# Patient Record
Sex: FEMALE | Race: WHITE | Employment: OTHER | URBAN - METROPOLITAN AREA
[De-identification: names, ages, dates, MRNs, and addresses within clinical notes are randomized per-mention and may not be internally consistent; named-entity substitution may affect disease eponyms.]

---

## 2017-08-12 ENCOUNTER — APPOINTMENT (OUTPATIENT)
Dept: GENERAL RADIOLOGY | Facility: HOSPITAL | Age: 82
DRG: 871 | End: 2017-08-12
Attending: EMERGENCY MEDICINE
Payer: MEDICARE

## 2017-08-12 ENCOUNTER — HOSPITAL ENCOUNTER (INPATIENT)
Facility: HOSPITAL | Age: 82
LOS: 2 days | Discharge: HOME HEALTH | DRG: 871 | End: 2017-08-14
Attending: EMERGENCY MEDICINE | Admitting: HOSPITALIST
Payer: MEDICARE

## 2017-08-12 ENCOUNTER — APPOINTMENT (OUTPATIENT)
Dept: CT IMAGING | Facility: HOSPITAL | Age: 82
DRG: 871 | End: 2017-08-12
Attending: EMERGENCY MEDICINE
Payer: MEDICARE

## 2017-08-12 DIAGNOSIS — J18.9 COMMUNITY ACQUIRED PNEUMONIA: ICD-10-CM

## 2017-08-12 DIAGNOSIS — G25.2 COARSE TREMORS: Primary | ICD-10-CM

## 2017-08-12 LAB
ALBUMIN SERPL-MCNC: 3.1 G/DL (ref 3.5–4.8)
ALLENS TEST: POSITIVE
ALP LIVER SERPL-CCNC: 86 U/L (ref 55–142)
ALT SERPL-CCNC: 15 U/L (ref 14–54)
APTT PPP: 32.2 SECONDS (ref 25–34)
ARTERIAL BLD GAS O2 SATURATION: 93 % (ref 92–100)
ARTERIAL BLOOD GAS BASE EXCESS: 0.8
ARTERIAL BLOOD GAS HCO3: 24.5 MEQ/L (ref 22–26)
ARTERIAL BLOOD GAS PCO2: 36 MM HG (ref 35–45)
ARTERIAL BLOOD GAS PH: 7.45 (ref 7.35–7.45)
ARTERIAL BLOOD GAS PO2: 68 MM HG (ref 80–105)
AST SERPL-CCNC: 34 U/L (ref 15–41)
BASOPHILS # BLD AUTO: 0.03 X10(3) UL (ref 0–0.1)
BASOPHILS NFR BLD AUTO: 0.2 %
BILIRUB SERPL-MCNC: 0.5 MG/DL (ref 0.1–2)
BILIRUB UR QL STRIP.AUTO: NEGATIVE
BUN BLD-MCNC: 25 MG/DL (ref 8–20)
CALCIUM BLD-MCNC: 8.6 MG/DL (ref 8.3–10.3)
CALCULATED O2 SATURATION: 94 % (ref 92–100)
CARBOXYHEMOGLOBIN: 1 % SAT (ref 0–3)
CHLORIDE: 101 MMOL/L (ref 101–111)
CLARITY UR REFRACT.AUTO: CLEAR
CO2: 26 MMOL/L (ref 22–32)
COLOR UR AUTO: YELLOW
CREAT BLD-MCNC: 1.4 MG/DL (ref 0.55–1.02)
EOSINOPHIL # BLD AUTO: 0.01 X10(3) UL (ref 0–0.3)
EOSINOPHIL NFR BLD AUTO: 0.1 %
ERYTHROCYTE [DISTWIDTH] IN BLOOD BY AUTOMATED COUNT: 13.7 % (ref 11.5–16)
GLUCOSE BLD-MCNC: 123 MG/DL (ref 70–99)
GLUCOSE UR STRIP.AUTO-MCNC: NEGATIVE MG/DL
HCT VFR BLD AUTO: 42.7 % (ref 34–50)
HGB BLD-MCNC: 14.1 G/DL (ref 12–16)
IMMATURE GRANULOCYTE COUNT: 0.09 X10(3) UL (ref 0–1)
IMMATURE GRANULOCYTE RATIO %: 0.7 %
INR BLD: 1.05 (ref 0.89–1.11)
KETONES UR STRIP.AUTO-MCNC: NEGATIVE MG/DL
L/M: 2 L/MIN
LACTIC ACID: 1.5 MMOL/L (ref 0.5–2)
LEUKOCYTE ESTERASE UR QL STRIP.AUTO: NEGATIVE
LYMPHOCYTES # BLD AUTO: 2.3 X10(3) UL (ref 0.9–4)
LYMPHOCYTES NFR BLD AUTO: 17.2 %
M PROTEIN MFR SERPL ELPH: 7.6 G/DL (ref 6.1–8.3)
MCH RBC QN AUTO: 29.9 PG (ref 27–33.2)
MCHC RBC AUTO-ENTMCNC: 33 G/DL (ref 31–37)
MCV RBC AUTO: 90.5 FL (ref 81–100)
METHEMOGLOBIN: 0.7 % SAT (ref 0.4–1.5)
MONOCYTES # BLD AUTO: 0.56 X10(3) UL (ref 0.1–0.6)
MONOCYTES NFR BLD AUTO: 4.2 %
NEUTROPHIL ABS PRELIM: 10.39 X10 (3) UL (ref 1.3–6.7)
NEUTROPHILS # BLD AUTO: 10.39 X10(3) UL (ref 1.3–6.7)
NEUTROPHILS NFR BLD AUTO: 77.6 %
NITRITE UR QL STRIP.AUTO: NEGATIVE
PATIENT TEMPERATURE: 98.2 F
PH UR STRIP.AUTO: 6 [PH] (ref 4.5–8)
PLATELET # BLD AUTO: 229 10(3)UL (ref 150–450)
POTASSIUM SERPL-SCNC: 5.1 MMOL/L (ref 3.6–5.1)
PROCALCITONIN SERPL-MCNC: <0.11 NG/ML (ref ?–0.11)
PROT UR STRIP.AUTO-MCNC: >=500 MG/DL
PSA SERPL DL<=0.01 NG/ML-MCNC: 13.7 SECONDS (ref 12–14.3)
RBC # BLD AUTO: 4.72 X10(6)UL (ref 3.8–5.1)
RBC UR QL AUTO: NEGATIVE
RED CELL DISTRIBUTION WIDTH-SD: 45.3 FL (ref 35.1–46.3)
SODIUM SERPL-SCNC: 134 MMOL/L (ref 136–144)
SP GR UR STRIP.AUTO: 1.01 (ref 1–1.03)
TOTAL HEMOGLOBIN: 14 G/DL (ref 11.7–16)
TROPONIN: <0.046 NG/ML (ref ?–0.05)
UROBILINOGEN UR STRIP.AUTO-MCNC: <2 MG/DL
WBC # BLD AUTO: 13.4 X10(3) UL (ref 4–13)

## 2017-08-12 PROCEDURE — 99223 1ST HOSP IP/OBS HIGH 75: CPT | Performed by: HOSPITALIST

## 2017-08-12 PROCEDURE — 71010 XR CHEST AP PORTABLE  (CPT=71010): CPT | Performed by: EMERGENCY MEDICINE

## 2017-08-12 PROCEDURE — 70450 CT HEAD/BRAIN W/O DYE: CPT | Performed by: EMERGENCY MEDICINE

## 2017-08-12 RX ORDER — ACETAMINOPHEN 325 MG/1
650 TABLET ORAL EVERY 6 HOURS PRN
Status: DISCONTINUED | OUTPATIENT
Start: 2017-08-12 | End: 2017-08-14

## 2017-08-12 RX ORDER — ASPIRIN 81 MG/1
81 TABLET, CHEWABLE ORAL DAILY
Status: DISCONTINUED | OUTPATIENT
Start: 2017-08-12 | End: 2017-08-14

## 2017-08-12 RX ORDER — IBUPROFEN 600 MG/1
600 TABLET ORAL EVERY 4 HOURS PRN
Status: DISCONTINUED | OUTPATIENT
Start: 2017-08-12 | End: 2017-08-14

## 2017-08-12 RX ORDER — ACETAMINOPHEN 500 MG
1000 TABLET ORAL ONCE
Status: DISCONTINUED | OUTPATIENT
Start: 2017-08-12 | End: 2017-08-14

## 2017-08-12 RX ORDER — DILTIAZEM HYDROCHLORIDE 240 MG/1
240 CAPSULE, COATED, EXTENDED RELEASE ORAL DAILY
Status: DISCONTINUED | OUTPATIENT
Start: 2017-08-12 | End: 2017-08-14

## 2017-08-12 RX ORDER — IBUPROFEN 200 MG
200 TABLET ORAL EVERY 4 HOURS PRN
Status: DISCONTINUED | OUTPATIENT
Start: 2017-08-12 | End: 2017-08-14

## 2017-08-12 RX ORDER — LOSARTAN POTASSIUM 50 MG/1
50 TABLET ORAL DAILY
Status: DISCONTINUED | OUTPATIENT
Start: 2017-08-12 | End: 2017-08-14

## 2017-08-12 RX ORDER — LORAZEPAM 2 MG/ML
0.5 INJECTION INTRAMUSCULAR ONCE
Status: COMPLETED | OUTPATIENT
Start: 2017-08-12 | End: 2017-08-12

## 2017-08-12 RX ORDER — SODIUM CHLORIDE 9 MG/ML
INJECTION, SOLUTION INTRAVENOUS CONTINUOUS
Status: DISCONTINUED | OUTPATIENT
Start: 2017-08-12 | End: 2017-08-14

## 2017-08-12 RX ORDER — IBUPROFEN 400 MG/1
400 TABLET ORAL EVERY 4 HOURS PRN
Status: DISCONTINUED | OUTPATIENT
Start: 2017-08-12 | End: 2017-08-14

## 2017-08-12 RX ORDER — DEXTROSE MONOHYDRATE 25 G/50ML
50 INJECTION, SOLUTION INTRAVENOUS
Status: DISCONTINUED | OUTPATIENT
Start: 2017-08-12 | End: 2017-08-14

## 2017-08-12 RX ORDER — ENOXAPARIN SODIUM 100 MG/ML
30 INJECTION SUBCUTANEOUS DAILY
Status: DISCONTINUED | OUTPATIENT
Start: 2017-08-12 | End: 2017-08-14

## 2017-08-12 RX ORDER — IRBESARTAN 150 MG/1
150 TABLET ORAL NIGHTLY
Status: ON HOLD | COMMUNITY
End: 2018-05-15

## 2017-08-12 RX ORDER — ONDANSETRON 2 MG/ML
4 INJECTION INTRAMUSCULAR; INTRAVENOUS EVERY 4 HOURS PRN
Status: DISCONTINUED | OUTPATIENT
Start: 2017-08-12 | End: 2017-08-14

## 2017-08-12 RX ORDER — LEVOTHYROXINE SODIUM 0.05 MG/1
50 TABLET ORAL
Status: DISCONTINUED | OUTPATIENT
Start: 2017-08-12 | End: 2017-08-14

## 2017-08-12 NOTE — ED NOTES
Pt to ED via EMS for c/o tremors that started this AM. Denies pain. Daughter at bedside, sts pt lives with a caregiver at [facility] AT [place].  Pt walks with a walker and noticed tremors this AM

## 2017-08-12 NOTE — ED PROVIDER NOTES
Patient Seen in: [facility] Emergency Department    History   Patient presents with:  Numbness Weakness (neurologic)    Stated Complaint: Tremors    HPI    26-year-old female presents for evaluation of tremors.   Patient is a fairly poor historian bu Tremors  Other systems are as noted in HPI. Constitutional and vital signs reviewed. All other systems reviewed and negative except as noted above. PSFH elements reviewed from today and agreed except as otherwise stated in HPI.     Physical Exam Absolute 10.39 (*)     All other components within normal limits   TROPONIN I - Normal   PROCALCITONIN - Normal   CBC WITH DIFFERENTIAL WITH PLATELET    Narrative: The following orders were created for panel order CBC WITH DIFFERENTIAL WITH PLATELET. IVPB-minibag/addVantage (not administered)   azithromycin (ZITHROMAX) 500 mg in sodium chloride 0.9 % 250 mL IVPB add-vantage (500 mg Intravenous Handoff 8/12/17 2110)   0.9%  NaCl infusion (not administered)   Enoxaparin Sodium (LOVENOX) 30 MG/0.3ML injec

## 2017-08-12 NOTE — ED NOTES
Daughter arrives to bedside. Refused tylenol for pt. Daughter educated on why tylenol was ordered. Daughter continues to refuse. Asked daughter why she does not want her Mom to have tylenol. Daughter sts \"I don't want her to have it. She doesn't need it. \

## 2017-08-13 LAB
BUN BLD-MCNC: 21 MG/DL (ref 8–20)
CALCIUM BLD-MCNC: 7.8 MG/DL (ref 8.3–10.3)
CHLORIDE: 105 MMOL/L (ref 101–111)
CO2: 27 MMOL/L (ref 22–32)
CREAT BLD-MCNC: 1.21 MG/DL (ref 0.55–1.02)
ERYTHROCYTE [DISTWIDTH] IN BLOOD BY AUTOMATED COUNT: 13.6 % (ref 11.5–16)
EST. AVERAGE GLUCOSE BLD GHB EST-MCNC: 134 MG/DL (ref 68–126)
GLUCOSE BLD-MCNC: 109 MG/DL (ref 65–99)
GLUCOSE BLD-MCNC: 112 MG/DL (ref 65–99)
GLUCOSE BLD-MCNC: 113 MG/DL (ref 65–99)
GLUCOSE BLD-MCNC: 118 MG/DL (ref 70–99)
GLUCOSE BLD-MCNC: 126 MG/DL (ref 65–99)
HBA1C MFR BLD HPLC: 6.3 % (ref ?–5.7)
HCT VFR BLD AUTO: 41.1 % (ref 34–50)
HGB BLD-MCNC: 13.8 G/DL (ref 12–16)
LACTIC ACID: 0.9 MMOL/L (ref 0.5–2)
LACTIC ACID: 1.7 MMOL/L (ref 0.5–2)
MCH RBC QN AUTO: 30.1 PG (ref 27–33.2)
MCHC RBC AUTO-ENTMCNC: 33.6 G/DL (ref 31–37)
MCV RBC AUTO: 89.5 FL (ref 81–100)
PLATELET # BLD AUTO: 226 10(3)UL (ref 150–450)
POTASSIUM SERPL-SCNC: 3.4 MMOL/L (ref 3.6–5.1)
RBC # BLD AUTO: 4.59 X10(6)UL (ref 3.8–5.1)
RED CELL DISTRIBUTION WIDTH-SD: 45 FL (ref 35.1–46.3)
SODIUM SERPL-SCNC: 138 MMOL/L (ref 136–144)
WBC # BLD AUTO: 12.7 X10(3) UL (ref 4–13)

## 2017-08-13 PROCEDURE — 99232 SBSQ HOSP IP/OBS MODERATE 35: CPT | Performed by: HOSPITALIST

## 2017-08-13 RX ORDER — CARVEDILOL 12.5 MG/1
12.5 TABLET ORAL 2 TIMES DAILY WITH MEALS
Status: DISCONTINUED | OUTPATIENT
Start: 2017-08-13 | End: 2017-08-14

## 2017-08-13 RX ORDER — LOSARTAN POTASSIUM 50 MG/1
50 TABLET ORAL DAILY
Status: DISCONTINUED | OUTPATIENT
Start: 2017-08-13 | End: 2017-08-13

## 2017-08-13 RX ORDER — PREGABALIN 75 MG/1
75 CAPSULE ORAL 2 TIMES DAILY
Status: DISCONTINUED | OUTPATIENT
Start: 2017-08-13 | End: 2017-08-14

## 2017-08-13 NOTE — PROGRESS NOTES
NURSING ADMISSION NOTE      Patient admitted via Cart   History and PTA meds per pt daughter. Orders received  Oriented to room. Safety precautions initiated. Bed in low position. Call light in reach. 0630--pt slept throughout night.  Pt mouth breat

## 2017-08-13 NOTE — PROGRESS NOTES
[facility] Pharmacy Note: Renal dose adjustment for Enoxaparin (Lovenox)  Salena Cantrell has been prescribed Enoxaparin (Lovenox)  40 mg subcutaneously every 24 hours. Estimated Creatinine Clearance: 27.7 mL/min (based on SCr of 1.4 mg/dL).     Her calculat

## 2017-08-13 NOTE — ED NOTES
Enter pt room. Pt continues to tremor. Pt has 5-6 blankets on her. Daughter educated again on fever management and possible source of tremors. Pt is getting hot. Daughter continues to refuse tylenol. Request for daughter to remove blankets.  MD is at bedsid

## 2017-08-13 NOTE — PAYOR COMM NOTE
--------------  ADMISSION REVIEW     Payor: MEDICARE Alysia Douglass  Subscriber #:  [id number]  Authorization Number: N/A    Admit date: 8/12/17  Admit time: 2113       Admitting Physician: Hemalatha Rojas MD  Attending Physician:  Edy Will MD  Primary Ca HCl ER Coated Beads 180 MG Oral Capsule SR 24 Hr,  Take 240 mg by mouth daily. hydrochlorothiazide 25 MG Oral Tab,  Take 25 mg by mouth daily.    Levothyroxine Sodium 50 MCG Oral Tab,  Take 50 mcg by mouth before breakfast.   aspirin 81 MG Oral Tab,  Ta Notable for the following:        Result Value    Glucose 123 (*)     BUN 25 (*)     Creatinine 1.40 (*)     GFR 36 (*)     Albumin 3.1 (*)     Sodium 134 (*)     All other components within normal limits   URINALYSIS WITH CULTURE REFLEX - Abnormal; Notabl at 15.  She appears mildly dehydrated. Chest x-ray suggests a pneumonia. Daughter and patient deny that she has a cough. I do notice that her O2 saturation is slightly low on room air at 91-92%. Blood cultures obtained and antibiotics given. [MM. 4]  D/W seemed to be more in the upper extremities. She denies any chest pain or shortness of breath. She denies abdominal pain, nausea vomiting diarrhea. She denies acute skin changes or rashes. She denied lightheadedness dizziness or syncope.   There is no ne Rfl:      Physical Exam:    BP (!) 166/100   Pulse 86   Temp 100.1 °F (37.8 °C) (Oral)   Resp 22   Ht 162.6 cm (5' 4\")   Wt 200 lb (90.7 kg)   SpO2 94%   BMI 34.33 kg/m²    General: No acute distress. [MD.1]  Ill-appearing with shaking chills[MD.2]  HEENT: meds[MD.2]    Quality:  · DVT Prophylaxis:[MD.1] lovenox[MD.2]  · CODE status:[MD.1] full[MD.2]  · Alberto:[MD.1] no[MD.2]  Plan of care discussed with[MD.1] patient, Lula Servin MD  8/12/2017[MD.1]          Electronically signed by Potassium (COZAAR) tab 50 mg     Date Action Dose Route User    8/13/2017 0822 Given 50 mg Oral Ladene Dense, RN      potassium chloride 40 mEq in sodium chloride 0.9 % 250 mL IVPB     Date Action Dose Route User    8/13/2017 0526 New Bag 40 mEq Intr

## 2017-08-13 NOTE — PHYSICAL THERAPY NOTE
PHYSICAL THERAPY EVALUATION - INPATIENT     Room Number: 407/407-A  Evaluation Date: 8/13/2017  Type of Evaluation: Initial  Physician Order: PT Eval and Treat    Presenting Problem: tremors/pneumonia/sepsis  Reason for Therapy: Mobility Dysfunction an extremity ROM is within functional limits     Lower extremity strength is within functional limits. Strength is grossly 3/5.  B dorsiflexion is 3- to 3/5    BALANCE  Static Sitting: Good  Dynamic Sitting: Fair +  Static Standing: Fair -  Dynamic Standing: P ft to bedside chair with rolling walker and min assist. One loss of balance with therapist needed correction to stay upright. Stand to sit with cga. Pt left in chair with call light. Nursing aware and daughter present.      Exercise/Education Provided:  Bed able to demonstrate transfers Sit to/from Stand at assistance level: minimum assistance     Goal #3 Patient is able to ambulate 100 feet with assist device: walker - rolling at assistance level: supervision     Goal #4    Goal #5    Goal #6    Goal Comment

## 2017-08-13 NOTE — PROGRESS NOTES
[facility]  Progress Note     Marilu Fernandez Patient Status:  Inpatient    1935 MRN [id number]   [facility] 4NW-A Attending Angelica Junior MD   Hosp Day # 1 PCP Regine Hernandez     Chief Complaint: tremors, confusion    S: P Coated Beads  240 mg Oral Daily   • Losartan Potassium  50 mg Oral Daily   • Levothyroxine Sodium  50 mcg Oral Before breakfast   • cefTRIAXone  1 g Intravenous Q24H   • azithromycin  500 mg Intravenous Q24H   • enoxaparin  30 mg Subcutaneous Daily   • Ins

## 2017-08-13 NOTE — H&P
[facility]  History and Physical     Katia Cisneros Patient Status:  Emergency    1935 MRN [id number]   Location [facility] [facility] Attending Martínez Jc, *   Hosp Day # 0 PCP Theodore Fargo     Chief Com 50 MG Oral Tab Take by mouth daily. Disp:  Rfl:    Linagliptin 5 MG Oral Tab Take by mouth. Disp:  Rfl:    pregabalin 75 MG Oral Cap Take 75 mg by mouth 2 (two) times daily.  Disp:  Rfl:    DiltiaZEM HCl ER Coated Beads 180 MG Oral Capsule SR 24 Hr Take 240 72 hours. Recent Labs   Lab  08/12/17   1732   TROP  <0.046     Imaging: Imaging data reviewed in Epic. ASSESSMENT / PLAN:   1. RLL Pneumonia with fever, RLL infiltrate, elevated WBC  1. IV Abx  2. F/U Bllod sputum cx  2.  Possible sepsis POA 2/2 above  1

## 2017-08-13 NOTE — CM/SW NOTE
08/13/17 1600   CM/SW Referral Data   Referral Source Physician   Reason for Referral Discharge planning   Informant Children      Method of Interpretation Legal Guardian/POA declined    Translated To Assessment/Screening   Pertine

## 2017-08-13 NOTE — PLAN OF CARE
Diabetes/Glucose Control    • Glucose maintained within prescribed range Progressing        Impaired Functional Mobility    • Achieve highest/safest level of mobility/gait Progressing        RESPIRATORY - ADULT    • Achieves optimal ventilation and oxygena

## 2017-08-14 VITALS
HEART RATE: 63 BPM | WEIGHT: 200 LBS | OXYGEN SATURATION: 100 % | TEMPERATURE: 98 F | BODY MASS INDEX: 34.15 KG/M2 | HEIGHT: 64 IN | DIASTOLIC BLOOD PRESSURE: 58 MMHG | RESPIRATION RATE: 18 BRPM | SYSTOLIC BLOOD PRESSURE: 121 MMHG

## 2017-08-14 LAB
ATRIAL RATE: 88 BPM
BUN BLD-MCNC: 22 MG/DL (ref 8–20)
CALCIUM BLD-MCNC: 8.3 MG/DL (ref 8.3–10.3)
CHLORIDE: 112 MMOL/L (ref 101–111)
CO2: 25 MMOL/L (ref 22–32)
CREAT BLD-MCNC: 1.11 MG/DL (ref 0.55–1.02)
ERYTHROCYTE [DISTWIDTH] IN BLOOD BY AUTOMATED COUNT: 13.6 % (ref 11.5–16)
GLUCOSE BLD-MCNC: 100 MG/DL (ref 65–99)
GLUCOSE BLD-MCNC: 91 MG/DL (ref 70–99)
GLUCOSE BLD-MCNC: 92 MG/DL (ref 65–99)
HCT VFR BLD AUTO: 38.6 % (ref 34–50)
HGB BLD-MCNC: 12.2 G/DL (ref 12–16)
MCH RBC QN AUTO: 29.5 PG (ref 27–33.2)
MCHC RBC AUTO-ENTMCNC: 31.6 G/DL (ref 31–37)
MCV RBC AUTO: 93.5 FL (ref 81–100)
PLATELET # BLD AUTO: 192 10(3)UL (ref 150–450)
POTASSIUM SERPL-SCNC: 4.1 MMOL/L (ref 3.6–5.1)
POTASSIUM SERPL-SCNC: 4.1 MMOL/L (ref 3.6–5.1)
Q-T INTERVAL: 348 MS
QRS DURATION: 82 MS
QTC CALCULATION (BEZET): 418 MS
R AXIS: -24 DEGREES
RBC # BLD AUTO: 4.13 X10(6)UL (ref 3.8–5.1)
RED CELL DISTRIBUTION WIDTH-SD: 46.7 FL (ref 35.1–46.3)
SODIUM SERPL-SCNC: 144 MMOL/L (ref 136–144)
T AXIS: 32 DEGREES
VENTRICULAR RATE: 87 BPM
WBC # BLD AUTO: 9.5 X10(3) UL (ref 4–13)

## 2017-08-14 PROCEDURE — 99239 HOSP IP/OBS DSCHRG MGMT >30: CPT | Performed by: HOSPITALIST

## 2017-08-14 RX ORDER — AZITHROMYCIN 250 MG/1
250 TABLET, FILM COATED ORAL DAILY
Qty: 3 TABLET | Refills: 0 | Status: SHIPPED | OUTPATIENT
Start: 2017-08-14 | End: 2017-08-17

## 2017-08-14 NOTE — CM/SW NOTE
SW spoke w/pt's daughter and informed her PT and OT both stated the pt is doing much better and the pt can dc home w/home health. NEFTALY asked about home health. Pt's daughter stated \"we already have a company, look up the name. \" Informed her SW was not able

## 2017-08-14 NOTE — OCCUPATIONAL THERAPY NOTE
OCCUPATIONAL THERAPY EVALUATION - INPATIENT     Room Number: 407/407-A  Evaluation Date: 8/14/2017  Type of Evaluation: Initial  Presenting Problem: PNA, sepsis, abdominal pain    Physician Order: IP Consult to Occupational Therapy  Reason for Therapy: ADL assistance    VISION  Current Vision: low vision    PERCEPTION  Overall Perception Status:   WFL - within functional limits    SENSATION  Light touch:  intact    Communication: [place] speaking, phone  used    Behavioral/Emotional/Social: moses female admitted 8/12/2017 for PNA, sepsis. The AM-TORRIE ' '6-Clicks' Inpatient Daily Activity Short Form was completed and  this patient  is demonstrating a  43% degree impairment in activities of daily living.  Research supports that patients with this level

## 2017-08-14 NOTE — PROGRESS NOTES
NURSING DISCHARGE NOTE    Discharged Home via Wheelchair. Accompanied by Family member  Belongings Taken by patient/family. AVS, prescriptions discussed with pt and pt's daughter. Demonstrated understanding. Abx prescription called in to pharmacy.

## 2017-08-14 NOTE — PROGRESS NOTES
[facility]  Progress Note     Apolonia García Patient Status:  Inpatient    1935 MRN [id number]   [facility]NW[facility]A [facility] Alverna Serum     Chief Complaint: SOB    S: Patient Lab  08/12/17   2142   PTP  13.7   INR  1.05       Recent Labs   Lab  08/12/17   1732   TROP  <0.046            Imaging: Imaging data reviewed in Epic.     Medications:   • carvedilol  12.5 mg Oral BID with meals   • pregabalin  75 mg Oral BID   • acetami

## 2017-08-14 NOTE — CM/SW NOTE
SW spoke w/pt's daughter who confirmed she would like a referral sent [facility]. Pt may need to discharge under her medicaid benefits as she does not appear to have a 3 midnight inpatient stay.

## 2017-08-14 NOTE — PHYSICAL THERAPY NOTE
PHYSICAL THERAPY TREATMENT NOTE - INPATIENT    Room Number: 407/407-A     Session: 1   Number of Visits to Meet Established Goals: 5    Presenting Problem: tremors/pneumonia/sepsis     Reason for Therapy: Mobility Dysfunction and Discharge Planning     Hi in hospital room?: None   -   Climbing 3-5 steps with a railing?: A Little       AM-PAC Score:  Raw Score: 23   PT Approx Degree of Impairment Score: 11.2%   Standardized Score (AM-PAC Scale): 56.93   CMS Modifier (G-Code): CI    FUNCTIONAL ABILITY STATUS training;Strengthening;Transfer training;Balance training  Rehab Potential : Fair  Frequency (Obs): 5x/week    CURRENT GOALS        Goal #1 Patient is able to demonstrate supine - sit EOB @ level: minimum assistance   Goal #2 Patient is able to demonstrate

## 2017-08-15 NOTE — CM/SW NOTE
08/15/17 0900   Discharge disposition   Discharged to: Home or Self   Discharge transportation Private car   NEFTALY called pt's MD office and was told the pt is current w/[facility]  [phone].  NEFTALY faxed resume [facility] orders to [phone]

## 2017-08-17 NOTE — DISCHARGE SUMMARY
[facility]PITAL[facility]  DISCHARGE SUMMARY     Madyson Hernandez Patient Status:  Inpatient    1935 MRN [id number]   [facility] 4NW-A Attending No att. providers found   Hosp Day # 2 PCP Raquel Spatz     Date of Admission: 2017  Trino injury and hyponatremia improved with IV fluids. Patient was placed on hyperglycemia protocol for her diabetes. Sepsis resolved with IV fluids and patient was restarted on her Coreg, Cardizem, losartan the day after admission.   Patient's essential tremor as: COZAAR      Take by mouth daily. Refills:  0     Lutein 40 MG Caps      Take by mouth. Refills:  0     pregabalin 75 MG Caps  Commonly known as:  LYRICA      Take 75 mg by mouth 2 (two) times daily.    Refills:  0     Vitamin D3 56697 units Caps

## 2018-05-12 ENCOUNTER — HOSPITAL ENCOUNTER (INPATIENT)
Facility: HOSPITAL | Age: 83
LOS: 6 days | Discharge: HOME | DRG: 189 | End: 2018-05-18
Attending: EMERGENCY MEDICINE | Admitting: HOSPITALIST
Payer: MEDICARE

## 2018-05-12 ENCOUNTER — APPOINTMENT (OUTPATIENT)
Dept: GENERAL RADIOLOGY | Facility: HOSPITAL | Age: 83
DRG: 189 | End: 2018-05-12
Payer: MEDICARE

## 2018-05-12 DIAGNOSIS — J98.01 ACUTE BRONCHOSPASM: ICD-10-CM

## 2018-05-12 DIAGNOSIS — B97.4 RESPIRATORY SYNCYTIAL VIRUS (RSV): ICD-10-CM

## 2018-05-12 DIAGNOSIS — J18.9 COMMUNITY ACQUIRED PNEUMONIA, UNSPECIFIED LATERALITY: Primary | ICD-10-CM

## 2018-05-12 DIAGNOSIS — N28.9 RENAL INSUFFICIENCY: ICD-10-CM

## 2018-05-12 PROBLEM — B33.8 RESPIRATORY SYNCYTIAL VIRUS (RSV): Status: ACTIVE | Noted: 2018-05-12

## 2018-05-12 LAB
ADENOVIRUS PCR:: NEGATIVE
ALBUMIN SERPL-MCNC: 2.9 G/DL (ref 3.5–4.8)
ALLENS TEST: POSITIVE
ALP LIVER SERPL-CCNC: 62 U/L (ref 55–142)
ALT SERPL-CCNC: 27 U/L (ref 14–54)
ARTERIAL BLD GAS O2 SATURATION: 90 % (ref 92–100)
ARTERIAL BLOOD GAS BASE EXCESS: -2
ARTERIAL BLOOD GAS HCO3: 23.2 MEQ/L (ref 22–26)
ARTERIAL BLOOD GAS PCO2: 40 MM HG (ref 35–45)
ARTERIAL BLOOD GAS PH: 7.38 (ref 7.35–7.45)
ARTERIAL BLOOD GAS PO2: 60 MM HG (ref 80–105)
AST SERPL-CCNC: 58 U/L (ref 15–41)
B PERT DNA SPEC QL NAA+PROBE: NEGATIVE
BASOPHILS # BLD AUTO: 0.02 X10(3) UL (ref 0–0.1)
BASOPHILS NFR BLD AUTO: 0.3 %
BILIRUB SERPL-MCNC: 0.3 MG/DL (ref 0.1–2)
BUN BLD-MCNC: 36 MG/DL (ref 8–20)
C PNEUM DNA SPEC QL NAA+PROBE: NEGATIVE
CALCIUM BLD-MCNC: 8.3 MG/DL (ref 8.3–10.3)
CALCULATED O2 SATURATION: 91 % (ref 92–100)
CARBOXYHEMOGLOBIN: 1 % SAT (ref 0–3)
CHLORIDE: 102 MMOL/L (ref 101–111)
CO2: 29 MMOL/L (ref 22–32)
CORONAVIRUS 229E PCR:: NEGATIVE
CORONAVIRUS HKU1 PCR:: NEGATIVE
CORONAVIRUS NL63 PCR:: NEGATIVE
CORONAVIRUS OC43 PCR:: NEGATIVE
CREAT BLD-MCNC: 1.57 MG/DL (ref 0.55–1.02)
EOSINOPHIL # BLD AUTO: 0.11 X10(3) UL (ref 0–0.3)
EOSINOPHIL NFR BLD AUTO: 1.5 %
ERYTHROCYTE [DISTWIDTH] IN BLOOD BY AUTOMATED COUNT: 16.4 % (ref 11.5–16)
EST. AVERAGE GLUCOSE BLD GHB EST-MCNC: 140 MG/DL (ref 68–126)
FLUAV RNA SPEC QL NAA+PROBE: NEGATIVE
FLUBV RNA SPEC QL NAA+PROBE: NEGATIVE
GLUCOSE BLD-MCNC: 118 MG/DL (ref 70–99)
GLUCOSE BLD-MCNC: 294 MG/DL (ref 65–99)
HBA1C MFR BLD HPLC: 6.5 % (ref ?–5.7)
HCT VFR BLD AUTO: 41.6 % (ref 34–50)
HGB BLD-MCNC: 13.5 G/DL (ref 12–16)
IMMATURE GRANULOCYTE COUNT: 0.04 X10(3) UL (ref 0–1)
IMMATURE GRANULOCYTE RATIO %: 0.5 %
IONIZED CALCIUM: 1.13 MMOL/L (ref 1.12–1.32)
L/M: 5 L/MIN
LACTIC ACID ARTERIAL: 3.2 MMOL/L (ref 0.5–2)
LACTIC ACID: 0.9 MMOL/L (ref 0.5–2)
LYMPHOCYTES # BLD AUTO: 3.3 X10(3) UL (ref 0.9–4)
LYMPHOCYTES NFR BLD AUTO: 44.8 %
M PROTEIN MFR SERPL ELPH: 6.8 G/DL (ref 6.1–8.3)
MCH RBC QN AUTO: 29.4 PG (ref 27–33.2)
MCHC RBC AUTO-ENTMCNC: 32.5 G/DL (ref 31–37)
MCV RBC AUTO: 90.6 FL (ref 81–100)
METAPNEUMOVIRUS PCR:: NEGATIVE
METHEMOGLOBIN: 0.7 % SAT (ref 0.4–1.5)
MONOCYTES # BLD AUTO: 0.71 X10(3) UL (ref 0.1–1)
MONOCYTES NFR BLD AUTO: 9.6 %
MYCOPLASMA PNEUMONIA PCR:: NEGATIVE
NEUTROPHIL ABS PRELIM: 3.18 X10 (3) UL (ref 1.3–6.7)
NEUTROPHILS # BLD AUTO: 3.18 X10(3) UL (ref 1.3–6.7)
NEUTROPHILS NFR BLD AUTO: 43.3 %
P/F RATIO: 301.7 MMHG
PARAINFLUENZA 1 PCR:: NEGATIVE
PARAINFLUENZA 2 PCR:: NEGATIVE
PARAINFLUENZA 3 PCR:: NEGATIVE
PARAINFLUENZA 4 PCR:: NEGATIVE
PATIENT TEMPERATURE: 97.1 F
PLATELET # BLD AUTO: 192 10(3)UL (ref 150–450)
POTASSIUM BLOOD GAS: 3.4 MMOL/L (ref 3.6–5.1)
POTASSIUM SERPL-SCNC: 3.6 MMOL/L (ref 3.6–5.1)
RBC # BLD AUTO: 4.59 X10(6)UL (ref 3.8–5.1)
RED CELL DISTRIBUTION WIDTH-SD: 54.4 FL (ref 35.1–46.3)
RHINOVIRUS/ENTERO PCR:: NEGATIVE
RSV RNA SPEC QL NAA+PROBE: POSITIVE
SODIUM BLOOD GAS: 136 MMOL/L (ref 136–144)
SODIUM SERPL-SCNC: 138 MMOL/L (ref 136–144)
TOTAL HEMOGLOBIN: 13.1 G/DL (ref 11.7–16)
TROPONIN: <0.046 NG/ML (ref ?–0.05)
WBC # BLD AUTO: 7.4 X10(3) UL (ref 4–13)

## 2018-05-12 PROCEDURE — 99223 1ST HOSP IP/OBS HIGH 75: CPT | Performed by: HOSPITALIST

## 2018-05-12 PROCEDURE — 71045 X-RAY EXAM CHEST 1 VIEW: CPT | Performed by: EMERGENCY MEDICINE

## 2018-05-12 RX ORDER — IPRATROPIUM BROMIDE AND ALBUTEROL SULFATE 2.5; .5 MG/3ML; MG/3ML
3 SOLUTION RESPIRATORY (INHALATION) ONCE
Status: COMPLETED | OUTPATIENT
Start: 2018-05-12 | End: 2018-05-12

## 2018-05-12 RX ORDER — METHYLPREDNISOLONE SODIUM SUCCINATE 125 MG/2ML
125 INJECTION, POWDER, LYOPHILIZED, FOR SOLUTION INTRAMUSCULAR; INTRAVENOUS ONCE
Status: COMPLETED | OUTPATIENT
Start: 2018-05-12 | End: 2018-05-12

## 2018-05-12 RX ORDER — IPRATROPIUM BROMIDE AND ALBUTEROL SULFATE 2.5; .5 MG/3ML; MG/3ML
3 SOLUTION RESPIRATORY (INHALATION)
Status: DISCONTINUED | OUTPATIENT
Start: 2018-05-12 | End: 2018-05-13

## 2018-05-12 RX ORDER — METOCLOPRAMIDE HYDROCHLORIDE 5 MG/ML
5 INJECTION INTRAMUSCULAR; INTRAVENOUS EVERY 8 HOURS PRN
Status: DISCONTINUED | OUTPATIENT
Start: 2018-05-12 | End: 2018-05-18

## 2018-05-12 RX ORDER — ASPIRIN 81 MG/1
81 TABLET, CHEWABLE ORAL DAILY
Status: DISCONTINUED | OUTPATIENT
Start: 2018-05-13 | End: 2018-05-18

## 2018-05-12 RX ORDER — PREGABALIN 75 MG/1
75 CAPSULE ORAL 2 TIMES DAILY
Status: DISCONTINUED | OUTPATIENT
Start: 2018-05-12 | End: 2018-05-18

## 2018-05-12 RX ORDER — DEXTROSE MONOHYDRATE 25 G/50ML
50 INJECTION, SOLUTION INTRAVENOUS
Status: DISCONTINUED | OUTPATIENT
Start: 2018-05-12 | End: 2018-05-18

## 2018-05-12 RX ORDER — HEPARIN SODIUM 5000 [USP'U]/ML
5000 INJECTION, SOLUTION INTRAVENOUS; SUBCUTANEOUS EVERY 8 HOURS SCHEDULED
Status: DISCONTINUED | OUTPATIENT
Start: 2018-05-12 | End: 2018-05-18

## 2018-05-12 RX ORDER — DILTIAZEM HYDROCHLORIDE 240 MG/1
240 CAPSULE, COATED, EXTENDED RELEASE ORAL NIGHTLY
Status: DISCONTINUED | OUTPATIENT
Start: 2018-05-12 | End: 2018-05-18

## 2018-05-12 RX ORDER — SODIUM CHLORIDE 9 MG/ML
INJECTION, SOLUTION INTRAVENOUS CONTINUOUS
Status: DISCONTINUED | OUTPATIENT
Start: 2018-05-12 | End: 2018-05-16

## 2018-05-12 RX ORDER — METHYLPREDNISOLONE SODIUM SUCCINATE 40 MG/ML
40 INJECTION, POWDER, LYOPHILIZED, FOR SOLUTION INTRAMUSCULAR; INTRAVENOUS EVERY 6 HOURS
Status: DISCONTINUED | OUTPATIENT
Start: 2018-05-12 | End: 2018-05-13

## 2018-05-12 RX ORDER — ONDANSETRON 2 MG/ML
4 INJECTION INTRAMUSCULAR; INTRAVENOUS EVERY 6 HOURS PRN
Status: DISCONTINUED | OUTPATIENT
Start: 2018-05-12 | End: 2018-05-18

## 2018-05-12 RX ORDER — ACETAMINOPHEN 325 MG/1
650 TABLET ORAL EVERY 6 HOURS PRN
Status: DISCONTINUED | OUTPATIENT
Start: 2018-05-12 | End: 2018-05-18

## 2018-05-12 RX ORDER — LEVOTHYROXINE SODIUM 0.05 MG/1
50 TABLET ORAL
Status: DISCONTINUED | OUTPATIENT
Start: 2018-05-13 | End: 2018-05-18

## 2018-05-12 NOTE — ED NOTES
[phone] [id number] [phone]

## 2018-05-12 NOTE — ED INITIAL ASSESSMENT (HPI)
Pt was seen by PCP today and sent in for fever, wheezing and rhonchi even after treatment of five days on levaquin for bronchitis.  Pt noted to have an sp02 of 88% on RA

## 2018-05-12 NOTE — ED NOTES
Inpatient Dr Royal Rueda requests that patient remain in ED for another hour long breathing treatment prior to floor transfer. Floor RN aware.

## 2018-05-12 NOTE — H&P
[facility]  History and Physical     Kezia Angulo Patient Status:  Emergency    1935 MRN [id number]   Location 656 [facility] Attending Derrick Hobbs MD   Hosp Day # 0 PCP Dariana Avina     Chief Complaint: Teresa Cortes Oral Tab Take 200 mg by mouth daily. Disp:  Rfl:    carvedilol 12.5 MG Oral Tab Take 12.5 mg by mouth 2 (two) times daily with meals. Disp:  Rfl:    Losartan Potassium 50 MG Oral Tab Take by mouth daily.  Disp:  Rfl:    Linagliptin 5 MG Oral Tab Take by edis isolation  9. Pulmonary consultation  2. Acute kidney injury on chronic kidney disease  1. IVF  2. Monitor UOP, creatinine and electrolytes  3. Essential hypertension  1. Diltiazem  2. Monitor blood pressure and heart rate  4. Diabetes mellitus  1.  Correct

## 2018-05-13 ENCOUNTER — APPOINTMENT (OUTPATIENT)
Dept: GENERAL RADIOLOGY | Facility: HOSPITAL | Age: 83
DRG: 189 | End: 2018-05-13
Attending: INTERNAL MEDICINE
Payer: MEDICARE

## 2018-05-13 LAB
ALBUMIN SERPL-MCNC: 2.5 G/DL (ref 3.5–4.8)
ALLENS TEST: POSITIVE
ALP LIVER SERPL-CCNC: 57 U/L (ref 55–142)
ALT SERPL-CCNC: 23 U/L (ref 14–54)
APTT PPP: 28.2 SECONDS (ref 26.1–34.6)
ARTERIAL BLD GAS O2 SATURATION: 96 % (ref 92–100)
ARTERIAL BLOOD GAS BASE EXCESS: -5.3
ARTERIAL BLOOD GAS HCO3: 19.4 MEQ/L (ref 22–26)
ARTERIAL BLOOD GAS PCO2: 34 MM HG (ref 35–45)
ARTERIAL BLOOD GAS PH: 7.37 (ref 7.35–7.45)
ARTERIAL BLOOD GAS PO2: 89 MM HG (ref 80–105)
AST SERPL-CCNC: 33 U/L (ref 15–41)
ATRIAL RATE: 66 BPM
BASOPHILS # BLD AUTO: 0 X10(3) UL (ref 0–0.1)
BASOPHILS NFR BLD AUTO: 0 %
BILIRUB SERPL-MCNC: 0.2 MG/DL (ref 0.1–2)
BUN BLD-MCNC: 42 MG/DL (ref 8–20)
BUN BLD-MCNC: 54 MG/DL (ref 8–20)
CALCIUM BLD-MCNC: 8.3 MG/DL (ref 8.3–10.3)
CALCIUM BLD-MCNC: 8.6 MG/DL (ref 8.3–10.3)
CALCULATED O2 SATURATION: 97 % (ref 92–100)
CARBOXYHEMOGLOBIN: 0.5 % SAT (ref 0–3)
CHLORIDE: 107 MMOL/L (ref 101–111)
CHLORIDE: 107 MMOL/L (ref 101–111)
CO2: 22 MMOL/L (ref 22–32)
CO2: 23 MMOL/L (ref 22–32)
CREAT BLD-MCNC: 1.71 MG/DL (ref 0.55–1.02)
CREAT BLD-MCNC: 2.21 MG/DL (ref 0.55–1.02)
EOSINOPHIL # BLD AUTO: 0 X10(3) UL (ref 0–0.3)
EOSINOPHIL NFR BLD AUTO: 0 %
ERYTHROCYTE [DISTWIDTH] IN BLOOD BY AUTOMATED COUNT: 16.2 % (ref 11.5–16)
FIO2: 40 %
GLUCOSE BLD-MCNC: 207 MG/DL (ref 70–99)
GLUCOSE BLD-MCNC: 213 MG/DL (ref 65–99)
GLUCOSE BLD-MCNC: 233 MG/DL (ref 70–99)
GLUCOSE BLD-MCNC: 248 MG/DL (ref 65–99)
GLUCOSE BLD-MCNC: 253 MG/DL (ref 65–99)
GLUCOSE BLD-MCNC: 262 MG/DL (ref 65–99)
GLUCOSE BLD-MCNC: 327 MG/DL (ref 65–99)
GLUCOSE BLD-MCNC: 353 MG/DL (ref 65–99)
HCT VFR BLD AUTO: 37.8 % (ref 34–50)
HGB BLD-MCNC: 12.5 G/DL (ref 12–16)
IMMATURE GRANULOCYTE COUNT: 0.03 X10(3) UL (ref 0–1)
IMMATURE GRANULOCYTE RATIO %: 0.8 %
INR BLD: 1.01 (ref 0.9–1.1)
IONIZED CALCIUM: 1.12 MMOL/L (ref 1.12–1.32)
L/M: 25 L/MIN
LACTIC ACID ARTERIAL: 6.5 MMOL/L (ref 0.5–2)
LACTIC ACID: 3.4 MMOL/L (ref 0.5–2)
LACTIC ACID: 4.9 MMOL/L (ref 0.5–2)
LACTIC ACID: 5.8 MMOL/L (ref 0.5–2)
LACTIC ACID: 7.3 MMOL/L (ref 0.5–2)
LYMPHOCYTES # BLD AUTO: 0.81 X10(3) UL (ref 0.9–4)
LYMPHOCYTES NFR BLD AUTO: 20.6 %
M PROTEIN MFR SERPL ELPH: 6.2 G/DL (ref 6.1–8.3)
MCH RBC QN AUTO: 29.9 PG (ref 27–33.2)
MCHC RBC AUTO-ENTMCNC: 33.1 G/DL (ref 31–37)
MCV RBC AUTO: 90.4 FL (ref 81–100)
METHEMOGLOBIN: 0.7 % SAT (ref 0.4–1.5)
MONOCYTES # BLD AUTO: 0.11 X10(3) UL (ref 0.1–1)
MONOCYTES NFR BLD AUTO: 2.8 %
NEUTROPHIL ABS PRELIM: 2.99 X10 (3) UL (ref 1.3–6.7)
NEUTROPHILS # BLD AUTO: 2.99 X10(3) UL (ref 1.3–6.7)
NEUTROPHILS NFR BLD AUTO: 75.8 %
P AXIS: 35 DEGREES
P-R INTERVAL: 188 MS
P/F RATIO: 233 MMHG
PATIENT TEMPERATURE: 97.2 F
PLATELET # BLD AUTO: 172 10(3)UL (ref 150–450)
POTASSIUM BLOOD GAS: 3.2 MMOL/L (ref 3.6–5.1)
POTASSIUM SERPL-SCNC: 3.3 MMOL/L (ref 3.6–5.1)
POTASSIUM SERPL-SCNC: 3.6 MMOL/L (ref 3.6–5.1)
PRO-BETA NATRIURETIC PEPTIDE: 400 PG/ML (ref ?–450)
PROCALCITONIN SERPL-MCNC: <0.11 NG/ML (ref ?–0.11)
PROCALCITONIN SERPL-MCNC: <0.11 NG/ML (ref ?–0.11)
PSA SERPL DL<=0.01 NG/ML-MCNC: 13.7 SECONDS (ref 12.4–14.7)
Q-T INTERVAL: 404 MS
QRS DURATION: 88 MS
QTC CALCULATION (BEZET): 423 MS
R AXIS: -22 DEGREES
RBC # BLD AUTO: 4.18 X10(6)UL (ref 3.8–5.1)
RED CELL DISTRIBUTION WIDTH-SD: 53.8 FL (ref 35.1–46.3)
SODIUM BLOOD GAS: 139 MMOL/L (ref 136–144)
SODIUM SERPL-SCNC: 139 MMOL/L (ref 136–144)
SODIUM SERPL-SCNC: 140 MMOL/L (ref 136–144)
T AXIS: 48 DEGREES
TOTAL HEMOGLOBIN: 12.8 G/DL (ref 11.7–16)
VENTRICULAR RATE: 66 BPM
WBC # BLD AUTO: 3.9 X10(3) UL (ref 4–13)

## 2018-05-13 PROCEDURE — 71045 X-RAY EXAM CHEST 1 VIEW: CPT | Performed by: INTERNAL MEDICINE

## 2018-05-13 PROCEDURE — 99233 SBSQ HOSP IP/OBS HIGH 50: CPT | Performed by: HOSPITALIST

## 2018-05-13 RX ORDER — POLYETHYLENE GLYCOL 3350 17 G/17G
17 POWDER, FOR SOLUTION ORAL DAILY
Status: DISCONTINUED | OUTPATIENT
Start: 2018-05-13 | End: 2018-05-17

## 2018-05-13 RX ORDER — IPRATROPIUM BROMIDE AND ALBUTEROL SULFATE 2.5; .5 MG/3ML; MG/3ML
3 SOLUTION RESPIRATORY (INHALATION)
Status: DISCONTINUED | OUTPATIENT
Start: 2018-05-14 | End: 2018-05-18

## 2018-05-13 RX ORDER — IPRATROPIUM BROMIDE AND ALBUTEROL SULFATE 2.5; .5 MG/3ML; MG/3ML
3 SOLUTION RESPIRATORY (INHALATION) EVERY 4 HOURS PRN
Status: DISCONTINUED | OUTPATIENT
Start: 2018-05-13 | End: 2018-05-18

## 2018-05-13 RX ORDER — DOCUSATE SODIUM 100 MG/1
100 CAPSULE, LIQUID FILLED ORAL 2 TIMES DAILY
Status: DISCONTINUED | OUTPATIENT
Start: 2018-05-13 | End: 2018-05-17

## 2018-05-13 RX ORDER — IPRATROPIUM BROMIDE AND ALBUTEROL SULFATE 2.5; .5 MG/3ML; MG/3ML
3 SOLUTION RESPIRATORY (INHALATION)
Status: DISCONTINUED | OUTPATIENT
Start: 2018-05-13 | End: 2018-05-13

## 2018-05-13 RX ORDER — METHYLPREDNISOLONE SODIUM SUCCINATE 125 MG/2ML
80 INJECTION, POWDER, LYOPHILIZED, FOR SOLUTION INTRAMUSCULAR; INTRAVENOUS EVERY 8 HOURS
Status: DISCONTINUED | OUTPATIENT
Start: 2018-05-13 | End: 2018-05-14

## 2018-05-13 NOTE — ED PROVIDER NOTES
Patient Seen in: [facility] 5nw-a    History   Patient presents with:  Fever (infectious)    Stated Complaint: bronchitis and fever for 5 days.      HPI    Patient is a 42-year-old female who on Monday developed cough and slight shortness of breath a equal round reactive to light and accommodation. Mouth normal, neck supple, no meningismus. LUNGS: Lungs coarse wheezes  bilaterally. CARDIOVASCULAR: + S1-S2, regular rate and rhythm, no murmurs. BACK: No CVA tenderness, no midline bony tenderness.   AB results for these tests on the individual orders.    SCAN SLIDE   HEMOGLOBIN A1C   RAINBOW DRAW BLUE   RAINBOW DRAW LAVENDER   RAINBOW DRAW LIGHT GREEN   RAINBOW DRAW GOLD   BLOOD CULTURE   BLOOD CULTURE   SPUTUM CULTURE     EKG    Rate, intervals and axes acquired pneumonia, unspecified laterality J18.9 5/12/2018     Acute bronchospasm J98.01 Unknown     Community acquired pneumonia J18.9 8/12/2017 Unknown    Renal insufficiency N28.9 5/12/2018     Respiratory syncytial virus (RSV) B97.4 5/12/2018

## 2018-05-13 NOTE — PROGRESS NOTES
[facility] Pharmacy Note:  Renal Dose Adjustment for Metoclopramide (REGLAN)    Azul Ochoa has been prescribed Metoclopramide (REGLAN) 10 mg every 8 hours as needed for nausea/vomiting. Estimated Creatinine Clearance: 24.3 mL/min (A) (based on SCr of 1.

## 2018-05-13 NOTE — CONSULTS
[facility]  Report of Consultation    Ariacody Floresron Patient Status:  Inpatient    1935 MRN [id number]   [facility] 4SW-A Attending Ronald Nelson MD   Hosp Day # 1 PCP Neda Jessica     Reason for Consultation:  Hypoxic r vomiting no diarrhea no ankle swelling. She is concerned about her hyperglycemia. She does carry diagnosis of diabetes hypertension.   She is known to have episodes of palpitations for which she takes as needed medication details unknown and reportedly MG Oral Tab Take by mouth daily. Disp:  Rfl:  Not Taking   hydrochlorothiazide 25 MG Oral Tab Take 25 mg by mouth daily. Disp:  Rfl:  Not Taking   Lutein 40 MG Oral Cap Take by mouth.  Disp:  Rfl:  Not Taking       Review of Systems:  With the help of her noman 05/12/18 1551 - - - - - 92 % - -   05/12/18 1545 153/62 (!) 97.1 °F (36.2 °C) Temporal 72 20 (!) 88 % 5' 4\" (1.626 m) 197 lb (89.4 kg)           Physical Exam:   General: alert, cooperative, oriented. No respiratory distress.   Intermittent harsh cough infiltrate  Left base actually improved from 8/17    Assessment and Plan:  Patient Active Problem List:     Elevated serum immunoglobulin free light chain level     Anemia     Coarse tremors     Pneumonia of right lower lobe due to infectious organism [facility]

## 2018-05-13 NOTE — CM/SW NOTE
05/13/18 1000   CM/SW Referral Data   Referral Source Physician;Social Work (self-referral)   Reason for Referral Discharge planning   Informant Other  (Chart Review)   Pertinent Medical Hx   Primary Care Physician Name Srinath Carr   Patient Info   Ángel

## 2018-05-13 NOTE — PLAN OF CARE
RESPIRATORY - ADULT    • Achieves optimal ventilation and oxygenation Progressing          PROBLEM: PNA, bronchitis    ASSESSMENT: Pt is A&O x4. [place]-speaking only, speaks very little [place].  Tearful and anxious this AM. Maintaining O2 sat WNL on Vapot

## 2018-05-13 NOTE — PROGRESS NOTES
NURSING ADMISSION NOTE      Patient admitted via Cart  Oriented to room 532  Safety precautions initiated. Bed in low position. Call light in reach. Pt A/O x 4, primarily [place] speaking, understands some [place]. Admission database completed.  Fa

## 2018-05-13 NOTE — PLAN OF CARE
Received pt from floor on Vapotherm at 40% and 25L. Predominately [place] speaking. Corinne, RN able to help with mild translation. A&Ox4 with no complaints of pain. All other VSS.  Daughter on way to hospital.

## 2018-05-13 NOTE — PROGRESS NOTES
[facility]  Progress Note     Zo Cooper Patient Status:  Inpatient    1935 MRN [id number]   [facility] 5NW-A Attending Lo Horn MD   Hosp Day # 1 PCP Jake Jones     Chief Complaint: Respiratory failure    S: Intravenous Q24H   • ipratropium-albuterol  3 mL Nebulization 6 times per day       ASSESSMENT / PLAN:     1. Acute hypoxic respiratory failure d/t RSV with acute bronchospasm, possible pneumonia though treated with abx recently and PCT negative  1.  IV abx

## 2018-05-13 NOTE — PLAN OF CARE
PAIN - ADULT    • Verbalizes/displays adequate comfort level or patient's stated pain goal Progressing        RESPIRATORY - ADULT    • Achieves optimal ventilation and oxygenation Progressing        SAFETY ADULT - FALL    • Free from fall injury Progressin

## 2018-05-13 NOTE — ED NOTES
EDMD decides that patient requires vapotherm. EDRN contacts house supervisor to verify that these facilities exist in that room.  House supervisor agrees that they do indeed exist.

## 2018-05-14 ENCOUNTER — APPOINTMENT (OUTPATIENT)
Dept: GENERAL RADIOLOGY | Facility: HOSPITAL | Age: 83
DRG: 189 | End: 2018-05-14
Attending: INTERNAL MEDICINE
Payer: MEDICARE

## 2018-05-14 ENCOUNTER — APPOINTMENT (OUTPATIENT)
Dept: CV DIAGNOSTICS | Facility: HOSPITAL | Age: 83
DRG: 189 | End: 2018-05-14
Attending: INTERNAL MEDICINE
Payer: MEDICARE

## 2018-05-14 ENCOUNTER — APPOINTMENT (OUTPATIENT)
Dept: ULTRASOUND IMAGING | Facility: HOSPITAL | Age: 83
DRG: 189 | End: 2018-05-14
Attending: INTERNAL MEDICINE
Payer: MEDICARE

## 2018-05-14 LAB
ALBUMIN SERPL-MCNC: 2.5 G/DL (ref 3.5–4.8)
ALP LIVER SERPL-CCNC: 55 U/L (ref 55–142)
ALT SERPL-CCNC: 22 U/L (ref 14–54)
AST SERPL-CCNC: 23 U/L (ref 15–41)
BASOPHILS # BLD AUTO: 0.02 X10(3) UL (ref 0–0.1)
BASOPHILS NFR BLD AUTO: 0.2 %
BILIRUB SERPL-MCNC: 0.2 MG/DL (ref 0.1–2)
BILIRUB UR QL STRIP.AUTO: NEGATIVE
BUN BLD-MCNC: 56 MG/DL (ref 8–20)
CALCIUM BLD-MCNC: 8.3 MG/DL (ref 8.3–10.3)
CHLORIDE: 109 MMOL/L (ref 101–111)
CO2: 21 MMOL/L (ref 22–32)
COLOR UR AUTO: YELLOW
CREAT BLD-MCNC: 2.09 MG/DL (ref 0.55–1.02)
CREAT UR-SCNC: 266 MG/DL
EOSINOPHIL # BLD AUTO: 0 X10(3) UL (ref 0–0.3)
EOSINOPHIL NFR BLD AUTO: 0 %
ERYTHROCYTE [DISTWIDTH] IN BLOOD BY AUTOMATED COUNT: 16.8 % (ref 11.5–16)
GLUCOSE BLD-MCNC: 143 MG/DL (ref 65–99)
GLUCOSE BLD-MCNC: 196 MG/DL (ref 65–99)
GLUCOSE BLD-MCNC: 203 MG/DL (ref 65–99)
GLUCOSE BLD-MCNC: 205 MG/DL (ref 65–99)
GLUCOSE BLD-MCNC: 217 MG/DL (ref 70–99)
GLUCOSE UR STRIP.AUTO-MCNC: 50 MG/DL
HCT VFR BLD AUTO: 36.5 % (ref 34–50)
HGB BLD-MCNC: 11.8 G/DL (ref 12–16)
HYALINE CASTS #/AREA URNS AUTO: PRESENT /LPF
IMMATURE GRANULOCYTE COUNT: 0.1 X10(3) UL (ref 0–1)
IMMATURE GRANULOCYTE RATIO %: 0.8 %
KETONES UR STRIP.AUTO-MCNC: NEGATIVE MG/DL
LACTIC ACID: 2.7 MMOL/L (ref 0.5–2)
LACTIC ACID: 2.8 MMOL/L (ref 0.5–2)
LEUKOCYTE ESTERASE UR QL STRIP.AUTO: NEGATIVE
LYMPHOCYTES # BLD AUTO: 0.89 X10(3) UL (ref 0.9–4)
LYMPHOCYTES NFR BLD AUTO: 7.1 %
M PROTEIN MFR SERPL ELPH: 6 G/DL (ref 6.1–8.3)
MCH RBC QN AUTO: 29.6 PG (ref 27–33.2)
MCHC RBC AUTO-ENTMCNC: 32.3 G/DL (ref 31–37)
MCV RBC AUTO: 91.7 FL (ref 81–100)
MONOCYTES # BLD AUTO: 0.46 X10(3) UL (ref 0.1–1)
MONOCYTES NFR BLD AUTO: 3.7 %
NEUTROPHIL ABS PRELIM: 11 X10 (3) UL (ref 1.3–6.7)
NEUTROPHILS # BLD AUTO: 11 X10(3) UL (ref 1.3–6.7)
NEUTROPHILS NFR BLD AUTO: 88.2 %
NITRITE UR QL STRIP.AUTO: NEGATIVE
OSMOLALITY URINE: 410 MOSM/KG (ref 300–1300)
PH UR STRIP.AUTO: 5 [PH] (ref 4.5–8)
PLATELET # BLD AUTO: 183 10(3)UL (ref 150–450)
POTASSIUM SERPL-SCNC: 3.5 MMOL/L (ref 3.6–5.1)
PROT UR STRIP.AUTO-MCNC: 100 MG/DL
RBC # BLD AUTO: 3.98 X10(6)UL (ref 3.8–5.1)
RBC UR QL AUTO: NEGATIVE
RED CELL DISTRIBUTION WIDTH-SD: 56.8 FL (ref 35.1–46.3)
SODIUM SERPL-SCNC: 11 MMOL/L
SODIUM SERPL-SCNC: 139 MMOL/L (ref 136–144)
SP GR UR STRIP.AUTO: 1.02 (ref 1–1.03)
UROBILINOGEN UR STRIP.AUTO-MCNC: <2 MG/DL
WBC # BLD AUTO: 12.5 X10(3) UL (ref 4–13)

## 2018-05-14 PROCEDURE — 99233 SBSQ HOSP IP/OBS HIGH 50: CPT | Performed by: HOSPITALIST

## 2018-05-14 PROCEDURE — 76770 US EXAM ABDO BACK WALL COMP: CPT | Performed by: INTERNAL MEDICINE

## 2018-05-14 PROCEDURE — 71045 X-RAY EXAM CHEST 1 VIEW: CPT | Performed by: INTERNAL MEDICINE

## 2018-05-14 PROCEDURE — 93306 TTE W/DOPPLER COMPLETE: CPT | Performed by: INTERNAL MEDICINE

## 2018-05-14 PROCEDURE — 99223 1ST HOSP IP/OBS HIGH 75: CPT | Performed by: INTERNAL MEDICINE

## 2018-05-14 RX ORDER — METHYLPREDNISOLONE SODIUM SUCCINATE 125 MG/2ML
60 INJECTION, POWDER, LYOPHILIZED, FOR SOLUTION INTRAMUSCULAR; INTRAVENOUS EVERY 8 HOURS
Status: DISCONTINUED | OUTPATIENT
Start: 2018-05-14 | End: 2018-05-16

## 2018-05-14 RX ORDER — GUAIFENESIN 600 MG
600 TABLET, EXTENDED RELEASE 12 HR ORAL 2 TIMES DAILY
Status: DISCONTINUED | OUTPATIENT
Start: 2018-05-14 | End: 2018-05-18

## 2018-05-14 RX ORDER — SENNOSIDES 8.6 MG
8.6 TABLET ORAL 2 TIMES DAILY
Status: DISCONTINUED | OUTPATIENT
Start: 2018-05-14 | End: 2018-05-17

## 2018-05-14 RX ORDER — BUDESONIDE 0.5 MG/2ML
0.5 INHALANT ORAL
Status: DISCONTINUED | OUTPATIENT
Start: 2018-05-14 | End: 2018-05-18

## 2018-05-14 RX ORDER — ARIPIPRAZOLE 15 MG/1
40 TABLET ORAL ONCE
Status: COMPLETED | OUTPATIENT
Start: 2018-05-14 | End: 2018-05-14

## 2018-05-14 NOTE — PLAN OF CARE
PAIN - ADULT    • Verbalizes/displays adequate comfort level or patient's stated pain goal Completed          RESPIRATORY - ADULT    • Achieves optimal ventilation and oxygenation Progressing        SAFETY ADULT - FALL    • Free from fall injury Progressin

## 2018-05-14 NOTE — CONSULTS
[facility]  Report of Consultation    Katia Cisneros Patient Status:  Inpatient    1935 MRN [id number]   [facility] 4SW-A Attending Albino Morocho MD   Hosp Day # 2 PCP Jl Salinas     Reason for Consultation:  RENEA    Hist (CARDIZEM CD) 24 hr cap 240 mg, 240 mg, Oral, Nightly  •  Levothyroxine Sodium (SYNTHROID) tab 50 mcg, 50 mcg, Oral, Before breakfast  •  aspirin chewable tab 81 mg, 81 mg, Oral, Daily  •  glucose (DEX4) oral liquid 15 g, 15 g, Oral, Q15 Min PRN **OR** Glu nondistended. Positive bowel sounds. No rebound tenderness   Neurologic: No focal neurological deficits. Musculoskeletal: Full range of motion of all extremities. No swelling noted  Integument: No lesions. No erythema.   Psychiatric: Appropriate mood an clarify  4. DM  5. Obesity    Thank you for allowing me to participate in this patient's care. Please feel free to call me with any questions or concerns.     Baltazar Seip, MD  5/14/2018  9:02 AM

## 2018-05-14 NOTE — PROGRESS NOTES
[facility]  Progress Note     Aditi Jalloh Patient Status:  Inpatient    1935 MRN [id number]   [facility] 5NW-A Attending Katerin Vogt MD   Hosp Day # 2 PCP Christine Kim     Chief Complaint: Respiratory failure    S: Subcutaneous TID CC   • potassium chloride  40 mEq Oral Once   • Senna  8.6 mg Oral BID   • guaiFENesin  100 mg Oral Q6H   • MethylPREDNISolone Sodium Succ  80 mg Intravenous Q8H   • PEG 3350  17 g Oral Daily   • docusate sodium  100 mg Oral BID   • ipratr

## 2018-05-14 NOTE — PLAN OF CARE
ANXIETY    • Will report anxiety at manageable levels Not Progressing          METABOLIC/FLUID AND ELECTROLYTES - ADULT    • Electrolytes maintained within normal limits Not Progressing          RESPIRATORY - ADULT    • Achieves optimal ventilation and oxy

## 2018-05-14 NOTE — PROGRESS NOTES
Brief Note:  Labs noted with worsening renal function and elevated blood glucose  Plan:  Nephrology consultation, await renal US  Increase Levemir and add carb coverage  Jerry VARNER

## 2018-05-14 NOTE — PLAN OF CARE
Received pt this am aox4. [place] speaking; used interpretor to [employer]. Pt has no complaints. Weaned oxygen to 2L plan to transfer out of ICU when bed available. Unable to give sq heparin prior to transfer, receiving rn aware.     CARDIOVASCULAR - AD

## 2018-05-14 NOTE — PROGRESS NOTES
[facility]  Progress Note    Sreedhar Cheng Patient Status:  Inpatient    1935 MRN [id number]   [facility] 4SW-A Attending Emily Espinosa MD   Hosp Day # 2 PCP Amy Garcia     Impression    1.  Acute hypoxic respiratory 99 %  O2 Device: High flow nasal cannula  FiO2 (%): 40 %  O2 Flow Rate (L/min): 4 L/min  Pulse Oximetry Type: Continuous  Oximetry Probe Site Changed: No  Pulse Ox Probe Location: Left hand  Blood pressure 123/48, pulse 80, temperature 98 °F (36.7 °C), tem 89   ABGHCO3  19.4*   ABGBE  -5.3   TEMP  97.2   AMAURY  Positive   SITE  Right Radial   DEV  High flow nasal cannula   THGB  12.8       Cultures:    Radiology:      Medications Reviewed:    Current Facility-Administered Medications:  insulin detemir (LEVE Q6H PRN   Insulin Aspart Pen (NOVOLOG) 100 UNIT/ML flexpen 4-20 Units 4-20 Units Subcutaneous TID CC and HS   ondansetron HCl (ZOFRAN) injection 4 mg 4 mg Intravenous Q6H PRN   Metoclopramide HCl (REGLAN) injection 5 mg 5 mg Intravenous Q8H PRN   azithromy

## 2018-05-14 NOTE — PAYOR COMM NOTE
--------------  ADMISSION REVIEW     Payor: MEDICARE Ernesto Hernandez  Subscriber #:  [id number]  Authorization Number: N/A    Admit date: 5/12/18  Admit time: 2047       Admitting Physician: Andrew Corral MD  Attending Physician:  Andrew Corral MD  Primary 16.4 (*)     RDW-SD 54.4 (*)     All other components within normal limits   LACTIC ACID, PLASMA - Normal   TROPONIN I - Normal   CBC WITH DIFFERENTIAL WITH PLATELET    Narrative:      The following orders were created for panel order CBC WITH DIFFERENTIAL Date Action Dose Route User    5/14/2018 0836 Given 81 mg Oral Oneil Hein, RN      azithromycin (ZITHROMAX) 500 mg in sodium chloride 0.9 % 250 mL IVPB     Date Action Dose Route User    5/13/2018 1834 New Bag 500 mg Intravenous Dean Orozco, RN      insulin detemir (LEVEMIR) 100 UNIT/ML flextouch 15 Units     Date Action Dose Route User    5/14/2018 0837 Given 15 Units Subcutaneous (Left Upper Arm) Naz Hein RN      ipratropium-albuterol (DUONEB) nebulizer solution 3 mL     Date Ac steroids as needed  Continue inhaled bronchodilators on a frequent basis   plan to continue antibiotics-at this time and consider de-escalating a.m. if improving, lactic acidosis has improved and pro-calcitonin remains negative.   In the interim will wean F

## 2018-05-15 ENCOUNTER — APPOINTMENT (OUTPATIENT)
Dept: ULTRASOUND IMAGING | Facility: HOSPITAL | Age: 83
DRG: 189 | End: 2018-05-15
Attending: INTERNAL MEDICINE
Payer: MEDICARE

## 2018-05-15 ENCOUNTER — APPOINTMENT (OUTPATIENT)
Dept: GENERAL RADIOLOGY | Facility: HOSPITAL | Age: 83
DRG: 189 | End: 2018-05-15
Attending: INTERNAL MEDICINE
Payer: MEDICARE

## 2018-05-15 LAB
BASOPHILS # BLD AUTO: 0.03 X10(3) UL (ref 0–0.1)
BASOPHILS NFR BLD AUTO: 0.2 %
BUN BLD-MCNC: 46 MG/DL (ref 8–20)
CALCIUM BLD-MCNC: 8.2 MG/DL (ref 8.3–10.3)
CHLORIDE: 111 MMOL/L (ref 101–111)
CO2: 21 MMOL/L (ref 22–32)
CREAT BLD-MCNC: 1.65 MG/DL (ref 0.55–1.02)
EOSINOPHIL # BLD AUTO: 0 X10(3) UL (ref 0–0.3)
EOSINOPHIL NFR BLD AUTO: 0 %
ERYTHROCYTE [DISTWIDTH] IN BLOOD BY AUTOMATED COUNT: 17.1 % (ref 11.5–16)
GLUCOSE BLD-MCNC: 143 MG/DL (ref 65–99)
GLUCOSE BLD-MCNC: 169 MG/DL (ref 65–99)
GLUCOSE BLD-MCNC: 190 MG/DL (ref 65–99)
GLUCOSE BLD-MCNC: 217 MG/DL (ref 70–99)
GLUCOSE BLD-MCNC: 240 MG/DL (ref 65–99)
HAV IGM SER QL: 2.6 MG/DL (ref 1.8–2.5)
HCT VFR BLD AUTO: 34.9 % (ref 34–50)
HGB BLD-MCNC: 11.1 G/DL (ref 12–16)
IMMATURE GRANULOCYTE COUNT: 0.28 X10(3) UL (ref 0–1)
IMMATURE GRANULOCYTE RATIO %: 2.2 %
LYMPHOCYTES # BLD AUTO: 0.89 X10(3) UL (ref 0.9–4)
LYMPHOCYTES NFR BLD AUTO: 7.1 %
MCH RBC QN AUTO: 29 PG (ref 27–33.2)
MCHC RBC AUTO-ENTMCNC: 31.8 G/DL (ref 31–37)
MCV RBC AUTO: 91.1 FL (ref 81–100)
MONOCYTES # BLD AUTO: 0.52 X10(3) UL (ref 0.1–1)
MONOCYTES NFR BLD AUTO: 4.1 %
NEUTROPHIL ABS PRELIM: 10.82 X10 (3) UL (ref 1.3–6.7)
NEUTROPHILS # BLD AUTO: 10.82 X10(3) UL (ref 1.3–6.7)
NEUTROPHILS NFR BLD AUTO: 86.4 %
PHOSPHATE SERPL-MCNC: 2.5 MG/DL (ref 2.5–4.9)
PLATELET # BLD AUTO: 191 10(3)UL (ref 150–450)
POTASSIUM SERPL-SCNC: 4.4 MMOL/L (ref 3.6–5.1)
RBC # BLD AUTO: 3.83 X10(6)UL (ref 3.8–5.1)
RED CELL DISTRIBUTION WIDTH-SD: 56.7 FL (ref 35.1–46.3)
SODIUM SERPL-SCNC: 143 MMOL/L (ref 136–144)
WBC # BLD AUTO: 12.5 X10(3) UL (ref 4–13)

## 2018-05-15 PROCEDURE — 71045 X-RAY EXAM CHEST 1 VIEW: CPT | Performed by: INTERNAL MEDICINE

## 2018-05-15 PROCEDURE — 99232 SBSQ HOSP IP/OBS MODERATE 35: CPT | Performed by: INTERNAL MEDICINE

## 2018-05-15 PROCEDURE — 05HD33Z INSERTION OF INFUSION DEVICE INTO RIGHT CEPHALIC VEIN, PERCUTANEOUS APPROACH: ICD-10-PCS | Performed by: STUDENT IN AN ORGANIZED HEALTH CARE EDUCATION/TRAINING PROGRAM

## 2018-05-15 PROCEDURE — B54MZZA ULTRASONOGRAPHY OF RIGHT UPPER EXTREMITY VEINS, GUIDANCE: ICD-10-PCS | Performed by: STUDENT IN AN ORGANIZED HEALTH CARE EDUCATION/TRAINING PROGRAM

## 2018-05-15 PROCEDURE — 93970 EXTREMITY STUDY: CPT | Performed by: INTERNAL MEDICINE

## 2018-05-15 PROCEDURE — 99233 SBSQ HOSP IP/OBS HIGH 50: CPT | Performed by: HOSPITALIST

## 2018-05-15 RX ORDER — SODIUM CHLORIDE 0.9 % (FLUSH) 0.9 %
10 SYRINGE (ML) INJECTION EVERY 12 HOURS
Status: DISCONTINUED | OUTPATIENT
Start: 2018-05-15 | End: 2018-05-18

## 2018-05-15 NOTE — PLAN OF CARE
ANXIETY    • Will report anxiety at manageable levels Progressing        CARDIOVASCULAR - ADULT    • Maintains optimal cardiac output and hemodynamic stability Progressing    • Absence of cardiac arrhythmias or at baseline Progressing        METABOLIC/FLUI

## 2018-05-15 NOTE — CM/SW NOTE
05/15/18 1000   CM/SW Referral Data   Referral Source    Reason for Referral Discharge planning   Informant Patient; Children  (daughter Rachel [place])   Pertinent Medical Hx   Primary Care Physician Name Sharyle Flatness   Patient Info   Patient's Mental

## 2018-05-15 NOTE — CONSULTS
[facility]  Progress Note    Chanel De La Cruz Patient Status:  Inpatient    1935 MRN [id number]   [facility] 4SW-A Attending Faviola Tellez MD   Hosp Day # 3 PCP Meghna Plunkett     Chart reviewed  Still feels sob  Generally irr Glucose-Vitamin C (DEX-4) 4-0.006 g chewable tab 8 tablet 8 tablet Oral Q15 Min PRN   CefTRIAXone Sodium (ROCEPHIN) 1 g in sodium chloride 0.9 % 100 mL MBP/ADD-vantage 1 g Intravenous Q24H   Heparin Sodium (Porcine) 5000 UNIT/ML injection 5,000 Units 5,0 --   --   --  2.5         Recent Labs   05/12/18  1547 05/13/18  0559 05/14/18  0414   ALT 27 23 22   AST 58* 33 23   ALB 2.9* 2.5* 2.5*       Renal US; unremarkable    Impression:  1. RENEA/CKD - baseline Cr appears to be ~ 1.2 ; suspect related to meds/H

## 2018-05-15 NOTE — PLAN OF CARE
ANXIETY    • Will report anxiety at manageable levels Progressing        CARDIOVASCULAR - ADULT    • Maintains optimal cardiac output and hemodynamic stability Progressing    • Absence of cardiac arrhythmias or at baseline Progressing        METABOLIC/FLUI requiring resolution prior to discharge: IV abx, oxygen demand    Anticipated discharge date: TBD    Current discharge plan: home?

## 2018-05-15 NOTE — PROGRESS NOTES
[facility]  Progress Note    Elena Ordonez Patient Status:  Inpatient    1935 MRN [id number]   [facility] 5NW-A Attending Alma Robbins MD   Hosp Day # 3 PCP Adalberto Salvador     Impression     1.  Acute hypoxic respiratory fa Grayson Caruso is a(n) 82year old female.    feeling worse    very weak , can not support herself   cough in bouts    no secretions    no pain    fells down today and teary   Pain in arms - IV was changes from right to left -  Objective:  Oxygen Therapy  SpO2: 21.0*  21.0*   ALKPHO  62  57   --   55   --    AST  58*  33   --   23   --    ALT  27  23   --   22   --    BILT  0.3  0.2   --   0.2   --    TP  6.8  6.2   --   6.0*   --      @MG@      Lab Results  Component Value Date   INR 1.01 05/13/2018   INR 1.05 0 Glucose-Vitamin C (DEX-4) 4-0.006 g chewable tab 4 tablet 4 tablet Oral Q15 Min PRN   Or      dextrose 50 % injection 50 mL 50 mL Intravenous Q15 Min PRN   Or      glucose (DEX4) oral liquid 30 g 30 g Oral Q15 Min PRN   Or      Glucose-Vitamin C (DEX-4)

## 2018-05-15 NOTE — PROGRESS NOTES
[facility]  Progress Note     Woody Mattiejacob Patient Status:  Inpatient    1935 MRN [id number]   [facility] 5NW-A Attending Gomez Olea MD   Hosp Day # 3 PCP Wayne Matthews     Chief Complaint: Respiratory failure    S: Lab  05/12/18   1547   TROP  <0.046            Imaging: Imaging data reviewed in Epic.     Medications:   • insulin detemir  15 Units Subcutaneous [email or web address]   • Insulin Aspart Pen  1-68 Units Subcutaneous TID CC   • Senna  8.6 mg Oral BID   • guaiFENe heart rate  4. Diabetes mellitus, A1c 6.5 - uncontrolled d/t steroids  1. Levemir to 15 BID - will need to lower as steroids are tapered  2. Correctional scale  3. Carb factor  4. Accuchecks  5. Hypothyroidism   1. Synthroid  6. Constipation  1.  Bowel care

## 2018-05-16 ENCOUNTER — APPOINTMENT (OUTPATIENT)
Dept: GENERAL RADIOLOGY | Facility: HOSPITAL | Age: 83
DRG: 189 | End: 2018-05-16
Attending: INTERNAL MEDICINE
Payer: MEDICARE

## 2018-05-16 LAB
BAND %: 3 %
BASOPHIL % MANUAL: 0 %
BASOPHIL ABSOLUTE MANUAL: 0 X10(3) UL (ref 0–0.1)
BUN BLD-MCNC: 36 MG/DL (ref 8–20)
CALCIUM BLD-MCNC: 8.7 MG/DL (ref 8.3–10.3)
CHLORIDE: 113 MMOL/L (ref 101–111)
CO2: 24 MMOL/L (ref 22–32)
CREAT BLD-MCNC: 1.24 MG/DL (ref 0.55–1.02)
EOSINOPHIL % MANUAL: 0 %
EOSINOPHIL ABSOLUTE MANUAL: 0 X10(3) UL (ref 0–0.3)
ERYTHROCYTE [DISTWIDTH] IN BLOOD BY AUTOMATED COUNT: 16.8 % (ref 11.5–16)
GLUCOSE BLD-MCNC: 133 MG/DL (ref 70–99)
GLUCOSE BLD-MCNC: 135 MG/DL (ref 65–99)
GLUCOSE BLD-MCNC: 150 MG/DL (ref 65–99)
GLUCOSE BLD-MCNC: 167 MG/DL (ref 65–99)
GLUCOSE BLD-MCNC: 183 MG/DL (ref 65–99)
HAV IGM SER QL: 2.6 MG/DL (ref 1.8–2.5)
HBV SURFACE AG SERPL QL IA: NONREACTIVE
HCT VFR BLD AUTO: 36.5 % (ref 34–50)
HEPATITIS B SURFACE ANTIGEN INDEX: 0.26
HEPATITIS C VIRUS AB INTERPRETATION: NONREACTIVE
HGB BLD-MCNC: 12.4 G/DL (ref 12–16)
HIV1+2 AB SPEC QL IA.RAPID: NONREACTIVE
HIVS EXPOSURE ONLY: NONREACTIVE
LYMPHOCYTE % MANUAL: 7 %
LYMPHOCYTE ABSOLUTE MANUAL: 1 X10(3) UL (ref 0.9–4)
MCH RBC QN AUTO: 30.1 PG (ref 27–33.2)
MCHC RBC AUTO-ENTMCNC: 34 G/DL (ref 31–37)
MCV RBC AUTO: 88.6 FL (ref 81–100)
MONOCYTE % MANUAL: 2 %
MONOCYTE ABSOLUTE MANUAL: 0.29 X10(3) UL (ref 0.1–1)
MORPHOLOGY: NORMAL
NEUTROPHIL ABS PRELIM: 11.46 X10 (3) UL (ref 1.3–6.7)
NEUTROPHIL ABSOLUTE MANUAL: 13.01 X10(3) UL (ref 1.3–6.7)
NEUTROPHILS % MANUAL: 88 %
PHOSPHATE SERPL-MCNC: 2.5 MG/DL (ref 2.5–4.9)
PLATELET # BLD AUTO: 206 10(3)UL (ref 150–450)
PLATELET MORPHOLOGY: NORMAL
POTASSIUM SERPL-SCNC: 3.8 MMOL/L (ref 3.6–5.1)
RBC # BLD AUTO: 4.12 X10(6)UL (ref 3.8–5.1)
RED CELL DISTRIBUTION WIDTH-SD: 54 FL (ref 35.1–46.3)
SODIUM SERPL-SCNC: 147 MMOL/L (ref 136–144)
TOTAL CELLS COUNTED: 100
WBC # BLD AUTO: 14.3 X10(3) UL (ref 4–13)

## 2018-05-16 PROCEDURE — 99232 SBSQ HOSP IP/OBS MODERATE 35: CPT | Performed by: INTERNAL MEDICINE

## 2018-05-16 PROCEDURE — 71045 X-RAY EXAM CHEST 1 VIEW: CPT | Performed by: INTERNAL MEDICINE

## 2018-05-16 PROCEDURE — 99233 SBSQ HOSP IP/OBS HIGH 50: CPT | Performed by: STUDENT IN AN ORGANIZED HEALTH CARE EDUCATION/TRAINING PROGRAM

## 2018-05-16 RX ORDER — METHYLPREDNISOLONE SODIUM SUCCINATE 125 MG/2ML
60 INJECTION, POWDER, LYOPHILIZED, FOR SOLUTION INTRAMUSCULAR; INTRAVENOUS EVERY 12 HOURS
Status: DISCONTINUED | OUTPATIENT
Start: 2018-05-16 | End: 2018-05-17

## 2018-05-16 RX ORDER — POTASSIUM CHLORIDE 20 MEQ/1
40 TABLET, EXTENDED RELEASE ORAL ONCE
Status: COMPLETED | OUTPATIENT
Start: 2018-05-16 | End: 2018-05-16

## 2018-05-16 RX ORDER — HYDRALAZINE HYDROCHLORIDE 20 MG/ML
10 INJECTION INTRAMUSCULAR; INTRAVENOUS EVERY 6 HOURS PRN
Status: DISCONTINUED | OUTPATIENT
Start: 2018-05-16 | End: 2018-05-18

## 2018-05-16 RX ORDER — LOSARTAN POTASSIUM 50 MG/1
50 TABLET ORAL DAILY
Status: DISCONTINUED | OUTPATIENT
Start: 2018-05-16 | End: 2018-05-18

## 2018-05-16 NOTE — PROGRESS NOTES
Multidisciplinary Discharge Rounds held 5/16/2018. Treatment team members present today include , , Charge Nurse,  Nurse, RT, PT and Pharmacy caring for [employer].      Other care providers present:    Patient Active Prob

## 2018-05-16 NOTE — PLAN OF CARE
ANXIETY    • Will report anxiety at manageable levels Progressing        CARDIOVASCULAR - ADULT    • Maintains optimal cardiac output and hemodynamic stability Progressing    • Absence of cardiac arrhythmias or at baseline Progressing        Impaired Activ

## 2018-05-16 NOTE — PAYOR COMM NOTE
--------------  CONTINUED STAY REVIEW    Payor: MEDICARE Mala Truman  Subscriber #:  [id number]  Authorization Number: N/A    Admit date: 5/12/18  Admit time: 2047    Admitting Physician: Awa Kinney MD  Attending Physician:  Hannah Buckley MD  Primary Dose Route User    5/15/2018 2237 Given 240 mg Oral Neace, MEÑO Tuttle      docusate sodium (COLACE) cap 100 mg     Date Action Dose Route User    5/16/2018 0930 Given 100 mg Oral [place] [address]    5/15/2018 2236 Given 100 mg Oral [place] MEÑO Guillaume Jarod Barbosa RN      insulin detemir (LEVEMIR) 100 UNIT/ML flextouch 15 Units     Date Action Dose Route User    5/16/2018 0930 Given 15 Units Subcutaneous (Right Lower Abdomen) Lisa Turk RN    5/15/2018 2233 Given 15 Units Subcutaneous (Right L Plan: 5/15  · Plan at this time to use modest steroids as needed - cont IV steroid same dose today   · Continue inhaled bronchodilators on a frequent basis  · Plan to continue antibiotics 3 days   · In the interim will wean FiO2 as able -on 2 l now

## 2018-05-16 NOTE — PHYSICAL THERAPY NOTE
PHYSICAL THERAPY QUICK EVALUATION - INPATIENT    Room Number: 366/707-X  Evaluation Date: 5/16/2018  Presenting Problem: SOB  Physician Order: PT Eval and Treat    Problem List  Principal Problem:    Community acquired pneumonia, unspecified laterality and from a bed to a chair (including a wheelchair)?: None   -   Need to walk in hospital room?: A Little   -   Climbing 3-5 steps with a railing?: A Little       AM-PAC Score:  Raw Score: 22   PT Approx Degree of Impairment Score: 20.91%   Standardized Sco aide will communicate with overseeing PT regarding any change in functional mobility. RN aware. GOALS  Patient was able to achieve the following goals . ..     Patient was able to transfer Safely and independently   Patient able to ambulate on level

## 2018-05-16 NOTE — OCCUPATIONAL THERAPY NOTE
OCCUPATIONAL THERAPY EVALUATION - INPATIENT     Room Number: 990/471-Q  Evaluation Date: 5/16/2018  Type of Evaluation: Initial  Presenting Problem: RSV, bronchospasm, renal insufficiency, PNA    Physician Order: IP Consult to Occupational Therapy  Reason Pt has a RW for use at home. SUBJECTIVE   \"I am very satisfied with your services. \"     Patient self-stated goal is to get home health after her children's summer \"holidays\" are over      OBJECTIVE  Precautions:  needed; Low vision  [address] [facility]  [address] throughout. Pt was educated on safety. Pt performed a sit><stand with RW and supervision assist. Pt performed functional mobility with RW and supervision assist x approximately 200ft>chair.   Pt was left with call light, telephone and personal items within options    Overall Complexity MODERATE     OT Discharge Recommendations: Home with home health PT/OT  OT Device Recommendations: TBD    PLAN  OT Treatment Plan: Balance activities; Energy conservation/work simplification techniques;ADL training;Functional t

## 2018-05-16 NOTE — PROGRESS NOTES
[facility]  Nephrology Progress Note    Kezia Angulo Patient Status:  Inpatient    1935 MRN [id number]   [facility] 5NW-A Attending Gerald Thornton MD   Hosp Day # 4 PCP Dariana Avina       SUBJECTIVE:  Cont to note SOB 46*  36*   CREATSERUM  1.71*  2.21*  2.09*  1.65*  1.24*   CA  8.6  8.3  8.3  8.2*  8.7   MG   --    --    --   2.6*  2.6*   PHOS   --    --    --   2.5  2.5   GLU  207*  233*  217*  217*  133*       Recent Labs   Lab  05/12/18   1547  05/13/18   0559  05 chewable tab 81 mg 81 mg Oral Daily   glucose (DEX4) oral liquid 15 g 15 g Oral Q15 Min PRN   Or      Glucose-Vitamin C (DEX-4) 4-0.006 g chewable tab 4 tablet 4 tablet Oral Q15 Min PRN   Or      dextrose 50 % injection 50 mL 50 mL Intravenous Q15 Min PRN

## 2018-05-16 NOTE — HOME CARE LIAISON
Referral received from [facility], met with patient at bedside via telephone  line. Per patient, planning to go out of town for several weeks/months with family but if [facility] is needed sooner or after her return she will contact [facility] at that time.

## 2018-05-16 NOTE — PROGRESS NOTES
05/16/18 0252   Provider Notification   Reason for Communication Other (comment)  (elevated BP)   Provider Name Other (comment)  (Dr. Rosaura Schlatter)   Method of Communication Page   Response Waiting for response   Notification Time [phone]     MD PEREA w/ BP of

## 2018-05-16 NOTE — PROGRESS NOTES
[facility]  Progress Note     Alverna Mchugh Patient Status:  Inpatient    1935 MRN [id number]   [facility] 5NW-A Attending New Jalloh MD   Hosp Day # 4 PCP Ronnie Yung     Chief Complaint: Respiratory failure    S: P --     < > = values in this interval not displayed. Estimated Creatinine Clearance: 30.7 mL/min (A) (based on SCr of 1.24 mg/dL (H)).     Recent Labs   Lab  05/13/18 2021   PTP  13.7   INR  1.01       Recent Labs   Lab  05/12/18   1547   TROP  <0.04 2. Correctional scale  3. Carb factor  4. Accuchecks  5. Hypothyroidism   1. Synthroid  6. Hypernatremia-   7. Constipation  1.  Bowel care      PLAN   Cr lower 1.24   IV steroids  Dec freq   Hep panel/ HCV pending  Off o2 at rest at present   Quality:  ·

## 2018-05-16 NOTE — CM/SW NOTE
PT recommending [facility] PT at [place]. Sridevi Johnson from [facility] met with pt, utilizing ipad  to speak with pt. Pt stated she will be going to [place] on Monday for an extended visit and so declined [facility] services.   / to remain

## 2018-05-16 NOTE — PROGRESS NOTES
[facility]  Progress Note    Tigist Bryson Patient Status:  Inpatient    1935 MRN [id number]   [facility] 5NW-A Attending Brant Rowell MD   Hosp Day # 4 PCP Bryanna Tirado     Impression     1.  Acute hypoxic respiratory Renal insufficiency     Respiratory syncytial virus (RSV)     Acute hypoxemic respiratory failure (HCC)     Dyspnea     Essential hypertension      Subjective:  Marilu Fernandez is a(n) 82year old female.    little better    cough + sputum +  , less    wa 3.6  3.6  3.3*  3.5*  4.4  3.8   CL  102  107  107  109  111  113*   CO2  29.0  23.0  22.0  21.0*  21.0*  24.0   ALKPHO  62  57   --   55   --    --    AST  58*  33   --   23   --    --    ALT  27  23   --   22   --    --    BILT  0.3  0.2   --   0.2   -- Q4H PRN   pregabalin (LYRICA) cap 75 mg 75 mg Oral BID   DilTIAZem HCl ER Coated Beads (CARDIZEM CD) 24 hr cap 240 mg 240 mg Oral Nightly   Levothyroxine Sodium (SYNTHROID) tab 50 mcg 50 mcg Oral Before breakfast   aspirin chewable tab 81 mg 81 mg Oral Jennifer Oneill

## 2018-05-16 NOTE — PROGRESS NOTES
05/16/18 0021   Provider Notification   Reason for Communication Other (comment)  (elevated BP)   Provider Name Other (comment)  (Dr. Donnie Lieberman)   Method of Communication Page   Response Waiting for response   Notification Time 0021     MD notified of e

## 2018-05-17 LAB
BUN BLD-MCNC: 31 MG/DL (ref 8–20)
CALCIUM BLD-MCNC: 8.3 MG/DL (ref 8.3–10.3)
CHLORIDE: 109 MMOL/L (ref 101–111)
CO2: 26 MMOL/L (ref 22–32)
CREAT BLD-MCNC: 1.07 MG/DL (ref 0.55–1.02)
GLUCOSE BLD-MCNC: 144 MG/DL (ref 65–99)
GLUCOSE BLD-MCNC: 150 MG/DL (ref 65–99)
GLUCOSE BLD-MCNC: 153 MG/DL (ref 65–99)
GLUCOSE BLD-MCNC: 158 MG/DL (ref 65–99)
GLUCOSE BLD-MCNC: 163 MG/DL (ref 70–99)
POTASSIUM SERPL-SCNC: 3.4 MMOL/L (ref 3.6–5.1)
SODIUM SERPL-SCNC: 145 MMOL/L (ref 136–144)

## 2018-05-17 PROCEDURE — 99232 SBSQ HOSP IP/OBS MODERATE 35: CPT | Performed by: STUDENT IN AN ORGANIZED HEALTH CARE EDUCATION/TRAINING PROGRAM

## 2018-05-17 PROCEDURE — 99232 SBSQ HOSP IP/OBS MODERATE 35: CPT | Performed by: INTERNAL MEDICINE

## 2018-05-17 RX ORDER — SENNOSIDES 8.6 MG
8.6 TABLET ORAL NIGHTLY
Status: DISCONTINUED | OUTPATIENT
Start: 2018-05-17 | End: 2018-05-18

## 2018-05-17 RX ORDER — POLYETHYLENE GLYCOL 3350 17 G/17G
17 POWDER, FOR SOLUTION ORAL DAILY PRN
Status: DISCONTINUED | OUTPATIENT
Start: 2018-05-17 | End: 2018-05-18

## 2018-05-17 RX ORDER — METHYLPREDNISOLONE SODIUM SUCCINATE 125 MG/2ML
60 INJECTION, POWDER, LYOPHILIZED, FOR SOLUTION INTRAMUSCULAR; INTRAVENOUS EVERY 12 HOURS
Status: COMPLETED | OUTPATIENT
Start: 2018-05-17 | End: 2018-05-18

## 2018-05-17 RX ORDER — METHYLPREDNISOLONE SODIUM SUCCINATE 125 MG/2ML
60 INJECTION, POWDER, LYOPHILIZED, FOR SOLUTION INTRAMUSCULAR; INTRAVENOUS EVERY 8 HOURS
Status: DISCONTINUED | OUTPATIENT
Start: 2018-05-17 | End: 2018-05-17

## 2018-05-17 RX ORDER — PREDNISONE 20 MG/1
40 TABLET ORAL
Status: DISCONTINUED | OUTPATIENT
Start: 2018-05-18 | End: 2018-05-18

## 2018-05-17 RX ORDER — POTASSIUM CHLORIDE 20 MEQ/1
40 TABLET, EXTENDED RELEASE ORAL EVERY 4 HOURS
Status: COMPLETED | OUTPATIENT
Start: 2018-05-17 | End: 2018-05-17

## 2018-05-17 NOTE — PROGRESS NOTES
[facility]  Nephrology Progress Note    Raineraline Retanacody Patient Status:  Inpatient    1935 MRN [id number]   [facility] 5NW-A Attending Daniel Chavez MD   Hosp Day # 5 PCP Amy Garcia       SUBJECTIVE:  Looks/feels better to CO2  22.0  21.0*  21.0*  24.0  26.0   BUN  54*  56*  46*  36*  31*   CREATSERUM  2.21*  2.09*  1.65*  1.24*  1.07*   CA  8.3  8.3  8.2*  8.7  8.3   MG   --    --   2.6*  2.6*   --    PHOS   --    --   2.5  2.5   --    GLU  233*  217*  217*  133*  163* Levothyroxine Sodium (SYNTHROID) tab 50 mcg 50 mcg Oral Before breakfast   aspirin chewable tab 81 mg 81 mg Oral Daily   glucose (DEX4) oral liquid 15 g 15 g Oral Q15 Min PRN   Or      Glucose-Vitamin C (DEX-4) 4-0.006 g chewable tab 4 tablet 4 tablet Or

## 2018-05-17 NOTE — PROGRESS NOTES
[facility]  Progress Note     Kenroy Gagnon Patient Status:  Inpatient    1935 MRN [id number]   [facility] 5NW-A Attending Lexi Encarnacion MD   Hosp Day # 5 PCP Jarad Streeter     Chief Complaint: Respiratory failure    S: P --    --    AST  58*  33   --   23   --    --    --    ALT  27  23   --   22   --    --    --    BILT  0.3  0.2   --   0.2   --    --    --    TP  6.8  6.2   --   6.0*   --    --    --     < > = values in this interval not displayed.        Estimated Creati 2. Monitor blood pressure and heart rate  4. Diabetes mellitus, A1c 6.5 - uncontrolled d/t steroids  1. Levemir to 15 BID - will need to lower as steroids are tapered  Will leave unchanged today   2. Correctional scale  3. Carb factor  4. Accuchecks  5.

## 2018-05-17 NOTE — PROGRESS NOTES
[facility]  Progress Note    Denis Samson Patient Status:  Inpatient    1935 MRN [id number]   [facility] 5NW-A Attending Kash Ordonez MD   [facility] Day # 5 PCP Maria Elena Colin     Subjective:  Denis Samson is a(n) 82 year Plan:   Patient Active Problem List:     Elevated serum immunoglobulin free light chain level     Anemia     Coarse tremors     Pneumonia of right lower lobe due to infectious organism ([facility]     Sepsis ([facility]     RENEA (acute kidney injury) ([facility] [address]     Hyponatr

## 2018-05-17 NOTE — PROGRESS NOTES
Multidisciplinary Discharge Rounds held 5/17/2018. Treatment team members present today include , , Charge Nurse,  Nurse, RT, PT and Pharmacy caring for [employer].      Other care providers present:    Patient Active Prob

## 2018-05-17 NOTE — PLAN OF CARE
Patient/Family Goals    • Patient/Family Short Term Goal Progressing        RESPIRATORY - ADULT    • Achieves optimal ventilation and oxygenation Progressing        SAFETY ADULT - FALL    • Free from fall injury Progressing        Pt A/O x 4, Cuban speak

## 2018-05-18 VITALS
HEART RATE: 93 BPM | TEMPERATURE: 98 F | RESPIRATION RATE: 18 BRPM | DIASTOLIC BLOOD PRESSURE: 74 MMHG | OXYGEN SATURATION: 93 % | SYSTOLIC BLOOD PRESSURE: 169 MMHG | BODY MASS INDEX: 34.28 KG/M2 | HEIGHT: 64 IN | WEIGHT: 200.81 LBS

## 2018-05-18 LAB
GLUCOSE BLD-MCNC: 149 MG/DL (ref 65–99)
GLUCOSE BLD-MCNC: 155 MG/DL (ref 65–99)
GLUCOSE BLD-MCNC: 196 MG/DL (ref 65–99)

## 2018-05-18 PROCEDURE — 99239 HOSP IP/OBS DSCHRG MGMT >30: CPT | Performed by: STUDENT IN AN ORGANIZED HEALTH CARE EDUCATION/TRAINING PROGRAM

## 2018-05-18 PROCEDURE — 99232 SBSQ HOSP IP/OBS MODERATE 35: CPT | Performed by: INTERNAL MEDICINE

## 2018-05-18 RX ORDER — LOSARTAN POTASSIUM 100 MG/1
100 TABLET ORAL DAILY
Status: DISCONTINUED | OUTPATIENT
Start: 2018-05-19 | End: 2018-05-18

## 2018-05-18 RX ORDER — GUAIFENESIN 600 MG
600 TABLET, EXTENDED RELEASE 12 HR ORAL 2 TIMES DAILY
Qty: 60 TABLET | Refills: 0 | Status: SHIPPED | OUTPATIENT
Start: 2018-05-18 | End: 2018-11-15 | Stop reason: CLARIF

## 2018-05-18 RX ORDER — IPRATROPIUM BROMIDE AND ALBUTEROL SULFATE 2.5; .5 MG/3ML; MG/3ML
3 SOLUTION RESPIRATORY (INHALATION)
Qty: 270 VIAL | Refills: 0 | Status: SHIPPED | OUTPATIENT
Start: 2018-05-18 | End: 2018-11-15 | Stop reason: CLARIF

## 2018-05-18 RX ORDER — LOSARTAN POTASSIUM 100 MG/1
100 TABLET ORAL DAILY
Qty: 30 TABLET | Refills: 0 | Status: SHIPPED | OUTPATIENT
Start: 2018-05-19 | End: 2018-05-18

## 2018-05-18 RX ORDER — BUDESONIDE 0.5 MG/2ML
0.5 INHALANT ORAL 2 TIMES DAILY
Qty: 60 CONTAINER | Refills: 0 | Status: SHIPPED | OUTPATIENT
Start: 2018-05-18 | End: 2018-11-15 | Stop reason: CLARIF

## 2018-05-18 RX ORDER — PREDNISONE 10 MG/1
TABLET ORAL
Qty: 30 TABLET | Refills: 0 | Status: SHIPPED | OUTPATIENT
Start: 2018-05-18 | End: 2018-11-15 | Stop reason: CLARIF

## 2018-05-18 RX ORDER — LOSARTAN POTASSIUM 100 MG/1
100 TABLET ORAL DAILY
Qty: 30 TABLET | Refills: 0 | Status: SHIPPED | OUTPATIENT
Start: 2018-05-19 | End: 2018-11-15 | Stop reason: CLARIF

## 2018-05-18 RX ORDER — IPRATROPIUM BROMIDE AND ALBUTEROL SULFATE 2.5; .5 MG/3ML; MG/3ML
3 SOLUTION RESPIRATORY (INHALATION)
Status: DISCONTINUED | OUTPATIENT
Start: 2018-05-18 | End: 2018-05-18

## 2018-05-18 NOTE — PROGRESS NOTES
[facility]  Nephrology Progress Note    Chanel De La Cruz Patient Status:  Inpatient    1935 MRN [id number]   [facility] 5NW-A Attending Bert Lopez MD   Hosp Day # 6 PCP Meghna Plunkett       SUBJECTIVE:  Stable this AM 8.2*  8.7  8.3   MG   --    --   2.6*  2.6*   --    PHOS   --    --   2.5  2.5   --    GLU  233*  217*  217*  133*  163*       Recent Labs   Lab  05/12/18   1547  05/13/18   0559  05/14/18   0414   ALT  27  23  22   AST  58*  33  23   ALB  2.9*  2.5*  2.5 tab 4 tablet 4 tablet Oral Q15 Min PRN   Or      dextrose 50 % injection 50 mL 50 mL Intravenous Q15 Min PRN   Or      glucose (DEX4) oral liquid 30 g 30 g Oral Q15 Min PRN   Or      Glucose-Vitamin C (DEX-4) 4-0.006 g chewable tab 8 tablet 8 tablet Oral Q

## 2018-05-18 NOTE — PROGRESS NOTES
[facility]  Progress Note     Madhu Mancuso Patient Status:  Inpatient    1935 MRN [id number]   [facility] 5NW-A Attending James Neri MD   Hosp Day # 6 PCP Benjie Rojas     Chief Complaint: Respiratory failure    S: P --    AST  58*  33   --   23   --    --    --    ALT  27  23   --   22   --    --    --    BILT  0.3  0.2   --   0.2   --    --    --    TP  6.8  6.2   --   6.0*   --    --    --     < > = values in this interval not displayed.        Estimated Creatinin tapered  Will leave unchanged today   2. Correctional scale  3. Carb factor  4. Accuchecks  5. Hypothyroidism   1. Synthroid  6. Hypernatremia-   7. Constipation  1.  Bowel care      PLAN   Pulm rec outpt  Pulm f/u for possible LUIS w/u   No labs today   Pt

## 2018-05-18 NOTE — PROGRESS NOTES
[facility]  Progress Note    Marilu Fernandez Patient Status:  Inpatient    1935 MRN [id number]   [facility] 5NW-A Attending Farrah Camacho MD   [facility] Day # 6 PCP Sahara Ríos     Subjective:  Marilu Fernandez is a(n) 82 year Cultures: Positive RSV;  blood cultures remain negative    Radiology:  No new imaging    Medications reviewed     Impression   1.  s/p Acute hypoxic respiratory failure-related to bronchospasm and viral infection now on room air  2.  Acute bronchospas

## 2018-05-18 NOTE — CM/SW NOTE
Spoke with Dr Reji Deshpande regarding orders for home O2 and nebulizer. Pt has diagnosis of LUIS and needs O2 for night only. She has not had a sleep study. Discussed that sleep study is required by insurance for night O2 if needed due to LUIS.   SW confirmed th

## 2018-05-18 NOTE — PLAN OF CARE
Patient/Family Goals    • Patient/Family Short Term Goal Progressing        RESPIRATORY - ADULT    • Achieves optimal ventilation and oxygenation Progressing        SAFETY ADULT - FALL    • Free from fall injury Progressing        Pt A/O x 4, Botswanan speak

## 2018-05-18 NOTE — PROGRESS NOTES
NURSING DISCHARGE NOTE    Discharged Home via Wheelchair. Accompanied by Family member and Support staff  Belongings Taken by patient/family. Midline discontinued. Went over discharge instructions with pt's daughter, Trista Owens.  Daughter very dismissi

## 2018-05-18 NOTE — PAYOR COMM NOTE
--------------  CONTINUED STAY REVIEW    Payor: MEDICARE Riki Winter  Subscriber #:  [id number]  Authorization Number: N/A    Admit date: 5/12/18  Admit time: 2047    Admitting Physician: Leda Keith MD  Attending Physician:  Isabela Tilley MD  Primary Given 5000 Units Subcutaneous (Left Lower Abdomen) Mary Fuentes RN    5/17/2018 2149 Given 5000 Units Subcutaneous (Left Lower Abdomen) Mary Fuentes RN      hydrALAzine HCl (APRESOLINE) injection 10 mg     Date Action Dose Route User    5/18/2018 0 Losartan Potassium (COZAAR) tab 50 mg     Date Action Dose Route User    5/18/2018 1135 Given 50 mg Oral Terri Jimenez RN      MethylPREDNISolone Sodium Succ (Solu-MEDROL) injection 60 mg     Date Action Dose Route User    5/18/2018 0553 Given 60 mg

## 2018-05-19 NOTE — DISCHARGE SUMMARY
[facility]  DISCHARGE SUMMARY     Karel Foy Patient Status:  Inpatient    1935 MRN [id number]   [facility] 5NW-A Attending No att. providers found   Hosp Day # 6 MANASA Morales     Date of Admission: 2018  D presented with shortness of breath tested positive for RSV virus was seen by pulmonology was treated with IV steroids neb treatments and antibiotics for possible pneumonia patient received 7 day course of antibiotic Rocephin 7 days Zithromax 3 days.   [place] 60 tablet  Refills:  0     ipratropium-albuterol 0.5-2.5 (3) MG/3ML Soln  Commonly known as:  DUONEB      Take 3 mL by nebulization 4 (four) times daily.    Quantity:  270 vial  Refills:  0     losartan 100 MG Tabs  Commonly known as:  COZAAR  Start taking need to reschedule, Bring discharge paperwork to appt    Mike Segundo MD  [address] [facility] [address]  [facility] [address] [facility]          Joyice Goldmann, MD  [address] [place] [address] [address] [facility] [phone]            Vital signs:  Temp:

## 2018-05-21 NOTE — CM/SW NOTE
Patient discharged on 05/18/2018 as previously planned.        05/21/18 0900   Discharge disposition   Expected discharge disposition Home or Self   Home services after discharge DME   E provider [address]   Discharge transportation Private car

## 2018-11-15 ENCOUNTER — HOSPITAL ENCOUNTER (INPATIENT)
Facility: HOSPITAL | Age: 83
LOS: 3 days | Discharge: HOME | DRG: 439 | End: 2018-11-18
Attending: EMERGENCY MEDICINE | Admitting: HOSPITALIST
Payer: MEDICARE

## 2018-11-15 ENCOUNTER — APPOINTMENT (OUTPATIENT)
Dept: CT IMAGING | Facility: HOSPITAL | Age: 83
DRG: 439 | End: 2018-11-15
Attending: EMERGENCY MEDICINE
Payer: MEDICARE

## 2018-11-15 DIAGNOSIS — D72.829 LEUKOCYTOSIS, UNSPECIFIED TYPE: ICD-10-CM

## 2018-11-15 DIAGNOSIS — K85.90 ACUTE PANCREATITIS, UNSPECIFIED COMPLICATION STATUS, UNSPECIFIED PANCREATITIS TYPE: Primary | ICD-10-CM

## 2018-11-15 DIAGNOSIS — D64.9 ANEMIA, UNSPECIFIED TYPE: ICD-10-CM

## 2018-11-15 DIAGNOSIS — R79.89 AZOTEMIA: ICD-10-CM

## 2018-11-15 DIAGNOSIS — R76.8 ELEVATED SERUM IMMUNOGLOBULIN FREE LIGHT CHAIN LEVEL: ICD-10-CM

## 2018-11-15 PROCEDURE — 74176 CT ABD & PELVIS W/O CONTRAST: CPT | Performed by: EMERGENCY MEDICINE

## 2018-11-15 PROCEDURE — 99223 1ST HOSP IP/OBS HIGH 75: CPT | Performed by: HOSPITALIST

## 2018-11-15 RX ORDER — ACETAMINOPHEN 325 MG/1
650 TABLET ORAL EVERY 6 HOURS PRN
Status: DISCONTINUED | OUTPATIENT
Start: 2018-11-15 | End: 2018-11-18

## 2018-11-15 RX ORDER — SODIUM CHLORIDE 9 MG/ML
INJECTION, SOLUTION INTRAVENOUS CONTINUOUS
Status: DISCONTINUED | OUTPATIENT
Start: 2018-11-15 | End: 2018-11-16

## 2018-11-15 RX ORDER — LEVOTHYROXINE SODIUM 0.05 MG/1
50 TABLET ORAL
Status: DISCONTINUED | OUTPATIENT
Start: 2018-11-16 | End: 2018-11-18

## 2018-11-15 RX ORDER — MORPHINE SULFATE 4 MG/ML
4 INJECTION, SOLUTION INTRAMUSCULAR; INTRAVENOUS EVERY 2 HOUR PRN
Status: DISCONTINUED | OUTPATIENT
Start: 2018-11-15 | End: 2018-11-18

## 2018-11-15 RX ORDER — METOCLOPRAMIDE HYDROCHLORIDE 5 MG/ML
5 INJECTION INTRAMUSCULAR; INTRAVENOUS EVERY 8 HOURS PRN
Status: DISCONTINUED | OUTPATIENT
Start: 2018-11-15 | End: 2018-11-18

## 2018-11-15 RX ORDER — ONDANSETRON 2 MG/ML
4 INJECTION INTRAMUSCULAR; INTRAVENOUS EVERY 6 HOURS PRN
Status: DISCONTINUED | OUTPATIENT
Start: 2018-11-15 | End: 2018-11-18

## 2018-11-15 RX ORDER — DILTIAZEM HYDROCHLORIDE 240 MG/1
240 CAPSULE, COATED, EXTENDED RELEASE ORAL NIGHTLY
COMMUNITY
End: 2021-11-30

## 2018-11-15 RX ORDER — DEXTROSE AND SODIUM CHLORIDE 5; .45 G/100ML; G/100ML
INJECTION, SOLUTION INTRAVENOUS CONTINUOUS
Status: ACTIVE | OUTPATIENT
Start: 2018-11-15 | End: 2018-11-15

## 2018-11-15 RX ORDER — ENOXAPARIN SODIUM 100 MG/ML
30 INJECTION SUBCUTANEOUS DAILY
Status: DISCONTINUED | OUTPATIENT
Start: 2018-11-15 | End: 2018-11-18

## 2018-11-15 RX ORDER — ENALAPRIL MALEATE 5 MG/1
5 TABLET ORAL DAILY
Status: DISCONTINUED | OUTPATIENT
Start: 2018-11-16 | End: 2018-11-16

## 2018-11-15 RX ORDER — ERGOCALCIFEROL (VITAMIN D2) 1250 MCG
50000 CAPSULE ORAL WEEKLY
COMMUNITY

## 2018-11-15 RX ORDER — MORPHINE SULFATE 4 MG/ML
2 INJECTION, SOLUTION INTRAMUSCULAR; INTRAVENOUS EVERY 2 HOUR PRN
Status: DISCONTINUED | OUTPATIENT
Start: 2018-11-15 | End: 2018-11-18

## 2018-11-15 RX ORDER — SODIUM CHLORIDE 9 MG/ML
125 INJECTION, SOLUTION INTRAVENOUS CONTINUOUS
Status: DISCONTINUED | OUTPATIENT
Start: 2018-11-15 | End: 2018-11-16

## 2018-11-15 RX ORDER — MORPHINE SULFATE 4 MG/ML
1 INJECTION, SOLUTION INTRAMUSCULAR; INTRAVENOUS EVERY 2 HOUR PRN
Status: DISCONTINUED | OUTPATIENT
Start: 2018-11-15 | End: 2018-11-18

## 2018-11-15 RX ORDER — DEXTROSE MONOHYDRATE 25 G/50ML
50 INJECTION, SOLUTION INTRAVENOUS
Status: DISCONTINUED | OUTPATIENT
Start: 2018-11-15 | End: 2018-11-18

## 2018-11-15 RX ORDER — ENALAPRIL MALEATE AND HYDROCHLOROTHIAZIDE 5; 12.5 MG/1; MG/1
1 TABLET ORAL DAILY
Status: ON HOLD | COMMUNITY
End: 2019-05-07 | Stop reason: CLARIF

## 2018-11-15 RX ORDER — DILTIAZEM HYDROCHLORIDE 240 MG/1
240 CAPSULE, COATED, EXTENDED RELEASE ORAL DAILY
Status: DISCONTINUED | OUTPATIENT
Start: 2018-11-16 | End: 2018-11-15

## 2018-11-15 RX ORDER — ALPRAZOLAM 0.25 MG/1
0.25 TABLET ORAL NIGHTLY PRN
COMMUNITY
End: 2018-11-15 | Stop reason: CLARIF

## 2018-11-15 RX ORDER — DILTIAZEM HYDROCHLORIDE 240 MG/1
240 CAPSULE, COATED, EXTENDED RELEASE ORAL EVERY EVENING
Status: DISCONTINUED | OUTPATIENT
Start: 2018-11-15 | End: 2018-11-18

## 2018-11-15 RX ORDER — ASPIRIN 81 MG/1
81 TABLET ORAL DAILY
Status: DISCONTINUED | OUTPATIENT
Start: 2018-11-16 | End: 2018-11-18

## 2018-11-15 NOTE — ED PROVIDER NOTES
Patient Seen in: [facility] Emergency Department    History   Patient presents with:  Abdomen/Flank Pain (GI/)    Stated Complaint: abd pain, pancreatic mass    HPI    This is an 28-year-old female with past medical history of obesity, hypertensio °F (36.3 °C)   Temp src Temporal   SpO2 94 %   O2 Device None (Room air)       Current:/69 (BP Location: Left arm)   Pulse 60   Temp 98.4 °F (36.9 °C) (Oral)   Resp 18   Ht 162.6 cm (5' 4\")   Wt 86.2 kg   SpO2 98%   Breastfeeding?  No   BMI 32.61 kg/ DIFFERENTIAL - Abnormal; Notable for the following components:    WBC 18.8 (*)     RDW 17.7 (*)     RDW-SD 54.6 (*)     Neutrophil Absolute Prelim 14.28 (*)     Neutrophil Absolute 14.28 (*)     Monocyte Absolute 1.07 (*)     All other components within no Patient will be admitted for further workup and evaluation. IV fluids were continued. She was relatively comfortable. Plan for admission discussed with patient and family discussed understanding.   [facility]  [facility]

## 2018-11-16 ENCOUNTER — APPOINTMENT (OUTPATIENT)
Dept: ULTRASOUND IMAGING | Facility: HOSPITAL | Age: 83
DRG: 439 | End: 2018-11-16
Attending: INTERNAL MEDICINE
Payer: MEDICARE

## 2018-11-16 PROCEDURE — 76700 US EXAM ABDOM COMPLETE: CPT | Performed by: INTERNAL MEDICINE

## 2018-11-16 PROCEDURE — 99232 SBSQ HOSP IP/OBS MODERATE 35: CPT | Performed by: INTERNAL MEDICINE

## 2018-11-16 RX ORDER — HYDRALAZINE HYDROCHLORIDE 20 MG/ML
10 INJECTION INTRAMUSCULAR; INTRAVENOUS EVERY 4 HOURS PRN
Status: DISCONTINUED | OUTPATIENT
Start: 2018-11-16 | End: 2018-11-18

## 2018-11-16 RX ORDER — DIPHENHYDRAMINE HYDROCHLORIDE 50 MG/ML
12.5 INJECTION INTRAMUSCULAR; INTRAVENOUS EVERY 6 HOURS PRN
Status: DISCONTINUED | OUTPATIENT
Start: 2018-11-16 | End: 2018-11-18

## 2018-11-16 RX ORDER — SODIUM CHLORIDE, SODIUM LACTATE, POTASSIUM CHLORIDE, CALCIUM CHLORIDE 600; 310; 30; 20 MG/100ML; MG/100ML; MG/100ML; MG/100ML
INJECTION, SOLUTION INTRAVENOUS CONTINUOUS
Status: ACTIVE | OUTPATIENT
Start: 2018-11-16 | End: 2018-11-16

## 2018-11-16 NOTE — PROGRESS NOTES
[facility] Note: Renal dose adjustment for Enoxaparin (Lovenox)  Braxton Helton has been prescribed Enoxaparin (Lovenox)  40 mg subcutaneously every 24 hours. Estimated Creatinine Clearance: 28.9 mL/min (A) (based on SCr of 1.32 mg/dL (H)).     Her

## 2018-11-16 NOTE — PAYOR COMM NOTE
--------------  ADMISSION REVIEW     Payor: MEDICARE America Payne  Subscriber #:  [id number]  Authorization Number: N/A    Admit date: 11/15/18  Admit time: 2115       Admitting Physician: Celina Belcher MD  Attending Physician:  Lita Nolasco MD  Primary Ca infection)        Past Surgical History:   Procedure Laterality Date   • APPENDECTOMY     • APPENDECTOMY     • KNEE REPLACEMENT SURGERY  2/2016           Social History    Tobacco Use      Smoking status: Never Smoker      Smokeless tobacco: Never Used REFLEX - Abnormal; Notable for the following components:    Blood Urine Small (*)     Protein Urine 30  (*)     Mucous Urine 1+ (*)     All other components within normal limits   LIPASE - Abnormal; Notable for the following components:    Lipase 668 (*) line was established of normal saline. She was given a 1 L bolus. She was kept n.p.o. Basic labs were obtained. CBC: White blood cell count 18.8. Hemoglobin 14.5. Platelet 803. CMP: BUN 45. Creatinine 1.3. Glucose 104. CMP: BUN 45. Creatinine 1. Service:  11/15/2018  7:51 PM Status:  Signed    :  Stanislaw Kaur MD (Physician)           [facility]  History and Physical     Zopaul Cooper Patient Status:  Emergency    1935 MRN [id number]   [address] daily.   Disp:  Rfl:    pregabalin 75 MG Oral Cap Take 75 mg by mouth 2 (two) times daily. Disp:  Rfl:    Levothyroxine Sodium 50 MCG Oral Tab Take 50 mcg by mouth before breakfast. Disp:  Rfl:    aspirin 81 MG Oral Tab EC Take 81 mg by mouth daily.  Disp: the last 168 hours. Imaging: Imaging data reviewed in Epic. ASSESSMENT / PLAN:     1. Acute pancreatitis  2. Jaundice  3. transaminitis  1. Suspect passed gall stone  2. Check lipids  3. Consult GI  4. Hep panel  4. Leukocytosis  1. Reactive? 2.  Charm Tank Bag 125 mL/hr Intravenous Delmi Asp, RN      0.9%  NaCl infusion     Date Action Dose Route User    11/15/2018 2245 New Bag (none) Intravenous Doyle CHAUDHARI RN      sodium chloride 0.9% IV bolus 1,000 mL     Date Action Dose Route User    11/15/2

## 2018-11-16 NOTE — PLAN OF CARE
NURSING ADMISSION NOTE      Patient admitted via Cart  Oriented to room. Safety precautions initiated. Bed in low position. Call light in reach. Pt admission data base completed with the use of the  services Bushra Ko #[id number]) via I Pad.  Pt

## 2018-11-16 NOTE — H&P
[facility]  History and Physical     Kezia Angulo Patient Status:  Emergency    1935 MRN [id number]   Location [address] Attending Melvi Lerma, [address] [facility] Day # 0 PCP Dariana Avina     Chief Complaint: Rfl:    Levothyroxine Sodium 50 MCG Oral Tab Take 50 mcg by mouth before breakfast. Disp:  Rfl:    aspirin 81 MG Oral Tab EC Take 81 mg by mouth daily. Disp:  Rfl:    Lutein 40 MG Oral Cap Take 40 mg by mouth nightly.    Disp:  Rfl:        Review of Systems pancreatitis  2. Jaundice  3. transaminitis  1. Suspect passed gall stone  2. Check lipids  3. Consult GI  4. Hep panel  4. Leukocytosis  1. Reactive? 2. No f/c or signs of infection on CT  3. Monitor off abx  5. dehdyration  6. RENEA  1.  IVF    Quality:  ·

## 2018-11-16 NOTE — CONSULTS
[facility]                       Gastroenterology Consultation-[facility]    Jhonnyanna Laila Patient Status:  Inpatient    1935 MRN [id number]   [facility] 0SW-A Attending Gali Farias MD   [facility] Da acetaminophen (TYLENOL) tab 650 mg 650 mg Oral Q6H PRN   morphINE sulfate (PF) 4 MG/ML injection 1 mg 1 mg Intravenous Q2H PRN   Or      morphINE sulfate (PF) 4 MG/ML injection 2 mg 2 mg Intravenous Q2H PRN   Or      morphINE sulfate (PF) 4 MG/ML injecti patient reports no hoarseness of voice, hearing loss, sinus congestion, tinnitus           Neurologic: The patient reports no history of seizure, stroke, or frequent headaches    PE: /58 (BP Location: Left arm)   Pulse 53   Temp 98.1 °F (36.7 °C) (Or A/P 11/15/18:  CONCLUSION:       Findings are suggestive of acute pancreatitis. L4 compression deformity is of unknown chronicity. Please correlate for pain at this site.      Mild dilatation of bilateral renal collecting system without evidence of obs

## 2018-11-16 NOTE — PROGRESS NOTES
[facility]  Progress Note     Kezia Angulo Patient Status:  Inpatient    1935 MRN [id number]   [facility] 0SW-A Attending Thomas Gongora MD   Hosp Day # 1 PCP Dariana Avina     Chief Complaint: pancreatitis  S: Patient w • enoxaparin  30 mg Subcutaneous Daily   • Insulin Aspart Pen  2-10 Units Subcutaneous TID CC and HS   • DilTIAZem HCl ER Coated Beads  240 mg Oral QPM       ASSESSMENT / PLAN:     1. Acute pancreatitis  1. IVF  2. NPO  3. US reviewed  4. Trend LFT  5.  Kylee Kraft

## 2018-11-16 NOTE — PLAN OF CARE
Maintains or returns to baseline bowel function Progressing      Minimal or absence of nausea and vomiting Progressing      Verbalizes/displays adequate comfort level or patient's stated pain goal Progressing      Glucose maintained within prescribed range

## 2018-11-16 NOTE — PROGRESS NOTES
Pharmacy Note: Renal dose adjustment for Metoclopramide (Reglan)  Lonny Hickman has been prescribed Metoclopramide (Reglan) 10 mg every 8 hours as needed. Estimated Creatinine Clearance: 28.9 mL/min (A) (based on SCr of 1.32 mg/dL (H)).     Her calcu

## 2018-11-17 PROCEDURE — 99233 SBSQ HOSP IP/OBS HIGH 50: CPT | Performed by: HOSPITALIST

## 2018-11-17 NOTE — PROGRESS NOTES
[facility]  Progress Note     Francesco Hernandez Patient Status:  Inpatient    1935 MRN [id number]   [facility] 0SW-A Attending Reyna Smallwood MD   Hosp Day # 2 PCP Donnamarie Koyanagi     Chief Complaint: pancreatitis    S:  No acu Epic.    Medications:   • aspirin  81 mg Oral Daily   • Levothyroxine Sodium  50 mcg Oral Before breakfast   • enoxaparin  30 mg Subcutaneous Daily   • Insulin Aspart Pen  2-10 Units Subcutaneous TID CC and HS   • DilTIAZem HCl ER Coated Beads  240 mg Oral

## 2018-11-17 NOTE — PLAN OF CARE
Diabetes/Glucose Control    • Glucose maintained within prescribed range Progressing        GASTROINTESTINAL - ADULT    • Minimal or absence of nausea and vomiting Progressing    • Maintains or returns to baseline bowel function Progressing        PAIN - A Request for: ALPRAZolam (XANAX) 1 MG tablet One tablet three times daily    Next visit: 8/23/18  No show(s): 0  Last visit: 3/8/18    Verified dosage against providers last note 3/8/18.    Last prescribed: 3/8/18   #90   Refills: 3  Per PDMP last filled:  6/25/18  #90  PDMP reviewed: criteria met. Routed to MD/APNP for approval/signature for pending requested medication

## 2018-11-18 VITALS
RESPIRATION RATE: 18 BRPM | WEIGHT: 195.13 LBS | TEMPERATURE: 98 F | DIASTOLIC BLOOD PRESSURE: 73 MMHG | SYSTOLIC BLOOD PRESSURE: 159 MMHG | HEART RATE: 79 BPM | HEIGHT: 64 IN | OXYGEN SATURATION: 95 % | BODY MASS INDEX: 33.31 KG/M2

## 2018-11-18 PROCEDURE — 99239 HOSP IP/OBS DSCHRG MGMT >30: CPT | Performed by: HOSPITALIST

## 2018-11-18 RX ORDER — PREGABALIN 75 MG/1
75 CAPSULE ORAL ONCE
Status: COMPLETED | OUTPATIENT
Start: 2018-11-18 | End: 2018-11-18

## 2018-11-18 RX ORDER — ENALAPRIL MALEATE 5 MG/1
5 TABLET ORAL DAILY
Status: DISCONTINUED | OUTPATIENT
Start: 2018-11-18 | End: 2018-11-18

## 2018-11-18 RX ORDER — PREGABALIN 75 MG/1
75 CAPSULE ORAL 2 TIMES DAILY
Qty: 30 CAPSULE | Refills: 0 | Status: ON HOLD | OUTPATIENT
Start: 2018-11-18 | End: 2021-11-30

## 2018-11-18 RX ORDER — DILTIAZEM HYDROCHLORIDE 240 MG/1
240 CAPSULE, COATED, EXTENDED RELEASE ORAL DAILY
Status: DISCONTINUED | OUTPATIENT
Start: 2018-11-18 | End: 2018-11-18

## 2018-11-18 NOTE — PROGRESS NOTES
[facility]  Progress Note     Marilu Fernandez Patient Status:  Inpatient    1935 MRN [id number]   [facility] 0SW-A Attending Tj Burns MD   Hosp Day # 3 PCP Sahara Ríos     Chief Complaint: pancreatitis    S:  No acu Epic.    Medications:   • aspirin  81 mg Oral Daily   • Levothyroxine Sodium  50 mcg Oral Before breakfast   • enoxaparin  30 mg Subcutaneous Daily   • Insulin Aspart Pen  2-10 Units Subcutaneous TID CC and HS   • DilTIAZem HCl ER Coated Beads  240 mg Oral

## 2018-11-18 NOTE — PROGRESS NOTES
[facility]  Report of GI Consultation    Tigist Bryson Patient Status:  Inpatient    1935 MRN [id number]   [facility] 4NW-A Attending Micheal Sorenson MD   Hosp Day # 2 PCP Bryanna Tirado     Date of Admission:  11/15/2018  PCP Units 2-10 Units Subcutaneous TID CC and HS   diltiazem (CARDIZEM CD) 24 hr cap 240 mg 240 mg Oral QPM       Allergies  No Known Allergies      Physical Exam:   Blood pressure (!) 169/80, pulse 70, temperature 98.5 °F (36.9 °C), temperature source Oral, re (normalized)  3. Cholelithiasis without evidence of choledocholithiasis    Recommendations:  1. Has started passing gas, tolerated clear liquid diet, and has good bowel sounds. Pain is present but markedly improved.   2. Advance diet as tolerated, and if O

## 2018-11-18 NOTE — PLAN OF CARE
NURSING DISCHARGE NOTE    Discharged Home via Wheelchair. Accompanied by Family member and Support staff  Belongings Taken by patient/family.

## 2018-11-18 NOTE — PLAN OF CARE
Pt is alert and oriented x 4. Speaks Limited English. Vitals stable. No C/oppain or discomfort. Tolerated Regular diet for Lunch. Pt blood pressure was elevated after 1 hour of IV Hydralazine.  Notified , orders received to resume pt home dose enal

## 2018-11-20 NOTE — DISCHARGE SUMMARY
[facility] [place] HOSPITALIST  DISCHARGE SUMMARY     Lj Sanon Patient Status:  Inpatient    1935 MRN [id number]   [facility] 4NW-A Attending No att. providers found   Hosp Day # 3 PCP Joby Cole     Date of Admission: 11/15/2018  Anival descriptions):  • As above    Lab/Test results pending at Discharge:   · none    Consultants:  • GI    Discharge Medication List:     Discharge Medications      CONTINUE taking these medications      Instructions Prescription details   aspirin 81 MG Tbec edema.  -----------------------------------------------------------------------------------------------  PATIENT DISCHARGE INSTRUCTIONS: See electronic chart    Hao Ayoub MD 11/20/2018    Time spent:  > 30 minutes

## 2019-05-07 ENCOUNTER — HOSPITAL ENCOUNTER (INPATIENT)
Facility: HOSPITAL | Age: 84
LOS: 4 days | Discharge: HOME | DRG: 202 | End: 2019-05-11
Attending: EMERGENCY MEDICINE | Admitting: HOSPITALIST
Payer: MEDICARE

## 2019-05-07 ENCOUNTER — APPOINTMENT (OUTPATIENT)
Dept: GENERAL RADIOLOGY | Facility: HOSPITAL | Age: 84
DRG: 202 | End: 2019-05-07
Attending: EMERGENCY MEDICINE
Payer: MEDICARE

## 2019-05-07 DIAGNOSIS — N17.9 ACUTE RENAL FAILURE SUPERIMPOSED ON CHRONIC KIDNEY DISEASE, UNSPECIFIED CKD STAGE, UNSPECIFIED ACUTE RENAL FAILURE TYPE (HCC): ICD-10-CM

## 2019-05-07 DIAGNOSIS — N18.9 ACUTE RENAL FAILURE SUPERIMPOSED ON CHRONIC KIDNEY DISEASE, UNSPECIFIED CKD STAGE, UNSPECIFIED ACUTE RENAL FAILURE TYPE (HCC): ICD-10-CM

## 2019-05-07 DIAGNOSIS — J20.4 PARAINFLUENZA VIRUS BRONCHITIS: Primary | ICD-10-CM

## 2019-05-07 DIAGNOSIS — R09.02 HYPOXIA: ICD-10-CM

## 2019-05-07 DIAGNOSIS — R06.02 SHORTNESS OF BREATH: ICD-10-CM

## 2019-05-07 DIAGNOSIS — R06.2 WHEEZING: ICD-10-CM

## 2019-05-07 PROCEDURE — 99223 1ST HOSP IP/OBS HIGH 75: CPT | Performed by: HOSPITALIST

## 2019-05-07 PROCEDURE — 71045 X-RAY EXAM CHEST 1 VIEW: CPT | Performed by: EMERGENCY MEDICINE

## 2019-05-07 RX ORDER — MELOXICAM 15 MG/1
15 TABLET ORAL DAILY
COMMUNITY
End: 2021-11-30

## 2019-05-07 RX ORDER — PREGABALIN 75 MG/1
75 CAPSULE ORAL DAILY
Status: DISCONTINUED | OUTPATIENT
Start: 2019-05-08 | End: 2019-05-11

## 2019-05-07 RX ORDER — ENALAPRIL MALEATE 5 MG/1
5 TABLET ORAL DAILY
Status: DISCONTINUED | OUTPATIENT
Start: 2019-05-07 | End: 2019-05-11

## 2019-05-07 RX ORDER — DILTIAZEM HYDROCHLORIDE 240 MG/1
240 CAPSULE, COATED, EXTENDED RELEASE ORAL DAILY
Status: DISCONTINUED | OUTPATIENT
Start: 2019-05-07 | End: 2019-05-07

## 2019-05-07 RX ORDER — DILTIAZEM HYDROCHLORIDE 240 MG/1
240 CAPSULE, COATED, EXTENDED RELEASE ORAL NIGHTLY
Status: DISCONTINUED | OUTPATIENT
Start: 2019-05-08 | End: 2019-05-11

## 2019-05-07 RX ORDER — METOPROLOL SUCCINATE 25 MG/1
25 TABLET, EXTENDED RELEASE ORAL DAILY
Status: DISCONTINUED | OUTPATIENT
Start: 2019-05-07 | End: 2019-05-11

## 2019-05-07 RX ORDER — SODIUM CHLORIDE 9 MG/ML
INJECTION, SOLUTION INTRAVENOUS CONTINUOUS
Status: ACTIVE | OUTPATIENT
Start: 2019-05-07 | End: 2019-05-07

## 2019-05-07 RX ORDER — METOPROLOL SUCCINATE 25 MG/1
25 TABLET, EXTENDED RELEASE ORAL DAILY
COMMUNITY
End: 2021-11-30

## 2019-05-07 RX ORDER — TRAMADOL HYDROCHLORIDE 50 MG/1
50 TABLET ORAL DAILY
COMMUNITY
End: 2021-11-30

## 2019-05-07 RX ORDER — SODIUM CHLORIDE 9 MG/ML
INJECTION, SOLUTION INTRAVENOUS CONTINUOUS
Status: DISCONTINUED | OUTPATIENT
Start: 2019-05-07 | End: 2019-05-09

## 2019-05-07 RX ORDER — TRAMADOL HYDROCHLORIDE 50 MG/1
50 TABLET ORAL EVERY 12 HOURS PRN
Status: DISCONTINUED | OUTPATIENT
Start: 2019-05-07 | End: 2019-05-11

## 2019-05-07 RX ORDER — METHYLPREDNISOLONE SODIUM SUCCINATE 125 MG/2ML
60 INJECTION, POWDER, LYOPHILIZED, FOR SOLUTION INTRAMUSCULAR; INTRAVENOUS EVERY 6 HOURS
Status: DISCONTINUED | OUTPATIENT
Start: 2019-05-07 | End: 2019-05-09

## 2019-05-07 RX ORDER — ENALAPRIL MALEATE 5 MG/1
5 TABLET ORAL DAILY
COMMUNITY
End: 2021-11-30

## 2019-05-07 RX ORDER — PREGABALIN 75 MG/1
75 CAPSULE ORAL 2 TIMES DAILY
Status: DISCONTINUED | OUTPATIENT
Start: 2019-05-07 | End: 2019-05-07

## 2019-05-07 RX ORDER — CLONIDINE HYDROCHLORIDE 0.1 MG/1
0.1 TABLET ORAL AS NEEDED
COMMUNITY
End: 2021-11-30

## 2019-05-07 RX ORDER — ENOXAPARIN SODIUM 100 MG/ML
40 INJECTION SUBCUTANEOUS DAILY
Status: DISCONTINUED | OUTPATIENT
Start: 2019-05-07 | End: 2019-05-08

## 2019-05-07 RX ORDER — CLONIDINE 0.1 MG/24H
1 PATCH, EXTENDED RELEASE TRANSDERMAL WEEKLY
Status: ON HOLD | COMMUNITY
End: 2019-05-11

## 2019-05-07 RX ORDER — LEVOTHYROXINE SODIUM 0.05 MG/1
50 TABLET ORAL
Status: DISCONTINUED | OUTPATIENT
Start: 2019-05-07 | End: 2019-05-11

## 2019-05-07 RX ORDER — TEMAZEPAM 7.5 MG/1
7.5 CAPSULE ORAL NIGHTLY PRN
Status: DISCONTINUED | OUTPATIENT
Start: 2019-05-07 | End: 2019-05-11

## 2019-05-07 RX ORDER — ONDANSETRON 2 MG/ML
4 INJECTION INTRAMUSCULAR; INTRAVENOUS EVERY 6 HOURS PRN
Status: DISCONTINUED | OUTPATIENT
Start: 2019-05-07 | End: 2019-05-11

## 2019-05-07 RX ORDER — IPRATROPIUM BROMIDE AND ALBUTEROL SULFATE 2.5; .5 MG/3ML; MG/3ML
3 SOLUTION RESPIRATORY (INHALATION) EVERY 4 HOURS PRN
Status: DISCONTINUED | OUTPATIENT
Start: 2019-05-07 | End: 2019-05-08

## 2019-05-07 RX ORDER — DILTIAZEM HYDROCHLORIDE 240 MG/1
240 CAPSULE, COATED, EXTENDED RELEASE ORAL NIGHTLY
Status: DISCONTINUED | OUTPATIENT
Start: 2019-05-07 | End: 2019-05-07

## 2019-05-07 RX ORDER — DILTIAZEM HYDROCHLORIDE 240 MG/1
240 CAPSULE, COATED, EXTENDED RELEASE ORAL ONCE
Status: COMPLETED | OUTPATIENT
Start: 2019-05-07 | End: 2019-05-07

## 2019-05-07 RX ORDER — ACETAMINOPHEN 325 MG/1
650 TABLET ORAL EVERY 6 HOURS PRN
Status: DISCONTINUED | OUTPATIENT
Start: 2019-05-07 | End: 2019-05-11

## 2019-05-07 RX ORDER — CLONIDINE 0.1 MG/24H
1 PATCH, EXTENDED RELEASE TRANSDERMAL WEEKLY
Status: DISCONTINUED | OUTPATIENT
Start: 2019-05-07 | End: 2019-05-11

## 2019-05-07 RX ORDER — DEXTROSE MONOHYDRATE 25 G/50ML
50 INJECTION, SOLUTION INTRAVENOUS
Status: DISCONTINUED | OUTPATIENT
Start: 2019-05-07 | End: 2019-05-11

## 2019-05-07 RX ORDER — METOCLOPRAMIDE HYDROCHLORIDE 5 MG/ML
5 INJECTION INTRAMUSCULAR; INTRAVENOUS EVERY 8 HOURS PRN
Status: DISCONTINUED | OUTPATIENT
Start: 2019-05-07 | End: 2019-05-11

## 2019-05-07 RX ORDER — METHYLPREDNISOLONE SODIUM SUCCINATE 125 MG/2ML
125 INJECTION, POWDER, LYOPHILIZED, FOR SOLUTION INTRAMUSCULAR; INTRAVENOUS ONCE
Status: COMPLETED | OUTPATIENT
Start: 2019-05-07 | End: 2019-05-07

## 2019-05-07 RX ORDER — LEVOFLOXACIN 500 MG/1
500 TABLET, FILM COATED ORAL DAILY
Status: ON HOLD | COMMUNITY
End: 2019-05-11

## 2019-05-07 RX ORDER — ASPIRIN 81 MG/1
81 TABLET ORAL DAILY
Status: DISCONTINUED | OUTPATIENT
Start: 2019-05-07 | End: 2019-05-11

## 2019-05-07 NOTE — ED PROVIDER NOTES
Patient Seen in: [facility] Emergency Department    History   Patient presents with:  Dyspnea SHAILESH SOB (respiratory)  Cough/URI    Stated Complaint: cough, sob    HPI    82-year-old female presents to the emergency department with complaints of incre Exam     ED Triage Vitals [05/07/19 1138]   BP 95/72   Pulse 83   Resp 20   Temp 97 °F (36.1 °C)   Temp src Temporal   SpO2 95 %   O2 Device None (Room air)       Current:/65   Pulse 77   Temp 98.5 °F (36.9 °C) (Oral)   Resp 24   Ht 162.6 cm (5' 4\") All other components within normal limits   POCT GLUCOSE - Abnormal; Notable for the following components:    POC Glucose 177 (*)     All other components within normal limits   RESPIRATORY PANEL FLU EXPANDED - Abnormal; Notable for the following component congestion and volume overload. Heart size is within normal limits. Pleural spaces appear clear. No focal consolidation. CONCLUSION:  Possible mild vascular congestion and volume overload. No focal consolidation is seen.   If clinical symptoms pers ICD-10-CM Noted POA    * (Principal) Parainfluenza virus bronchitis J20.4 5/7/2019 Unknown    Acute renal failure superimposed on chronic kidney disease, unspecified CKD stage, unspecified acute renal failure type ([facility] N17.9, N18.9 5/7/2019     D

## 2019-05-07 NOTE — ED INITIAL ASSESSMENT (HPI)
Pt here for SOB, and cough since April 30, pt seen Primary on Friday and given Antibx.  Pt denies n,v,d.

## 2019-05-07 NOTE — CONSULTS
[facility] Pharmacy Note:  Renal Dose Adjustment for Metoclopramide (REGLAN)    Chanel De La Cruz has been prescribed Metoclopramide (REGLAN) 10 mg every 8 hours as needed for nausea/vomiting. Estimated Creatinine Clearance: 19.2 mL/min (A) (based on SCr of 1.

## 2019-05-07 NOTE — PROGRESS NOTES
NURSING ADMISSION NOTE      Patient admitted via Cart  Oriented to room. Safety precautions initiated. Bed in low position. Call light in reach. ADMITTED TO  FROM ER FOR BRONCHITIS.

## 2019-05-07 NOTE — PLAN OF CARE
Problem: Patient/Family Goals  Goal: Patient/Family Long Term Goal  Description  Patient's Long Term Goal: BE DISCHARGED     Interventions:  - O2 THERAPY  NEB TREATMENTS  - See additional Care Plan goals for specific interventions   Outcome: Progressing and encourage patient/family in tolerated functional activity level and precautions during self-care  {Additional ADL Interventions:  Outcome: Progressing

## 2019-05-07 NOTE — H&P
[facility]  History and Physical     Zo Cooper Patient Status:  Emergency    1935 MRN [id number]   Location [facility] [facility] Attending Isela Trotter MD   Hosp Day # 0 PCP Jake Jones     Chief Complaint: Philip Ansari Take 15 mg by mouth daily. Disp:  Rfl:    cloNIDine 0.1 MG/24HR Transdermal Patch Weekly Place 1 patch onto the skin once a week. Disp:  Rfl:    cloNIDine HCl 0.1 MG Oral Tab Take 0.1 mg by mouth as needed.  Disp:  Rfl:    levofloxacin 500 MG Oral Tab Take cyanosis. Integument: No rashes or lesions. Psychiatric: Appropriate mood and affect.   Diagnostic Data:    Labs:  Recent Labs   Lab 05/07/19  1155   WBC 10.1   HGB 14.2   MCV 91.2   .0     Recent Labs   Lab 05/07/19  1155   GLU 92   BUN 47*   CRE

## 2019-05-08 PROCEDURE — 99232 SBSQ HOSP IP/OBS MODERATE 35: CPT | Performed by: HOSPITALIST

## 2019-05-08 RX ORDER — FUROSEMIDE 10 MG/ML
20 INJECTION INTRAMUSCULAR; INTRAVENOUS ONCE
Status: DISCONTINUED | OUTPATIENT
Start: 2019-05-08 | End: 2019-05-08

## 2019-05-08 RX ORDER — IPRATROPIUM BROMIDE AND ALBUTEROL SULFATE 2.5; .5 MG/3ML; MG/3ML
3 SOLUTION RESPIRATORY (INHALATION)
Status: DISCONTINUED | OUTPATIENT
Start: 2019-05-08 | End: 2019-05-08

## 2019-05-08 RX ORDER — IPRATROPIUM BROMIDE AND ALBUTEROL SULFATE 2.5; .5 MG/3ML; MG/3ML
3 SOLUTION RESPIRATORY (INHALATION)
Status: DISCONTINUED | OUTPATIENT
Start: 2019-05-08 | End: 2019-05-11

## 2019-05-08 RX ORDER — ENOXAPARIN SODIUM 100 MG/ML
30 INJECTION SUBCUTANEOUS DAILY
Status: DISCONTINUED | OUTPATIENT
Start: 2019-05-08 | End: 2019-05-11

## 2019-05-08 NOTE — PROGRESS NOTES
Pt is a 83 y/o Burundian speaking female admitted with sob due to acute bronchospasm and +parainfluenza. alert oriented. still has wheezing with nonproductive cough. on iv steroids,nebs. up as tolerated. on 2L02 via NC.02 sats 92-96% at rest.encouaged ambulation

## 2019-05-08 NOTE — RESPIRATORY THERAPY NOTE
Patient received on 2 liter nasal cannula sating 96%. Breath sounds diminshed/expiratory wheezes. Plan to continue nebs as ordered.

## 2019-05-08 NOTE — PHYSICAL THERAPY NOTE
PHYSICAL THERAPY QUICK EVALUATION - INPATIENT    Room Number: 393/598-C  Evaluation Date: 5/8/2019  Presenting Problem: SOB  Physician Order: PT Eval and Treat    Problem List  Principal Problem:    Parainfluenza virus bronchitis  Active Problems:    Whe bedclothes, sheets and blankets)?: None   -   Sitting down on and standing up from a chair with arms (e.g., wheelchair, bedside commode, etc.): None   -   Moving from lying on back to sitting on the side of the bed?: None   How much help from another perso encouraged ambulation to maintain current level of mobility. The rehab aide will perform treatment activities prescribed by this physical therapist. The rehab aide will communicate with overseeing PT regarding any change in functional mobility. RN aware.

## 2019-05-08 NOTE — PROGRESS NOTES
Assumed care of pt at 2300. Here for bronchitis, expiratory wheezes. VSS, 3L NC.  used for explanation of medications and to inform pt of plan of care for the night. IV steroids, IV fluids, tolerating both well.  Questions and concerns about me

## 2019-05-08 NOTE — PROGRESS NOTES
[facility] Pharmacy Note: Renal dose adjustment for Enoxaparin (Lovenox)  Fermin Soto has been prescribed Enoxaparin (Lovenox)  40 mg subcutaneously every 24 hours. Estimated Creatinine Clearance: 23.7 mL/min (A) (based on SCr of 1.58 mg/dL (H)).     Her

## 2019-05-08 NOTE — PLAN OF CARE
Problem: Patient/Family Goals  Goal: Patient/Family Long Term Goal  Description  Patient's Long Term Goal: BE DISCHARGED     Interventions:  - O2 THERAPY  NEB TREATMENTS  - See additional Care Plan goals for specific interventions   Outcome: Progressing and bathing  - Educate and encourage patient/family in tolerated functional activity level and precautions during self-care    Outcome: Progressing

## 2019-05-08 NOTE — PROGRESS NOTES
[facility]  Progress Note     Karel Heads Patient Status:  Inpatient    1935 MRN [id number]   [facility] 5NW-A Attending Chapin Ingram [address] Day # 1 PCP Alexi Morales     Chief Complaint: SOB    S: Patient Daily   • Levothyroxine Sodium  50 mcg Oral Before breakfast   • Metoprolol Succinate ER  25 mg Oral Daily   • enoxaparin  40 mg Subcutaneous Daily   • Insulin Aspart Pen  1-10 Units Subcutaneous TID AC and HS   • Insulin Aspart Pen  1-68 Units Subcutaneou

## 2019-05-08 NOTE — PLAN OF CARE
Assumed care of patient at 1. Monitor on  on nasal cannula 3-4L. HOB elevated. Droplet isolation maintained. Went over plan of care/medications/assessment with . IVF, IV solumedrol, nebs prn.  Labs in am. Endorse care to oncoming RN

## 2019-05-09 PROCEDURE — 99232 SBSQ HOSP IP/OBS MODERATE 35: CPT | Performed by: HOSPITALIST

## 2019-05-09 RX ORDER — TETRAHYDROZOLINE HCL 0.05 %
1 DROPS OPHTHALMIC (EYE) 4 TIMES DAILY PRN
Status: DISCONTINUED | OUTPATIENT
Start: 2019-05-09 | End: 2019-05-11

## 2019-05-09 RX ORDER — LORAZEPAM 2 MG/ML
0.5 INJECTION INTRAMUSCULAR ONCE
Status: DISCONTINUED | OUTPATIENT
Start: 2019-05-09 | End: 2019-05-09

## 2019-05-09 RX ORDER — METHYLPREDNISOLONE SODIUM SUCCINATE 125 MG/2ML
80 INJECTION, POWDER, LYOPHILIZED, FOR SOLUTION INTRAMUSCULAR; INTRAVENOUS EVERY 6 HOURS
Status: DISCONTINUED | OUTPATIENT
Start: 2019-05-09 | End: 2019-05-10

## 2019-05-09 RX ORDER — CODEINE PHOSPHATE AND GUAIFENESIN 10; 100 MG/5ML; MG/5ML
5 SOLUTION ORAL EVERY 4 HOURS PRN
Status: DISCONTINUED | OUTPATIENT
Start: 2019-05-09 | End: 2019-05-11

## 2019-05-09 NOTE — PLAN OF CARE
Patient alert and orientated, [place] speaking, [address] [facility]  used. Oxygen WNL on 3L nasal cannula, . No complaints of pain. Medications per MAR. IVF infusing. Pt tolerating diet. Voiding. Up with assistance.  Pt instructed to call for help, call omar level and precautions  - Ambulate 3+ times per day in hallway with RW   Outcome: Progressing     Problem: Impaired Activities of Daily Living  Goal: Achieve highest/safest level of independence in self care  Description  Interventions:  - Assess ability an

## 2019-05-09 NOTE — PROGRESS NOTES
Pt is a 83 y/o Ecuadorean speaking female admitted with sob due to acute bronchospasm and +parainfluenza. alert oriented. still has wheezing with nonproductive cough. MD notified and robitussin with codeine ordered. on iv steroids,nebs. up as tolerated. on 2L02 via

## 2019-05-09 NOTE — PLAN OF CARE
Problem: Patient/Family Goals  Goal: Patient/Family Long Term Goal  Description  Patient's Long Term Goal: BE DISCHARGED     Interventions:  - O2 THERAPY  NEB TREATMENTS  - See additional Care Plan goals for specific interventions   Outcome: Progressing care  Description  Interventions:  - Assess ability and encourage patient to participate in ADLs to maximize function  - Promote sitting position while performing ADLs such as feeding, grooming, and bathing  - Educate and encourage patient/family in [place]

## 2019-05-09 NOTE — CM/SW NOTE
Spoke with pt's dtr, Sowmya regarding DC planning. Pt lives alone and has a caregiver for 19 hours/week. Pt's dtr would like to increase this, but has been told this is the maximum number of hours pt can receive.   Pt's dtr stated that Jacob Chirinos has Apryl Escobar

## 2019-05-09 NOTE — PROGRESS NOTES
[facility]  Progress Note     Mario Casper Patient Status:  Inpatient    1935 MRN [id number]   [facility] 5NW-A Attending Jaclyn Guallpa [address] Day # 2 PCP Lida Duval     Chief Complaint: SOB    S: Patient Epic.    Medications:   • enoxaparin  30 mg Subcutaneous Daily   • ipratropium-albuterol  3 mL Nebulization QID   • aspirin  81 mg Oral Daily   • cloNIDine  1 patch Transdermal Weekly   • Enalapril Maleate  5 mg Oral Daily   • Levothyroxine Sodium  50 mcg

## 2019-05-10 PROCEDURE — 99232 SBSQ HOSP IP/OBS MODERATE 35: CPT | Performed by: HOSPITALIST

## 2019-05-10 RX ORDER — DOCUSATE SODIUM 100 MG/1
100 CAPSULE, LIQUID FILLED ORAL 2 TIMES DAILY
Status: DISCONTINUED | OUTPATIENT
Start: 2019-05-10 | End: 2019-05-11

## 2019-05-10 RX ORDER — METHYLPREDNISOLONE SODIUM SUCCINATE 40 MG/ML
40 INJECTION, POWDER, LYOPHILIZED, FOR SOLUTION INTRAMUSCULAR; INTRAVENOUS EVERY 12 HOURS
Status: DISCONTINUED | OUTPATIENT
Start: 2019-05-10 | End: 2019-05-11

## 2019-05-10 NOTE — PROGRESS NOTES
Pt is a 83 y/o Cape Verdean speaking female admitted with sob due to acute bronchospasm and +parainfluenza. alert oriented. wheezing is better with nonproductive cough. robitussin with codeine given with minimal help. on iv steroids,nebs. up as tolerated. on 2L02 vi

## 2019-05-10 NOTE — PLAN OF CARE
Problem: Patient/Family Goals  Goal: Patient/Family Long Term Goal  Description  Patient's Long Term Goal: BE DISCHARGED     Interventions:  - O2 THERAPY  NEB TREATMENTS  - See additional Care Plan goals for specific interventions   Outcome: Progressing care  Description  Interventions:  - Assess ability and encourage patient to participate in ADLs to maximize function  - Promote sitting position while performing ADLs such as feeding, grooming, and bathing  - Educate and encourage patient/family in [place] Cessation handout, if applicable  - Encourage broncho-pulmonary hygiene including cough, deep breathe, Incentive Spirometry  - Assess the need for suctioning and perform as needed  - Assess and instruct to report SOB or any respiratory difficulty  - Respir stated that she wanted to get as much uninterrupted sleep as possible tonight, cluster care with medications and vitals. The plan is to see how the pt tolerated increased steroid dose and to determine plan of care from morning labs and imaging.  Pt will ne

## 2019-05-10 NOTE — PROGRESS NOTES
[facility]  Progress Note     Woody Mendez Patient Status:  Inpatient    1935 MRN [id number]   [facility] 5NW-A Attending Jordyn Chaudhari [address] Day # 3 PCP Wayne Matthews     Chief Complaint: SOB    S: Patient INR in the last 168 hours. Recent Labs   Lab 05/07/19  1155   TROP <0.045            Imaging: Imaging data reviewed in Epic.     Medications:   • MethylPREDNISolone Sodium Succ  40 mg Intravenous Q12H   • docusate sodium  100 mg Oral BID   • enoxaparin reasonably be expected to span two midnight's based on the clinical documentation in H+P. Based on patients current state of illness, I anticipate that, after discharge, patient will require TBD.

## 2019-05-10 NOTE — PLAN OF CARE
Problem: Patient/Family Goals  Goal: Patient/Family Long Term Goal  Description  Patient's Long Term Goal: BE DISCHARGED     Interventions:  - O2 THERAPY  NEB TREATMENTS  - See additional Care Plan goals for specific interventions   Outcome: Progressing Living  Goal: Achieve highest/safest level of independence in self care  Description  Interventions:  - Assess ability and encourage patient to participate in ADLs to maximize function  - Promote sitting position while performing ADLs such as feeding, groo

## 2019-05-11 VITALS
SYSTOLIC BLOOD PRESSURE: 161 MMHG | TEMPERATURE: 98 F | HEIGHT: 64 IN | RESPIRATION RATE: 16 BRPM | DIASTOLIC BLOOD PRESSURE: 62 MMHG | OXYGEN SATURATION: 95 % | BODY MASS INDEX: 34.33 KG/M2 | WEIGHT: 201.06 LBS | HEART RATE: 68 BPM

## 2019-05-11 PROCEDURE — 99239 HOSP IP/OBS DSCHRG MGMT >30: CPT | Performed by: HOSPITALIST

## 2019-05-11 RX ORDER — PREDNISONE 20 MG/1
40 TABLET ORAL 2 TIMES DAILY WITH MEALS
Status: DISCONTINUED | OUTPATIENT
Start: 2019-05-11 | End: 2019-05-11

## 2019-05-11 RX ORDER — PREDNISONE 10 MG/1
TABLET ORAL
Qty: 37 TABLET | Refills: 0 | Status: SHIPPED | OUTPATIENT
Start: 2019-05-11 | End: 2021-11-30

## 2019-05-11 RX ORDER — CODEINE PHOSPHATE AND GUAIFENESIN 10; 100 MG/5ML; MG/5ML
10 SOLUTION ORAL EVERY 6 HOURS PRN
Qty: 240 ML | Refills: 0 | Status: SHIPPED | OUTPATIENT
Start: 2019-05-11

## 2019-05-11 NOTE — PLAN OF CARE
Received patient a/ox4. O2 sats on RA 89% while sleeping. No new complaints. Will continue to monitor.       Problem: Patient/Family Goals  Goal: Patient/Family Long Term Goal  Description  Patient's Long Term Goal: BE DISCHARGED     Interventions:  - O2 TH Ambulate 3+ times per day in hallway with RW   Outcome: Progressing     Problem: Impaired Activities of Daily Living  Goal: Achieve highest/safest level of independence in self care  Description  Interventions:  - Assess ability and encourage patient to pa

## 2019-05-11 NOTE — PROGRESS NOTES
[facility]  Progress Note     Alverna Mchugh Patient Status:  Inpatient    1935 MRN [id number]   [facility] 5NW-A [facility] PCP Ronnie Yung     Chief Complaint: SOB    S: Patient INR in the last 168 hours. Recent Labs   Lab 05/07/19  1155   TROP <0.045            Imaging: Imaging data reviewed in Epic.     Medications:   • predniSONE  40 mg Oral BID with meals   • docusate sodium  100 mg Oral BID   • enoxaparin  30 mg Subcutaneou

## 2019-05-11 NOTE — PROGRESS NOTES
NURSING DISCHARGE NOTE    Discharged Home via Wheelchair. Accompanied by Family member  Belongings Taken by patient/family. Discharge education completed with patient and patients daughter Salbador Lesches.  New prescription and disease information handouts

## 2019-05-12 PROBLEM — J96.01 ACUTE RESPIRATORY FAILURE WITH HYPOXIA (HCC): Status: ACTIVE | Noted: 2019-05-12

## 2019-05-12 NOTE — CM/SW NOTE
05/12/19 1600   Discharge disposition   Expected discharge disposition Home or Self   Additional Home Care/Hospice Provider   (Pt current w/19 hours caregiver from [address] [place] [address])   Home services after discharge Patient refused services   Discharge transportation

## 2019-05-13 ENCOUNTER — PATIENT OUTREACH (OUTPATIENT)
Dept: CASE MANAGEMENT | Age: 84
End: 2019-05-13

## 2019-05-13 NOTE — DISCHARGE SUMMARY
[facility] HOSPITALIST  DISCHARGE SUMMARY     Christina Kearney Patient Status:  Inpatient    1935 MRN [id number]   [facility] 5NW-A Attending No att. providers found   Hosp Day # 4 PCP Gaurav Lopez     Date of Admission: 2019  Date home.    Lace+ Score: 64  59-90 High Risk  29-58 Medium Risk  0-28   Low Risk. TCM Follow-Up Recommendation:  LACE 29-58:  Moderate Risk of readmission after discharge from the hospital.    Procedures during hospitalization:   • None    Incidental or sig TRADJENTA      Take 5 mg by mouth daily. Refills:  0     Lutein 40 MG Caps      Take 40 mg by mouth nightly. Refills:  0     Meloxicam 15 MG Tabs      Take 15 mg by mouth daily.    Refills:  0     Metoprolol Succinate ER 25 MG Tb24  Commonly known as: No edema.   -----------------------------------------------------------------------------------------------  PATIENT DISCHARGE INSTRUCTIONS: See electronic chart    Shayna Flores DO    Time spent:  > 30 minutes

## 2019-05-13 NOTE — PROGRESS NOTES
Attempted to reach pt for TCM. Dtr Javier Ackerman answered stating pt does not speak [place]. Introduced myself and advised why I was calling for hospital f/u. Dtr states pt is doing OK since d/c and cough has improved.   She states pt already saw PCP Dr. Quiles Danger

## 2020-03-26 NOTE — LETTER
OSR/LUIS Notification    To: Dr Carol Vizcaino            Date:  1/14/2022  Fax #: [phone]    Patient Name: Corey Luna / Sex: 4/22/1935-A: 82 y  female  Phone:  [phone]  CSN: [id number] Improved

## 2021-11-18 ENCOUNTER — APPOINTMENT (OUTPATIENT)
Dept: CT IMAGING | Facility: HOSPITAL | Age: 86
DRG: 853 | End: 2021-11-18
Attending: EMERGENCY MEDICINE
Payer: MEDICARE

## 2021-11-18 ENCOUNTER — ANESTHESIA (OUTPATIENT)
Dept: SURGERY | Facility: HOSPITAL | Age: 86
DRG: 853 | End: 2021-11-18
Payer: MEDICARE

## 2021-11-18 ENCOUNTER — HOSPITAL ENCOUNTER (INPATIENT)
Facility: HOSPITAL | Age: 86
LOS: 12 days | Discharge: NURSING, REHAB OR LONG-TERM CARE | DRG: 853 | End: 2021-11-30
Attending: EMERGENCY MEDICINE | Admitting: INTERNAL MEDICINE
Payer: MEDICARE

## 2021-11-18 ENCOUNTER — APPOINTMENT (OUTPATIENT)
Dept: GENERAL RADIOLOGY | Facility: HOSPITAL | Age: 86
DRG: 853 | End: 2021-11-18
Attending: EMERGENCY MEDICINE
Payer: MEDICARE

## 2021-11-18 ENCOUNTER — ANESTHESIA EVENT (OUTPATIENT)
Dept: SURGERY | Facility: HOSPITAL | Age: 86
DRG: 853 | End: 2021-11-18
Payer: MEDICARE

## 2021-11-18 DIAGNOSIS — R76.8 ELEVATED SERUM IMMUNOGLOBULIN FREE LIGHT CHAIN LEVEL: ICD-10-CM

## 2021-11-18 DIAGNOSIS — D64.9 ANEMIA, UNSPECIFIED TYPE: ICD-10-CM

## 2021-11-18 DIAGNOSIS — R19.8 PERFORATED VISCUS: Primary | ICD-10-CM

## 2021-11-18 PROCEDURE — 49020 DRAINAGE ABDOM ABSCESS OPEN: CPT | Performed by: STUDENT IN AN ORGANIZED HEALTH CARE EDUCATION/TRAINING PROGRAM

## 2021-11-18 PROCEDURE — 99223 1ST HOSP IP/OBS HIGH 75: CPT | Performed by: STUDENT IN AN ORGANIZED HEALTH CARE EDUCATION/TRAINING PROGRAM

## 2021-11-18 PROCEDURE — 0DQH0ZZ REPAIR CECUM, OPEN APPROACH: ICD-10-PCS | Performed by: STUDENT IN AN ORGANIZED HEALTH CARE EDUCATION/TRAINING PROGRAM

## 2021-11-18 PROCEDURE — 47100 WEDGE BIOPSY OF LIVER: CPT | Performed by: STUDENT IN AN ORGANIZED HEALTH CARE EDUCATION/TRAINING PROGRAM

## 2021-11-18 PROCEDURE — 0DNW0ZZ RELEASE PERITONEUM, OPEN APPROACH: ICD-10-PCS | Performed by: STUDENT IN AN ORGANIZED HEALTH CARE EDUCATION/TRAINING PROGRAM

## 2021-11-18 PROCEDURE — 0FB00ZZ EXCISION OF LIVER, OPEN APPROACH: ICD-10-PCS | Performed by: STUDENT IN AN ORGANIZED HEALTH CARE EDUCATION/TRAINING PROGRAM

## 2021-11-18 PROCEDURE — 99223 1ST HOSP IP/OBS HIGH 75: CPT | Performed by: INTERNAL MEDICINE

## 2021-11-18 PROCEDURE — 49020 DRAINAGE ABDOM ABSCESS OPEN: CPT | Performed by: SURGERY

## 2021-11-18 PROCEDURE — 74177 CT ABD & PELVIS W/CONTRAST: CPT | Performed by: EMERGENCY MEDICINE

## 2021-11-18 PROCEDURE — 0F9000Z DRAINAGE OF LIVER WITH DRAINAGE DEVICE, OPEN APPROACH: ICD-10-PCS | Performed by: STUDENT IN AN ORGANIZED HEALTH CARE EDUCATION/TRAINING PROGRAM

## 2021-11-18 PROCEDURE — 3E0T3BZ INTRODUCTION OF ANESTHETIC AGENT INTO PERIPHERAL NERVES AND PLEXI, PERCUTANEOUS APPROACH: ICD-10-PCS | Performed by: ANESTHESIOLOGY

## 2021-11-18 PROCEDURE — 47100 WEDGE BIOPSY OF LIVER: CPT | Performed by: SURGERY

## 2021-11-18 PROCEDURE — 76942 ECHO GUIDE FOR BIOPSY: CPT | Performed by: ANESTHESIOLOGY

## 2021-11-18 PROCEDURE — 71045 X-RAY EXAM CHEST 1 VIEW: CPT | Performed by: EMERGENCY MEDICINE

## 2021-11-18 PROCEDURE — 0W9J0ZZ DRAINAGE OF PELVIC CAVITY, OPEN APPROACH: ICD-10-PCS | Performed by: STUDENT IN AN ORGANIZED HEALTH CARE EDUCATION/TRAINING PROGRAM

## 2021-11-18 RX ORDER — MEPERIDINE HYDROCHLORIDE 25 MG/ML
12.5 INJECTION INTRAMUSCULAR; INTRAVENOUS; SUBCUTANEOUS AS NEEDED
Status: DISCONTINUED | OUTPATIENT
Start: 2021-11-18 | End: 2021-11-19 | Stop reason: HOSPADM

## 2021-11-18 RX ORDER — SODIUM CHLORIDE 9 MG/ML
1000 INJECTION, SOLUTION INTRAVENOUS CONTINUOUS
Status: DISCONTINUED | OUTPATIENT
Start: 2021-11-18 | End: 2021-11-18

## 2021-11-18 RX ORDER — ONDANSETRON 2 MG/ML
INJECTION INTRAMUSCULAR; INTRAVENOUS AS NEEDED
Status: DISCONTINUED | OUTPATIENT
Start: 2021-11-18 | End: 2021-11-18 | Stop reason: SURG

## 2021-11-18 RX ORDER — SODIUM CHLORIDE 9 MG/ML
INJECTION, SOLUTION INTRAVENOUS CONTINUOUS
Status: DISCONTINUED | OUTPATIENT
Start: 2021-11-18 | End: 2021-11-19

## 2021-11-18 RX ORDER — HYDROMORPHONE HYDROCHLORIDE 1 MG/ML
0.2 INJECTION, SOLUTION INTRAMUSCULAR; INTRAVENOUS; SUBCUTANEOUS EVERY 2 HOUR PRN
Status: CANCELLED | OUTPATIENT
Start: 2021-11-18

## 2021-11-18 RX ORDER — OXYCODONE HYDROCHLORIDE 5 MG/1
2.5 TABLET ORAL EVERY 4 HOURS PRN
Status: CANCELLED | OUTPATIENT
Start: 2021-11-18

## 2021-11-18 RX ORDER — HYDROMORPHONE HYDROCHLORIDE 1 MG/ML
0.2 INJECTION, SOLUTION INTRAMUSCULAR; INTRAVENOUS; SUBCUTANEOUS EVERY 2 HOUR PRN
Status: DISCONTINUED | OUTPATIENT
Start: 2021-11-18 | End: 2021-11-18

## 2021-11-18 RX ORDER — MIDAZOLAM HYDROCHLORIDE 1 MG/ML
1 INJECTION INTRAMUSCULAR; INTRAVENOUS EVERY 5 MIN PRN
Status: DISCONTINUED | OUTPATIENT
Start: 2021-11-18 | End: 2021-11-19

## 2021-11-18 RX ORDER — ONDANSETRON 2 MG/ML
4 INJECTION INTRAMUSCULAR; INTRAVENOUS EVERY 6 HOURS PRN
Status: DISCONTINUED | OUTPATIENT
Start: 2021-11-18 | End: 2021-11-30

## 2021-11-18 RX ORDER — MORPHINE SULFATE 4 MG/ML
4 INJECTION, SOLUTION INTRAMUSCULAR; INTRAVENOUS ONCE
Status: COMPLETED | OUTPATIENT
Start: 2021-11-18 | End: 2021-11-18

## 2021-11-18 RX ORDER — ONDANSETRON 2 MG/ML
4 INJECTION INTRAMUSCULAR; INTRAVENOUS EVERY 6 HOURS PRN
Status: DISCONTINUED | OUTPATIENT
Start: 2021-11-18 | End: 2021-11-19

## 2021-11-18 RX ORDER — LABETALOL HYDROCHLORIDE 5 MG/ML
5 INJECTION, SOLUTION INTRAVENOUS EVERY 5 MIN PRN
Status: DISCONTINUED | OUTPATIENT
Start: 2021-11-18 | End: 2021-11-19

## 2021-11-18 RX ORDER — CEFOXITIN 1 G/1
INJECTION, POWDER, FOR SOLUTION INTRAVENOUS AS NEEDED
Status: DISCONTINUED | OUTPATIENT
Start: 2021-11-18 | End: 2021-11-18 | Stop reason: SURG

## 2021-11-18 RX ORDER — LIDOCAINE HYDROCHLORIDE 20 MG/ML
10 SOLUTION OROPHARYNGEAL ONCE
Status: COMPLETED | OUTPATIENT
Start: 2021-11-18 | End: 2021-11-18

## 2021-11-18 RX ORDER — DEXTROSE MONOHYDRATE 25 G/50ML
50 INJECTION, SOLUTION INTRAVENOUS
Status: DISCONTINUED | OUTPATIENT
Start: 2021-11-18 | End: 2021-11-30

## 2021-11-18 RX ORDER — HYDROMORPHONE HYDROCHLORIDE 1 MG/ML
0.4 INJECTION, SOLUTION INTRAMUSCULAR; INTRAVENOUS; SUBCUTANEOUS EVERY 2 HOUR PRN
Status: DISCONTINUED | OUTPATIENT
Start: 2021-11-18 | End: 2021-11-18

## 2021-11-18 RX ORDER — METOCLOPRAMIDE HYDROCHLORIDE 5 MG/ML
10 INJECTION INTRAMUSCULAR; INTRAVENOUS EVERY 8 HOURS PRN
Status: DISCONTINUED | OUTPATIENT
Start: 2021-11-18 | End: 2021-11-19

## 2021-11-18 RX ORDER — HYDROMORPHONE HYDROCHLORIDE 1 MG/ML
0.4 INJECTION, SOLUTION INTRAMUSCULAR; INTRAVENOUS; SUBCUTANEOUS EVERY 5 MIN PRN
Status: DISCONTINUED | OUTPATIENT
Start: 2021-11-18 | End: 2021-11-19

## 2021-11-18 RX ORDER — NALOXONE HYDROCHLORIDE 0.4 MG/ML
0.08 INJECTION, SOLUTION INTRAMUSCULAR; INTRAVENOUS; SUBCUTANEOUS
Status: DISCONTINUED | OUTPATIENT
Start: 2021-11-18 | End: 2021-11-30

## 2021-11-18 RX ORDER — ACETAMINOPHEN 10 MG/ML
1000 INJECTION, SOLUTION INTRAVENOUS EVERY 6 HOURS
Status: DISCONTINUED | OUTPATIENT
Start: 2021-11-18 | End: 2021-11-22

## 2021-11-18 RX ORDER — SODIUM CHLORIDE 9 MG/ML
INJECTION, SOLUTION INTRAVENOUS CONTINUOUS
Status: DISCONTINUED | OUTPATIENT
Start: 2021-11-18 | End: 2021-11-18

## 2021-11-18 RX ORDER — HEPARIN SODIUM 5000 [USP'U]/ML
5000 INJECTION, SOLUTION INTRAVENOUS; SUBCUTANEOUS EVERY 8 HOURS SCHEDULED
Status: DISCONTINUED | OUTPATIENT
Start: 2021-11-19 | End: 2021-11-30

## 2021-11-18 RX ORDER — SODIUM CHLORIDE, SODIUM LACTATE, POTASSIUM CHLORIDE, CALCIUM CHLORIDE 600; 310; 30; 20 MG/100ML; MG/100ML; MG/100ML; MG/100ML
INJECTION, SOLUTION INTRAVENOUS CONTINUOUS
Status: DISCONTINUED | OUTPATIENT
Start: 2021-11-18 | End: 2021-11-18

## 2021-11-18 RX ORDER — ONDANSETRON 2 MG/ML
4 INJECTION INTRAMUSCULAR; INTRAVENOUS AS NEEDED
Status: DISCONTINUED | OUTPATIENT
Start: 2021-11-18 | End: 2021-11-19

## 2021-11-18 RX ORDER — HYDROCODONE BITARTRATE AND ACETAMINOPHEN 5; 325 MG/1; MG/1
1 TABLET ORAL AS NEEDED
Status: DISCONTINUED | OUTPATIENT
Start: 2021-11-18 | End: 2021-11-19 | Stop reason: HOSPADM

## 2021-11-18 RX ORDER — DIPHENHYDRAMINE HYDROCHLORIDE 50 MG/ML
12.5 INJECTION INTRAMUSCULAR; INTRAVENOUS EVERY 4 HOURS PRN
Status: DISCONTINUED | OUTPATIENT
Start: 2021-11-18 | End: 2021-11-30

## 2021-11-18 RX ORDER — OXYCODONE HYDROCHLORIDE 5 MG/1
5 TABLET ORAL EVERY 4 HOURS PRN
Status: CANCELLED | OUTPATIENT
Start: 2021-11-18

## 2021-11-18 RX ORDER — ONDANSETRON 2 MG/ML
4 INJECTION INTRAMUSCULAR; INTRAVENOUS ONCE
Status: COMPLETED | OUTPATIENT
Start: 2021-11-18 | End: 2021-11-18

## 2021-11-18 RX ORDER — ONDANSETRON 2 MG/ML
4 INJECTION INTRAMUSCULAR; INTRAVENOUS EVERY 6 HOURS PRN
Status: DISCONTINUED | OUTPATIENT
Start: 2021-11-18 | End: 2021-11-18

## 2021-11-18 RX ORDER — INSULIN ASPART 100 [IU]/ML
INJECTION, SOLUTION INTRAVENOUS; SUBCUTANEOUS ONCE
Status: DISCONTINUED | OUTPATIENT
Start: 2021-11-18 | End: 2021-11-18

## 2021-11-18 RX ORDER — HYDROMORPHONE HYDROCHLORIDE 1 MG/ML
0.4 INJECTION, SOLUTION INTRAMUSCULAR; INTRAVENOUS; SUBCUTANEOUS EVERY 2 HOUR PRN
Status: CANCELLED | OUTPATIENT
Start: 2021-11-18

## 2021-11-18 RX ORDER — HYDROMORPHONE HYDROCHLORIDE 1 MG/ML
0.8 INJECTION, SOLUTION INTRAMUSCULAR; INTRAVENOUS; SUBCUTANEOUS EVERY 2 HOUR PRN
Status: DISCONTINUED | OUTPATIENT
Start: 2021-11-18 | End: 2021-11-18

## 2021-11-18 RX ORDER — HYDROCODONE BITARTRATE AND ACETAMINOPHEN 5; 325 MG/1; MG/1
2 TABLET ORAL AS NEEDED
Status: DISCONTINUED | OUTPATIENT
Start: 2021-11-18 | End: 2021-11-19 | Stop reason: HOSPADM

## 2021-11-18 RX ORDER — NALOXONE HYDROCHLORIDE 0.4 MG/ML
80 INJECTION, SOLUTION INTRAMUSCULAR; INTRAVENOUS; SUBCUTANEOUS AS NEEDED
Status: DISCONTINUED | OUTPATIENT
Start: 2021-11-18 | End: 2021-11-19

## 2021-11-18 RX ORDER — DEXAMETHASONE SODIUM PHOSPHATE 4 MG/ML
VIAL (ML) INJECTION AS NEEDED
Status: DISCONTINUED | OUTPATIENT
Start: 2021-11-18 | End: 2021-11-18 | Stop reason: SURG

## 2021-11-18 RX ORDER — DEXTROSE MONOHYDRATE 25 G/50ML
50 INJECTION, SOLUTION INTRAVENOUS ONCE
Status: COMPLETED | OUTPATIENT
Start: 2021-11-18 | End: 2021-11-18

## 2021-11-18 RX ORDER — ROCURONIUM BROMIDE 10 MG/ML
INJECTION, SOLUTION INTRAVENOUS AS NEEDED
Status: DISCONTINUED | OUTPATIENT
Start: 2021-11-18 | End: 2021-11-18 | Stop reason: SURG

## 2021-11-18 RX ORDER — LIDOCAINE HYDROCHLORIDE 20 MG/ML
SOLUTION OROPHARYNGEAL
Status: COMPLETED
Start: 2021-11-18 | End: 2021-11-18

## 2021-11-18 RX ADMIN — DEXAMETHASONE SODIUM PHOSPHATE 8 MG: 4 MG/ML VIAL (ML) INJECTION at 17:55:00

## 2021-11-18 RX ADMIN — ROCURONIUM BROMIDE 70 MG: 10 INJECTION, SOLUTION INTRAVENOUS at 17:41:00

## 2021-11-18 RX ADMIN — SODIUM CHLORIDE, SODIUM LACTATE, POTASSIUM CHLORIDE, CALCIUM CHLORIDE: 600; 310; 30; 20 INJECTION, SOLUTION INTRAVENOUS at 19:39:00

## 2021-11-18 RX ADMIN — CEFOXITIN 2 G: 1 INJECTION, POWDER, FOR SOLUTION INTRAVENOUS at 19:47:00

## 2021-11-18 RX ADMIN — SODIUM CHLORIDE, SODIUM LACTATE, POTASSIUM CHLORIDE, CALCIUM CHLORIDE: 600; 310; 30; 20 INJECTION, SOLUTION INTRAVENOUS at 20:55:00

## 2021-11-18 RX ADMIN — ONDANSETRON 4 MG: 2 INJECTION INTRAMUSCULAR; INTRAVENOUS at 20:02:00

## 2021-11-18 RX ADMIN — SODIUM CHLORIDE, SODIUM LACTATE, POTASSIUM CHLORIDE, CALCIUM CHLORIDE: 600; 310; 30; 20 INJECTION, SOLUTION INTRAVENOUS at 18:28:00

## 2021-11-18 RX ADMIN — SODIUM CHLORIDE, SODIUM LACTATE, POTASSIUM CHLORIDE, CALCIUM CHLORIDE: 600; 310; 30; 20 INJECTION, SOLUTION INTRAVENOUS at 17:37:00

## 2021-11-18 RX ADMIN — CEFOXITIN 2 G: 1 INJECTION, POWDER, FOR SOLUTION INTRAVENOUS at 17:57:00

## 2021-11-18 NOTE — CONSULTS
[facility]  Report of Consultation    Yonatan Martinez Patient Status:  Inpatient    1935 MRN [id number]   [facility] 4SW-A Attending Casimiro Gillette, DO   Hosp Day # 0 PCP Luz Austin MD     Reason for Consultation:  ICU ma Allergies    Medications:  Ipratropium-Albuterol (COMBIVENT RESPIMAT)  MCG/ACT Inhalation Aero Soln, Inhale 1 puff into the lungs 4 (four) times daily as needed (SOB). , Disp: 1 Inhaler, Rfl: 1  predniSONE 10 MG Oral Tab, Take 4 tablets BID x 2 days, pain  Gastrointestinal: Abdominal symptoms as above  Musculoskeletal: Denies lower extremity edema has peripheral neuropathy  Neurological: Denies issues     All other review of systems are negative. --Denies any history of LUIS    Vital signs in last 24 ayaz 11/18/2021    BILT 2.2 11/18/2021    TP 6.9 11/18/2021     11/18/2021     11/18/2021    PTT 32.9 11/18/2021    INR 1.24 11/18/2021    PTP 15.9 11/18/2021    LIP 2,203 11/18/2021    PGLU 132 11/18/2021       Cultures:   Blood cultures are pend (acute kidney injury) ([facility]     Hyponatremia     Community acquired pneumonia     Acute bronchospasm     CKD (chronic kidney disease) stage 3, GFR 30-59 ml/min ([facility])     Community acquired pneumonia, unspecified laterality     Renal insufficiency     Respi

## 2021-11-18 NOTE — CONSULTS
[facility]  Report of  Surgical Consultation with History and Physical Exam    Kimberlee Anderson Patient Status:  Emergency    1935 MRN [id number]   Location [facility] [facility] Attending Chintan Erickson MD   [facility] Day # 0 [facility] medical history is significant for type 2 diabetes, hypertension, and chronic kidney disease. The patient's past surgical history is significant for open appendectomy. The patient reports that she takes 81 mg of aspirin daily.     The patient denies a symptoms  Respiratory:  Negative for cough, dyspnea and wheezing    Physical Exam:  Blood pressure 114/52, pulse 69, temperature 97.8 °F (36.6 °C), temperature source Tympanic, resp.  rate 21, weight 198 lb 6.6 oz (90 kg), SpO2 96 %, not currently breastfee bronchospasm     CKD (chronic kidney disease) stage 3, GFR 30-59 ml/min (HCC)     Community acquired pneumonia, unspecified laterality     Renal insufficiency     Respiratory syncytial virus (RSV)     Acute hypoxemic respiratory failure (HCC)     Dyspnea patient:  [address] [place] [address]  11/18/2021  2:00 PM  ADDENDUM:     Patient was seen and examined.   72-year-old Austrian-speaking female with past medical history of gout, arthritis, hypertension who presented to the emergency room with 1 d risks, benefits, alternatives of exploratory laparotomy with possible bowel resection possible ostomy in detail with the patient including but not limited to bleeding, infection, damage to surrounding organs or tissues, possible ostomy creation, possible b

## 2021-11-18 NOTE — H&P
[facility]  History and Physical     Yonatan Martinez Patient Status:  Emergency    1935 MRN [id number]   Location [address] [facility] [address] Attending Pam Haynes MD   Hosp Day # 0 PCP Luz Austin MD     Chief Compl 3 days, then  Take 1 tablet BID x 3 days, then  Take 1 tablet daily x 3 days then stop, Disp: 37 tablet, Rfl: 0  guaiFENesin-codeine 100-10 MG/5ML Oral Solution, Take 10 mL by mouth every 6 (six) hours as needed for cough or congestion. , Disp: 240 mL, Rfl: Chest and Back: No tenderness or deformity. Abdomen: distended, tender  Neurologic: No focal neurological deficits. CNII-XII grossly intact. Musculoskeletal: Moves all extremities. Extremities: No edema or cyanosis. Integument: No rashes or lesions. discussed with patient     Luis Gibson DO  11/18/2021

## 2021-11-18 NOTE — PROGRESS NOTES
Received pt from ER. [facility] speaking. Able to understand some english. Granddaughter then at bedside translating. A0x4. Pleasant. Maintaining 02 sats on 2L via NC. Increased WOB d/t abd size. Severe ABD pain reported with gentle movement. Prn meds given.

## 2021-11-18 NOTE — PROGRESS NOTES
[facility] Pharmacy Note:  Renal Adjustment for piperacillin/tazobactam (Glenora Alfonso)    Misty Cage is a 82year old patient who has been prescribed piperacillin/tazobactam (ZOSYN) 3.375 g every 8 hrs.   The estimated creatinine clearance is 13.4 mL/min (A) (base

## 2021-11-18 NOTE — ANESTHESIA PREPROCEDURE EVALUATION
PRE-OP EVALUATION    Patient Name: Isadora Bonner    Admit Diagnosis: Perforated viscus [R19.8]    Pre-op Diagnosis: Perforated viscus [R19.8]    EXPLORATORY LAPAROTOMY    Anesthesia Procedure: EXPLORATORY LAPAROTOMY (N/A )    Surgeon(s) and Role:     * Units, 2-10 Units, Subcutaneous, Q6H  Piperacillin Sod-Tazobactam So (ZOSYN) 3.375 g in dextrose 5% 100 mL IVPB-MBP, 3.375 g, Intravenous, Q8H        Outpatient Medications:   Ipratropium-Albuterol (COMBIVENT RESPIMAT)  MCG/ACT Inhalation Aero Soln, for perforated viscus           (+) chronic renal disease and CRI                   Cardiovascular  Comment: Normal sinus rhythm   Low voltage QRS, consider pulmonary disease, pericardial effusion, or normal   variant   Borderline ECG   When compared with APPENDECTOMY     • KNEE REPLACEMENT SURGERY  2/2016     Social History    Tobacco Use      Smoking status: Never Smoker      Smokeless tobacco: Never Used    Alcohol use: No      Drug use: No     Available pre-op labs reviewed.   Lab Results   Component Katie

## 2021-11-18 NOTE — ED PROVIDER NOTES
Patient Seen in: [facility] Emergency Department      History   Patient presents with:  Abdomen/Flank Pain    Stated Complaint: abdominal pain     Subjective:   HPI    She is an 82-year-old female comes in emergency room for evaluation of abdominal bilaterally, no wheezing, no rales, no rhonchi. CARDIOVASCULAR: Regular rate and rhythm. Normal S1S2. No S3S4 or murmur. ABDOMEN: Bowel sounds are present. Patient has a distended abdomen.   She has diffuse abdominal tenderness with rigid abdomen and p ---------                               -----------         ------                     CBC W/ DIFFERENTIAL[[id number]]          Abnormal            Final result                 Please view results for these tests on the individual orders.    Memory [place] BILIARY:  Mild intrahepatic biliary ductal dilatation. The gallbladder is mildly distended with suggestion of gallbladder thickening. Common bile duct dilated to 1.9 cm tapering to 1.3 cm in the region of the head of the pancreas. [place]  PANCREAS:  Pancreas is to be less likely. Perforated peptic ulcer the differential consideration. There is wall thickening and slight Daya cholecystic fluid involving the gallbladder with intrahepatic and common bile duct distension.   Cortical scarring both kidneys with perine procedures critical care time includes monitoring of patient's cardiopulmonary and hemodynamic status, interpretation of laboratory values, and discussion of case with physician and consultants.   Admission disposition: 11/18/2021 12:06 PM

## 2021-11-19 ENCOUNTER — ANESTHESIA (OUTPATIENT)
Dept: ENDOSCOPY | Facility: HOSPITAL | Age: 86
DRG: 853 | End: 2021-11-19
Payer: MEDICARE

## 2021-11-19 ENCOUNTER — APPOINTMENT (OUTPATIENT)
Dept: GENERAL RADIOLOGY | Facility: HOSPITAL | Age: 86
DRG: 853 | End: 2021-11-19
Attending: PHYSICIAN ASSISTANT
Payer: MEDICARE

## 2021-11-19 ENCOUNTER — ANESTHESIA EVENT (OUTPATIENT)
Dept: ENDOSCOPY | Facility: HOSPITAL | Age: 86
DRG: 853 | End: 2021-11-19
Payer: MEDICARE

## 2021-11-19 ENCOUNTER — APPOINTMENT (OUTPATIENT)
Dept: GENERAL RADIOLOGY | Facility: HOSPITAL | Age: 86
DRG: 853 | End: 2021-11-19
Attending: INTERNAL MEDICINE
Payer: MEDICARE

## 2021-11-19 PROCEDURE — 71045 X-RAY EXAM CHEST 1 VIEW: CPT | Performed by: PHYSICIAN ASSISTANT

## 2021-11-19 PROCEDURE — 99223 1ST HOSP IP/OBS HIGH 75: CPT | Performed by: INTERNAL MEDICINE

## 2021-11-19 PROCEDURE — 74328 X-RAY BILE DUCT ENDOSCOPY: CPT | Performed by: INTERNAL MEDICINE

## 2021-11-19 PROCEDURE — 99233 SBSQ HOSP IP/OBS HIGH 50: CPT | Performed by: INTERNAL MEDICINE

## 2021-11-19 PROCEDURE — BF101ZZ FLUOROSCOPY OF BILE DUCTS USING LOW OSMOLAR CONTRAST: ICD-10-PCS | Performed by: INTERNAL MEDICINE

## 2021-11-19 PROCEDURE — 0F798DZ DILATION OF COMMON BILE DUCT WITH INTRALUMINAL DEVICE, VIA NATURAL OR ARTIFICIAL OPENING ENDOSCOPIC: ICD-10-PCS | Performed by: INTERNAL MEDICINE

## 2021-11-19 PROCEDURE — 0FC98ZZ EXTIRPATION OF MATTER FROM COMMON BILE DUCT, VIA NATURAL OR ARTIFICIAL OPENING ENDOSCOPIC: ICD-10-PCS | Performed by: INTERNAL MEDICINE

## 2021-11-19 DEVICE — COTTON-LEUNG BILIARY STENT
Type: IMPLANTABLE DEVICE | Site: BILIARY | Status: FUNCTIONAL
Brand: COTTON-LEUNG

## 2021-11-19 RX ORDER — ONDANSETRON 2 MG/ML
4 INJECTION INTRAMUSCULAR; INTRAVENOUS AS NEEDED
Status: DISCONTINUED | OUTPATIENT
Start: 2021-11-19 | End: 2021-11-19

## 2021-11-19 RX ORDER — LIDOCAINE HYDROCHLORIDE 10 MG/ML
INJECTION, SOLUTION EPIDURAL; INFILTRATION; INTRACAUDAL; PERINEURAL AS NEEDED
Status: DISCONTINUED | OUTPATIENT
Start: 2021-11-19 | End: 2021-11-19 | Stop reason: SURG

## 2021-11-19 RX ORDER — SODIUM CHLORIDE, SODIUM LACTATE, POTASSIUM CHLORIDE, CALCIUM CHLORIDE 600; 310; 30; 20 MG/100ML; MG/100ML; MG/100ML; MG/100ML
INJECTION, SOLUTION INTRAVENOUS CONTINUOUS
Status: DISCONTINUED | OUTPATIENT
Start: 2021-11-19 | End: 2021-11-20

## 2021-11-19 RX ORDER — SODIUM CHLORIDE, SODIUM LACTATE, POTASSIUM CHLORIDE, CALCIUM CHLORIDE 600; 310; 30; 20 MG/100ML; MG/100ML; MG/100ML; MG/100ML
INJECTION, SOLUTION INTRAVENOUS CONTINUOUS
Status: DISCONTINUED | OUTPATIENT
Start: 2021-11-19 | End: 2021-11-19

## 2021-11-19 RX ORDER — HYDROCODONE BITARTRATE AND ACETAMINOPHEN 10; 325 MG/1; MG/1
1 TABLET ORAL AS NEEDED
Status: COMPLETED | OUTPATIENT
Start: 2021-11-19 | End: 2021-11-23

## 2021-11-19 RX ORDER — METOCLOPRAMIDE HYDROCHLORIDE 5 MG/ML
5 INJECTION INTRAMUSCULAR; INTRAVENOUS EVERY 8 HOURS PRN
Status: DISCONTINUED | OUTPATIENT
Start: 2021-11-19 | End: 2021-11-30

## 2021-11-19 RX ORDER — HYDROMORPHONE HYDROCHLORIDE 1 MG/ML
0.4 INJECTION, SOLUTION INTRAMUSCULAR; INTRAVENOUS; SUBCUTANEOUS EVERY 2 HOUR PRN
Status: DISCONTINUED | OUTPATIENT
Start: 2021-11-19 | End: 2021-11-30

## 2021-11-19 RX ORDER — NALOXONE HYDROCHLORIDE 0.4 MG/ML
80 INJECTION, SOLUTION INTRAMUSCULAR; INTRAVENOUS; SUBCUTANEOUS AS NEEDED
Status: DISCONTINUED | OUTPATIENT
Start: 2021-11-19 | End: 2021-11-19

## 2021-11-19 RX ORDER — HYDROCODONE BITARTRATE AND ACETAMINOPHEN 10; 325 MG/1; MG/1
2 TABLET ORAL AS NEEDED
Status: COMPLETED | OUTPATIENT
Start: 2021-11-19 | End: 2021-11-23

## 2021-11-19 RX ORDER — HYDROMORPHONE HYDROCHLORIDE 1 MG/ML
0.2 INJECTION, SOLUTION INTRAMUSCULAR; INTRAVENOUS; SUBCUTANEOUS EVERY 2 HOUR PRN
Status: DISCONTINUED | OUTPATIENT
Start: 2021-11-19 | End: 2021-11-30

## 2021-11-19 RX ORDER — HYDROMORPHONE HYDROCHLORIDE 1 MG/ML
0.4 INJECTION, SOLUTION INTRAMUSCULAR; INTRAVENOUS; SUBCUTANEOUS EVERY 5 MIN PRN
Status: DISCONTINUED | OUTPATIENT
Start: 2021-11-19 | End: 2021-11-19

## 2021-11-19 RX ORDER — ALBUMIN, HUMAN INJ 5% 5 %
250 SOLUTION INTRAVENOUS ONCE
Status: COMPLETED | OUTPATIENT
Start: 2021-11-19 | End: 2021-11-19

## 2021-11-19 RX ORDER — HYDROMORPHONE HYDROCHLORIDE 1 MG/ML
0.1 INJECTION, SOLUTION INTRAMUSCULAR; INTRAVENOUS; SUBCUTANEOUS EVERY 2 HOUR PRN
Status: DISCONTINUED | OUTPATIENT
Start: 2021-11-19 | End: 2021-11-30

## 2021-11-19 RX ORDER — METOCLOPRAMIDE HYDROCHLORIDE 5 MG/ML
5 INJECTION INTRAMUSCULAR; INTRAVENOUS AS NEEDED
Status: DISCONTINUED | OUTPATIENT
Start: 2021-11-19 | End: 2021-11-19

## 2021-11-19 RX ADMIN — LIDOCAINE HYDROCHLORIDE 25 MG: 10 INJECTION, SOLUTION EPIDURAL; INFILTRATION; INTRACAUDAL; PERINEURAL at 11:39:00

## 2021-11-19 RX ADMIN — LIDOCAINE HYDROCHLORIDE 25 MG: 10 INJECTION, SOLUTION EPIDURAL; INFILTRATION; INTRACAUDAL; PERINEURAL at 11:48:00

## 2021-11-19 NOTE — H&P (VIEW-ONLY)
[facility]                       [facility]    Marilu Fernandez Patient Status:  Inpatient    1935 MRN [id number]   [facility] 4SW-A Attending Nerissa Moreland, DO   Hosp Day # 1 PCP Lonnie العراقي chloride 0.9% IV bolus 1,000 mL, 1,000 mL, Intravenous, Once  norepinephrine (LEVOPHED) 4 mg/250 ml premix infusion, 0.5-30 mcg/min, Intravenous, Continuous  [COMPLETED] Albumin Human (ALBUMINAR) 5 % solution 250 mL, 250 mL, Intravenous, Once  metocloprami oral liquid spray 1 mL, 1 spray, Mouth/Throat, PRN  Heparin Sodium (Porcine) 5000 UNIT/ML injection 5,000 Units, 5,000 Units, Subcutaneous, Q8H TRE  acetaminophen (OFIRMEV) infusion 1,000 mg, 1,000 mg, Intravenous, Q6H  HYDROmorphone (DILAUDID) PCA bolus f Genitourinary:  +chronic kidney disease           Psychiatric: The patient reports no history of depression, anxiety, suicidal ideation, or homicidal ideation           Oncologic: The patient reports no history of prior solid tumor or hematologic malignanc 11/19/2021    PHOS 6.6 11/19/2021     Recent Labs   Lab 11/18/21  1059 11/18/21  2115 11/19/21  0322   * 123* 114*   BUN 46* 52* 55*   CREATSERUM 2.70* 3.34* 3.27*   GFRAA 18* 14* 14*   GFRNAA 16* 12* 12*   CA 8.6 7.7* 6.9*    140 143   K 4.3 lateral limb of the left adrenal gland. .   AORTA/VASCULAR:  Tortuous abdominal aorta 0 with calcified plaque involving the abdominal aorta, mesenteric vessels and common iliac arteries.     RETROPERITONEUM:  No enlarged adenopathy. Bethena Selawik is free air in th Taz Lewis MD on 11/18/2021 at 11:58 AM      Impression: Chanel De La Cruz is an 46-year-old with PMHx of hypertension, diabetes mellitus, chronic kidney disease, hypothyroidism, who presented with a hepatic abscess that is s/p resection with surgical Gastroenterology

## 2021-11-19 NOTE — ANESTHESIA POSTPROCEDURE EVALUATION
[address] [facility] Patient Status:  Inpatient   Age/Gender 82year old female MRN [id number]   [facility] 4SW-A Attending Sheldon Roy, [address] [facility] Day # 0 PCP Eleni Desai MD       Anesthesia Post-op Note    EXPLORATORY L

## 2021-11-19 NOTE — CONSULTS
[facility]                       Gastroenterology Consultation-[facility]    Denis Samson Patient Status:  Inpatient    1935 MRN [id number]   [facility] 4SW-A Attending Yanet Harrison, DO   Hosp Day # 1 PCP Pili Lobato chloride 0.9% IV bolus 1,000 mL, 1,000 mL, Intravenous, Once  norepinephrine (LEVOPHED) 4 mg/250 ml premix infusion, 0.5-30 mcg/min, Intravenous, Continuous  [COMPLETED] Albumin Human (ALBUMINAR) 5 % solution 250 mL, 250 mL, Intravenous, Once  metocloprami oral liquid spray 1 mL, 1 spray, Mouth/Throat, PRN  Heparin Sodium (Porcine) 5000 UNIT/ML injection 5,000 Units, 5,000 Units, Subcutaneous, Q8H TRE  acetaminophen (OFIRMEV) infusion 1,000 mg, 1,000 mg, Intravenous, Q6H  HYDROmorphone (DILAUDID) PCA bolus f Genitourinary:  +chronic kidney disease           Psychiatric: The patient reports no history of depression, anxiety, suicidal ideation, or homicidal ideation           Oncologic: The patient reports no history of prior solid tumor or hematologic malignanc 11/19/2021    PHOS 6.6 11/19/2021     Recent Labs   Lab 11/18/21  1059 11/18/21  2115 11/19/21  0322   * 123* 114*   BUN 46* 52* 55*   CREATSERUM 2.70* 3.34* 3.27*   GFRAA 18* 14* 14*   GFRNAA 16* 12* 12*   CA 8.6 7.7* 6.9*    140 143   K 4.3 lateral limb of the left adrenal gland. .   AORTA/VASCULAR:  Tortuous abdominal aorta 0 with calcified plaque involving the abdominal aorta, mesenteric vessels and common iliac arteries.     RETROPERITONEUM:  No enlarged adenopathy. Mono Dominion is free air in th Ayan Landin MD on 11/18/2021 at 11:58 AM      Impression: Mario Casper is an 82-year-old with PMHx of hypertension, diabetes mellitus, chronic kidney disease, hypothyroidism, who presented with a hepatic abscess that is s/p resection with surgical Gastroenterology

## 2021-11-19 NOTE — OCCUPATIONAL THERAPY NOTE
OCCUPATIONAL THERAPY EVALUATION - INPATIENT     Room Number:452  Evaluation Date: 11/19/2021  Type of Evaluation: Initial  Presenting Problem: perforated viscus, 11/18 exp lap, liver wedge resection, drainage of hepatic abscess    Physician Order: IP Consu engagement in ADL/IADL, client assessment/performance deficits, and clinical decision making): Low Complexity    OT Discharge Recommendations: Sub-acute rehabilitation  OT Device Recommendations: TBD    WEIGHT BEARING RESTRICTION  Weight Bearing Restrictio Strength: is within functional limits     AM-PAC ‘6-Clicks’ Inpatient Daily Activity Short Form  -   Putting on and taking off regular lower body clothing?: A Lot  -   Bathing (including washing, rinsing, drying)?: A Lot  -   Toileting, which includes us complains of pain/discomfort

## 2021-11-19 NOTE — ANESTHESIA PROCEDURE NOTES
Airway  Date/Time: 11/18/2021 5:45 PM  Urgency: elective      General Information and Staff    Patient location during procedure: OR  Anesthesiologist: Shayna Aguirre MD  Performed: anesthesiologist     Indications and Patient Condition  Indications for air

## 2021-11-19 NOTE — ANESTHESIA PROCEDURE NOTES
Airway  Date/Time: 11/19/2021 11:39 AM  Urgency: elective    Airway not difficult    General Information and Staff    Patient location during procedure: OR  Anesthesiologist: Reynaldo Saeed MD  Performed: anesthesiologist     Indications and Patient Co

## 2021-11-19 NOTE — PLAN OF CARE
Received pt from OR around 2100 on 6L NC. Upon initial assessment pt very drowsy; ABG obtained. Within an hour more alert and oriented x4. Primarily Com[place]an speaking,  used. Denies pain.  Pt hyperkalemic; bicarb, insulin and D50 given and EKG obt

## 2021-11-19 NOTE — ANESTHESIA POSTPROCEDURE EVALUATION
[facility] Patient Status:  Inpatient   Age/Gender 82year old female MRN [id number]   Location [address] [address] Attending Francisco Curry, [address] [facility] Day # 1 PCP Aldair Reyes MD       Anesthesia Post-op Note

## 2021-11-19 NOTE — PROGRESS NOTES
[facility]  Progress Note    Lonny Hickman Patient Status:  Inpatient    1935 MRN [id number]   [facility] 4SW-A Attending Han Guillory, DO   Hosp Day # 1 PCP Domingo Dunham MD     Subjective:  Lonny Hickman is a(n) 82 y 143   K 4.3 5.5* 4.9    111 112   CO2 24.0 18.0* 24.0   ALKPHO 153* 109 79   * 188* 151*   * 162* 132*   BILT 2.2* 2.6* 1.7   TP 6.9 5.7* 4.8*       Recent Labs   Lab 11/18/21 2115 11/19/21  0322   MG 2.0 1.8       Lab Results   Compon abdominal pressures.   Check bladder pressure  · Pain control  · Pulm toilet as able, IS, OOB to chair  · GI consultation pending  · Proph- heparin subcut, protonix  · Disp- ICU    Critical care time 40 minutes  Discussed w RN, APN, Dr. Esther Nieves

## 2021-11-19 NOTE — CONSULTS
INFECTIOUS DISEASE CONSULT NOTE    Rafael Fuad Patient Status:  Inpatient    1935 MRN [id number]   [facility] 4SW-A Attending Nury Garcia, DO   Hosp Day # 1 PCP Rosalinda Rojas ringers infusion, , Intravenous, Continuous  •  lactated ringers IV bolus 500 mL, 500 mL, Intravenous, Once PRN  •  HYDROcodone-acetaminophen (NORCO)  MG per tab 1 tablet, 1 tablet, Oral, PRN **OR** HYDROcodone-acetaminophen (NORCO)  MG per tab Heparin Sodium (Porcine) 5000 UNIT/ML injection 5,000 Units, 5,000 Units, Subcutaneous, Q8H [facility]  •  acetaminophen (OFIRMEV) infusion 1,000 mg, 1,000 mg, Intravenous, Q6H  •  Naloxone HCl (NARCAN) 0.4 MG/ML injection 0.08 mg, 0.08 mg, Intravenous, Q5 Min P arthritis or deformity  Integument: No rash. No open wounds.     Laboratory Data:    Recent Labs   Lab 11/18/21  1059 11/18/21  2115 11/19/21  0322   RBC 4.04 3.82 3.38*   HGB 13.0 11.5* 9.9*   HCT 37.0 37.1 33.2*   MCV 91.6 97.1 98.2   MCH 32.2 30.1 29.3 COMPARISON:  [facility] , CT, CT ABDOMEN PELVIS IV CONTRAST, NO ORAL (ER), 11/18/2021, 11:17 AM.  INDICATIONS:  ERCP  FLUOROSCOPY IMAGES OBTAINED:  7 FLUOROSCOPY TIME:  3 minutes 2 seconds TECHNOLOGIST TIME:  40 minutes RADIATION DOSE (AIR KERMA PRODUCT):  62m 12:24 PM.  [facility] , XR, XR CHEST AP PORTABLE  (CPT=71045), 5/16/2018, 5:50 AM.  INDICATIONS:  Verify correct tube placement  PATIENT STATED HISTORY: (As transcribed by Technologist)  Patient offered no additional history at this time.     FINDINGS:  The exa 2. 4 cm. ADRENALS:  Mild thickening of the lateral limb of the left adrenal gland. . AORTA/VASCULAR:  Tortuous abdominal aorta 0 with calcified plaque involving the abdominal aorta, mesenteric vessels and common iliac arteries.   RETROPERITONEUM:  No enlarged on 11/18/2021 at 11:58 AM            ASSESSMENT:  1.  Liver abscess- s/p OR drainage and wedge resection- assume biliary origin as pt with dilated CBD and stones removed at the time of ERCP  2. choledocholithiasis with suspected cholangitis, sp ERCP  3. Xavi

## 2021-11-19 NOTE — CM/SW NOTE
11/19/21 1300   CM/SW Referral Data   Referral Source Social Work (self-referral)   Reason for Referral Discharge planning   Informant Patient;Daughter; Other  (Granddaughter)      Method of Interpretation Family Member over the age of 25  (Gr

## 2021-11-19 NOTE — CONSULTS
[facility]  Report of Consultation    Margie Jaimes Patient Status:  Inpatient    1935 MRN [id number]   [facility] 4SW-A Attending La Kidd, DO   Hosp Day # 1 PCP David Huitron MD       Assessment / Plan:    1) RENEA- du Procedure Laterality Date   • APPENDECTOMY     • APPENDECTOMY     • KNEE REPLACEMENT SURGERY  2/2016     No family history on file. Denies family history of kidney disease. reports that she has never smoked.  She has never used smokeless tobacco. She mg, Intravenous, Q5 Min PRN  •  glucose (DEX4) oral liquid 15 g, 15 g, Oral, Q15 Min PRN **OR** glucose-vitamin C (DEX-4) chewable tab 4 tablet, 4 tablet, Oral, Q15 Min PRN **OR** dextrose 50 % injection 50 mL, 50 mL, Intravenous, Q15 Min PRN **OR** glucos Output 1284 ml   Net 3369 ml     Wt Readings from Last 3 Encounters:  11/18/21 : 202 lb 6.1 oz (91.8 kg)  05/11/19 : 201 lb 1 oz (91.2 kg)  11/16/18 : 195 lb 1.6 oz (88.5 kg)    General: awake alert  HEENT: No scleral icterus, MMM  Neck: Supple, no AMANDEEP o

## 2021-11-19 NOTE — ANESTHESIA PREPROCEDURE EVALUATION
PRE-OP EVALUATION    Patient Name: Marilu Fernandez    Admit Diagnosis: Perforated viscus [R19.8]    Pre-op Diagnosis: bile leak    ENDOSCOPIC RETROGRADE CHOLANGIOPANCREATOGRAPHY (ERCP)    Anesthesia Procedure: ENDOSCOPIC RETROGRADE [address] Or  glucose-vitamin C (DEX-4) chewable tab 8 tablet, 8 tablet, Oral, Q15 Min PRN  Piperacillin Sod-Tazobactam So (ZOSYN) 3.375 g in dextrose 5% 100 mL IVPB-MBP, 3.375 g, Intravenous, q12h  influenza vaccine (PF) (FLUZONE HD) high dose for 65 yrs & older in days, then  Take 2 tablets BID x 3 days, then  Take 1 tablet BID x 3 days, then  Take 1 tablet daily x 3 days then stop, Disp: 37 tablet, Rfl: 0  guaiFENesin-codeine 100-10 MG/5ML Oral Solution, Take 10 mL by mouth every 6 (six) hours as needed for cough o status: Never Smoker      Smokeless tobacco: Never Used    Alcohol use: No      Drug use: No     Available pre-op labs reviewed.   Lab Results   Component Value Date    WBC 8.9 11/19/2021    RBC 3.38 (L) 11/19/2021    HGB 9.9 (L) 11/19/2021    HCT 33.2 (L)

## 2021-11-19 NOTE — INTERVAL H&P NOTE
Pre-op Diagnosis: bile leak    The above referenced H&P was reviewed by Silvia Alonzo DO on 11/19/2021, the patient was examined and no significant changes have occurred in the patient's condition since the H&P was performed.   I discussed with the patie

## 2021-11-19 NOTE — PHYSICAL THERAPY NOTE
PHYSICAL THERAPY EVALUATION - INPATIENT     Room Number: 452/452-A  Evaluation Date: 11/19/2021  Type of Evaluation: Initial  Physician Order: PT Eval and Treat    Presenting Problem: perforated viscus s/p repair 11/18/21  Co-Morbidities : HTN, DM  R and oriented to person  · Following Commands:  follows one-step commands inconsistently  · Initiation: hand over hand to initiate tasks  · Motor Planning: impaired  · Safety Judgement:  decreased awareness of need for assistance and decreased awareness of Moderate assistance  Distance (ft): 6  Assistive Device: Rolling walker  Pattern: Shuffle (impulsive)    Skilled Therapy Provided: Pt received supine in bed and is agreeable to therapy.  line used for session.  Pt reports 10/10 abdominal pain and placement 11/19/21. Pertinent comorbidities and personal factors impacting therapy include HTN and DM.  In this PT evaluation, the patient presents with the following impairments abdominal pain, strength deficits, balance impairments, impulsivity, and decre

## 2021-11-19 NOTE — OPERATIVE REPORT
General Surgery Operative Note  Woody Mendez Location: OR   CSN [id number] MRN [id number]    1935 Age 82year old   Admission Date 2021 Operation Date 2021   Attending Physician Cl Castillo DO Operating Physician Sri Seabstian gastric ulcer or possible biliary source. The patient was seen and evaluated by myself and found to have an acute abdomen with focal peritonitis in the epigastrium. The patient is admitted to the ICU and surgical consult was performed.  The risks, benefits, was thoroughly inspected and found to be without any signs of ulcer. The lesser sac was opened and also found to be normal in the posterior aspect of the stomach. The ileocecal valve was identified and the cecum was mobilized up to the hepatic flexure. off the sterile field pending final pathology. Electrocautery was used to achieve hemostasis in the liver bed.   There was concern for possible bile leak in this area so elected to leave a 19 [place] Claudette Marcelo drain draped from the right lateral abdomen up to t

## 2021-11-19 NOTE — ANESTHESIA PROCEDURE NOTES
Regional Block  Performed by: Jorge Cantrell MD  Authorized by: Jorge Cantrell MD       General Information and Staff    Start Time:   Anesthesiologist: Jorge Cantrell MD  Performed by:   Anesthesiologist  Patient Location:  OR    Block Placement: Keven Castellanos

## 2021-11-19 NOTE — PROGRESS NOTES
[facility]     Hospitalist Progress Note     Chanel De La Cruz Patient Status:  Inpatient    1935 MRN [id number]   [facility] 4SW-A Attending Magdaleno Fatimah,    Hosp Day # 1 PCP Anushka Mcdonald MD     Chief Complaint: Abdominal hours. Cardiac  No results for input(s): TROP, PBNP in the last 168 hours. Creatinine Kinase  No results for input(s): CK in the last 168 hours. Inflammatory Markers  No results for input(s): CRP, KP, LDH, DDIMER in the last 168 hours.     Imaging reasonably be expected to span two midnight's based on the clinical documentation in H+P. Based on patients current state of illness, I anticipate that, after discharge, patient will require TBD.

## 2021-11-19 NOTE — PROGRESS NOTES
[facility]  General surgery progress Note    Rafael Joanna Patient Status:  Inpatient    1935 MRN [id number]   Location [address] [place] [address] 28 Attending Nury Garcia, [address] [facility] [address] Day # 1 PCP Damari Olivas MD     Subjective: surgery      Laboratory:  Recent Labs     11/18/21  1059 11/18/21  1147 11/18/21  2115 11/19/21  8872   WBC 13.6*  --  9.8 8.9   HGB 13.0  --  11.5* 9.9*   HCT 37.0  --  37.1 33.2*   .0  --  172.0 152.0   INR  --  1.24*  --  1.56*   PTT  --  32.9  - INDICATIONS:  Verify correct tube placement         PATIENT STATED HISTORY: (As transcribed by Technologist)  Patient offered     no additional history at this time. FINDINGS:  The exam is somewhat limited due to motion.   There is an scarring with perinephric stranding     about both kidneys. Parapelvic cysts largest on the right measuring 2.4     cm. ADRENALS:  Mild thickening of the lateral limb of the left adrenal gland. .    AORTA/VASCULAR:  Tortuous abdominal aorta 0 with calc and common bile duct distension. Cortical scarring both kidneys with perinephric stranding and cysts. Small amount of free fluid in the pelvis.          Dictated by (CST): Kirt Uribe MD on 11/18/2021 at 11:43 AM         Finalized by (CST status, unspecified pancreatitis type     Leukocytosis, unspecified type     Type 2 diabetes mellitus without complication, without long-term current use of insulin (HCC)     Elevated liver enzymes     Parainfluenza virus bronchitis     Wheezing     Shortn

## 2021-11-19 NOTE — OPERATIVE REPORT
Sreedhar Cheng Patient Status:  Inpatient    1935 MRN [id number]   Location [address] [place] [address] Attending Panda Sanchez, [address] [facility] Day # 1 PCP Deyvi Anderson MD     PREOPERATIVE DIAGNOSIS/INDICATION: 81 y/o F s/p explor the procedure, the patient was transported to the recovery area in stable condition. FINDINGS:  A  film was taken. The duodenoscope was advanced to the ampulla which small in appearance.  Selective cannulation of the common bile duct was achieved deep resume clear liquid diet then advance as tolerated. 3. Monitor BRIANNA output.      Jaden Gilmore  Gastroenterology/Advanced Endoscopy  [facility]

## 2021-11-20 PROCEDURE — 05H933Z INSERTION OF INFUSION DEVICE INTO RIGHT BRACHIAL VEIN, PERCUTANEOUS APPROACH: ICD-10-PCS | Performed by: INTERNAL MEDICINE

## 2021-11-20 PROCEDURE — 99291 CRITICAL CARE FIRST HOUR: CPT | Performed by: INTERNAL MEDICINE

## 2021-11-20 PROCEDURE — 99232 SBSQ HOSP IP/OBS MODERATE 35: CPT | Performed by: INTERNAL MEDICINE

## 2021-11-20 PROCEDURE — B54MZZA ULTRASONOGRAPHY OF RIGHT UPPER EXTREMITY VEINS, GUIDANCE: ICD-10-PCS | Performed by: INTERNAL MEDICINE

## 2021-11-20 RX ORDER — FUROSEMIDE 10 MG/ML
40 INJECTION INTRAMUSCULAR; INTRAVENOUS ONCE
Status: COMPLETED | OUTPATIENT
Start: 2021-11-20 | End: 2021-11-20

## 2021-11-20 NOTE — PROGRESS NOTES
[facility]     Hospitalist Progress Note     Baileygilson Hannah Patient Status:  Inpatient    1935 MRN [id number]   [facility] 4SW-A Attending Eliza Mariano, DO   Hosp Day # 2 PCP Geraldine Krueger MD     Chief Complaint: Abdominal COVID19 Not Detected 11/18/2021       Pro-Calcitonin  No results for input(s): PCT in the last 168 hours. Cardiac  No results for input(s): TROP, PBNP in the last 168 hours. Creatinine Kinase  No results for input(s): CK in the last 168 hours.     Inf tbd            **Certification      PHYSICIAN Certification of Need for Inpatient Hospitalization - Initial Certification    Patient will require inpatient services that will reasonably be expected to span two midnight's based on the clinical documentation

## 2021-11-20 NOTE — PROGRESS NOTES
[facility]                INFECTIOUS DISEASE PROGRESS NOTE    Francesco Hernandez Patient Status:  Inpatient    1935 MRN [id number]   [facility] 4SW-A Attending Nadege Thakkar, [address] [facility] Day # 2 PCP Yareli Schreiber MD     Antib 97*   BILT 2.6* 1.7 0.8   TP 5.7* 4.8* 5.1*       No results found for: [facility] Encounter on 11/18/21   1.  Tissue Aerobic Culture     Status: Abnormal (Preliminary result)    Collection Time: 11/18/21  8:24 PM    Specimen: Peritoneu long-term current use of insulin (HCC)     Elevated liver enzymes     Parainfluenza virus bronchitis     Wheezing     Shortness of breath     Acute renal failure superimposed on chronic kidney disease, unspecified CKD stage, unspecified acute renal failure

## 2021-11-20 NOTE — PLAN OF CARE
Received patient this morning after report. Patient alert and oriented X4. Primary [place] Speaking, small English.  used and granddaughter at bedside. Patient titrated off of levo @1442 with stable BP.  Weaned down oxygen to 3L NC with sats of 9

## 2021-11-20 NOTE — PROGRESS NOTES
[facility]  Progress Note    Denis Samson Patient Status:  Inpatient    1935 MRN [id number]   [facility] 4SW-A Attending Yanet Harrison, DO   Hosp Day # 2 PCP Roger Cates MD     Subjective:  Denis Samson is a(n) 82 y culture thus far with gram-negative angelina  Tissue culture with E. Coli   blood cultures pending    Radiology:  No results found.   Chest x-ray is reviewed with poor excursion      Medications reviewed     Assessment and Plan:   Patient Active Problem List: chronic bronchitis with intermittent flares  · Diabetes/gout  · Thrombocytopenia following suspected consumptive    Plan:  · Following serial blood work  · Add bronchopulmonary toilet as swelling  · Out of bed when able  · Okay to transfer from pulmonary s

## 2021-11-20 NOTE — PLAN OF CARE
Pt alert and oriented x4, VSS, NSR with 1st deg HB, afebrile. 8L HFNC overnight but down to 6L in am. Midline placed. Diminished urinary output and irregular am labs, renal aware. No BM, bowel sounds hypoactive, abdomen firm, rounded, tender.  Brown/bloody

## 2021-11-20 NOTE — PROGRESS NOTES
[facility]  Progress Note    Margie Fail Patient Status:  Inpatient    1935 MRN [id number]   [facility] 4SW-A Attending La Kidd, DO   Hosp Day # 2 PCP David Huitron MD     Subjective: The patient is resting in bed. AST 94 11/20/2021    BILT 0.8 11/20/2021    ALB 1.8 11/20/2021    TP 5.1 11/20/2021     Lab Results   Component Value Date    COLORUR Yellow 11/19/2021    CLARITY Hazy 11/19/2021    SPECGRAVITY 1.015 11/19/2021    GLUUR Negative 11/19/2021    BILUR Negativ mobilization, and lysis of intra-abdominal adhesions  PPD 1 ERCP with sphincterotomy, stone removal, and stent placement    Plan:  1. Okay to transfer to floor from a surgical standpoint. 2. Continue clear liquids for now.  Will await further return of bow

## 2021-11-20 NOTE — PROGRESS NOTES
[facility]  Progress Note    Elena Ordonez Patient Status:  Inpatient    1935 MRN [id number]   [facility] 4SW-A Attending Harshil Walls, DO   Hosp Day # 2 PCP Joshua Morales MD       Chart reviewed  Family at bedside  Ángel injection 5,000 Units, 5,000 Units, Subcutaneous, Q8H [facility]  acetaminophen (OFIRMEV) infusion 1,000 mg, 1,000 mg, Intravenous, Q6H  Naloxone HCl (NARCAN) 0.4 MG/ML injection 0.08 mg, 0.08 mg, Intravenous, Q5 Min PRN  diphenhydrAMINE (BENADRYL) IV PUSH inject Recent Labs     11/18/21  1059 11/18/21  2115 11/19/21  0322 11/20/21  0522   * 162* 132* 97*   * 188* 151* 94*   ALB 2.8* 2.3* 1.8* 1.8*         Assessment / Plan:    1. RENEA/CKD 3 - due to DM/HTN related nephropathy; + microalbuminuria

## 2021-11-20 NOTE — PROGRESS NOTES
[address] [facility] Gastroenterology     Isadora Bonner Patient Status:  Inpatient    1935 MRN [id number]   [facility] 4SW-A Attending Roberto Bauer, [address] [facility] Day # 2 PCP Aleksandr Haynes MD 3.2 oz (95.8 kg)   SpO2 96%   BMI 36.25 kg/m²   Body mass index is 36.25 kg/m².     Gen: NAD, AOx3  HEENT: no scleral icterus, MMM; neck supple  CV: RRR, extremities in lower extremity are warm b/l  Resp: CTAB, no respiratory distress  Abd: Soft, non-tender post-operative clinical concern for bile leak, exam revealing large bilious output from BRIANNA drain highly suspicious of bile leak, dilated CBD, elevated liver chemistries and patient with abdominal pain also concerning for choledocholithiasis.    S/p ERCP 11/

## 2021-11-21 ENCOUNTER — APPOINTMENT (OUTPATIENT)
Dept: GENERAL RADIOLOGY | Facility: HOSPITAL | Age: 86
DRG: 853 | End: 2021-11-21
Attending: INTERNAL MEDICINE
Payer: MEDICARE

## 2021-11-21 PROCEDURE — 99232 SBSQ HOSP IP/OBS MODERATE 35: CPT | Performed by: INTERNAL MEDICINE

## 2021-11-21 PROCEDURE — 99233 SBSQ HOSP IP/OBS HIGH 50: CPT | Performed by: INTERNAL MEDICINE

## 2021-11-21 PROCEDURE — 71045 X-RAY EXAM CHEST 1 VIEW: CPT | Performed by: INTERNAL MEDICINE

## 2021-11-21 RX ORDER — METRONIDAZOLE 500 MG/1
500 TABLET ORAL EVERY 8 HOURS SCHEDULED
Status: DISCONTINUED | OUTPATIENT
Start: 2021-11-21 | End: 2021-11-27

## 2021-11-21 RX ORDER — CEFAZOLIN SODIUM/WATER 2 G/20 ML
2 SYRINGE (ML) INTRAVENOUS EVERY 24 HOURS
Status: DISCONTINUED | OUTPATIENT
Start: 2021-11-21 | End: 2021-11-27

## 2021-11-21 RX ORDER — ALBUMIN (HUMAN) 12.5 G/50ML
25 SOLUTION INTRAVENOUS ONCE
Status: COMPLETED | OUTPATIENT
Start: 2021-11-21 | End: 2021-11-21

## 2021-11-21 RX ORDER — PREGABALIN 25 MG/1
75 CAPSULE ORAL DAILY
Status: DISCONTINUED | OUTPATIENT
Start: 2021-11-21 | End: 2021-11-30

## 2021-11-21 RX ORDER — FUROSEMIDE 10 MG/ML
40 INJECTION INTRAMUSCULAR; INTRAVENOUS ONCE
Status: COMPLETED | OUTPATIENT
Start: 2021-11-21 | End: 2021-11-21

## 2021-11-21 RX ORDER — METOPROLOL SUCCINATE 25 MG/1
25 TABLET, EXTENDED RELEASE ORAL
Status: DISCONTINUED | OUTPATIENT
Start: 2021-11-21 | End: 2021-11-24

## 2021-11-21 RX ORDER — DILTIAZEM HYDROCHLORIDE 240 MG/1
240 CAPSULE, COATED, EXTENDED RELEASE ORAL NIGHTLY
Status: DISCONTINUED | OUTPATIENT
Start: 2021-11-21 | End: 2021-11-26

## 2021-11-21 RX ORDER — LEVOTHYROXINE SODIUM 0.05 MG/1
50 TABLET ORAL
Status: DISCONTINUED | OUTPATIENT
Start: 2021-11-21 | End: 2021-11-30

## 2021-11-21 RX ORDER — ALBUTEROL SULFATE 2.5 MG/3ML
2.5 SOLUTION RESPIRATORY (INHALATION)
Status: DISCONTINUED | OUTPATIENT
Start: 2021-11-21 | End: 2021-11-27

## 2021-11-21 RX ORDER — BUDESONIDE 0.5 MG/2ML
0.5 INHALANT ORAL
Status: DISCONTINUED | OUTPATIENT
Start: 2021-11-21 | End: 2021-11-26

## 2021-11-21 NOTE — PROGRESS NOTES
[facility]                INFECTIOUS DISEASE PROGRESS NOTE    Chloe Ulloa Patient Status:  Inpatient    1935 MRN [id number]   [facility] 4SW-A Attending Arleen Hernandez, [address] [facility] Day # 3 PCP Liz Colon MD     Antib 100* 138* 136*   BUN 55*   < > 70* 75* 77*   CREATSERUM 3.27*   < > 3.89* 3.89* 3.96*   GFRAA 14*   < > 12* 12* 11*   GFRNAA 12*   < > 10* 10* 10*   CA 6.9*   < > 6.9* 6.7* 6.8*   ALB 1.8*  --  1.8*  --  1.5*      < > 138 140 140   K 4.9   < > 5.8* 4 Ampicillin 4 Sensitive      Cefazolin <=4 Sensitive      Ciprofloxacin <=0.25 Sensitive      Gentamicin <=1 Sensitive      Meropenem <=0.25 Sensitive      Levofloxacin <=0.12 Sensitive      Piperacillin + Tazobactam <=4 Sensitive      Trimethoprim/Sulfa <= [facility]  [phone]

## 2021-11-21 NOTE — PLAN OF CARE
Received patient this morning A&Ox4 on 6L NC. Patient weaned down to 2L by end of shift. 1 amp Na bicarb and 1x dose lasix given. Patient started on Bicarb gtt per renal. Urine output for shift: 300 ml. Dressing removed per surgery from midline incision.  B

## 2021-11-21 NOTE — PLAN OF CARE
Pt is A&Ox2. RA, VSS, , TELE ( sinus bradycardia). Alberto with adequate output, up with assist, full diet tolerated well. IVF running per order. Abdomen Soft and Rounded, denies nausea, report gas. All appropriate safety measures in place.  All question

## 2021-11-21 NOTE — PROGRESS NOTES
[facility]  Progress Note    Lj Sanon Patient Status:  Inpatient    1935 MRN [id number]   [facility] 4SW-A Attending Yasir Price, DO   Hosp Day # 3 PCP Anai Noriega MD     Subjective:  Lj Sanon is an 82 yea PTT 32.9  --        Cultures: Fluid culture thus far with gram-negative angelina  Tissue culture with E. Coli   blood cultures pending    Radiology:  None new      Medications reviewed     Assessment and Plan:   Patient Active Problem List:     Elevated serum above-history of chronic bronchitis with intermittent flares  · Diabetes/gout  · Thrombocytopenia following suspected consumptive    Plan:  · Following serial blood work  · Cont bronchopulmonary toilet  · Scheduled budesonide nebs and albuterol nebs  · Out

## 2021-11-21 NOTE — PROGRESS NOTES
[facility]     Hospitalist Progress Note     Braxton Helton Patient Status:  Inpatient    1935 MRN [id number]   [facility] 4SW-A Attending Francisco Curry, DO   Hosp Day # 3 PCP Aldair Reyes MD     Chief Complaint: Abdominal Estimated Creatinine Clearance: 9.1 mL/min (A) (based on SCr of 3.96 mg/dL (H)).     Recent Labs   Lab 11/18/21  1147 11/19/21  0322   PTP 15.9* 19.0*   INR 1.24* 1.56*            COVID-19 Lab Results    COVID-19  Lab Results   Component Value Date 11/19  4. RENEA  a. IVF, did receive contrast on admission, monitor UOP, avoid nephrotoxic agents, renally dose meds  b. Alberto in place  c. Nephrology following    5. Acute Anemia/thrombocytopenia  a.  Suspect 2/2 surgical blood loss and critical illness, mon

## 2021-11-21 NOTE — CM/SW NOTE
CM reviewed MARK referrals in aidin and only one had accepted before it timed out. CM re-opened window to give more time for facilities to respond and offer patient more choices. Updates sent.  JAYESH/NEFTALY to F/U on 11/22 with choice list.     & Case

## 2021-11-21 NOTE — PROGRESS NOTES
[facility]  Progress Note    Apolonia García Patient Status:  Inpatient    1935 MRN [id number]   [facility] 4SW-A Attending Gurjit Arndt DO   Hosp Day # 3 PCP Cheryl De La Fuente MD       Chart reviewed  Daughter  at bedside  Fee injection 50 mL, 50 mL, Intravenous, Q15 Min PRN   Or  glucose (DEX4) oral liquid 30 g, 30 g, Oral, Q15 Min PRN   Or  glucose-vitamin C (DEX-4) chewable tab 8 tablet, 8 tablet, Oral, Q15 Min PRN  influenza vaccine (PF) (FLUZONE HD) high dose for 65 yrs & o cj    Recent Labs     11/18/21 2115 11/19/21  0322 11/20/21  0613 11/21/21  0424   WBC 9.8 8.9 10.4 10.5   HGB 11.5* 9.9* 9.9* 8.5*   MCV 97.1 98.2 98.5 100.7*   .0 152.0 123.0* 121.0*   BAND 30 42 35  --    INR  --  1.56*  --   --        Recen

## 2021-11-21 NOTE — PLAN OF CARE
Received patient after report. Patient resting in bed on nasal canula. Alberto catheter intact, urine output remains low. BRIANNA x2 to bulb suction. Midline abdominal incision intact. Patient dyspneic with exertion. PRN dilaudid for pain.  Melatonin to help with

## 2021-11-21 NOTE — PROGRESS NOTES
[facility]  Progress Note    Kezia Angulo Patient Status:  Inpatient    1935 MRN [id number]   [facility] 3NW-A Attending Erwin Elliott, DO   Hosp Day # 3 PCP Nissa Rose MD     Subjective: The patient is resting in bed. 11/21/2021    ALT 66 11/21/2021    AST 51 11/21/2021    BILT 0.6 11/21/2021    ALB 1.5 11/21/2021    TP 4.7 11/21/2021          Assessment:  Patient Active Problem List:     Elevated serum immunoglobulin free light chain level     Anemia     Coarse tremors catheter tomorrow morning. 6. Continue to encourage use of incentive spirometer. 7. Continue IV antibiotics per infectious disease service. 8. Melatonin nightly. 9. DVT prophylaxis with heparin. 10. GI prophylaxis with Protonix.     I spent 25 minutes

## 2021-11-22 PROCEDURE — 99233 SBSQ HOSP IP/OBS HIGH 50: CPT | Performed by: INTERNAL MEDICINE

## 2021-11-22 PROCEDURE — 99232 SBSQ HOSP IP/OBS MODERATE 35: CPT | Performed by: INTERNAL MEDICINE

## 2021-11-22 RX ORDER — FUROSEMIDE 10 MG/ML
40 INJECTION INTRAMUSCULAR; INTRAVENOUS ONCE
Status: DISCONTINUED | OUTPATIENT
Start: 2021-11-22 | End: 2021-11-22

## 2021-11-22 RX ORDER — ALBUMIN (HUMAN) 12.5 G/50ML
25 SOLUTION INTRAVENOUS ONCE
Status: COMPLETED | OUTPATIENT
Start: 2021-11-22 | End: 2021-11-22

## 2021-11-22 RX ORDER — MAGNESIUM SULFATE HEPTAHYDRATE 40 MG/ML
2 INJECTION, SOLUTION INTRAVENOUS ONCE
Status: COMPLETED | OUTPATIENT
Start: 2021-11-22 | End: 2021-11-22

## 2021-11-22 RX ORDER — FUROSEMIDE 10 MG/ML
20 INJECTION INTRAMUSCULAR; INTRAVENOUS ONCE
Status: COMPLETED | OUTPATIENT
Start: 2021-11-22 | End: 2021-11-22

## 2021-11-22 RX ORDER — PANTOPRAZOLE SODIUM 40 MG/1
40 TABLET, DELAYED RELEASE ORAL
Status: DISCONTINUED | OUTPATIENT
Start: 2021-11-22 | End: 2021-11-30

## 2021-11-22 RX ORDER — ACETAMINOPHEN 500 MG
1000 TABLET ORAL EVERY 6 HOURS
Status: DISCONTINUED | OUTPATIENT
Start: 2021-11-22 | End: 2021-11-30

## 2021-11-22 NOTE — CM/SW NOTE
Provided MARK list to dtr and pt. Pt lives by Venetie IRA, family chose as MARK placement. Ariela made aware. DON complete. Insurance auth not needed.      Darren 106, LSW

## 2021-11-22 NOTE — PROGRESS NOTES
[facility]  Progress Note    Kezia Angulo Patient Status:  Inpatient    1935 MRN [id number]   [facility] 3NW-A Attending Erwin Elliott, DO   Hosp Day # 4 PCP Nissa Rose MD     Subjective:  Patient seen and examined.   Re Sepsis ([facility] [address] [facility]     RENEA (acute kidney injury) ([facility] [address] [facility]     Hyponatremia     Community acquired pneumonia     Acute bronchospasm     CKD (chronic kidney disease) stage 3, GFR 30-59 ml/min ([facility] [address] [facility]     Community acquired pneumonia, unspecified laterality     Renal in coli  8. Melatonin nightly. 9. DVT prophylaxis with heparin. 10. GI prophylaxis with Protonix.     Thank you for allowing me to care for this patient    Dr. Daya Stewart (T.CINDY) Valentine  [facility]  11/22/2021  8:50 AM

## 2021-11-22 NOTE — PLAN OF CARE
Pt alert and orientedx4. Mostly [place]ese speaking, can understand some [place]. 2 liter of oxygen, nasal cannula. Pulse Ox. Encouraged to use IS. SOB w/ exertion or activity. Mild wheezing noted. Scheduled Neb treatments. Tele- NSR/ SB. On heparin. SCDs.  Fu optimize hemodynamic stability  - Monitor arterial and/or venous puncture sites for bleeding and/or hematoma  - Assess quality of pulses, skin color and temperature  - Assess for signs of decreased coronary artery perfusion - ex.  Angina  - Evaluate fluid b

## 2021-11-22 NOTE — PROGRESS NOTES
INFECTIOUS DISEASE PROGRESS NOTE    Bailey Hannah Patient Status:  Inpatient    1935 MRN [id number]   [facility] 4SW-A Attending Eliza [place], DO   Hosp Day # 4 PCP Yakelin Rios tablet, Oral, PRN  •  Insulin Aspart Pen (NOVOLOG) 100 UNIT/ML flexpen 2-10 Units, 2-10 Units, Subcutaneous, TID CC and HS  •  glucose (DEX4) oral liquid 15 g, 15 g, Oral, Q15 Min PRN **OR** glucose-vitamin C (DEX-4) chewable tab 4 tablet, 4 tablet, Oral, serosang output on L. Musculoskeletal: Full range of motion of all extremities. No arthritis or deformity  Integument: No rash. No open wounds.     Laboratory Data:    Recent Labs   Lab 11/18/21 2115 11/18/21 2115 11/19/21  0322 11/20/21  8196 11/21/21 BACUR None Seen   EPIUR Few*        Microbiology    Reviewed in EMR,   Hospital Encounter on 11/18/21   1.  Blood Culture     Status: None (Preliminary result)    Collection Time: 11/19/21  4:26 PM    Specimen: Blood,peripheral   Result Value Ref Range PATIENT STATED HISTORY: (As transcribed by Technologist) Patient offered no additional history at this time. FINDINGS: There is stable cardiomegaly. There is mild interstitial edema. There is trace left pleural effusion.      Impression: CONCLUSIO

## 2021-11-22 NOTE — PHYSICAL THERAPY NOTE
PHYSICAL THERAPY TREATMENT NOTE - INPATIENT    Room Number: 326/326-A     Session: 1   Number of Visits to Meet Established Goals: 5     History related to current admission: Pt is a 82 y.o. female admitted 11/18/21 with abdominal pain.  Pt presenting with have...   Patient Difficulty: Turning over in bed (including adjusting bedclothes, sheets and blankets)?: A Lot   Patient Difficulty: Sitting down on and standing up from a chair with arms (e.g., wheelchair, bedside commode, etc.): A Little   Patient Diffic will benefit from continued skilled IP PT services and MARK upon discharge. DISCHARGE RECOMMENDATIONS  PT Discharge Recommendations: Sub-acute rehabilitation     PLAN  PT Treatment Plan: Bed mobility; Endurance; Energy conservation;Patient education;Gait

## 2021-11-22 NOTE — PROGRESS NOTES
[facility] Note: Route Optimization for Pantoprazole (PROTONIX)    Patient is currently on Pantoprazole (PROTONIX) 40 mg IV every 12 hours.    The patient meets the criteria to convert to the oral equivalent as established by the IV to Oral conversion pro

## 2021-11-22 NOTE — PROGRESS NOTES
[facility]  Progress Note    Misty Cage Patient Status:  Inpatient    1935 MRN [id number]   [facility] 4SW-A Attending Rebecca Owen, DO   Hosp Day # 4 PCP Juan Zheng MD       Chart reviewed  No acute events   pain co Intravenous, Q15 Min PRN   Or  glucose (DEX4) oral liquid 30 g, 30 g, Oral, Q15 Min PRN   Or  glucose-vitamin C (DEX-4) chewable tab 8 tablet, 8 tablet, Oral, Q15 Min PRN  influenza vaccine (PF) (FLUZONE HD) high dose for 65 yrs & older inj 0.7ml, 0.7 mL, 11/19/21 0322 11/20/21  0613 11/21/21  0424   WBC 9.8 8.9 10.4 10.5   HGB 11.5* 9.9* 9.9* 8.5*   MCV 97.1 98.2 98.5 100.7*   .0 152.0 123.0* 121.0*   BAND 30 42 35  --    INR  --  1.56*  --   --        Recent Labs     11/18/21 2115 11/19/21 0322

## 2021-11-22 NOTE — PROGRESS NOTES
[facility]     Hospitalist Progress Note     Denis Samson Patient Status:  Inpatient    1935 MRN [id number]   [facility] 4SW-A Attending Yanet Harrison, DO   Hosp Day # 4 PCP Roger Cates MD     Chief Complaint: Abdominal > 18.0*   < > 22.0 23.0 27.0   ALKPHO 79  --  50*  --   --  51*  --    *  --  94*  --   --  51*  --    *  --  97*  --   --  66*  --    BILT 1.7  --  0.8  --   --  0.6  --    TP 4.8*  --  5.1*  --   --  4.7*  --     < > = values in this interv reintroduce antihypertensives   3. Acute Pancreatitis 2/2 choledocholithiasis complicated by bile leak   a. S/p ERCP w/ biliary sphincterotomy, removal of stone and placement of biliary stent 11/19  4.  RENEA  a. IVF, did receive contrast on admission, dedricko

## 2021-11-22 NOTE — PLAN OF CARE
Patient A&Ox4, 3L NC, up with 2 assist and walker, and primarily [place] speaking. SOB with exertion and expiratory wheezes noted. Receiving neb treatments BID. Midline incision with staples intact and beata. Tele-SB. Reports passing gas. BM today.  Autumn Drake Assess for signs of decreased coronary artery perfusion - ex.  Angina  - Evaluate fluid balance, assess for edema, trend weights  Outcome: Progressing     Problem: RESPIRATORY - ADULT  Goal: Achieves optimal ventilation and oxygenation  Description: INTERVE

## 2021-11-22 NOTE — OCCUPATIONAL THERAPY NOTE
OCCUPATIONAL THERAPY TREATMENT NOTE - INPATIENT     Room Number: 326/326-A  Session: 1   Number of Visits to Meet Established Goals: 7    Presenting Problem: perforated viscus, 11/18 exp lap, liver wedge resection, drainage of hepatic abscess    History re ACTIVITY TOLERANCE                         O2 SATURATIONS  Oxygen Therapy  SPO2% Ambulation on Oxygen: 89  Ambulation oxygen flow (liters per minute): 2    ACTIVITIES OF DAILY LIVING ASSESSMENT  AM-PAC ‘6-Clicks’ Inpatient Daily Activity Short Form  Ho aware of session/findings; All patient questions and concerns addressed;SCDs in place; Alarm set    ASSESSMENT    In this session patient making progress towards OT goals with improvements in balance, sitting endurance and independence in ADL tasks, however

## 2021-11-22 NOTE — PROGRESS NOTES
[facility]  Progress Note    Francesco Hernandez Patient Status:  Inpatient    1935 MRN [id number]   [facility] 4SW-A Attending Nadege Thakkar, DO   Hosp Day # 4 PCP Yareli Schreiber MD     Subjective:  Francesco Hernandez is a(n) 82 y < > 3.89* 3.96* 3.65*   GFRAA 14*   < > 12*   < > 12* 11* 12*   GFRNAA 12*   < > 10*   < > 10* 10* 11*   CA 6.9*   < > 6.9*   < > 6.7* 6.8* 7.4*   ALB 1.8*  --  1.8*  --   --  1.5*  --       < > 138   < > 140 140 142   K 4.9   < > 5.8*   < > 4.8 4.6 injury on chronic kidney disease  · Acute blood loss anemia- post op  · History of hypertension with negative stress 2014  · History of exertional dyspnea chronic with further work-up pending the above-history of chronic bronchitis with intermittent flares

## 2021-11-22 NOTE — CM/SW NOTE
SW left voicemail to daughter to follow up on PT recommendations and MARK choice. Will follow up.      Darren 106, LSW

## 2021-11-23 PROCEDURE — 99233 SBSQ HOSP IP/OBS HIGH 50: CPT | Performed by: INTERNAL MEDICINE

## 2021-11-23 PROCEDURE — 99232 SBSQ HOSP IP/OBS MODERATE 35: CPT | Performed by: INTERNAL MEDICINE

## 2021-11-23 RX ORDER — HYDRALAZINE HYDROCHLORIDE 20 MG/ML
10 INJECTION INTRAMUSCULAR; INTRAVENOUS ONCE
Status: COMPLETED | OUTPATIENT
Start: 2021-11-23 | End: 2021-11-23

## 2021-11-23 RX ORDER — HYDRALAZINE HYDROCHLORIDE 20 MG/ML
10 INJECTION INTRAMUSCULAR; INTRAVENOUS EVERY 4 HOURS PRN
Status: DISCONTINUED | OUTPATIENT
Start: 2021-11-23 | End: 2021-11-30

## 2021-11-23 RX ORDER — HYDRALAZINE HYDROCHLORIDE 25 MG/1
25 TABLET, FILM COATED ORAL EVERY 8 HOURS SCHEDULED
Status: DISCONTINUED | OUTPATIENT
Start: 2021-11-23 | End: 2021-11-24

## 2021-11-23 NOTE — PLAN OF CARE
Pt is A&Ox3, 3L of oxygen, Tele Sinus rhythm,  pt complain of chest tightness, MD notified. Pt BP is high: IV hydralazine PRN administered. EKG ordered and performed. Denies nausea. Report belching and passing flautus. Reg diet tolerated.  Gi Bull in place for edema, trend weights  Outcome: Progressing     Problem: RESPIRATORY - ADULT  Goal: Achieves optimal ventilation and oxygenation  Description: INTERVENTIONS:  - Assess for changes in respiratory status  - Assess for changes in mentation and behavior  -

## 2021-11-23 NOTE — PROGRESS NOTES
INFECTIOUS DISEASE PROGRESS NOTE    Sreedhar Mazincody Patient Status:  Inpatient    1935 MRN [id number]   [facility] 4SW-A Attending Panda Sanchez, DO   Hosp Day # 5 PCP Kristal Mcintyre HYDROmorphone HCl (DILAUDID) 1 MG/ML injection 0.2 mg, 0.2 mg, Intravenous, Q2H PRN **OR** HYDROmorphone HCl (DILAUDID) 1 MG/ML injection 0.4 mg, 0.4 mg, Intravenous, Q2H PRN  •  Insulin Aspart Pen (NOVOLOG) 100 UNIT/ML flexpen 2-10 Units, 2-10 Units, Subc place.  Musculoskeletal: Full range of motion of all extremities. No arthritis or deformity  Integument: No rash. No open wounds.     Laboratory Data:    Recent Labs   Lab 11/18/21 2115 11/18/21 2115 11/19/21  0322 11/20/21  0613 11/21/21  0424   RBC 3.8 Seen   EPIUR Few*        Microbiology    Reviewed in EMR,   Hospital Encounter on 11/18/21   1.  Blood Culture     Status: None (Preliminary result)    Collection Time: 11/19/21  4:26 PM    Specimen: Blood,peripheral   Result Value Ref Range    Blood Cultur STATED HISTORY: (As transcribed by Technologist) Patient offered no additional history at this time. FINDINGS: There is stable cardiomegaly. There is mild interstitial edema. There is trace left pleural effusion.      Impression: CONCLUSION: Cardiomeg

## 2021-11-23 NOTE — PROGRESS NOTES
[facility]  Progress Note    Mary Ann Orozco Patient Status:  Inpatient    1935 MRN [id number]   [facility] 4SW-A Attending Kaila Ludwig DO   Hosp Day # 5 PCP Landon Navarro MD       Chart reviewed  Feels better  RUQ pain ov (DEX-4) chewable tab 8 tablet, 8 tablet, Oral, Q15 Min PRN  influenza vaccine (PF) (FLUZONE HD) high dose for 65 yrs & older inj 0.7ml, 0.7 mL, Intramuscular, Prior to discharge  phenol (CHLORASEPTIC) 1.4 % oral liquid spray 1 mL, 1 spray, Mouth/Throat, AR 6. 7* 6.8* 7.4* 8.3*   MG  --  2.0 1.9 2.2   PHOS  --  6.0*  --   --        Recent Labs     11/21/21  0424   ALT 66*   AST 51*   ALB 1.5*     Assessment / Plan:    1. RENEA/CKD 3 - due to DM/HTN related nephropathy; + microalbuminuria.  RENEA  due to dehydration

## 2021-11-23 NOTE — PROGRESS NOTES
[facility]     Hospitalist Progress Note     Apolonia García Patient Status:  Inpatient    1935 MRN [id number]   [facility] 4SW-A Attending Gurjit Arndt DO   Hosp Day # 5 PCP Cheryl De La Fuente MD     Chief Complaint: Abdominal > 18.0*   < > 23.0 27.0 25.0   ALKPHO 79  --  50*  --  51*  --   --    *  --  94*  --  51*  --   --    *  --  97*  --  66*  --   --    BILT 1.7  --  0.8  --  0.6  --   --    TP 4.8*  --  5.1*  --  4.7*  --   --     < > = values in this interv reintroduce antihypertensives   3. Acute Pancreatitis 2/2 choledocholithiasis complicated by bile leak   a. S/p ERCP w/ biliary sphincterotomy, removal of stone and placement of biliary stent 11/19  4.  RENEA  a. IVF, did receive contrast on admission, dedricko

## 2021-11-23 NOTE — PHYSICAL THERAPY NOTE
PHYSICAL THERAPY TREATMENT NOTE - INPATIENT    Room Number: 326/326-A     Session: 1   Number of Visits to Meet Established Goals: 5     History related to current admission: Pt is a 82 y.o. female admitted 11/18/21 with abdominal pain.  Pt presenting with up from a chair with arms (e.g., wheelchair, bedside commode, etc.): A Little   Patient Difficulty: Moving from lying on back to sitting on the side of the bed?: A Little   How much help from another person does the patient currently need. ..    Help from An staff;Needs met;Call light within reach;RN aware of session/findings; All patient questions and concerns addressed; Alarm set    ASSESSMENT   Needed encouragement to do task.  Poor task tolerance and Pt will benefit from ongoing PT to continue to improve stre

## 2021-11-23 NOTE — PROGRESS NOTES
[facility]  Progress Note    Dania Due Patient Status:  Inpatient    1935 MRN [id number]   [facility] 3NW-A Attending Sabrina Vincent MD   [facility] Day # 5 PCP Maria Elena Levy MD     Subjective:   The patient is currently un sphincterotomy, stone removal, and stent placement for choledocholithiasis and bile leak    Plan:  1. Continue pulmonary toilet and use of incentive spirometer  2. Encourage out of bed to chair as able. Physical therapy on service.   3. Continue IV antibio

## 2021-11-24 ENCOUNTER — APPOINTMENT (OUTPATIENT)
Dept: ULTRASOUND IMAGING | Facility: HOSPITAL | Age: 86
DRG: 853 | End: 2021-11-24
Attending: STUDENT IN AN ORGANIZED HEALTH CARE EDUCATION/TRAINING PROGRAM
Payer: MEDICARE

## 2021-11-24 ENCOUNTER — APPOINTMENT (OUTPATIENT)
Dept: NUCLEAR MEDICINE | Facility: HOSPITAL | Age: 86
DRG: 853 | End: 2021-11-24
Attending: STUDENT IN AN ORGANIZED HEALTH CARE EDUCATION/TRAINING PROGRAM
Payer: MEDICARE

## 2021-11-24 PROCEDURE — 78226 HEPATOBILIARY SYSTEM IMAGING: CPT | Performed by: STUDENT IN AN ORGANIZED HEALTH CARE EDUCATION/TRAINING PROGRAM

## 2021-11-24 PROCEDURE — 76705 ECHO EXAM OF ABDOMEN: CPT | Performed by: STUDENT IN AN ORGANIZED HEALTH CARE EDUCATION/TRAINING PROGRAM

## 2021-11-24 PROCEDURE — 99232 SBSQ HOSP IP/OBS MODERATE 35: CPT | Performed by: INTERNAL MEDICINE

## 2021-11-24 PROCEDURE — 78227 HEPATOBIL SYST IMAGE W/DRUG: CPT | Performed by: STUDENT IN AN ORGANIZED HEALTH CARE EDUCATION/TRAINING PROGRAM

## 2021-11-24 PROCEDURE — 99233 SBSQ HOSP IP/OBS HIGH 50: CPT | Performed by: INTERNAL MEDICINE

## 2021-11-24 RX ORDER — IPRATROPIUM BROMIDE AND ALBUTEROL SULFATE 2.5; .5 MG/3ML; MG/3ML
SOLUTION RESPIRATORY (INHALATION)
Status: DISPENSED
Start: 2021-11-24 | End: 2021-11-25

## 2021-11-24 RX ORDER — POTASSIUM CHLORIDE 20 MEQ/1
40 TABLET, EXTENDED RELEASE ORAL ONCE
Status: COMPLETED | OUTPATIENT
Start: 2021-11-24 | End: 2021-11-24

## 2021-11-24 RX ORDER — FUROSEMIDE 10 MG/ML
20 INJECTION INTRAMUSCULAR; INTRAVENOUS ONCE
Status: COMPLETED | OUTPATIENT
Start: 2021-11-24 | End: 2021-11-24

## 2021-11-24 RX ORDER — HYDRALAZINE HYDROCHLORIDE 50 MG/1
50 TABLET, FILM COATED ORAL EVERY 8 HOURS SCHEDULED
Status: DISCONTINUED | OUTPATIENT
Start: 2021-11-24 | End: 2021-11-30

## 2021-11-24 RX ORDER — CARVEDILOL 3.12 MG/1
6.25 TABLET ORAL 2 TIMES DAILY WITH MEALS
Status: DISCONTINUED | OUTPATIENT
Start: 2021-11-24 | End: 2021-11-25

## 2021-11-24 NOTE — PROGRESS NOTES
[facility]  Progress Note    Kenroy Gagnon Patient Status:  Inpatient    1935 MRN [id number]   [facility] 3NW-A Attending Liz Mccullough, DO   Hosp Day # 6 PCP Merline Yee MD     Subjective:  Translation tablet used during previously 6 mm.  Gallstones and some gallbladder sludge noted.  The gallbladder is distended slightly.  Findings would be   consistent with acute cholecystitis.  A nuclear medicine biliary scan may provide more information if clinically indicated. Patricia Ac falciform ligament, wedge resection of the liver, kocherization of the duodenum, splenic flexure mobilization, and lysis of intra-abdominal adhesions    PPD 4 ERCP     Plan:  1. Ultrasound with evidence of acute cholecystitis.    2. Recommend cholecystostom Noted ERCP with filling of cystic duct stump only  4. Continue IV antibiotics per ID  5. Continue surgical drains, monitor for bile leak  6. Pulmonary following, appreciate input for patient's complaint of dyspnea and shortness of breath at night  7.  PT O

## 2021-11-24 NOTE — DIETARY NOTE
[facility]     Admitting diagnosis:  Perforated viscus [R19.8]    Ht: 162.6 cm (5' 4\")  Wt: 96.6 kg (213 lb). Body mass index is 36.56 kg/m².     Wt Readings from Last 6 Encounters:  11/24/21 : 96.6 kg (213

## 2021-11-24 NOTE — PROGRESS NOTES
[facility]  Progress Note    Zo Cooper Patient Status:  Inpatient    1935 MRN [id number]   [facility] 4SW-A Attending Adele Muñoz, DO   Hosp Day # 6 PCP Saturnino Lindsay MD     Subjective:  Zo Cooper is a(n) 82 y 11/20/21  1401 11/21/21  0424 11/21/21  0424 11/22/21  0908 11/23/21  0700 11/24/21  0443   *   < > 100*   < > 136*   < > 102* 116* 141*   BUN 55*   < > 70*   < > 77*   < > 69* 60* 49*   CREATSERUM 3.27*   < > 3.89*   < > 3.96*   < > 3.65* 3.09* 2.3 hours.    Cultures:  Peritoneal fluid 11/18- E. Coli  Blood NGTD    Radiology:  CXR 11/21- mild interstitial edema and trace left effusion  11/24 US and  HIDA reviewed.    US GALLBLADDER (QTD=[id number])    Result Date: 11/24/2021  CONCLUSION:  There is marked ga evaluation of this exam and consultation with the performing technologist, the exam was performed for a total of 90 minutes as opposed to 75 minutes.   Additionally, there is a focus of increased radiotracer activity along the region of the proximal duodenu ancef/flagyl  · Proph- heparin subcut, protonix  · Disp- floor    Merlin Dike, [address] [facility]

## 2021-11-24 NOTE — PROGRESS NOTES
[facility]  Progress Note    Aditi Jalloh Patient Status:  Inpatient    1935 MRN [id number]   [facility] 4SW-A Attending Quinten Bailon, DO   Hosp Day # 6 PCP Priti Freire MD       Chart reviewed  Feels better  Abd pain is Intravenous, Q15 Min PRN   Or  glucose (DEX4) oral liquid 30 g, 30 g, Oral, Q15 Min PRN   Or  glucose-vitamin C (DEX-4) chewable tab 8 tablet, 8 tablet, Oral, Q15 Min PRN  influenza vaccine (PF) (FLUZONE HD) high dose for 65 yrs & older inj 0.7ml, 0.7 mL, 3.6 3.4*    105 105   CO2 27.0 25.0 25.0   BUN 69* 60* 49*   CREATSERUM 3.65* 3.09* 2.35*   CA 7.4* 8.3* 9.1   MG 1.9 2.2 2.0       No results for input(s): ALT, AST, ALB, AMYLASE, LIPASE, LDH in the last 72 hours.     Invalid input(s): ALPHOS, TBIL,

## 2021-11-24 NOTE — PROGRESS NOTES
INFECTIOUS DISEASE PROGRESS NOTE    Marilu Fernandez Patient Status:  Inpatient    1935 MRN [id number]   [facility] 4SW-A Attending Nerissa Moreland, DO   Hosp Day # 6 PCP Hermila Rodriguez injection 0.2 mg, 0.2 mg, Intravenous, Q2H PRN **OR** HYDROmorphone HCl (DILAUDID) 1 MG/ML injection 0.4 mg, 0.4 mg, Intravenous, Q2H PRN  •  Insulin Aspart Pen (NOVOLOG) 100 UNIT/ML flexpen 2-10 Units, 2-10 Units, Subcutaneous, TID CC and HS  •  glucose ( arthritis or deformity  Integument: No rash. No open wounds.     Laboratory Data:    Recent Labs   Lab 11/18/21 2115 11/18/21 2115 11/19/21  0322 11/20/21  0613 11/21/21  0424   RBC 3.82   < > 3.38* 3.31* 2.80*   HGB 11.5*   < > 9.9* 9.9* 8.5*   HCT 37.1 PHURINE 5.0   PROUR Negative   UROBILINOGEN <2.0   NITRITE Negative   LEUUR Trace*   WBCUR 11-20*   RBCUR 3-5*   BACUR None Seen   EPIUR Few*        Microbiology    Reviewed in EMR,   Hospital Encounter on 11/18/21   1.  Blood Culture     Status: None (Pr (WKM=[id number])     COMPARISON: [facility] , XR, XR ENDO BILE DUCT (VZT=[id number]), 11/19/2021, 12:03 PM. EDEVI , CT, CT ABDOMEN PELVIS IV CONTRAST, NO ORAL (ER), 11/18/2021, 11:17 AM. EDEVI , US, US ABDOMEN COMPLETE (CPT=76700), 11/16/2018, 9:43 AM.     INDICATIONS reviewed. Agree with above. Note has been reviewed by me and modified as needed. Exam and Impression/ Recs as noted above.   D/w staff and with pt this am  Will follow     Yoni Auguste MD

## 2021-11-24 NOTE — PHYSICAL THERAPY NOTE
Duplicate PT order placed by PA this AM, chart reviewed. Pt is being followed by PT with sessions on 11/19, 11/22, and 11/23 and recs for MARK. Will f/u for further PT sessions at a later date.

## 2021-11-24 NOTE — PLAN OF CARE
Pt a&ox4, afebrile. O2 sat stable on 1L NC. Needs reminders about IS use. NSR on telemetry. Voiding well. Purewick in place. Tolerating diet without nausea. Passing flatus. C.diff pending - although pt did have formed stool this afternoon.  Midline incision signs, rhythm, and trends  - Monitor for bleeding, hypotension and signs of decreased cardiac output  - Evaluate effectiveness of vasoactive medications to optimize hemodynamic stability  - Monitor arterial and/or venous puncture sites for bleeding and/or

## 2021-11-24 NOTE — PROGRESS NOTES
[facility]     Hospitalist Progress Note     Apolonia García Patient Status:  Inpatient    1935 MRN [id number]   [facility] 4SW-A Attending Gurjit Arndt DO   Hosp Day # 6 PCP Cheryl De La Fuente MD     Chief Complaint: Abdominal > 4.6   < > 3.9 3.6 3.4*      < > 112   < > 109   < > 108 105 105   CO2 24.0   < > 18.0*   < > 23.0   < > 27.0 25.0 25.0   ALKPHO 79  --  50*  --  51*  --   --   --   --    *  --  94*  --  51*  --   --   --   --    *  --  97*  --  66* General Surgery on consult  b. Zosyn-> Flagyl/Cefazolin, - follow surgical/blood cultures, + E. Coli  c. ID following   d. RUQ ultrasound showing GB wall thickening - plan for IR cholecystostomy   2. Septic Shock 2/2 above  a.  Shock resolved, source control

## 2021-11-24 NOTE — PROGRESS NOTES
[facility]  Progress Note    Fermin Soto Patient Status:  Inpatient    1935 MRN [id number]   [facility] 4SW-A Attending Zeinab Perez, DO   Hosp Day # 5 PCP Ivis Max MD     Subjective:  Fermin Soto is a(n) 82 y 116*   BUN 55*   < > 70*   < > 77* 69* 60*   CREATSERUM 3.27*   < > 3.89*   < > 3.96* 3.65* 3.09*   GFRAA 14*   < > 12*   < > 11* 12* 15*   GFRNAA 12*   < > 10*   < > 10* 11* 13*   CA 6.9*   < > 6.9*   < > 6.8* 7.4* 8.3*   ALB 1.8*  --  1.8*  --  1.5*  -- LFTs- with history of presumed gallstone pancreatitis 2018  · Acute kidney injury on chronic kidney disease  · Acute blood loss anemia- post op  · History of hypertension with negative stress 2014  · History of exertional dyspnea chronic with further work-

## 2021-11-25 ENCOUNTER — APPOINTMENT (OUTPATIENT)
Dept: GENERAL RADIOLOGY | Facility: HOSPITAL | Age: 86
DRG: 853 | End: 2021-11-25
Attending: INTERNAL MEDICINE
Payer: MEDICARE

## 2021-11-25 PROCEDURE — 71045 X-RAY EXAM CHEST 1 VIEW: CPT | Performed by: INTERNAL MEDICINE

## 2021-11-25 PROCEDURE — 99232 SBSQ HOSP IP/OBS MODERATE 35: CPT | Performed by: INTERNAL MEDICINE

## 2021-11-25 PROCEDURE — 99233 SBSQ HOSP IP/OBS HIGH 50: CPT | Performed by: INTERNAL MEDICINE

## 2021-11-25 RX ORDER — CARVEDILOL 12.5 MG/1
12.5 TABLET ORAL 2 TIMES DAILY WITH MEALS
Status: DISCONTINUED | OUTPATIENT
Start: 2021-11-25 | End: 2021-11-26

## 2021-11-25 RX ORDER — SODIUM CHLORIDE, SODIUM LACTATE, POTASSIUM CHLORIDE, CALCIUM CHLORIDE 600; 310; 30; 20 MG/100ML; MG/100ML; MG/100ML; MG/100ML
INJECTION, SOLUTION INTRAVENOUS CONTINUOUS
Status: DISCONTINUED | OUTPATIENT
Start: 2021-11-25 | End: 2021-11-26

## 2021-11-25 NOTE — PROGRESS NOTES
[facility]     Hospitalist Progress Note     Christina Kearney Patient Status:  Inpatient    1935 MRN [id number]   [facility] 4SW-A Attending Pina Carey, DO   Hosp Day # 7 PCP Mo Barber MD     Chief Complaint: Abdominal > 7.4* 8.3* 9.1   ALB 1.8*  --  1.8*  --  1.5*  --   --   --   --       < > 138   < > 140   < > 142 142 140   K 4.9   < > 5.8*   < > 4.6   < > 3.9 3.6 3.4*      < > 112   < > 109   < > 108 105 105   CO2 24.0   < > 18.0*   < > 23.0   < > 27.0 25 admission 11/18 with drainage of intra-abdominal/hepatic abscess, liver wedge resection, NAREN  a. Drains/diet per General Surgery on consult  b. Zosyn-> Flagyl/Cefazolin, - follow surgical/blood cultures, + E. Coli  c. ID following   d. RUQ ultrasound showin

## 2021-11-25 NOTE — PLAN OF CARE
*Assessment findings and all other communication done using hospital video interpretor at bedside (used @5 times throughout shift)    Upon assessment this am, B/P elevated. MD aware and meds changed. B/P improved to 148/70. HR 70, NSR on tele monitor.  Pt d self management of diabetes  Outcome: Progressing     Problem: Patient/Family Goals  Goal: Patient/Family Long Term Goal  Description: Patient's Long Term Goal: go home    Interventions:  -   - See additional Care Plan goals for specific interventions  Out GASTROINTESTINAL - ADULT  Goal: Minimal or absence of nausea and vomiting  Description: INTERVENTIONS:  - Maintain adequate hydration with IV or PO as ordered and tolerated  - Nasogastric tube to low intermittent suction as ordered  - Evaluate effectivenes

## 2021-11-25 NOTE — PROGRESS NOTES
[facility]  Progress Note    Claymarnie Soto Patient Status:  Inpatient    1935 MRN [id number]   [facility] 3NW-A Attending Zeinab Perez, DO   Hosp Day # 7 PCP Ivis Max MD     Subjective:   The patient is undergoing resp splenic flexure mobilization and lysis of intra-abdominal adhesions    PPD #5 ERCP    Leukocytosis–18.6    Plan:  1. Recheck labs tomorrow morning. Will monitor leukocytosis.   2. SPECT imaging of the upper abdomen/gallbladder fossa region showed a decompr services described in this documentation  by Ms Andi Odom, and they are both accurate and complete. Jake Wagner.  MD Maral FACS  [facility]

## 2021-11-25 NOTE — PROGRESS NOTES
[facility]  Progress Note    Sreedhar Retanacody Patient Status:  Inpatient    1935 MRN [id number]   [facility] 3NW-A Attending Panda Sanchez, DO   Hosp Day # 7 PCP Deyvi Anderson MD     Subjective:  Sreedhar Cheng is a(n) 82 y negative    Radiology:  NM GB HEPATOBILIARY SCAN  (GBA=38499)    Addendum Date: 11/24/2021    ADDENDUM:  Patient was brought back for additional SPECT imaging of the upper abdomen/gallbladder fossa region.   CT images reveal the presence of a biliary stent transit. The findings are concerning for acute cholecystitis.    Dictated by (CST): Aura Griffith DO on 11/24/2021 at 5:53 PM     Finalized by (CST): Aura Griffith DO on 11/24/2021 at 5:56 PM       XR CHEST AP PORTABLE  (CPT=71045)    Result Date: 11/25/20 Hepatic abscess     Proteinuria     Bile leak     Hyperkalemia     Acidosis     Cholangitis      Assessment:  · Perforated viscus- s/p exploratory laparotomy with drainage of hepatic  abscess, liver wedge resection and lysis of adhesions 11/18   · Pancreat

## 2021-11-25 NOTE — PROGRESS NOTES
[facility]     Nephrology Progress Note    Mario Modest Patient Status:  Inpatient    1935 MRN [id number]   [facility] 3NW-A Attending Lucy Walker, DO   Hosp Day # 7 PCP Katalina Matamoros MD       SUBJECTIVE:  Stable this AM 10. 5  --  18.6*   HGB 13.0   < >  --  11.5* 9.9* 9.9* 8.5*  --  10.6*   MCV 91.6   < >  --  97.1 98.2 98.5 100.7*  --  92.0   .0   < >  --  172.0 152.0 123.0* 121.0*  --  207.0   BAND 45  --   --  30 42 35  --   --  16   INR  --   --  1.24*  --  1.5 (VENTOLIN) (2.5 MG/3ML) 0.083% nebulizer solution 2.5 mg, 2.5 mg, Nebulization, Q6H WA  pregabalin (LYRICA) cap 75 mg, 75 mg, Oral, Daily  levothyroxine (SYNTHROID) tab 50 mcg, 50 mcg, Oral, Before breakfast  dilTIAZem (cardIZEM CD) 24 hr cap 240 mg, 240 m DM/HTN related nephropathy; + microalbuminuria with b/l creat typically close to 1.9 mg/dl or so. RENEA  due to dehydration + sepsis. no other clear insults noted. Imaging without obstruction. Renal fxn slowly better     2.  Sepsis- due to intraabdominal /

## 2021-11-25 NOTE — PLAN OF CARE
Pt alert and oriented x 4. [place] speaking.  used for assessment. Lung sounds clear to diminished on 1L NC/. Pt denies SOB. Voiding via purwick. Abdomen soft and rounded, bowel sounds present. Pt reports passing gas. Denies nausea.  Midline in sites for bleeding and/or hematoma  - Assess quality of pulses, skin color and temperature  - Assess for signs of decreased coronary artery perfusion - ex.  Angina  - Evaluate fluid balance, assess for edema, trend weights  Outcome: Progressing     Problem:

## 2021-11-26 PROCEDURE — 99233 SBSQ HOSP IP/OBS HIGH 50: CPT | Performed by: INTERNAL MEDICINE

## 2021-11-26 PROCEDURE — 99232 SBSQ HOSP IP/OBS MODERATE 35: CPT | Performed by: INTERNAL MEDICINE

## 2021-11-26 RX ORDER — CARVEDILOL 12.5 MG/1
25 TABLET ORAL 2 TIMES DAILY WITH MEALS
Status: DISCONTINUED | OUTPATIENT
Start: 2021-11-26 | End: 2021-11-30

## 2021-11-26 RX ORDER — LISINOPRIL 20 MG/1
20 TABLET ORAL DAILY
Status: DISCONTINUED | OUTPATIENT
Start: 2021-11-26 | End: 2021-11-27

## 2021-11-26 RX ORDER — DILTIAZEM HYDROCHLORIDE 120 MG/1
120 CAPSULE, EXTENDED RELEASE ORAL NIGHTLY
Status: DISCONTINUED | OUTPATIENT
Start: 2021-11-26 | End: 2021-11-26

## 2021-11-26 RX ORDER — ALPRAZOLAM 0.25 MG/1
0.25 TABLET ORAL ONCE
Status: COMPLETED | OUTPATIENT
Start: 2021-11-26 | End: 2021-11-26

## 2021-11-26 RX ORDER — POTASSIUM CHLORIDE 20 MEQ/1
40 TABLET, EXTENDED RELEASE ORAL ONCE
Status: COMPLETED | OUTPATIENT
Start: 2021-11-26 | End: 2021-11-26

## 2021-11-26 NOTE — PROGRESS NOTES
[facility]  Progress Note    Margie Fail Patient Status:  Inpatient    1935 MRN [id number]   [facility] 3NW-A Attending La Kidd, DO   Hosp Day # 8 PCP David Huitron MD     Subjective:   The patient is resting comfort Anemia     Coarse tremors     Pneumonia of right lower lobe due to infectious organism     Sepsis ([facility] [address]     RENEA (acute kidney injury) ([facility] [address]     Hyponatremia     Community acquired pneumonia     Acute bronchospasm     CKD (chronic kidney disease) stage 3, G assistant. I agree with her physical exam and the data listed in the report, and I have made any relevant changes in editing her note. I have personally examined the patient and reviewed all relevant labs and reports.  I agree with the above assessment and

## 2021-11-26 NOTE — PROGRESS NOTES
[facility]     Hospitalist Progress Note     Chanel De La Cruz Patient Status:  Inpatient    1935 MRN [id number]   [facility] 4SW-A Attending Magdaleno Fatimah, DO   Hosp Day # 8 PCP Anushka Mcdonald MD     Chief Complaint: Abdominal on SCr of 2 mg/dL (H)).     Recent Labs   Lab 11/24/21  1114   PTP 14.0   INR 1.06            COVID-19 Lab Results    COVID-19  Lab Results   Component Value Date    COVID19 Not Detected 11/24/2021    COVID19 Not Detected 11/18/2021       Pro-Calcitonin  No receive contrast on admission, monitor UOP, avoid nephrotoxic agents, renally dose meds  b. Alberto removed  c. Nephrology following    d. Improved, off IVF  5. Acute Anemia/thrombocytopenia (resolved)  a.  Suspect 2/2 surgical blood loss and critical illness

## 2021-11-26 NOTE — PLAN OF CARE
Pt axox4, resting in bed, used  on phone, pt stated that she only had back pain and did not want any pain meds, pt passing gas and belching, blood sugar QID, abdomen soft rounded with midline incision staples KERI, tele NSR with first degree bloc weights  Outcome: Progressing     Problem: RESPIRATORY - ADULT  Goal: Achieves optimal ventilation and oxygenation  Description: INTERVENTIONS:  - Assess for changes in respiratory status  - Assess for changes in mentation and behavior  - Position to facil document skin integrity  - Assess and document dressing/incision, wound bed, drain sites and surrounding tissue  - Implement wound care per orders  - Initiate isolation precautions as appropriate  - Initiate Pressure Ulcer prevention bundle as indicated  O

## 2021-11-26 NOTE — PROGRESS NOTES
[facility]  Progress Note    Madyson Hernandez Patient Status:  Inpatient    1935 MRN [id number]   [facility] 3NW-A Attending Augusto Sanches, DO   Hosp Day # 8 PCP Judi Freeman MD     Subjective:  Madyson Hernandez is a(n) 82 y reviewed     Assessment and Plan:   Patient Active Problem List:     Elevated serum immunoglobulin free light chain level     Anemia     Coarse tremors     Pneumonia of right lower lobe due to infectious organism     Sepsis ([facility] [address]     RENEA (acute kidney inju pending the above-history of chronic bronchitis with intermittent flares  · Diabetes/gout  ·     Plan:  · Weaned to room air  · Increase activity  · Plan for outpatient pulmonary assessment for chronic dyspnea once recovered--no desaturation with ambulatio

## 2021-11-26 NOTE — PROGRESS NOTES
[facility]     Nephrology Progress Note    Denis Samson Patient Status:  Inpatient    1935 MRN [id number]   [facility] 3NW-A Attending Yanet Harrison, DO   Hosp Day # 8 PCP Roger Cates MD       SUBJECTIVE:  States she is 121.0*  --  207.0 191.0   BAND 35  --   --  16 3   INR  --   --  1.06  --   --        Recent Labs   Lab 11/20/21  0522 11/20/21  1401 11/21/21  0424 11/21/21  0424 11/22/21  0908 11/23/21  0700 11/24/21  0443 11/25/21  0707 11/26/21  0534      < > 14 syringe 2 g, 2 g, Intravenous, Q24H  metRONIDAZOLE (FLAGYL) tab 500 mg, 500 mg, Oral, Q8H TRE  melatonin cap/tab 5 mg, 5 mg, Oral, Nightly  metoclopramide (REGLAN) injection 5 mg, 5 mg, Intravenous, Q8H PRN  HYDROmorphone HCl (DILAUDID) 1 MG/ML injection 0 ACE as outpt and will resume today    4.   Hypokalemia- replace and follow        Bjorn Harrison MD  11/26/2021  8:35 AM

## 2021-11-26 NOTE — PROGRESS NOTES
INFECTIOUS DISEASE PROGRESS NOTE    Chanel De La Cruz Patient Status:  Inpatient    1935 MRN [id number]   [facility] 4SW-A Attending Magdaleno Fatimah, DO   Hosp Day # 8 PCP Agus Cheek mg, Intravenous, Q2H PRN **OR** HYDROmorphone HCl (DILAUDID) 1 MG/ML injection 0.4 mg, 0.4 mg, Intravenous, Q2H PRN  •  Insulin Aspart Pen (NOVOLOG) 100 UNIT/ML flexpen 2-10 Units, 2-10 Units, Subcutaneous, TID CC and HS  •  glucose (DEX4) oral liquid 15 g extremities. No arthritis or deformity  Integument: No rash. No open wounds.     Laboratory Data:    Recent Labs   Lab 11/20/21  0613 11/20/21  0613 11/21/21  0424 11/25/21  0643 11/26/21  0534   RBC 3.31*   < > 2.80* 3.50* 3.01*   HGB 9.9*   < > 8.5* 10.6 Blood Culture Result No Growth 5 Days N/A   2. Tissue Aerobic Culture     Status: Abnormal    Collection Time: 11/18/21  8:24 PM    Specimen: Peritoneum;  Tissue   Result Value Ref Range    Tissue Culture Result 4+ growth Escherichia coli (A) N/A    Tiss effusions. There is a tortuous, ectatic thoracic aorta. Degenerative changes are seen in the spine. Impression: CONCLUSION: Cardiomegaly with improving pulmonary edema. Small bilateral pleural effusions are suspected.      Dictated by (CST): Anali Figueroa 6:03 PM Finalized by (CST): Oral Hastings DO on 11/24/2021 at 6:21 PM Signed: 11/24/21 1821 by Oral Hastings DO Narrative: PROCEDURE: NM GB HEPATOBILIARY SCAN (CPT=78226)     COMPARISON: US BANDAR, US GALLBLADDER (CPT=76705), 11/24/2021, 7:24 AM.     IND progress.  -follow abd exam and O2 requirements  -duration of treatment with IV abx TBD, depending on progress    D/w pt (via ), Dr. Kassy Gonzalez and JOSE Webster PA-C    ID ATTENDING ADDENDUM     Pt seen an examined independently.  [facility]

## 2021-11-26 NOTE — PHYSICAL THERAPY NOTE
PHYSICAL THERAPY TREATMENT NOTE - INPATIENT    Room Number: 326/326-A     Session: 2  Number of Visits to Meet Established Goals: 5     History related to current admission: Pt is a 82 y.o. female admitted 11/18/21 with abdominal pain.  Pt presenting with down on and standing up from a chair with arms (e.g., wheelchair, bedside commode, etc.): A Little   Patient Difficulty: Moving from lying on back to sitting on the side of the bed?: A Little   How much help from another person does the patient currently n addressed; Alarm set    ASSESSMENT   Pt continues to make slow progress toward functional goals with max encouragement to progress mobility . Pt continues to present with impaired strength and balance below PLOC.  The AM-PAC '6-Clicks' Inpatient Basic Mobili

## 2021-11-27 ENCOUNTER — APPOINTMENT (OUTPATIENT)
Dept: CT IMAGING | Facility: HOSPITAL | Age: 86
DRG: 853 | End: 2021-11-27
Attending: PHYSICIAN ASSISTANT
Payer: MEDICARE

## 2021-11-27 PROCEDURE — 99233 SBSQ HOSP IP/OBS HIGH 50: CPT | Performed by: INTERNAL MEDICINE

## 2021-11-27 PROCEDURE — 74176 CT ABD & PELVIS W/O CONTRAST: CPT | Performed by: PHYSICIAN ASSISTANT

## 2021-11-27 PROCEDURE — 99232 SBSQ HOSP IP/OBS MODERATE 35: CPT | Performed by: INTERNAL MEDICINE

## 2021-11-27 RX ORDER — ALBUTEROL SULFATE 2.5 MG/3ML
2.5 SOLUTION RESPIRATORY (INHALATION) EVERY 4 HOURS PRN
Status: DISCONTINUED | OUTPATIENT
Start: 2021-11-27 | End: 2021-11-30

## 2021-11-27 NOTE — PROGRESS NOTES
[facility]  Progress Note    Katia Cisneros Patient Status:  Inpatient    1935 MRN [id number]   [facility] 4SW-A Attending Quiana Hampton, [address] [facility] Day # 9 PCP Jerardo Rubalcava MD     Subjective:  Katia Cisneros is a(n) 82 y Recent Labs   Lab 11/21/21  0424 11/22/21  0908 11/25/21  0707 11/25/21  0707 11/26/21  0534 11/26/21  0534 11/26/21  1612 11/26/21  2344 11/27/21  0556   *   < > 132*  --  120*  --   --   --  105*   BUN 77*   < > 49*  --  43*  --   --   --  4 NGTD    Radiology:  CXR 11/21- mild interstitial edema and trace left effusion  11/24 US and  HIDA reviewed. CT as below   CT ABDOMEN+PELVIS(CPT=74176)    Result Date: 11/27/2021  CONCLUSION:  1.  Interval surgery for wedge resection of abnormality in live

## 2021-11-27 NOTE — PROGRESS NOTES
[facility]     Nephrology Progress Note    Christina eKarney Patient Status:  Inpatient    1935 MRN [id number]   [facility] 3NW-A Attending Pina Carey, DO   Hosp Day # 9 PCP Mo Barber MD       SUBJECTIVE:  Stable this af 9.2*   .7*  --  92.0 94.4 95.3   .0*  --  207.0 191.0 197.0   BAND  --   --  16 3  --    INR  --  1.06  --   --   --        Recent Labs   Lab 11/21/21  0424 11/21/21  0424 11/22/21  0908 11/22/21  0908 11/23/21  0700 11/23/21  0700 11/24/21 AC  albuterol (VENTOLIN) (2.5 MG/3ML) 0.083% nebulizer solution 2.5 mg, 2.5 mg, Nebulization, Q6H WA  pregabalin (LYRICA) cap 75 mg, 75 mg, Oral, Daily  levothyroxine (SYNTHROID) tab 50 mcg, 50 mcg, Oral, Before breakfast  SARS-CoV-2 adenovirus vaccine Helio Finch fxn had been better overall although creat up slightly today. Encourage po intake. Will follow, hold ACE with RENEA     2. Sepsis- due to intraabdominal / hepatic abscess. S/p surgery/drain placmeent. As per surgery service. On ancef/flagly    3.   HTN-

## 2021-11-27 NOTE — PLAN OF CARE
Patient alert and oriented x4. Patient [place] speaking.  tablet in room and used for communication. Patient's blood pressure elevated at start of shift, new medication ordered.   Patient taken off of nasal cannula and oxygen saturating within bleeding and/or hematoma  - Assess quality of pulses, skin color and temperature  - Assess for signs of decreased coronary artery perfusion - ex.  Angina  - Evaluate fluid balance, assess for edema, trend weights  Outcome: Progressing     Problem: RESPIRATO INTEGRITY - ADULT  Goal: Incision(s), wounds(s) or drain site(s) healing without S/S of infection  Description: INTERVENTIONS:  - Assess and document risk factors for pressure ulcer development  - Assess and document skin integrity  - Assess and document d

## 2021-11-27 NOTE — PLAN OF CARE
PT axox4, laying in bed, abdomen distended, firm, tender to touch, using bedside commode, voiding and having bm, two liters nc at night restating to 88, midline incision staples KERI cleaned with chg cloths, BRIANNA drain with grey mucus and number 2 with SS flu ADULT  Goal: Achieves optimal ventilation and oxygenation  Description: INTERVENTIONS:  - Assess for changes in respiratory status  - Assess for changes in mentation and behavior  - Position to facilitate oxygenation and minimize respiratory effort  - Oxyg dressing/incision, wound bed, drain sites and surrounding tissue  - Implement wound care per orders  - Initiate isolation precautions as appropriate  - Initiate Pressure Ulcer prevention bundle as indicated  Outcome: Progressing

## 2021-11-27 NOTE — PROGRESS NOTES
INFECTIOUS DISEASE PROGRESS NOTE    Braxton Helton Patient Status:  Inpatient    1935 MRN [id number]   [facility] 4SW-A Attending Francisco Curry, DO   Hosp Day # 9 MANASA Dorado Intravenous, Q2H PRN **OR** HYDROmorphone HCl (DILAUDID) 1 MG/ML injection 0.4 mg, 0.4 mg, Intravenous, Q2H PRN  •  Insulin Aspart Pen (NOVOLOG) 100 UNIT/ML flexpen 2-10 Units, 2-10 Units, Subcutaneous, TID CC and HS  •  glucose (DEX4) oral liquid 15 g, 15 on L.  Musculoskeletal: Full range of motion of all extremities. No arthritis or deformity  Integument: No rash. No open wounds.     Laboratory Data:    Recent Labs   Lab 11/25/21  0643 11/26/21  0534 11/27/21  0556   RBC 3.50* 3.01* 2.99*   HGB 10.6* 8.9* 4:26 PM    Specimen: Blood,peripheral   Result Value Ref Range    Blood Culture Result No Growth 5 Days N/A   2. Tissue Aerobic Culture     Status: Abnormal    Collection Time: 11/18/21  8:24 PM    Specimen: Peritoneum;  Tissue   Result Value Ref Range    T interstitial prominence noted. There are small bilateral pleural effusions. There is a tortuous, ectatic thoracic aorta. Degenerative changes are seen in the spine. Impression: CONCLUSION: Cardiomegaly with improving pulmonary edema.  Small bilateral above findings.  Dictated by (CST): William Steinberg DO on 11/24/2021 at 6:03 PM Finalized by (CST): William Steinberg DO on 11/24/2021 at 6:21 PM Signed: 11/24/21 1821 by William Steinberg DO Narrative: PROCEDURE: NM GB HEPATOBILIARY SCAN (TJX=[id number])     COMPARISON: bcxs. Dc IV Ancef/po flagyl and start IV Zosyn.  Surgery eval appreciated, agree with plans for CT a/p today.  -follow creatinine and adjust abx as needed  -follow temps and WBC  -follow abd exam and O2 requirements  -duration of treatment with IV abx TBD,

## 2021-11-27 NOTE — PROGRESS NOTES
[facility]  Progress Note    Misty Cage Patient Status:  Inpatient    1935 MRN [id number]   [facility] 3NW-A Attending Rebecca Owen, DO   Hosp Day # 9 PCP Juan Zheng MD     Subjective:   The patient is sitting up in c Community acquired pneumonia     Acute bronchospasm     CKD (chronic kidney disease) stage 3, GFR 30-59 ml/min (HCC)     Community acquired pneumonia, unspecified laterality     Renal insufficiency     Respiratory syncytial virus (RSV)     Acute hypoxemic the above note and evaluation by the physician assistant. I agree with her physical exam and the data listed in the report, and I have made any relevant changes in editing her note.  I have personally examined the patient and reviewed all relevant labs and

## 2021-11-27 NOTE — PROGRESS NOTES
[facility]     Hospitalist Progress Note     Elena Ordonez Patient Status:  Inpatient    1935 MRN [id number]   [facility] 4SW-A Attending Harshil Walls, DO   Hosp Day # 9 PCP Joshua Morales MD     Chief Complaint: Abdominal --  26.0  --   --   --  27.0   ALKPHO 51*  --  88  --  78  --   --   --   --    AST 51*  --  19  --  16  --   --   --   --    ALT 66*  --  <6*  --  <6*  --   --   --   --    BILT 0.6  --  0.5  --  0.4  --   --   --   --    TP 4.7*  --  6.2*  --  5.7*  -- Cholecystitis, however gallbladder decompressed no plans for surgery presently   d. Repeat CTAP today  2. Septic Shock 2/2 above  a. Shock resolved, source controlled, BP normalized   3.  Acute Pancreatitis 2/2 choledocholithiasis complicated by bile leak

## 2021-11-28 PROCEDURE — 99233 SBSQ HOSP IP/OBS HIGH 50: CPT | Performed by: INTERNAL MEDICINE

## 2021-11-28 PROCEDURE — 99232 SBSQ HOSP IP/OBS MODERATE 35: CPT | Performed by: INTERNAL MEDICINE

## 2021-11-28 NOTE — PLAN OF CARE
Alert and oriented x3  Up with SBA  Voiding  +BMs  R drain w/ cloudy output  L drain serosang.    Incision to midline is CDI  Problem: Diabetes/Glucose Control  Goal: Glucose maintained within prescribed range  Description: INTERVENTIONS:  - Monitor Blood G and minimize respiratory effort  - Oxygen supplementation based on oxygen saturation or ABGs  - Provide Smoking Cessation handout, if applicable  - Encourage broncho-pulmonary hygiene including cough, deep breathe, Incentive Spirometry  - Assess the need f

## 2021-11-28 NOTE — PROGRESS NOTES
[facility]     Nephrology Progress Note    Yonatan Martinez Patient Status:  Inpatient    1935 MRN [id number]   [facility] 3NW-A Attending Casimiro Gillette,    Hosp Day # 10 PCP Luz Austin MD       SUBJECTIVE:  Remains stabl 19.9* 17.6*   HGB  --  10.6* 8.9* 9.2* 8.7*   MCV  --  92.0 94.4 95.3 94.4   PLT  --  207.0 191.0 197.0 209.0   BAND  --  16 3  --  2   INR 1.06  --   --   --   --        Recent Labs   Lab 11/23/21  0700 11/23/21  0700 11/24/21  0443 11/24/21  0443 11/25/2 tab 50 mcg, 50 mcg, Oral, Before breakfast  SARS-CoV-2 adenovirus vaccine Ary Ear) injection 0.5 mL, 0.5 mL, Intramuscular, Prior to discharge  melatonin cap/tab 5 mg, 5 mg, Oral, Nightly  metoclopramide (REGLAN) injection 5 mg, 5 mg, Intravenous, Q8H PRN surgery/drain placmeent. As per surgery service. On ancef/flagly    3. HTN- BP remains somewhat poorly controlled, ACE still on hold for now. Will follow    4.   Hypokalemia- replace and follow        Meri Cowan MD  11/28/2021  9:24 AM

## 2021-11-28 NOTE — PROGRESS NOTES
[facility]     Hospitalist Progress Note     Mary Ann Orozco Patient Status:  Inpatient    1935 MRN [id number]   [facility] 4SW-A Attending Kaila Ludwig DO   Hosp Day # 10 PCP Landon Navarro MD     Chief Complaint: Abdominal --  0.4  --   --   --   --    TP 6.2*  --  5.7*  --   --   --   --     < > = values in this interval not displayed. Estimated Creatinine Clearance: 15.7 mL/min (A) (based on SCr of 2.3 mg/dL (H)).     Recent Labs   Lab 11/24/21  1114   PTP 14.0   INR and placement of biliary stent 11/19  4. RENEA  a. Nephrology following    b. Improved, off IVF  5. Acute Anemia/thrombocytopenia (resolved)  a. Suspect 2/2 surgical blood loss and critical illness, monitor Hb  6. Essential HTN  a.  Diltiazem stopped  b. Carv

## 2021-11-28 NOTE — PROGRESS NOTES
[facility]  Progress Note    Chanel De La Cruz Patient Status:  Inpatient    1935 MRN [id number]   [facility] 4SW-A Attending Magdaleno Fatimah, DO   Hosp Day # 10 PCP Anushka Mcdonald MD     Subjective:  Chanel De La Cruz is a(n) 82 Lab 11/25/21  0707 11/25/21  0707 11/26/21  0534 11/26/21  1612 11/26/21  2344 11/27/21  0556 11/28/21  0641   *   < > 120*  --   --  105* 97   BUN 49*   < > 43*  --   --  46* 49*   CREATSERUM 2.26*   < > 2.00*  --   --  2.38* 2.30*   GFRAA 22* wedge resection of abnormality in liver. Multiple surgical drains are noted. There is ill-defined fluid and air along expected course of falciform ligament which is likely related to recent surgery.   There is no well-formed abscess cavity detected within

## 2021-11-28 NOTE — PROGRESS NOTES
[facility]  Progress Note    Lj Sanon Patient Status:  Inpatient    1935 MRN [id number]   [facility] 3NW-A Attending Yasir Price, DO   Hosp Day # 10 PCP Anai Noriega MD     Subjective:   The patient states that she i acquired pneumonia     Acute bronchospasm     CKD (chronic kidney disease) stage 3, GFR 30-59 ml/min (HCC)     Community acquired pneumonia, unspecified laterality     Renal insufficiency     Respiratory syncytial virus (RSV)     Acute hypoxemic respirator assistant. I have personally examined the patient and reviewed all relevant labs and reports. I agree with her physical exam and the data listed in the report, and I have made any relevant changes in editing her note.     I agree with the above listed as

## 2021-11-28 NOTE — PLAN OF CARE
Upon assessment, alert and oriented. Pain managed w/dennis tylenol. Denies n/v, tolerating diet. Abd soft and rounded. BRIANNA x2 L w/serosang and R cloudy - mds aware. abd incisions cdi. Tele NSR. Prn o2 at night. Accu checks completed. Iv abx per mar.  Afebrile t Problem: RESPIRATORY - ADULT  Goal: Achieves optimal ventilation and oxygenation  Description: INTERVENTIONS:  - Assess for changes in respiratory status  - Assess for changes in mentation and behavior  - Position to facilitate oxygenation and minimize r and document dressing/incision, wound bed, drain sites and surrounding tissue  - Implement wound care per orders  - Initiate isolation precautions as appropriate  - Initiate Pressure Ulcer prevention bundle as indicated  Outcome: Progressing

## 2021-11-29 PROCEDURE — 99232 SBSQ HOSP IP/OBS MODERATE 35: CPT | Performed by: HOSPITALIST

## 2021-11-29 PROCEDURE — 99233 SBSQ HOSP IP/OBS HIGH 50: CPT | Performed by: INTERNAL MEDICINE

## 2021-11-29 NOTE — PLAN OF CARE
Pt tolerating diet. Abd soft and tender. Active bs,reports loose stool. Midline incision with staples cdi. Rt mrayam with light green cloudy drainage, lt maryam with ss drainage Pt ia ambulating in room. Encouraged to use Is.  Poc updated using Step Labs interpr respiratory difficulty  - Respiratory Therapy support as indicated  - Manage/alleviate anxiety  - Monitor for signs/symptoms of CO2 retention  Outcome: Progressing     Problem: GASTROINTESTINAL - ADULT  Goal: Minimal or absence of nausea and vomiting  Desc

## 2021-11-29 NOTE — PROGRESS NOTES
INFECTIOUS DISEASE PROGRESS NOTE    Francesco Hernandez Patient Status:  Inpatient    1935 MRN [id number]   [facility] 4SW-A Attending Nadege Thakkar, DO   Hosp Day # 11 PCP Dalton Zuluaga injection 0.4 mg, 0.4 mg, Intravenous, Q2H PRN  •  Insulin Aspart Pen (NOVOLOG) 100 UNIT/ML flexpen 2-10 Units, 2-10 Units, Subcutaneous, TID CC and HS  •  glucose (DEX4) oral liquid 15 g, 15 g, Oral, Q15 Min PRN **OR** glucose-vitamin C (DEX-4) chewable t deformity  Integument: No rash. No open wounds.     Laboratory Data:    Recent Labs   Lab 11/26/21  0534 11/26/21  0534 11/27/21  0556 11/28/21  0641 11/29/21  0700   RBC 3.01*   < > 2.99* 2.85* 2.84*   HGB 8.9*   < > 9.2* 8.7* 8.5*   HCT 28.4*   < > 28.5* Specimen: Blood,peripheral   Result Value Ref Range    Blood Culture Result No Growth 1 Day N/A   2. Tissue Aerobic Culture     Status: Abnormal    Collection Time: 11/18/21  8:24 PM    Specimen: Peritoneum;  Tissue   Result Value Ref Range    Tissue Cultur techniques were used. Dose information is transmitted to the [facility] [employer] of Radiology) NRDR ([address]) which includes the Dose Index Registry.      PATIENT STATED HISTORY: (As transcribed by Technologist) The patient had liver. Multiple surgical drains are noted. There is ill-defined fluid and air along expected course of falciform ligament which is likely related to recent surgery.  There is no well-formed   abscess cavity detected within the limits of this noncontrast shankar MD

## 2021-11-29 NOTE — PLAN OF CARE
Pt alert and oriented x4. Currently on 2L via nc when sleeping but maintains sats well on RA when awake. VSS. Afebrile. NSR on tele. Tolerating diet. Denies n/v. Mild pain managed with scheduled PO tylenol. BRIANNA x2.  Left having SS drainage and right cloudy d Incentive Spirometry  - Assess the need for suctioning and perform as needed  - Assess and instruct to report SOB or any respiratory difficulty  - Respiratory Therapy support as indicated  - Manage/alleviate anxiety  - Monitor for signs/symptoms of CO2 ret

## 2021-11-29 NOTE — PROGRESS NOTES
[facility]  Nephrology Progress Note    Zo Cooper Attending:  Patrecia Cooks, MD       Assessment and Plan:    1) RENEA- nonoliguric ATN due to dehydration + sepsis superimposed on CKD with stable labs / reasonable UO,.  Expect ongoing, gradual r moving all extremities  Skin: Warm and dry, no rashes       Labs:   Lab Results   Component Value Date    WBC 15.2 11/29/2021    HGB 8.5 11/29/2021    HCT 28.1 11/29/2021    .0 11/29/2021    CREATSERUM 2.33 11/29/2021    BUN 43 11/29/2021     % injection 50 mL, 50 mL, Intravenous, Q15 Min PRN   Or  glucose (DEX4) oral liquid 30 g, 30 g, Oral, Q15 Min PRN   Or  glucose-vitamin C (DEX-4) chewable tab 8 tablet, 8 tablet, Oral, Q15 Min PRN  influenza vaccine (PF) (FLUZONE HD) high dose for 65 yrs &

## 2021-11-29 NOTE — PHYSICAL THERAPY NOTE
IP PT attempted, pt in bed, politely refusing. Pt states she's been OOB x 2 today and has been moving more in room. Pt reports fatigue. Will re-attempt as schedule permits.

## 2021-11-29 NOTE — PROGRESS NOTES
[facility]  Progress Note    Moravianrobbin Mancuso Patient Status:  Inpatient    1935 MRN [id number]   [facility] 3NW-A Attending Elvin Akbar MD   [facility] Day # 11 PCP Brittany Renner MD     Subjective:  Moravian Mancuso is a(n) 82 Recent Labs   Lab 11/24/21  1114   PTP 14.0   INR 1.06       Cultures: Blood cultures remain negative    Radiology:  No results found.   CT abdomen reviewed no obvious abscess cavity cholelithiasis noted  Small right effusion      Medications reviewed kidney injury on chronic kidney disease-renal note appreciated with gradual recovery  · Acute blood loss anemia- post op-stabilizing  · History of hypertension with negative stress 2014  · History of exertional dyspnea chronic with further work-up pending

## 2021-11-29 NOTE — CM/SW NOTE
Care Progression Note:  Active Acute Medical Issue:   Perforated viscus   Ex lap drainag intra-abdominal abscess/JANINA/splenic flexure mobilization/kocherization duodenum/liver wedge resection/surgical hemostatic agent to liver 11/18  ERCP w/ sphincterotomy/

## 2021-11-29 NOTE — PROGRESS NOTES
[facility]  Progress Note    Onsilver Mews Patient Status:  Inpatient    1935 MRN [id number]   [facility] 3NW-A Attending Caitlyn Love MD   [facility] Day # 11 PCP Lamont Mcfarlane MD     Subjective:   The patient denies new compl bronchospasm     CKD (chronic kidney disease) stage 3, GFR 30-59 ml/min (HCC)     Community acquired pneumonia, unspecified laterality     Renal insufficiency     Respiratory syncytial virus (RSV)     Acute hypoxemic respiratory failure (HCC)     Dyspnea opinion, and physical exam findings. Exam and plan as above. No new complaints. No abdominal pain reported. No nausea or emesis. Ambulating without difficulty. Abdomen soft nontender nondistended without rebound guarding or rigidity.   Incisions

## 2021-11-30 VITALS
HEART RATE: 65 BPM | OXYGEN SATURATION: 97 % | SYSTOLIC BLOOD PRESSURE: 146 MMHG | HEIGHT: 64 IN | BODY MASS INDEX: 35.08 KG/M2 | WEIGHT: 205.5 LBS | RESPIRATION RATE: 19 BRPM | TEMPERATURE: 98 F | DIASTOLIC BLOOD PRESSURE: 50 MMHG

## 2021-11-30 PROCEDURE — 99239 HOSP IP/OBS DSCHRG MGMT >30: CPT | Performed by: HOSPITALIST

## 2021-11-30 PROCEDURE — B548ZZA ULTRASONOGRAPHY OF SUPERIOR VENA CAVA, GUIDANCE: ICD-10-PCS | Performed by: HOSPITALIST

## 2021-11-30 PROCEDURE — 02HV33Z INSERTION OF INFUSION DEVICE INTO SUPERIOR VENA CAVA, PERCUTANEOUS APPROACH: ICD-10-PCS | Performed by: HOSPITALIST

## 2021-11-30 PROCEDURE — 99233 SBSQ HOSP IP/OBS HIGH 50: CPT | Performed by: INTERNAL MEDICINE

## 2021-11-30 RX ORDER — PREGABALIN 75 MG/1
75 CAPSULE ORAL DAILY
Qty: 30 CAPSULE | Refills: 0 | Status: SHIPPED | COMMUNITY
Start: 2021-11-30 | End: 2022-01-07

## 2021-11-30 RX ORDER — CARVEDILOL 25 MG/1
25 TABLET ORAL 2 TIMES DAILY WITH MEALS
Refills: 0 | Status: SHIPPED | COMMUNITY
Start: 2021-11-30

## 2021-11-30 RX ORDER — LIDOCAINE HYDROCHLORIDE 10 MG/ML
5 INJECTION, SOLUTION EPIDURAL; INFILTRATION; INTRACAUDAL; PERINEURAL ONCE
Status: COMPLETED | OUTPATIENT
Start: 2021-11-30 | End: 2021-11-30

## 2021-11-30 RX ORDER — HYDROCODONE BITARTRATE AND ACETAMINOPHEN 5; 325 MG/1; MG/1
1 TABLET ORAL EVERY 4 HOURS PRN
Qty: 15 TABLET | Refills: 0 | Status: SHIPPED | OUTPATIENT
Start: 2021-11-30 | End: 2021-11-30

## 2021-11-30 RX ORDER — PANTOPRAZOLE SODIUM 40 MG/1
40 TABLET, DELAYED RELEASE ORAL DAILY
Qty: 30 TABLET | Refills: 0 | Status: SHIPPED | COMMUNITY
Start: 2021-11-30

## 2021-11-30 RX ORDER — HYDRALAZINE HYDROCHLORIDE 50 MG/1
50 TABLET, FILM COATED ORAL EVERY 8 HOURS SCHEDULED
Qty: 90 TABLET | Refills: 0 | Status: SHIPPED | COMMUNITY
Start: 2021-11-30

## 2021-11-30 NOTE — DIETARY NOTE
[facility]   CLINICAL NUTRITION    Aditirolando Jalloh     Admitting diagnosis:  Perforated viscus [R19.8]    Ht: 162.6 cm (5' 4\")  Wt: 93.2 kg (205 lb 8 oz). Body mass index is 35.27 kg/m².     Wt Readings from Last 6 Encounters:  11/28/21 : 93.2 kg

## 2021-11-30 NOTE — PLAN OF CARE
Pt alert and oriented x4. 2L via nc when sleeping. . VSS. Afebrile. NSR on tele. Tolerating diet, denies n/v. QID ac, coverage per mar. Reports minimal pain managed with scheduled PO tylenol. Up with minimal assist and walker. Voiding freely.  Pt had one ADULT  Goal: Achieves optimal ventilation and oxygenation  Description: INTERVENTIONS:  - Assess for changes in respiratory status  - Assess for changes in mentation and behavior  - Position to facilitate oxygenation and minimize respiratory effort  - Oxyg dressing/incision, wound bed, drain sites and surrounding tissue  - Implement wound care per orders  - Initiate isolation precautions as appropriate  - Initiate Pressure Ulcer prevention bundle as indicated  Outcome: Progressing

## 2021-11-30 NOTE — PHYSICAL THERAPY NOTE
PHYSICAL THERAPY TREATMENT NOTE - INPATIENT    Room Number: 326/326-A     Session: 3  Number of Visits to Meet Established Goals: 5     History related to current admission: Pt is a 82 y.o. female admitted 11/18/21 with abdominal pain.  Pt presenting with patient currently have. ..   Patient Difficulty: Turning over in bed (including adjusting bedclothes, sheets and blankets)?: A Little   Patient Difficulty: Sitting down on and standing up from a chair with arms (e.g., wheelchair, bedside commode, etc.): WATSON Mattson skilled therapy this session and is able to progress mobility . Pt continues to present with impaired strength, balance and decreased endurance  below PLOF.  The AM-PAC '6-Clicks' Inpatient Basic Mobility Short Form was completed and this patient is demonst

## 2021-11-30 NOTE — PROGRESS NOTES
[facility]  Progress Note    Lonny Hickman Patient Status:  Inpatient    1935 MRN [id number]   [facility] 3NW-A Attending Pernell Castleman, MD   [facility] Day # 12 PCP Domingo Dunham MD     Subjective:  Lonny Hickman is a(n) 82 --   --  5.0*    < > = values in this interval not displayed.        Recent Labs   Lab 11/27/21  0556 11/28/21  0641 11/30/21  0455   MG 2.0 2.0 2.1       Lab Results   Component Value Date    PHOS 3.2 11/27/2021        Recent Labs   Lab 11/24/21  1114   IN Prior to discharge  melatonin cap/tab 5 mg, 5 mg, Oral, Nightly  metoclopramide (REGLAN) injection 5 mg, 5 mg, Intravenous, Q8H PRN  HYDROmorphone HCl (DILAUDID) 1 MG/ML injection 0.1 mg, 0.1 mg, Intravenous, Q2H PRN   Or  HYDROmorphone HCl (DILAUDID) 1 MG bronchitis with intermittent flares  · Diabetes/gout  · Right pleural effusion    Plan:  · Fluid on right too small for thoracentesis  · Continue pulm toilet, IS, ambulation as able  · IV abx per ID  · Proph- protonix, heparin subcut  · Disp- SCU.   Saima Graves

## 2021-11-30 NOTE — PROGRESS NOTES
[facility]  Nephrology Progress Note    Lonny Hickman Attending:  Pernell Castleman, MD       Assessment and Plan:    1) RENEA- nonoliguric ATN due to dehydration + sepsis superimposed on CKD with stable labs / reasonable UO,. Expect gradual recovery. extremities  Skin: Warm and dry, no rashes       Labs:   Lab Results   Component Value Date    WBC 12.3 11/30/2021    HGB 8.0 11/30/2021    HCT 25.5 11/30/2021    .0 11/30/2021    CREATSERUM 2.37 11/30/2021    BUN 45 11/30/2021     11/30/2021 oral liquid 15 g, 15 g, Oral, Q15 Min PRN   Or  glucose-vitamin C (DEX-4) chewable tab 4 tablet, 4 tablet, Oral, Q15 Min PRN   Or  dextrose 50 % injection 50 mL, 50 mL, Intravenous, Q15 Min PRN   Or  glucose (DEX4) oral liquid 30 g, 30 g, Oral, Q15 Min PRN

## 2021-11-30 NOTE — PROGRESS NOTES
[facility]     Hospitalist Progress Note     Woody Mendez Patient Status:  Inpatient    1935 MRN [id number]   [facility] 4SW-A Attending Cl Castillo, DO   Hosp Day # 15 PCP Eliazar Lamas MD     Chief Complaint: Abdominal ALKPHO 88  --  78  --   --   --  67   AST 19  --  16  --   --   --  18   ALT <6*  --  <6*  --   --   --  <6*   BILT 0.5  --  0.4  --   --   --  0.3   TP 6.2*  --  5.7*  --   --   --  5.0*    < > = values in this interval not displayed.        Estimated Cr biliary sphincterotomy, removal of stone and placement of biliary stent 11/19, repeat ERCP with stent exchange/removal in 8 weeks  5. ERNEA  a. improving  6. Acute Anemia/thrombocytopenia (resolved)  a.  Suspect 2/2 surgical blood loss and critical illness, m

## 2021-11-30 NOTE — PROGRESS NOTES
Tried calling to give report to [address] [place] [address] I [address]. Line is busy. Will try again before shift change. [facility] phone number [phone] and [phone] multiple to give report. Lines are busy. Midline take out. Picc line on left arm kept in place for IV Abx.

## 2021-11-30 NOTE — CM/SW NOTE
11/30/21 1418   Discharge disposition   Expected discharge disposition [id number] Provider   Farhan Crenshaw)   Discharge transportation [facility]

## 2021-11-30 NOTE — PROGRESS NOTES
[facility]  Progress Note    Iván Pump Patient Status:  Inpatient    1935 MRN [id number]   [facility] 3NW-A Attending Yaw Varghese MD   [facility] Day # 12 PCP Vane Mcnulty MD     Subjective:   The patient just returned fr week for drain removal   3.  DVT prophylaxis -Heparin      Arash Arreaga PA-C  11/30/2021  11:49 AM

## 2021-11-30 NOTE — PROGRESS NOTES
[facility]     Hospitalist Progress Note     Kimberlee Anderson Patient Status:  Inpatient    1935 MRN [id number]   [facility] 4SW-A Attending Mena Marrufo, DO   Hosp Day # 6 PCP Christina Rashid MD     Chief Complaint: Abdominal --  <6*  --   --   --   --    BILT 0.5  --  0.4  --   --   --   --    TP 6.2*  --  5.7*  --   --   --   --     < > = values in this interval not displayed. Estimated Creatinine Clearance: 15.5 mL/min (A) (based on SCr of 2.33 mg/dL (H)).     Recent [place] ERCP with stent exchange/removal in 8 weeks  5. RENEA  a. improving  6. Acute Anemia/thrombocytopenia (resolved)  a. Suspect 2/2 surgical blood loss and critical illness, monitor Hb  7. Essential HTN  a. Cont. Coreg and hydralazine  8.  Type II DM with periph

## 2021-11-30 NOTE — PROGRESS NOTES
INFECTIOUS DISEASE PROGRESS NOTE    Chloe Ulloa Patient Status:  Inpatient    1935 MRN [id number]   [facility] 4SW-A Attending Arleen Hernandez, DO   Hosp Day # 12 PCP Juan Sun Insulin Aspart Pen (NOVOLOG) 100 UNIT/ML flexpen 2-10 Units, 2-10 Units, Subcutaneous, TID CC and HS  •  glucose (DEX4) oral liquid 15 g, 15 g, Oral, Q15 Min PRN **OR** glucose-vitamin C (DEX-4) chewable tab 4 tablet, 4 tablet, Oral, Q15 Min PRN **OR** dex Data:    Recent Labs   Lab 11/26/21  0534 11/26/21  0534 11/27/21  0556 11/27/21  0556 11/28/21  0641 11/29/21  0700 11/30/21  0455   RBC 3.01*   < > 2.99*   < > 2.85* 2.84* 2.68*   HGB 8.9*   < > 9.2*   < > 8.7* 8.5* 8.0*   HCT 28.4*   < > 28.5*   < > 26. EMR,   Hospital Encounter on 11/18/21   1. Blood Culture     Status: None (Preliminary result)    Collection Time: 11/27/21  1:54 PM    Specimen: Blood,peripheral   Result Value Ref Range    Blood Culture Result No Growth 2 Days N/A   2.  Tissue Aerobic Cul TECHNIQUE: Unenhanced multislice CT scanning was performed from the dome of the diaphragm to the pubic symphysis. Dose reduction techniques were used.  Dose information is transmitted to the [facility] [employer] [facility]) [place] [address] [place] [address] Radiology right lower lobe are noted. OTHER: Negative. Impression: CONCLUSION:   1. Interval surgery for wedge resection of abnormality in liver. Multiple surgical drains are noted.  There is ill-defined fluid and air along expected course of falciform ligament ATTENDING ADDENDUM     Pt seen an examined independently. Chart reviewed. Agree with above. Note has been reviewed by me and modified as needed. Exam and Impression/ Recs as noted above.   D/w staff and with pt    Khadra Pearson MD

## 2021-11-30 NOTE — PLAN OF CARE
Patient is alert and oriented. Primarily [place] speaking.  at bedside used. On room air. Tele with NSR. Heart rate in the 60's. Patient had bowel movement earlier this morning. Voiding freely. Patient ambulated with PT this morning.  Midline wit puncture sites for bleeding and/or hematoma  - Assess quality of pulses, skin color and temperature  - Assess for signs of decreased coronary artery perfusion - ex.  Angina  - Evaluate fluid balance, assess for edema, trend weights  Outcome: Progressing SKIN/TISSUE INTEGRITY - ADULT  Goal: Incision(s), wounds(s) or drain site(s) healing without S/S of infection  Description: INTERVENTIONS:  - Assess and document risk factors for pressure ulcer development  - Assess and document skin integrity  - Assess an

## 2021-11-30 NOTE — PLAN OF CARE
Ipad [place]  used for transition. Pt is c/o of slight abd tenderness, bs active, having Bm. Staples removed form midline incision at ss applied. Pt is voiding. Poc updated, pt verbalized understanding.   Problem: Diabetes/Glucose Control  Goal: Manage/alleviate anxiety  - Monitor for signs/symptoms of CO2 retention  Outcome: Progressing     Problem: GASTROINTESTINAL - ADULT  Goal: Minimal or absence of nausea and vomiting  Description: INTERVENTIONS:  - Maintain adequate hydration with IV or PO a

## 2021-12-02 ENCOUNTER — APPOINTMENT (OUTPATIENT)
Dept: ULTRASOUND IMAGING | Facility: HOSPITAL | Age: 86
End: 2021-12-02
Attending: EMERGENCY MEDICINE
Payer: MEDICARE

## 2021-12-02 ENCOUNTER — HOSPITAL ENCOUNTER (EMERGENCY)
Facility: HOSPITAL | Age: 86
Discharge: HOME | End: 2021-12-02
Attending: EMERGENCY MEDICINE
Payer: MEDICARE

## 2021-12-02 ENCOUNTER — TELEPHONE (OUTPATIENT)
Dept: SURGERY | Facility: CLINIC | Age: 86
End: 2021-12-02

## 2021-12-02 VITALS
OXYGEN SATURATION: 97 % | WEIGHT: 205 LBS | HEART RATE: 70 BPM | DIASTOLIC BLOOD PRESSURE: 50 MMHG | RESPIRATION RATE: 14 BRPM | HEIGHT: 63 IN | SYSTOLIC BLOOD PRESSURE: 140 MMHG | BODY MASS INDEX: 36.32 KG/M2 | TEMPERATURE: 98 F

## 2021-12-02 DIAGNOSIS — T81.30XA WOUND DEHISCENCE: Primary | ICD-10-CM

## 2021-12-02 PROCEDURE — 80053 COMPREHEN METABOLIC PANEL: CPT | Performed by: EMERGENCY MEDICINE

## 2021-12-02 PROCEDURE — 85025 COMPLETE CBC W/AUTO DIFF WBC: CPT | Performed by: EMERGENCY MEDICINE

## 2021-12-02 PROCEDURE — 99285 EMERGENCY DEPT VISIT HI MDM: CPT

## 2021-12-02 PROCEDURE — 12020 TX SUPFC WND DEHSN SMPL CLSR: CPT

## 2021-12-02 PROCEDURE — 76705 ECHO EXAM OF ABDOMEN: CPT | Performed by: EMERGENCY MEDICINE

## 2021-12-02 PROCEDURE — 83690 ASSAY OF LIPASE: CPT | Performed by: EMERGENCY MEDICINE

## 2021-12-02 PROCEDURE — 36415 COLL VENOUS BLD VENIPUNCTURE: CPT

## 2021-12-02 PROCEDURE — 87070 CULTURE OTHR SPECIMN AEROBIC: CPT | Performed by: EMERGENCY MEDICINE

## 2021-12-02 PROCEDURE — 87205 SMEAR GRAM STAIN: CPT | Performed by: EMERGENCY MEDICINE

## 2021-12-02 PROCEDURE — 83605 ASSAY OF LACTIC ACID: CPT | Performed by: EMERGENCY MEDICINE

## 2021-12-02 PROCEDURE — 87040 BLOOD CULTURE FOR BACTERIA: CPT | Performed by: EMERGENCY MEDICINE

## 2021-12-02 NOTE — ED PROVIDER NOTES
Patient Seen in: [facility] Emergency Department      History   Patient presents with:  Postop/Procedure Problem    Stated Complaint: post op    Subjective:   HPI    Patient is a 72-year-old female comes emergency room for evaluation of abdominal wo Temp src 12/02/21 1055 Temporal   SpO2 12/02/21 1051 98 %   O2 Device 12/02/21 1051 None (Room air)       Current:/55   Pulse 68   Temp 98.1 °F (36.7 °C) (Temporal)   Resp 14   Ht 160 cm (5' 3\")   Wt 93 kg   SpO2 98%   BMI 36.31 kg/m²         Phys RAPID SARS-COV-2 BY PCR - Normal   CBC WITH DIFFERENTIAL WITH PLATELET    Narrative: The following orders were created for panel order CBC With Differential With Platelet.   Procedure                               Abnormality         Status INDICATIONS:  ERCP  FLUOROSCOPY IMAGES OBTAINED:  7 FLUOROSCOPY TIME:  3 minutes 2 seconds TECHNOLOGIST TIME:  40 minutes RADIATION DOSE (AIR KERMA PRODUCT):  62mGy   FINDINGS:  BILIARY:  There is retrograde injection of contrast into bile duct on subseque can follow-up in the office tomorrow. I did go back to the room after this and expressed approximately 20 cc of drainage from the wound. Gauze was placed into the wound and ABD pad was placed over. Patient was screened and evaluated during this visit.

## 2021-12-02 NOTE — DISCHARGE SUMMARY
[facility] HOSPITAL[facility]  DISCHARGE SUMMARY     Christina Kearney Patient Status:  Inpatient    1935 MRN [id number]   [facility] 3NW-A Attending No att. providers found   Hosp Day # 12 PCP Mo Barber MD     Date of Admission:  biliary stent on 11/19. The patient eventually stabilized. She was significantly deconditioned but eventually for discharge with a PICC line in place for IV antibiotics. She has a right BRIANNA drain remain in place for now.   Her left BRIANNA drain was removed pr as needed for cough or congestion. Quantity: 240 mL  Refills: 0     ipratropium-albuterol  MCG/ACT Aers  Commonly known as: Combivent Respimat      Inhale 1 puff into the lungs 4 (four) times daily as needed (SOB).    Quantity: 1 Inhaler  Refills: 1 bilaterally. No wheezes. No rhonchi. Cardiovascular: S1, S2. No rubs, no JVD  Abdomen: Soft, nontender, nondistended. Positive bowel sounds.     -----------------------------------------------------------------------------------------------  PATIENT [address] [place]

## 2021-12-02 NOTE — ED QUICK NOTES
Attempt made to call report to nursing facility, phone rings without answer. Family aware patient will be returning to nursing facility and to F/U with surgeon.

## 2021-12-02 NOTE — ED INITIAL ASSESSMENT (HPI)
Pt to ED with c/o surgical wound drainage and dehiscence per EMS. Pt is on IV Unasyn at nursing facility.

## 2021-12-02 NOTE — ED QUICK NOTES
Patient's abd surgical wound packed with gauze by XIMENA VARNER. Wound dressed with ABD pad and paper tape.

## 2021-12-02 NOTE — PROGRESS NOTES
I was contacted by the ER physician regarding this patient. She has a separation of her surgical wound in the upper aspect and the ultrasound I requested shows a small fluid collection in the subcutaneous tissue.   I recommend wet-to-dry dressing and follo

## 2021-12-02 NOTE — TELEPHONE ENCOUNTER
Family member called back and scheduled appt.      Future Appointments   Date Time Provider Flakito Duarte   12/3/2021  2:15 PM Rufus Vines [facility] General   12/7/2021 10:00 AM DO DONNA Triana [facility] General   1/21/20

## 2021-12-02 NOTE — TELEPHONE ENCOUNTER
PBP is on call. This pt was seen in ED by our staff. Per PBP, pt needs to be seen in office tomorrow for wound check. Writer called number in pt chart to schedule. Family member refuses to make appt.  States pt is going back to nursing facility and

## 2021-12-03 ENCOUNTER — HOSPITAL ENCOUNTER (EMERGENCY)
Facility: HOSPITAL | Age: 86
Discharge: HOME | End: 2021-12-03
Attending: EMERGENCY MEDICINE
Payer: MEDICARE

## 2021-12-03 ENCOUNTER — OFFICE VISIT (OUTPATIENT)
Dept: SURGERY | Facility: CLINIC | Age: 86
End: 2021-12-03

## 2021-12-03 VITALS
HEART RATE: 72 BPM | OXYGEN SATURATION: 98 % | TEMPERATURE: 98 F | RESPIRATION RATE: 16 BRPM | DIASTOLIC BLOOD PRESSURE: 63 MMHG | SYSTOLIC BLOOD PRESSURE: 170 MMHG

## 2021-12-03 VITALS — TEMPERATURE: 98 F

## 2021-12-03 DIAGNOSIS — K75.0 HEPATIC ABSCESS: Primary | ICD-10-CM

## 2021-12-03 DIAGNOSIS — T81.31XA POSTOPERATIVE WOUND DEHISCENCE, INITIAL ENCOUNTER: Primary | ICD-10-CM

## 2021-12-03 DIAGNOSIS — G47.00 INSOMNIA, UNSPECIFIED TYPE: ICD-10-CM

## 2021-12-03 DIAGNOSIS — T81.30XA SUPERFICIAL DEHISCENCE OF WOUND: ICD-10-CM

## 2021-12-03 DIAGNOSIS — Z98.890 S/P EXPLORATORY LAPAROTOMY: ICD-10-CM

## 2021-12-03 PROCEDURE — 3008F BODY MASS INDEX DOCD: CPT | Performed by: PHYSICIAN ASSISTANT

## 2021-12-03 PROCEDURE — 99283 EMERGENCY DEPT VISIT LOW MDM: CPT

## 2021-12-03 PROCEDURE — 85025 COMPLETE CBC W/AUTO DIFF WBC: CPT | Performed by: EMERGENCY MEDICINE

## 2021-12-03 PROCEDURE — 36415 COLL VENOUS BLD VENIPUNCTURE: CPT

## 2021-12-03 PROCEDURE — 99024 POSTOP FOLLOW-UP VISIT: CPT | Performed by: PHYSICIAN ASSISTANT

## 2021-12-03 PROCEDURE — 85025 COMPLETE CBC W/AUTO DIFF WBC: CPT

## 2021-12-03 PROCEDURE — 80053 COMPREHEN METABOLIC PANEL: CPT

## 2021-12-03 PROCEDURE — 80053 COMPREHEN METABOLIC PANEL: CPT | Performed by: EMERGENCY MEDICINE

## 2021-12-03 RX ORDER — ZOLPIDEM TARTRATE 5 MG/1
5 TABLET ORAL NIGHTLY PRN
Qty: 10 TABLET | Refills: 0 | Status: ON HOLD | OUTPATIENT
Start: 2021-12-03 | End: 2021-12-29

## 2021-12-03 NOTE — ED INITIAL ASSESSMENT (HPI)
Pt had surgery to liver x10 days ago. Pt has a wound near he umbilicus that has been stitched that has been leaking x2 days. No fevers noted at home. No pain noted per family member. Per family leakage appeared pus like without foul odor.  Culture done to t

## 2021-12-03 NOTE — PROGRESS NOTES
Post Operative Visit Note       Active Problems  1. Hepatic abscess    2. S/P exploratory laparotomy    3.  Superficial dehiscence of wound         Chief Complaint   Patient presents with:  Post-Op: PO 11/18 ex lap wound check         History of Present Ill APPENDECTOMY     • KNEE REPLACEMENT SURGERY  2/2016       The family history and social history have been reviewed by me today. History reviewed. No pertinent family history. Social History    Socioeconomic History      Marital status:      [place] and unexpected weight change. HENT: Negative for hearing loss, nosebleeds, sore throat and trouble swallowing. Respiratory: Negative for apnea, cough, shortness of breath and wheezing.     Cardiovascular: Negative for chest pain, palpitations and leg s discomfort with probing of the wound. The wound tracks both superiorly and inferiorly.  The fascia is palpable at the wound base and seems to be intact; however, the base of the wound is unable to be visualized and I am only able to probe with my pinky fing

## 2021-12-04 ENCOUNTER — HOSPITAL ENCOUNTER (EMERGENCY)
Facility: HOSPITAL | Age: 86
Discharge: HOME | End: 2021-12-04
Attending: EMERGENCY MEDICINE
Payer: MEDICARE

## 2021-12-04 VITALS
HEART RATE: 75 BPM | OXYGEN SATURATION: 93 % | BODY MASS INDEX: 36 KG/M2 | DIASTOLIC BLOOD PRESSURE: 62 MMHG | RESPIRATION RATE: 18 BRPM | WEIGHT: 205 LBS | TEMPERATURE: 98 F | SYSTOLIC BLOOD PRESSURE: 186 MMHG

## 2021-12-04 DIAGNOSIS — Z51.89 VISIT FOR WOUND CHECK: Primary | ICD-10-CM

## 2021-12-04 PROCEDURE — 99213 OFFICE O/P EST LOW 20 MIN: CPT | Performed by: SURGERY

## 2021-12-04 NOTE — ED QUICK NOTES
Dr. Alvarado Bowden came to patients room- at bedside did wound dressing change and placed a new bulb to BRIANNA drain. Patient cleared for discharge and to follow up in office in a week.

## 2021-12-04 NOTE — ED PROVIDER NOTES
Patient Seen in: [facility] Emergency Department      History   Patient presents with:  Cath Tube Problem    Stated Complaint: pulled out g-tube    Subjective:   HPI    This is a 30-year-old Moroccan-speaking female that presents with BRIANNA drain issue. kg/m²         Physical Exam    GENERAL: Awake, alert oriented x3, nontoxic appearing. SKIN: Normal, warm, and dry. HEENT:  Pupils equally round and reactive to light. Conjuctiva clear.   Oropharynx is clear and moist.   Lungs: Clear to auscultation bilat    Giuseppe SIMMS       Contacted general surgery. Discussed case with Dr. Ziggy Demarco. She will come down from OR to evaluate patient. Dr. Ziggy Demarco evaluated patient. She placed a new BRIANNA bulb on the drain.   She did a dressing change on the epigastric

## 2021-12-04 NOTE — CONSULTS
[facility]  Report of Consultation    Rafael Merida Patient Status:  Emergency    1935 MRN [id number]   Location [address] [facility] Attending Evelyne Tucker, [facility] Day # 0 PCP Damari Olivas MD     Requesting Physic vitals reviewed. Constitutional: She is oriented to person, place, and time. She appears well-developed and well-nourished. HENT:   Head: Normocephalic and atraumatic. Cardiovascular: Normal rate and regular rhythm.     Pulmonary/Chest: Effort normal in the last 168 hours. No results found.       Impression:  Patient Active Problem List:     Elevated serum immunoglobulin free light chain level     Anemia     Coarse tremors     Pneumonia of right lower lobe due to infectious organism     Sepsis ([address] [facility] from rehab facility for evaluation of [place] drain. Interview was conducted with the assistance of video  ID number [id number]  Patient denies any abdominal pain. She denies any nausea or vomiting.   She has been tolerating her general

## 2021-12-04 NOTE — ED PROVIDER NOTES
Patient Seen in: [facility] Emergency Department      History   Patient presents with:  Postop/Procedure Problem    Stated Complaint: post op complication, leaking from surgical site    Subjective:   HPI    62-year-old woman who presents from rehab that extends up to upper abdomen has a 1 cm dehiscence with serous fluid. There is no purulent drainage. No surrounding erythema.   Extremities: no edema, normal peripheral pulses   Neuro: Alert oriented and nonfocal           ED Course     Labs Reviewed Discharge Medication List    START taking these medications    zolpidem 5 MG Oral Tab  Take 1 tablet (5 mg total) by mouth nightly as needed for Sleep.   Qty: 10 tablet Refills: 0

## 2021-12-05 NOTE — CONSULTS
[facility]  Report of Consultation    Lonny Hickman Patient Status:  Emergency    1935 MRN [id number]   Location [address] [facility] Attending Gurjit Larios, [facility] Day # 0 PCP Domingo Dunham MD     Requesting Physic vitals reviewed. Constitutional: She is oriented to person, place, and time. She appears well-developed and well-nourished. HENT:   Head: Normocephalic and atraumatic. Cardiovascular: Normal rate and regular rhythm.     Pulmonary/Chest: Effort normal in the last 168 hours. No results found.       Impression:  Patient Active Problem List:     Elevated serum immunoglobulin free light chain level     Anemia     Coarse tremors     Pneumonia of right lower lobe due to infectious organism     Sepsis ([address] evaluation of wound and drain  Superior aspect of the wound is seperated for approximately 1.5 cm in length and 3 cm in depth. There is turbid discharge. The would was irrigaed and repacked. The bulb of the drain was removed from the drain tubing.   Ne

## 2021-12-06 ENCOUNTER — HOSPITAL ENCOUNTER (EMERGENCY)
Facility: HOSPITAL | Age: 86
Discharge: HOME | End: 2021-12-06
Attending: EMERGENCY MEDICINE
Payer: MEDICARE

## 2021-12-06 ENCOUNTER — TELEPHONE (OUTPATIENT)
Dept: SURGERY | Facility: CLINIC | Age: 86
End: 2021-12-06

## 2021-12-06 VITALS
HEIGHT: 64 IN | TEMPERATURE: 97 F | DIASTOLIC BLOOD PRESSURE: 64 MMHG | HEART RATE: 63 BPM | OXYGEN SATURATION: 100 % | WEIGHT: 195 LBS | BODY MASS INDEX: 33.29 KG/M2 | RESPIRATION RATE: 22 BRPM | SYSTOLIC BLOOD PRESSURE: 163 MMHG

## 2021-12-06 DIAGNOSIS — T81.30XA WOUND DEHISCENCE: Primary | ICD-10-CM

## 2021-12-06 PROCEDURE — 99283 EMERGENCY DEPT VISIT LOW MDM: CPT

## 2021-12-06 NOTE — ED QUICK NOTES
RN APPLIED WET TO DRY DRESSING PER VERBAL ORDER FROM MD, RN PLACED 1 4X4 WITH SALINE, 1 ABD AND PAPER TAP APPLIED, PT Yvonne Kirby MD AWARE.

## 2021-12-06 NOTE — TELEPHONE ENCOUNTER
Per Lala AU. Pt was seen in the ER over the weekend and is to be seen by Dr Jennifer Lyn this week. I called her daughter Selene Watters to schedule.  She is calling the Nursing home to see if they can get transportation to bring her on Tuesday or Friday and she

## 2021-12-06 NOTE — CONSULTS
[facility]  Consultation Note    Karel Foy Patient Status:  Emergency    1935 MRN [id number]   Location [address] Attending Michael Camacho, *   Hosp Day # 0 PCP Teja Singh MD     Reason for Consult was afebrile with normal vital signs no other lab work or imaging was done    General surgery consulted for wound check    History:  Past Medical History:   Diagnosis Date   • Arthritis    • Back disorder    • Back pain    • Diabetes ([facility]    • Essential h and oriented, cooperative, no apparent distress  Eyes: EOMI  ENT: moist mucus membranes, without scleral icterus  Cardiovascular: regular rate  Respiratory: respirations unlabored, no wheeze  Abdominal: soft, non-tender, non-distended, no guarding/rebound Diabetes mellitus type 2 in obese (HCC)     Acute respiratory failure with hypoxia (HCC)     Perforated viscus     Hepatic abscess     Proteinuria     Bile leak     Hyperkalemia     Acidosis     Cholangitis     Hypokalemia     CKD (chronic kidney disease)

## 2021-12-06 NOTE — ED PROVIDER NOTES
Patient Seen in: [facility] Emergency Department      History   Patient presents with:  Rash Skin Problem    Stated Complaint: DRAINAGE FROM INCISION SITE FROM LIVER SURGERY.       Subjective:   HPI    79-year-old female presents for evaluation of pe systems reviewed and negative except as noted above.     Physical Exam     ED Triage Vitals [12/06/21 1242]   BP (!) 163/64   Pulse 66   Resp 25   Temp 97 °F (36.1 °C)   Temp src Temporal   SpO2 100 %   O2 Device None (Room air)       Current:BP (!) 163/64 56496  [phone]                Medications Prescribed:  Current Discharge Medication List

## 2021-12-06 NOTE — ED INITIAL ASSESSMENT (HPI)
PT PRESENTS TO ED WITH DRAINAGE COMING FROM INCISION SITE AFTER SHE HAD SURGERY TO LIVER, PT ALSO HAS BRIANNA DRAIN, PER DAUGHTER THIS IS THE FOURTH TIME FOR DRAINAGE IN THE LAST 4 DAYS.   PT DENIES PAIN

## 2021-12-13 ENCOUNTER — MED REC SCAN ONLY (OUTPATIENT)
Dept: NEPHROLOGY | Facility: CLINIC | Age: 86
End: 2021-12-13

## 2021-12-17 ENCOUNTER — OFFICE VISIT (OUTPATIENT)
Dept: SURGERY | Facility: CLINIC | Age: 86
End: 2021-12-17

## 2021-12-17 VITALS — TEMPERATURE: 97 F | HEIGHT: 64 IN | WEIGHT: 195 LBS | BODY MASS INDEX: 33.29 KG/M2

## 2021-12-17 DIAGNOSIS — Z98.890 POST-OPERATIVE STATE: Primary | ICD-10-CM

## 2021-12-17 DIAGNOSIS — K75.0 HEPATIC ABSCESS: ICD-10-CM

## 2021-12-17 DIAGNOSIS — T81.30XA WOUND DEHISCENCE: ICD-10-CM

## 2021-12-17 DIAGNOSIS — K83.9 BILE LEAK: ICD-10-CM

## 2021-12-17 DIAGNOSIS — K83.09 CHOLANGITIS: ICD-10-CM

## 2021-12-17 PROBLEM — D72.829 LEUKOCYTOSIS: Status: RESOLVED | Noted: 2018-11-15 | Resolved: 2021-12-17

## 2021-12-17 PROBLEM — K85.90 ACUTE PANCREATITIS, UNSPECIFIED COMPLICATION STATUS, UNSPECIFIED PANCREATITIS TYPE: Status: RESOLVED | Noted: 2018-11-15 | Resolved: 2021-12-17

## 2021-12-17 PROBLEM — K85.90 ACUTE PANCREATITIS: Status: RESOLVED | Noted: 2018-11-15 | Resolved: 2021-12-17

## 2021-12-17 PROBLEM — N18.9 ACUTE RENAL FAILURE SUPERIMPOSED ON CHRONIC KIDNEY DISEASE, UNSPECIFIED CKD STAGE, UNSPECIFIED ACUTE RENAL FAILURE TYPE (HCC): Status: RESOLVED | Noted: 2019-05-07 | Resolved: 2021-12-17

## 2021-12-17 PROBLEM — J18.9 COMMUNITY ACQUIRED PNEUMONIA: Status: RESOLVED | Noted: 2017-08-12 | Resolved: 2021-12-17

## 2021-12-17 PROBLEM — D72.829 LEUKOCYTOSIS, UNSPECIFIED TYPE: Status: RESOLVED | Noted: 2018-11-15 | Resolved: 2021-12-17

## 2021-12-17 PROBLEM — J20.4 PARAINFLUENZA VIRUS BRONCHITIS: Status: RESOLVED | Noted: 2019-05-07 | Resolved: 2021-12-17

## 2021-12-17 PROBLEM — K85.90 ACUTE PANCREATITIS, UNSPECIFIED COMPLICATION STATUS, UNSPECIFIED PANCREATITIS TYPE (HCC): Status: RESOLVED | Noted: 2018-11-15 | Resolved: 2021-12-17

## 2021-12-17 PROBLEM — J18.9 COMMUNITY ACQUIRED PNEUMONIA, UNSPECIFIED LATERALITY: Status: RESOLVED | Noted: 2018-05-12 | Resolved: 2021-12-17

## 2021-12-17 PROBLEM — B33.8 RESPIRATORY SYNCYTIAL VIRUS (RSV): Status: RESOLVED | Noted: 2018-05-12 | Resolved: 2021-12-17

## 2021-12-17 PROBLEM — R09.02 HYPOXIA: Status: RESOLVED | Noted: 2019-05-07 | Resolved: 2021-12-17

## 2021-12-17 PROBLEM — N18.9 ACUTE RENAL FAILURE SUPERIMPOSED ON CHRONIC KIDNEY DISEASE, UNSPECIFIED CKD STAGE, UNSPECIFIED ACUTE RENAL FAILURE TYPE: Status: RESOLVED | Noted: 2019-05-07 | Resolved: 2021-12-17

## 2021-12-17 PROBLEM — R19.8 PERFORATED VISCUS: Status: RESOLVED | Noted: 2021-11-18 | Resolved: 2021-12-17

## 2021-12-17 PROBLEM — R06.2 WHEEZING: Status: RESOLVED | Noted: 2019-05-07 | Resolved: 2021-12-17

## 2021-12-17 PROBLEM — N17.9 ACUTE RENAL FAILURE SUPERIMPOSED ON CHRONIC KIDNEY DISEASE, UNSPECIFIED CKD STAGE, UNSPECIFIED ACUTE RENAL FAILURE TYPE: Status: RESOLVED | Noted: 2019-05-07 | Resolved: 2021-12-17

## 2021-12-17 PROBLEM — N17.9 ACUTE RENAL FAILURE SUPERIMPOSED ON CHRONIC KIDNEY DISEASE, UNSPECIFIED CKD STAGE, UNSPECIFIED ACUTE RENAL FAILURE TYPE (HCC): Status: RESOLVED | Noted: 2019-05-07 | Resolved: 2021-12-17

## 2021-12-17 PROBLEM — K85.90 ACUTE PANCREATITIS (HCC): Status: RESOLVED | Noted: 2018-11-15 | Resolved: 2021-12-17

## 2021-12-17 PROBLEM — B97.4 RESPIRATORY SYNCYTIAL VIRUS (RSV): Status: RESOLVED | Noted: 2018-05-12 | Resolved: 2021-12-17

## 2021-12-17 PROBLEM — R06.02 SHORTNESS OF BREATH: Status: RESOLVED | Noted: 2019-05-07 | Resolved: 2021-12-17

## 2021-12-17 PROBLEM — J96.01 ACUTE RESPIRATORY FAILURE WITH HYPOXIA (HCC): Status: RESOLVED | Noted: 2019-05-12 | Resolved: 2021-12-17

## 2021-12-17 PROCEDURE — 99024 POSTOP FOLLOW-UP VISIT: CPT | Performed by: STUDENT IN AN ORGANIZED HEALTH CARE EDUCATION/TRAINING PROGRAM

## 2021-12-17 PROCEDURE — 1111F DSCHRG MED/CURRENT MED MERGE: CPT | Performed by: STUDENT IN AN ORGANIZED HEALTH CARE EDUCATION/TRAINING PROGRAM

## 2021-12-17 PROCEDURE — 3008F BODY MASS INDEX DOCD: CPT | Performed by: STUDENT IN AN ORGANIZED HEALTH CARE EDUCATION/TRAINING PROGRAM

## 2021-12-17 NOTE — PATIENT INSTRUCTIONS
- Remove outer dressings and packings daily and shower with soap and water and clean the wound thoroughly.  -Patient may shower with drain.  -Replace dressing and packing after showering daily.  -Continue emptying drain daily.   Please record drain output i

## 2021-12-17 NOTE — PROGRESS NOTES
Post Operative Visit Note       Active Problems  1. Post-operative state    2. Hepatic abscess    3. Bile leak    4. Cholangitis    5. Wound dehiscence         Chief Complaint   Patient presents with:  Post-Op: Exp lap, BRIANNA drain, wound check.  pt denies p tablet (5 mg total) by mouth nightly as needed for Sleep., Disp: 10 tablet, Rfl: 0  •  sodium chloride 0.9% SOLN 100 mL with Ampicillin-Sulbactam Sodium 3 (2-1) g SOLR 3 g, Inject 3 g into the vein every 12 (twelve) hours for 20 days.  Will need weekly labs approximately 1 cm at the superior aspect, no surrounding erythema or induration, wound packed with half-inch packing tape and covered with gauze.   Right lower quadrant drain with very slightly murky brown output, no particulate matter         Assessment/P

## 2021-12-23 ENCOUNTER — HOSPITAL ENCOUNTER (EMERGENCY)
Facility: HOSPITAL | Age: 86
Discharge: HOME | End: 2021-12-23
Attending: EMERGENCY MEDICINE
Payer: MEDICARE

## 2021-12-23 ENCOUNTER — APPOINTMENT (OUTPATIENT)
Dept: CT IMAGING | Facility: HOSPITAL | Age: 86
End: 2021-12-23
Attending: EMERGENCY MEDICINE
Payer: MEDICARE

## 2021-12-23 VITALS
SYSTOLIC BLOOD PRESSURE: 172 MMHG | RESPIRATION RATE: 26 BRPM | HEIGHT: 66 IN | HEART RATE: 71 BPM | OXYGEN SATURATION: 97 % | DIASTOLIC BLOOD PRESSURE: 74 MMHG | BODY MASS INDEX: 31.18 KG/M2 | WEIGHT: 194 LBS | TEMPERATURE: 98 F

## 2021-12-23 DIAGNOSIS — L02.211 ABDOMINAL WALL ABSCESS: Primary | ICD-10-CM

## 2021-12-23 PROCEDURE — 96360 HYDRATION IV INFUSION INIT: CPT

## 2021-12-23 PROCEDURE — 99285 EMERGENCY DEPT VISIT HI MDM: CPT

## 2021-12-23 PROCEDURE — 87040 BLOOD CULTURE FOR BACTERIA: CPT | Performed by: EMERGENCY MEDICINE

## 2021-12-23 PROCEDURE — 80053 COMPREHEN METABOLIC PANEL: CPT | Performed by: EMERGENCY MEDICINE

## 2021-12-23 PROCEDURE — 85025 COMPLETE CBC W/AUTO DIFF WBC: CPT | Performed by: EMERGENCY MEDICINE

## 2021-12-23 PROCEDURE — 96361 HYDRATE IV INFUSION ADD-ON: CPT

## 2021-12-23 PROCEDURE — 74176 CT ABD & PELVIS W/O CONTRAST: CPT | Performed by: EMERGENCY MEDICINE

## 2021-12-23 RX ORDER — SODIUM CHLORIDE 9 MG/ML
125 INJECTION, SOLUTION INTRAVENOUS CONTINUOUS
Status: DISCONTINUED | OUTPATIENT
Start: 2021-12-23 | End: 2021-12-24

## 2021-12-23 NOTE — ED PROVIDER NOTES
Patient Seen in: [facility] Emergency Department      History   No chief complaint on file.     Stated Complaint: POSSIBLE ABDOMINAL CYST INFECTION    Subjective:   HPI    82-year-old female comes to the hospital with a complaint of having difficulty (36.4 °C) (Temporal)   Resp 26   Ht 167.6 cm (5' 6\")   Wt 88 kg   SpO2 98%   BMI 31.31 kg/m²         Physical Exam    HEENT : NCAT, EOMI, PEERL,  neck supple, no JVD, trachea midline, No LAD  Heart: S1S2 normal. No murmurs, regular rate and rhythm  Lungs: GALLBLADDER (FWC=76287)    Result Date: 11/24/2021  PROCEDURE:  US GALLBLADDER (HGQ=18164)  COMPARISON:  [facility] , XR, XR ENDO BILE DUCT (ASE=94910), 11/19/2021, 12:03 PM.  [facility] , CT, CT ABDOMEN PELVIS IV CONTRAST, NO ORAL (ER), 11/18/2021, 11:17 AM.  EDW CT ABDOMEN+PELVIS(CPT=74176)    Result Date: 12/23/2021  PROCEDURE:  CT ABDOMEN+PELVIS (CPT=74176)  COMPARISON:  [facility] , CT, CT ABDOMEN+PELVIS(CPT=74176), 11/27/2021, 11:43 AM.  INDICATIONS:  POSSIBLE ABDOMINAL CYST INFECTION  TECHNIQUE:  Unenhanced m ORGANS:  Unremarkable. LYMPH NODES:  Unremarkable. BONES:  Degenerative changes in the spine with scattered prominent Schmorl's nodes. OTHER:  There is a trace amount of ascites within the pelvis. CONCLUSION:   1.  Postoperative changes from a li transcribed by Technologist)  The patient had abdominal surgery 11/19/21. The patient complaints of abdominal pain and distentsion.     FINDINGS:  LIVER:  There is postoperative change along the falciform ligament with ill-defined air in fluid here consiste within the limits of this noncontrast study. 2. There is a bile duct stent noted. There is cholelithiasis and there is air in bile ducts and gallbladder which is an expected finding in the presence of a bile duct stent.   Fluid adjacent gallbladder fundus nuclear medicine department for additional SPECT imaging of the liver/gallbladder fossa to further delineate the above findings.   Dictated by (CST): Sharmila Powell DO on 11/24/2021 at 6:03 PM     Finalized by (CST): Sharmila Powell DO on 11/24/2021 at 6:21 PM in the spine. CONCLUSION:  Cardiomegaly with improving pulmonary edema. Small bilateral pleural effusions are suspected.     Dictated by (CST): Mortimer Moots, MD on 11/25/2021 at 8:10 AM     Finalized by (CST): Mortimer Moots, MD on 11/25

## 2021-12-24 NOTE — CONSULTS
[facility]  Consultation Note    Salena Cantrell Patient Status:  Emergency    1935 MRN [id number]   Location [address] [facility] Attending Sherwin Blackman MD   Hosp Day # 0 PCP Mickey Kuo MD     Reason for Consultation: completed with pertinent items noted above in HPI. Allergic/Immuno:  Review of patient's allergies performed.   Cardiovascular:  Negative irregular heartbeat/palpitations  Constitutional:  Negative for decreased activity, irritability or lethargy  Endocrin 26.4 12/23/2021    .0 12/23/2021    CREATSERUM 1.98 12/23/2021    BUN 28 12/23/2021     12/23/2021    K 4.2 12/23/2021     12/23/2021    CO2 30.0 12/23/2021     12/23/2021    CA 8.8 12/23/2021    ALB 2.2 12/23/2021    ALKPHO 69 12 ADRENALS:  Unremarkable. KIDNEYS:  Left renal atrophy. AORTA/VASCULAR:  Scattered atherosclerosis. RETROPERITONEUM:  Unremarkable. BOWEL/MESENTERY:  A surgical drain from a right abdominal approach     terminates at the upper central abdomen. Tara Castro MD on 12/23/2021 at 9:56 PM            Impression and Plan:  Patient Active Problem List:     Elevated serum immunoglobulin free light chain level     Anemia     Coarse tremors     Renal insufficiency     Essential hypertension     Azotem

## 2021-12-24 NOTE — ED QUICK NOTES
ABDOMINAL WOULD NOTED WITH IODOFORM IN PLACE. SMALL AMOUNT OF PURULENT DRAINAGE NOTED. AREA CLEANSED AND DRESSED WITH 4X4 GAUZE DRESSING. PATIENT TOLERATED WELL.

## 2021-12-28 ENCOUNTER — HOSPITAL ENCOUNTER (OUTPATIENT)
Dept: GENERAL RADIOLOGY | Facility: HOSPITAL | Age: 86
Discharge: HOME | End: 2021-12-28
Attending: STUDENT IN AN ORGANIZED HEALTH CARE EDUCATION/TRAINING PROGRAM
Payer: MEDICARE

## 2021-12-28 ENCOUNTER — OFFICE VISIT (OUTPATIENT)
Dept: SURGERY | Facility: CLINIC | Age: 86
End: 2021-12-28

## 2021-12-28 VITALS — WEIGHT: 194 LBS | BODY MASS INDEX: 31 KG/M2

## 2021-12-28 DIAGNOSIS — T81.30XA WOUND DEHISCENCE: ICD-10-CM

## 2021-12-28 DIAGNOSIS — R06.02 SHORTNESS OF BREATH: ICD-10-CM

## 2021-12-28 DIAGNOSIS — Z09 POSTOP CHECK: ICD-10-CM

## 2021-12-28 DIAGNOSIS — R06.02 SHORTNESS OF BREATH: Primary | ICD-10-CM

## 2021-12-28 PROCEDURE — 99024 POSTOP FOLLOW-UP VISIT: CPT | Performed by: STUDENT IN AN ORGANIZED HEALTH CARE EDUCATION/TRAINING PROGRAM

## 2021-12-28 PROCEDURE — 71046 X-RAY EXAM CHEST 2 VIEWS: CPT | Performed by: STUDENT IN AN ORGANIZED HEALTH CARE EDUCATION/TRAINING PROGRAM

## 2021-12-28 NOTE — PATIENT INSTRUCTIONS
Shower every day and clean wound with soap and water. Change packing daily and cover with clean gauze. May cover old drain site with gauze as needed.

## 2021-12-28 NOTE — PROGRESS NOTES
Post Operative Visit Note       Active Problems  1. Shortness of breath    2. Wound dehiscence    3. Postop check         Chief Complaint   Patient presents with:  Post-Op: PO- Exp lap, BRIANNA drain, wound check- Pt. denies pain at the surgical site.  Pt. state pancreatitis type 11/15/2018   • Arthritis    • Back disorder    • Back pain    • Cholangitis    • Diabetes [facility])    • Essential hypertension    • Gout    • Hepatic abscess    • Neuropathy    • UTI (urinary tract infection)      Past Surgical History:   Pr nightly.  , Disp: , Rfl:       Review of Systems  A 10 point Review of Systems has been completed by me today and is negative except as above in the HPI.     Physical Findings   Wt 194 lb (88 kg)   BMI 31.31 kg/m²   Gen/psych: alert and oriented, cooperativ for wound check. Dr. Cha Townsend (LUIS EDUARDO) [facility] General Surgery  12/28/2021  2:24 PM    Addendum:  Radiology report from chest xr shows right sided pneumonia, worsening from recent CT. MARK Titus called and levaquin 500mg daily x 10 days ordered.  Hayden lopez

## 2021-12-29 ENCOUNTER — APPOINTMENT (OUTPATIENT)
Dept: GENERAL RADIOLOGY | Facility: HOSPITAL | Age: 86
DRG: 291 | End: 2021-12-29
Attending: EMERGENCY MEDICINE
Payer: MEDICARE

## 2021-12-29 ENCOUNTER — HOSPITAL ENCOUNTER (INPATIENT)
Facility: HOSPITAL | Age: 86
LOS: 9 days | Discharge: NURSING, REHAB OR LONG-TERM CARE | DRG: 291 | End: 2022-01-07
Attending: EMERGENCY MEDICINE | Admitting: INTERNAL MEDICINE
Payer: MEDICARE

## 2021-12-29 ENCOUNTER — APPOINTMENT (OUTPATIENT)
Dept: HEMATOLOGY/ONCOLOGY | Facility: HOSPITAL | Age: 86
End: 2021-12-29
Attending: INTERNAL MEDICINE
Payer: MEDICARE

## 2021-12-29 DIAGNOSIS — R09.02 HYPOXIA: ICD-10-CM

## 2021-12-29 DIAGNOSIS — G47.33 OSA (OBSTRUCTIVE SLEEP APNEA): ICD-10-CM

## 2021-12-29 DIAGNOSIS — I50.9 ACUTE ON CHRONIC CONGESTIVE HEART FAILURE, UNSPECIFIED HEART FAILURE TYPE (HCC): Primary | ICD-10-CM

## 2021-12-29 DIAGNOSIS — I26.99 OTHER PULMONARY EMBOLISM WITHOUT ACUTE COR PULMONALE, UNSPECIFIED CHRONICITY (HCC): ICD-10-CM

## 2021-12-29 LAB
ALBUMIN SERPL-MCNC: 2.7 G/DL (ref 3.4–5)
ALBUMIN/GLOB SERPL: 0.6 {RATIO} (ref 1–2)
ALP LIVER SERPL-CCNC: 83 U/L
ALT SERPL-CCNC: 17 U/L
AMMONIA PLAS-MCNC: 33 UMOL/L (ref 11–32)
ANION GAP SERPL CALC-SCNC: 6 MMOL/L (ref 0–18)
APTT PPP: 26.5 SECONDS (ref 23.3–35.6)
AST SERPL-CCNC: 29 U/L (ref 15–37)
ATRIAL RATE: 75 BPM
BASOPHILS # BLD AUTO: 0.05 X10(3) UL (ref 0–0.2)
BASOPHILS NFR BLD AUTO: 0.4 %
BILIRUB SERPL-MCNC: 0.3 MG/DL (ref 0.1–2)
BILIRUB UR QL STRIP.AUTO: NEGATIVE
BUN BLD-MCNC: 25 MG/DL (ref 7–18)
CALCIUM BLD-MCNC: 8.6 MG/DL (ref 8.5–10.1)
CHLORIDE SERPL-SCNC: 105 MMOL/L (ref 98–112)
CLARITY UR REFRACT.AUTO: CLEAR
CO2 SERPL-SCNC: 30 MMOL/L (ref 21–32)
COLOR UR AUTO: YELLOW
CREAT BLD-MCNC: 1.76 MG/DL
EOSINOPHIL # BLD AUTO: 0.12 X10(3) UL (ref 0–0.7)
EOSINOPHIL NFR BLD AUTO: 1 %
ERYTHROCYTE [DISTWIDTH] IN BLOOD BY AUTOMATED COUNT: 17 %
GLOBULIN PLAS-MCNC: 4.2 G/DL (ref 2.8–4.4)
GLUCOSE BLD-MCNC: 105 MG/DL (ref 70–99)
GLUCOSE BLD-MCNC: 120 MG/DL (ref 70–99)
GLUCOSE BLD-MCNC: 143 MG/DL (ref 70–99)
GLUCOSE UR STRIP.AUTO-MCNC: NEGATIVE MG/DL
HCT VFR BLD AUTO: 28.6 %
HGB BLD-MCNC: 8.2 G/DL
IMM GRANULOCYTES # BLD AUTO: 0.33 X10(3) UL (ref 0–1)
IMM GRANULOCYTES NFR BLD: 2.6 %
INR BLD: 1.07 (ref 0.8–1.2)
KETONES UR STRIP.AUTO-MCNC: NEGATIVE MG/DL
LEUKOCYTE ESTERASE UR QL STRIP.AUTO: NEGATIVE
LIPASE SERPL-CCNC: 65 U/L (ref 73–393)
LYMPHOCYTES # BLD AUTO: 3.62 X10(3) UL (ref 1–4)
LYMPHOCYTES NFR BLD AUTO: 29 %
MCH RBC QN AUTO: 29.2 PG (ref 26–34)
MCHC RBC AUTO-ENTMCNC: 28.7 G/DL (ref 31–37)
MCV RBC AUTO: 101.8 FL
MONOCYTES # BLD AUTO: 0.57 X10(3) UL (ref 0.1–1)
MONOCYTES NFR BLD AUTO: 4.6 %
NEUTROPHILS # BLD AUTO: 7.8 X10 (3) UL (ref 1.5–7.7)
NEUTROPHILS # BLD AUTO: 7.8 X10(3) UL (ref 1.5–7.7)
NEUTROPHILS NFR BLD AUTO: 62.4 %
NITRITE UR QL STRIP.AUTO: NEGATIVE
NT-PROBNP SERPL-MCNC: 9184 PG/ML (ref ?–450)
OSMOLALITY SERPL CALC.SUM OF ELEC: 299 MOSM/KG (ref 275–295)
PH UR STRIP.AUTO: 5 [PH] (ref 5–8)
PLATELET # BLD AUTO: 114 10(3)UL (ref 150–450)
POTASSIUM SERPL-SCNC: 4.2 MMOL/L (ref 3.5–5.1)
PROCALCITONIN SERPL-MCNC: 0.2 NG/ML (ref ?–0.16)
PROT SERPL-MCNC: 6.9 G/DL (ref 6.4–8.2)
PROT UR STRIP.AUTO-MCNC: 100 MG/DL
PROTHROMBIN TIME: 13.9 SECONDS (ref 11.6–14.8)
Q-T INTERVAL: 400 MS
QRS DURATION: 88 MS
QTC CALCULATION (BEZET): 470 MS
R AXIS: -11 DEGREES
RBC # BLD AUTO: 2.81 X10(6)UL
RBC UR QL AUTO: NEGATIVE
SARS-COV-2 RNA RESP QL NAA+PROBE: NOT DETECTED
SODIUM SERPL-SCNC: 141 MMOL/L (ref 136–145)
SP GR UR STRIP.AUTO: 1.01 (ref 1–1.03)
T AXIS: 23 DEGREES
TROPONIN I HIGH SENSITIVITY: 14 NG/L
UROBILINOGEN UR STRIP.AUTO-MCNC: <2 MG/DL
VENTRICULAR RATE: 83 BPM
WBC # BLD AUTO: 12.5 X10(3) UL (ref 4–11)

## 2021-12-29 PROCEDURE — 99223 1ST HOSP IP/OBS HIGH 75: CPT | Performed by: INTERNAL MEDICINE

## 2021-12-29 PROCEDURE — 71045 X-RAY EXAM CHEST 1 VIEW: CPT | Performed by: EMERGENCY MEDICINE

## 2021-12-29 RX ORDER — MELATONIN
3 NIGHTLY PRN
Status: DISCONTINUED | OUTPATIENT
Start: 2021-12-29 | End: 2022-01-07

## 2021-12-29 RX ORDER — HYDRALAZINE HYDROCHLORIDE 50 MG/1
50 TABLET, FILM COATED ORAL EVERY 8 HOURS SCHEDULED
Status: DISCONTINUED | OUTPATIENT
Start: 2021-12-29 | End: 2022-01-07

## 2021-12-29 RX ORDER — PANTOPRAZOLE SODIUM 40 MG/1
40 TABLET, DELAYED RELEASE ORAL
Status: DISCONTINUED | OUTPATIENT
Start: 2021-12-30 | End: 2022-01-07

## 2021-12-29 RX ORDER — ERGOCALCIFEROL (VITAMIN D2) 1250 MCG
50000 CAPSULE ORAL WEEKLY
COMMUNITY
End: 2022-01-14

## 2021-12-29 RX ORDER — LEVOTHYROXINE SODIUM 0.05 MG/1
50 TABLET ORAL
Status: DISCONTINUED | OUTPATIENT
Start: 2021-12-30 | End: 2022-01-07

## 2021-12-29 RX ORDER — ALBUTEROL SULFATE 90 UG/1
1 AEROSOL, METERED RESPIRATORY (INHALATION) 4 TIMES DAILY
Status: DISCONTINUED | OUTPATIENT
Start: 2021-12-29 | End: 2021-12-29

## 2021-12-29 RX ORDER — FUROSEMIDE 20 MG/1
20 TABLET ORAL DAILY
COMMUNITY
Start: 2021-12-29 | End: 2022-01-07

## 2021-12-29 RX ORDER — FUROSEMIDE 10 MG/ML
40 INJECTION INTRAMUSCULAR; INTRAVENOUS ONCE
Status: COMPLETED | OUTPATIENT
Start: 2021-12-29 | End: 2021-12-29

## 2021-12-29 RX ORDER — CARVEDILOL 12.5 MG/1
25 TABLET ORAL 2 TIMES DAILY WITH MEALS
Status: DISCONTINUED | OUTPATIENT
Start: 2021-12-29 | End: 2022-01-07

## 2021-12-29 RX ORDER — ALBUTEROL SULFATE 90 UG/1
2 AEROSOL, METERED RESPIRATORY (INHALATION) 4 TIMES DAILY
Status: DISCONTINUED | OUTPATIENT
Start: 2021-12-30 | End: 2022-01-07

## 2021-12-29 RX ORDER — FUROSEMIDE 10 MG/ML
40 INJECTION INTRAMUSCULAR; INTRAVENOUS
Status: DISCONTINUED | OUTPATIENT
Start: 2021-12-29 | End: 2021-12-31

## 2021-12-29 RX ORDER — PREGABALIN 75 MG/1
75 CAPSULE ORAL DAILY
Status: DISCONTINUED | OUTPATIENT
Start: 2021-12-30 | End: 2022-01-05

## 2021-12-29 RX ORDER — LEVOFLOXACIN 500 MG/1
500 TABLET, FILM COATED ORAL DAILY
COMMUNITY
Start: 2021-12-29 | End: 2022-01-07

## 2021-12-29 RX ORDER — VITS A,C,E/LUTEIN/MINERALS 300MCG-200
1 TABLET ORAL NIGHTLY
Status: DISCONTINUED | OUTPATIENT
Start: 2021-12-29 | End: 2022-01-07

## 2021-12-29 RX ORDER — ACETAMINOPHEN 325 MG/1
650 TABLET ORAL EVERY 6 HOURS PRN
Status: DISCONTINUED | OUTPATIENT
Start: 2021-12-29 | End: 2022-01-07

## 2021-12-29 RX ORDER — ONDANSETRON 2 MG/ML
4 INJECTION INTRAMUSCULAR; INTRAVENOUS EVERY 4 HOURS PRN
Status: ACTIVE | OUTPATIENT
Start: 2021-12-29 | End: 2021-12-29

## 2021-12-29 RX ORDER — DEXTROSE MONOHYDRATE 25 G/50ML
50 INJECTION, SOLUTION INTRAVENOUS
Status: DISCONTINUED | OUTPATIENT
Start: 2021-12-29 | End: 2022-01-07

## 2021-12-29 RX ORDER — LABETALOL HYDROCHLORIDE 5 MG/ML
10 INJECTION, SOLUTION INTRAVENOUS ONCE
Status: COMPLETED | OUTPATIENT
Start: 2021-12-29 | End: 2021-12-29

## 2021-12-29 RX ORDER — CODEINE PHOSPHATE AND GUAIFENESIN 10; 100 MG/5ML; MG/5ML
10 SOLUTION ORAL EVERY 6 HOURS PRN
Status: DISCONTINUED | OUTPATIENT
Start: 2021-12-29 | End: 2022-01-07

## 2021-12-29 RX ORDER — ENOXAPARIN SODIUM 100 MG/ML
30 INJECTION SUBCUTANEOUS DAILY
Status: DISCONTINUED | OUTPATIENT
Start: 2021-12-29 | End: 2021-12-30

## 2021-12-29 RX ORDER — HEPARIN SODIUM 5000 [USP'U]/ML
5000 INJECTION, SOLUTION INTRAVENOUS; SUBCUTANEOUS EVERY 8 HOURS SCHEDULED
COMMUNITY
End: 2022-01-07

## 2021-12-29 RX ORDER — MELATONIN
325
Status: ON HOLD | COMMUNITY
End: 2022-01-06

## 2021-12-29 NOTE — H&P
[facility]  History and Physical     Thalia Osorio Patient Status:  Emergency    1935 MRN [id number]   Location [address] [facility] Attending Amor Lopes, Juan Luis Encarnacion MD   Hosp Day # 0 PCP Thomas Cortes MD     Chief Complaint total) by mouth every 8 (eight) hours. , Disp: 90 tablet, Rfl: 0  carvedilol 25 MG Oral Tab, Take 1 tablet (25 mg total) by mouth 2 (two) times daily with meals. , Disp: , Rfl: 0  pantoprazole 40 MG Oral Tab EC, Take 1 tablet (40 mg total) by mouth daily. , D intact. Musculoskeletal: Moves all extremities. Extremities: trace of ankle edema b/l  Integument: No rashes or lesions. Psychiatric: Appropriate mood and affect.       Diagnostic Data:      Labs:  Recent Labs   Lab 12/23/21  1720 12/29/21  1027   WBC 1 full  · Alberto: none  · If COVID testing is negative, may discontinue isolation: yes     Plan of care discussed with pt and ER.      Ashleigh Fermin MD  12/29/2021

## 2021-12-29 NOTE — PROGRESS NOTES
[facility] Pharmacy Note:  Renal Dose Adjustment for enoxaparin (LOVENOX)    Justen Ovalle has been prescribed enoxaparin 40 mg subcutaneously every 24 hours. Estimated Creatinine Clearance: 21.4 mL/min (A) (based on SCr of 1.76 mg/dL (H)).     Calculated C

## 2021-12-29 NOTE — ED QUICK NOTES
Orders for admission, patient is aware of plan and ready to go upstairs. Any questions, please call ED RN Manuelito Jha  at extension [phone]. Vaccinated?  Unknown   Type of COVID test sent:Rapid  COVID Suspicion level: Low    Titratable drug(s) infusing:  NONE

## 2021-12-29 NOTE — ED INITIAL ASSESSMENT (HPI)
Pt arrived with complaints of difficulty breathing and acute worsening lethargy. Per [facility] nurses, pt was 81% on her normal 1L nasal cannula. Pt arrives at 80% at 15L non-rebreather. Pt is confused upon arrival trying to rip off her mask.

## 2021-12-29 NOTE — ED PROVIDER NOTES
Patient Seen in: [facility] Emergency Department      History   Patient presents with:  Difficulty Breathing    Stated Complaint:     Subjective:   HPI    Patient is an 62-year-old female, presenting for evaluation of shortness of breath and low oxy Extraocular muscles are intact. Pupils are equal and reactive to light. NECK: Neck is supple and nontender. The trachea is midline. LUNGS: Respirations labored. Bibasilar crackles. HEART: Regular rate and rhythm. There are no rubs or gallops.    ABDO 12.5 (*)     RBC 2.81 (*)     HGB 8.2 (*)     HCT 28.6 (*)     .0 (*)     .8 (*)     MCHC 28.7 (*)     Neutrophil Absolute Prelim 7.80 (*)     Neutrophil Absolute 7.80 (*)     All other components within normal limits   PROTHROMBIN TIME (PT) throughout the bilateral lungs is concerning for congestive failure with pulmonary edema. Clinical correlation recommended. Small bilateral pleural effusions.      Dictated by (CST): Fernandez Simon MD on 12/29/2021 at 11:05 AM     Finalized by (CST): Vag

## 2021-12-30 ENCOUNTER — APPOINTMENT (OUTPATIENT)
Dept: GENERAL RADIOLOGY | Facility: HOSPITAL | Age: 86
DRG: 291 | End: 2021-12-30
Attending: INTERNAL MEDICINE
Payer: MEDICARE

## 2021-12-30 ENCOUNTER — APPOINTMENT (OUTPATIENT)
Dept: CV DIAGNOSTICS | Facility: HOSPITAL | Age: 86
DRG: 291 | End: 2021-12-30
Attending: INTERNAL MEDICINE
Payer: MEDICARE

## 2021-12-30 LAB
ADENOVIRUS PCR:: NOT DETECTED
ANION GAP SERPL CALC-SCNC: 7 MMOL/L (ref 0–18)
ARTERIAL PATENCY WRIST A: POSITIVE
ARTERIAL PATENCY WRIST A: POSITIVE
ATRIAL RATE: 64 BPM
B PARAPERT DNA SPEC QL NAA+PROBE: NOT DETECTED
B PERT DNA SPEC QL NAA+PROBE: NOT DETECTED
BASE EXCESS BLDA CALC-SCNC: 7 MMOL/L (ref ?–2)
BASE EXCESS BLDA CALC-SCNC: 7.5 MMOL/L (ref ?–2)
BODY TEMPERATURE: 96.8 F
BODY TEMPERATURE: 98.2 F
BUN BLD-MCNC: 30 MG/DL (ref 7–18)
C PNEUM DNA SPEC QL NAA+PROBE: NOT DETECTED
CA-I BLD-SCNC: 1.19 MMOL/L (ref 1.12–1.32)
CA-I BLD-SCNC: 1.27 MMOL/L (ref 1.12–1.32)
CALCIUM BLD-MCNC: 8.7 MG/DL (ref 8.5–10.1)
CHLORIDE SERPL-SCNC: 103 MMOL/L (ref 98–112)
CO2 SERPL-SCNC: 34 MMOL/L (ref 21–32)
COHGB MFR BLD: 1.3 % SAT (ref 0–3)
COHGB MFR BLD: 1.3 % SAT (ref 0–3)
CORONAVIRUS 229E PCR:: NOT DETECTED
CORONAVIRUS HKU1 PCR:: NOT DETECTED
CORONAVIRUS NL63 PCR:: NOT DETECTED
CORONAVIRUS OC43 PCR:: NOT DETECTED
CREAT BLD-MCNC: 2.09 MG/DL
D DIMER PPP FEU-MCNC: 3.66 UG/ML FEU (ref ?–0.84)
ERYTHROCYTE [DISTWIDTH] IN BLOOD BY AUTOMATED COUNT: 16.9 %
FIO2: 100 %
FIO2: 80 %
FLUAV RNA SPEC QL NAA+PROBE: NOT DETECTED
FLUBV RNA SPEC QL NAA+PROBE: NOT DETECTED
GLUCOSE BLD-MCNC: 102 MG/DL (ref 70–99)
GLUCOSE BLD-MCNC: 118 MG/DL (ref 70–99)
GLUCOSE BLD-MCNC: 122 MG/DL (ref 70–99)
GLUCOSE BLD-MCNC: 133 MG/DL (ref 70–99)
GLUCOSE BLD-MCNC: 147 MG/DL (ref 70–99)
HCO3 BLDA-SCNC: 34.3 MEQ/L (ref 22–26)
HCO3 BLDA-SCNC: 38.7 MEQ/L (ref 22–26)
HCT VFR BLD AUTO: 27.3 %
HGB BLD-MCNC: 7.8 G/DL
HGB BLD-MCNC: 8.2 G/DL
HGB BLD-MCNC: 8.6 G/DL
L/M: 15 L/MIN
LACTATE BLDA-SCNC: <1.6 MMOL/L (ref 0.5–2)
LACTATE BLDA-SCNC: <1.6 MMOL/L (ref 0.5–2)
MAGNESIUM SERPL-MCNC: 1.9 MG/DL (ref 1.6–2.6)
MCH RBC QN AUTO: 29.4 PG (ref 26–34)
MCHC RBC AUTO-ENTMCNC: 28.6 G/DL (ref 31–37)
MCV RBC AUTO: 103 FL
METAPNEUMOVIRUS PCR:: NOT DETECTED
METHGB MFR BLD: 0.5 % SAT (ref 0.4–1.5)
METHGB MFR BLD: 0.6 % SAT (ref 0.4–1.5)
MYCOPLASMA PNEUMONIA PCR:: NOT DETECTED
OSMOLALITY SERPL CALC.SUM OF ELEC: 305 MOSM/KG (ref 275–295)
P/F RATIO: 155.3 MMHG
PARAINFLUENZA 1 PCR:: NOT DETECTED
PARAINFLUENZA 2 PCR:: NOT DETECTED
PARAINFLUENZA 3 PCR:: NOT DETECTED
PARAINFLUENZA 4 PCR:: NOT DETECTED
PCO2 BLDA: 118 MM HG (ref 35–45)
PCO2 BLDA: 66 MM HG (ref 35–45)
PH BLDA: 7.14 [PH] (ref 7.35–7.45)
PH BLDA: 7.33 [PH] (ref 7.35–7.45)
PLATELET # BLD AUTO: 211 10(3)UL (ref 150–450)
PO2 BLDA: 118 MM HG (ref 80–105)
PO2 BLDA: 69 MM HG (ref 80–105)
POTASSIUM BLD-SCNC: 3.9 MMOL/L (ref 3.6–5.1)
POTASSIUM BLD-SCNC: 3.9 MMOL/L (ref 3.6–5.1)
POTASSIUM SERPL-SCNC: 4 MMOL/L (ref 3.5–5.1)
Q-T INTERVAL: 440 MS
QRS DURATION: 86 MS
QTC CALCULATION (BEZET): 471 MS
R AXIS: -12 DEGREES
RBC # BLD AUTO: 2.65 X10(6)UL
RHINOVIRUS/ENTERO PCR:: NOT DETECTED
RSV RNA SPEC QL NAA+PROBE: NOT DETECTED
SAO2 % BLDA FROM PO2: 87 % (ref 92–100)
SAO2 % BLDA FROM PO2: 99 % (ref 92–100)
SAO2 % BLDA: 85 % (ref 92–100)
SAO2 % BLDA: 97 % (ref 92–100)
SARS-COV-2 RNA NPH QL NAA+NON-PROBE: NOT DETECTED
SODIUM BLD-SCNC: 145 MMOL/L (ref 136–144)
SODIUM BLD-SCNC: 145 MMOL/L (ref 136–144)
SODIUM SERPL-SCNC: 144 MMOL/L (ref 136–145)
T AXIS: 17 DEGREES
VENTRICULAR RATE: 69 BPM
WBC # BLD AUTO: 13.2 X10(3) UL (ref 4–11)

## 2021-12-30 PROCEDURE — 93306 TTE W/DOPPLER COMPLETE: CPT | Performed by: INTERNAL MEDICINE

## 2021-12-30 PROCEDURE — 99291 CRITICAL CARE FIRST HOUR: CPT | Performed by: INTERNAL MEDICINE

## 2021-12-30 PROCEDURE — 5A09357 ASSISTANCE WITH RESPIRATORY VENTILATION, LESS THAN 24 CONSECUTIVE HOURS, CONTINUOUS POSITIVE AIRWAY PRESSURE: ICD-10-PCS | Performed by: HOSPITALIST

## 2021-12-30 PROCEDURE — 71045 X-RAY EXAM CHEST 1 VIEW: CPT | Performed by: INTERNAL MEDICINE

## 2021-12-30 RX ORDER — HEPARIN SODIUM 5000 [USP'U]/ML
5000 INJECTION, SOLUTION INTRAVENOUS; SUBCUTANEOUS EVERY 8 HOURS SCHEDULED
Status: DISCONTINUED | OUTPATIENT
Start: 2021-12-30 | End: 2021-12-31

## 2021-12-30 NOTE — RESPIRATORY THERAPY NOTE
RN called pt had resp. Distress, upon to pt room, pt pale, RR 35-40/m, diminished bilateral, on NRBmask 15 L, sats mid 80s,  abg done, placed pt on AVAPS, and transfer to [address] [place] [address].

## 2021-12-30 NOTE — CONSULTS
[facility]  Report of Inpatient Wound Care Consultation    Love Covarrubias Patient Status:  Inpatient    1935 MRN [id number]   [facility] 6NE-A Attending Meng Nieves MD   Hosp Day # 1 PCP Raymond Quinonez MD     Reason for Cons --    < > 120* 147*  --  133*  --     < > = values in this interval not displayed.          ASSESSMENT:  Wound 12/30/21 Incision Abdomen Upper;Medial (Active)   Date First Assessed/Time First Assessed: 12/30/21 0520   Present on Hospital Admission: Yes  Anita

## 2021-12-30 NOTE — CONSULTS
[facility]  Consultation    Edgar Mccann Patient Status:  Inpatient    1935 MRN [id number]   [facility] 6NE-A Attending Donal Bhatt MD   Hosp Day # 1 PCP Farhan Chacon MD     Consults    Reason for Consultation:  [facility] pregabalin (LYRICA) cap 75 mg, 75 mg, Oral, Daily  •  glucose (DEX4) oral liquid 15 g, 15 g, Oral, Q15 Min PRN **OR** glucose-vitamin C (DEX-4) chewable tab 4 tablet, 4 tablet, Oral, Q15 Min PRN **OR** dextrose 50 % injection 50 mL, 50 mL, Intravenous, Q15 in respiratory distress. She has rales. Abdominal: Soft. Bowel sounds are normal.   Neurological: She is alert and oriented to person, place, and time. Skin: Skin is warm and dry.             Diagnostics History:  EKG:   Echo:   Stress Test:   Cath:   C

## 2021-12-30 NOTE — CONSULTS
[facility]  Pulmonary/Critical Care Consult Note  [facility]  Report of Consultation    Leobardo Elaine Patient Status:  Inpatient    1935 MRN [id number]   [facility] 6NE-A Attending David Mcclellan mcg Oral Before breakfast   • pantoprazole  40 mg Oral QAM AC   • pregabalin  75 mg Oral Daily   • enoxaparin  30 mg Subcutaneous Daily   • furosemide  40 mg Intravenous BID (Diuretic)   • Insulin Aspart Pen  1-5 Units Subcutaneous TID CC and HS   • Ocuvit view while on AVAPS  NECK: Trachea midline, symmetric, no visible masses or scars, no crepitus, normal flexion/extension  CHEST: Normal chest excursion, no visible deformity or scars, no tenderness to palpation  PULMONARY: +Crackles posteriorly .  No wheeze 7.5*   TEMP 98.2   AMAURY Positive   SITE Right Radial   DEV Non-rebreather mask   THGB 8.6*       Cultures:     No results found for this visit on 12/29/21.   Recent Labs   Lab 12/29/21  [facility] 1.011   Phylicia Retana renal failure, cr recently at 1.98-2.2  · Leukocytosis  · Recent cholecystitis, pancreatitis s/p perforated viscus s/p ex lap with drainage of hepatic abscess liver wedge resection and JNAINA 11/18, bile leak s/p ERCP with sphincterotomy and stent placement 1

## 2021-12-30 NOTE — RESPIRATORY THERAPY NOTE
Pt received on AVAPS for hypercapnia. Repeat ABG looking much better after being on the machine for an hour or so. Will keep Pt on AVAPS while diuresed and continue to monitor.

## 2021-12-30 NOTE — CODE DOCUMENTATION
[facility]  RAPID RESPONSE NOTE     Love Covarrubias Patient Status:  Inpatient    1935 MRN [id number]   [facility] 2NE-A Attending Meng Nieves MD   Hosp Day # 1 PCP Raymond Quinonez MD     Reason for RRT:AMS, hypoxia     Per

## 2021-12-30 NOTE — PROGRESS NOTES
[facility] Pharmacy Note:  Renal Dose Adjustment for enoxaparin (LOVENOX)    Stef John has been prescribed enoxaparin 30 mg subcutaneously every 24 hours. Estimated Creatinine Clearance: 18 mL/min (A) (based on SCr of 2.09 mg/dL (H)).     Calculated CrC

## 2021-12-30 NOTE — DIETARY NOTE
[facility]    NUTRITION ASSESSMENT    Pt does not meet malnutrition criteria. NUTRITION INTERVENTION:  1. Meal and Snacks - monitor patient po intake. Encourage adequate po of appropriate diet.   2. Medical Food Supplements - RD added Magic Cup Osman Becker FINDINGS:     1. Body Fat/Muscle Mass: OTIS      2.  Fluid Accumulation: abdominal edema     NUTRITION PRESCRIPTION:   Calories: 7258-3342 calories/day (15-20 calories per kg)  Protein:  grams protein/day (1.5-2.0 grams protein per k/IBWg)  Fluid: ~1 m

## 2021-12-31 ENCOUNTER — APPOINTMENT (OUTPATIENT)
Dept: CT IMAGING | Facility: HOSPITAL | Age: 86
DRG: 291 | End: 2021-12-31
Attending: INTERNAL MEDICINE
Payer: MEDICARE

## 2021-12-31 ENCOUNTER — APPOINTMENT (OUTPATIENT)
Dept: NUCLEAR MEDICINE | Facility: HOSPITAL | Age: 86
DRG: 291 | End: 2021-12-31
Attending: INTERNAL MEDICINE
Payer: MEDICARE

## 2021-12-31 ENCOUNTER — APPOINTMENT (OUTPATIENT)
Dept: ULTRASOUND IMAGING | Facility: HOSPITAL | Age: 86
DRG: 291 | End: 2021-12-31
Attending: INTERNAL MEDICINE
Payer: MEDICARE

## 2021-12-31 LAB
ALBUMIN SERPL-MCNC: 2.6 G/DL (ref 3.4–5)
ALBUMIN/GLOB SERPL: 0.6 {RATIO} (ref 1–2)
ALP LIVER SERPL-CCNC: 75 U/L
ALT SERPL-CCNC: 15 U/L
ANION GAP SERPL CALC-SCNC: 5 MMOL/L (ref 0–18)
ANION GAP SERPL CALC-SCNC: 6 MMOL/L (ref 0–18)
APTT PPP: 38.5 SECONDS (ref 23.3–35.6)
APTT PPP: >300 SECONDS (ref 23.3–35.6)
ARTERIAL PATENCY WRIST A: POSITIVE
AST SERPL-CCNC: 15 U/L (ref 15–37)
BASE EXCESS BLDA CALC-SCNC: 12.6 MMOL/L (ref ?–2)
BASOPHILS # BLD AUTO: 0.03 X10(3) UL (ref 0–0.2)
BASOPHILS NFR BLD AUTO: 0.2 %
BILIRUB SERPL-MCNC: 0.4 MG/DL (ref 0.1–2)
BODY TEMPERATURE: 97.8 F
BUN BLD-MCNC: 35 MG/DL (ref 7–18)
BUN BLD-MCNC: 41 MG/DL (ref 7–18)
CA-I BLD-SCNC: 1.11 MMOL/L (ref 1.12–1.32)
CALCIUM BLD-MCNC: 8.7 MG/DL (ref 8.5–10.1)
CALCIUM BLD-MCNC: 9 MG/DL (ref 8.5–10.1)
CHLORIDE SERPL-SCNC: 97 MMOL/L (ref 98–112)
CHLORIDE SERPL-SCNC: 98 MMOL/L (ref 98–112)
CO2 SERPL-SCNC: 38 MMOL/L (ref 21–32)
CO2 SERPL-SCNC: 38 MMOL/L (ref 21–32)
COHGB MFR BLD: 1.4 % SAT (ref 0–3)
CREAT BLD-MCNC: 2.09 MG/DL
CREAT BLD-MCNC: 2.46 MG/DL
DEPRECATED HBV CORE AB SER IA-ACNC: 91.7 NG/ML
EOSINOPHIL # BLD AUTO: 0.02 X10(3) UL (ref 0–0.7)
EOSINOPHIL NFR BLD AUTO: 0.2 %
ERYTHROCYTE [DISTWIDTH] IN BLOOD BY AUTOMATED COUNT: 16.8 %
ERYTHROCYTE [DISTWIDTH] IN BLOOD BY AUTOMATED COUNT: 16.9 %
FOLATE SERPL-MCNC: 12.8 NG/ML (ref 8.7–?)
GLOBULIN PLAS-MCNC: 4.1 G/DL (ref 2.8–4.4)
GLUCOSE BLD-MCNC: 126 MG/DL (ref 70–99)
GLUCOSE BLD-MCNC: 132 MG/DL (ref 70–99)
GLUCOSE BLD-MCNC: 139 MG/DL (ref 70–99)
GLUCOSE BLD-MCNC: 149 MG/DL (ref 70–99)
GLUCOSE BLD-MCNC: 93 MG/DL (ref 70–99)
GLUCOSE BLD-MCNC: 99 MG/DL (ref 70–99)
HCO3 BLDA-SCNC: 38.7 MEQ/L (ref 22–26)
HCT VFR BLD AUTO: 25.1 %
HCT VFR BLD AUTO: 25.3 %
HGB BLD-MCNC: 7.7 G/DL
HGB BLD-MCNC: 7.9 G/DL
HGB BLD-MCNC: 8.5 G/DL
IMM GRANULOCYTES # BLD AUTO: 0.11 X10(3) UL (ref 0–1)
IMM GRANULOCYTES NFR BLD: 0.8 %
IRON SATURATION: 7 %
IRON SERPL-MCNC: 18 UG/DL
L/M: 10 L/MIN
LACTATE BLDA-SCNC: <1.6 MMOL/L (ref 0.5–2)
LYMPHOCYTES # BLD AUTO: 3.55 X10(3) UL (ref 1–4)
LYMPHOCYTES NFR BLD AUTO: 27.4 %
MAGNESIUM SERPL-MCNC: 1.8 MG/DL (ref 1.6–2.6)
MCH RBC QN AUTO: 30.1 PG (ref 26–34)
MCH RBC QN AUTO: 31.1 PG (ref 26–34)
MCHC RBC AUTO-ENTMCNC: 30.4 G/DL (ref 31–37)
MCHC RBC AUTO-ENTMCNC: 31.5 G/DL (ref 31–37)
MCV RBC AUTO: 98.8 FL
MCV RBC AUTO: 98.8 FL
METHGB MFR BLD: 0.6 % SAT (ref 0.4–1.5)
MONOCYTES # BLD AUTO: 0.9 X10(3) UL (ref 0.1–1)
MONOCYTES NFR BLD AUTO: 6.9 %
NEUTROPHILS # BLD AUTO: 8.36 X10 (3) UL (ref 1.5–7.7)
NEUTROPHILS # BLD AUTO: 8.36 X10(3) UL (ref 1.5–7.7)
NEUTROPHILS NFR BLD AUTO: 64.5 %
OSMOLALITY SERPL CALC.SUM OF ELEC: 302 MOSM/KG (ref 275–295)
OSMOLALITY SERPL CALC.SUM OF ELEC: 302 MOSM/KG (ref 275–295)
P/F RATIO: 370.9 MMHG
PCO2 BLDA: 60 MM HG (ref 35–45)
PH BLDA: 7.43 [PH] (ref 7.35–7.45)
PLATELET # BLD AUTO: 189 10(3)UL (ref 150–450)
PLATELET # BLD AUTO: 197 10(3)UL (ref 150–450)
PO2 BLDA: 76 MM HG (ref 80–105)
POTASSIUM BLD-SCNC: 3.4 MMOL/L (ref 3.6–5.1)
POTASSIUM SERPL-SCNC: 3.2 MMOL/L (ref 3.5–5.1)
POTASSIUM SERPL-SCNC: 3.8 MMOL/L (ref 3.5–5.1)
PROT SERPL-MCNC: 6.7 G/DL (ref 6.4–8.2)
RBC # BLD AUTO: 2.54 X10(6)UL
RBC # BLD AUTO: 2.56 X10(6)UL
SAO2 % BLDA FROM PO2: 96 % (ref 92–100)
SAO2 % BLDA: 95 % (ref 92–100)
SODIUM BLD-SCNC: 142 MMOL/L (ref 136–144)
SODIUM SERPL-SCNC: 140 MMOL/L (ref 136–145)
SODIUM SERPL-SCNC: 142 MMOL/L (ref 136–145)
T4 FREE SERPL-MCNC: 1.3 NG/DL (ref 0.8–1.7)
TOTAL IRON BINDING CAPACITY: 264 UG/DL (ref 240–450)
TRANSFERRIN SERPL-MCNC: 177 MG/DL (ref 200–360)
TSI SER-ACNC: 6.03 MIU/ML (ref 0.36–3.74)
WBC # BLD AUTO: 12.1 X10(3) UL (ref 4–11)
WBC # BLD AUTO: 13 X10(3) UL (ref 4–11)

## 2021-12-31 PROCEDURE — 78580 LUNG PERFUSION IMAGING: CPT | Performed by: INTERNAL MEDICINE

## 2021-12-31 PROCEDURE — 99223 1ST HOSP IP/OBS HIGH 75: CPT | Performed by: INTERNAL MEDICINE

## 2021-12-31 PROCEDURE — 71250 CT THORAX DX C-: CPT | Performed by: INTERNAL MEDICINE

## 2021-12-31 PROCEDURE — 99233 SBSQ HOSP IP/OBS HIGH 50: CPT | Performed by: INTERNAL MEDICINE

## 2021-12-31 PROCEDURE — 93970 EXTREMITY STUDY: CPT | Performed by: INTERNAL MEDICINE

## 2021-12-31 RX ORDER — MELATONIN
325
Status: DISCONTINUED | OUTPATIENT
Start: 2021-12-31 | End: 2022-01-07

## 2021-12-31 RX ORDER — MAGNESIUM OXIDE 400 MG (241.3 MG MAGNESIUM) TABLET
400 TABLET ONCE
Status: COMPLETED | OUTPATIENT
Start: 2021-12-31 | End: 2021-12-31

## 2021-12-31 RX ORDER — POTASSIUM CHLORIDE 20 MEQ/1
40 TABLET, EXTENDED RELEASE ORAL ONCE
Status: COMPLETED | OUTPATIENT
Start: 2021-12-31 | End: 2021-12-31

## 2021-12-31 RX ORDER — POLYETHYLENE GLYCOL 3350 17 G/17G
17 POWDER, FOR SOLUTION ORAL DAILY
Status: DISCONTINUED | OUTPATIENT
Start: 2021-12-31 | End: 2022-01-07

## 2021-12-31 RX ORDER — HEPARIN SODIUM AND DEXTROSE 10000; 5 [USP'U]/100ML; G/100ML
INJECTION INTRAVENOUS CONTINUOUS
Status: DISCONTINUED | OUTPATIENT
Start: 2021-12-31 | End: 2022-01-05

## 2021-12-31 RX ORDER — HEPARIN SODIUM 5000 [USP'U]/ML
80 INJECTION INTRAVENOUS; SUBCUTANEOUS ONCE
Status: COMPLETED | OUTPATIENT
Start: 2021-12-31 | End: 2021-12-31

## 2021-12-31 RX ORDER — HEPARIN SODIUM AND DEXTROSE 10000; 5 [USP'U]/100ML; G/100ML
18 INJECTION INTRAVENOUS ONCE
Status: COMPLETED | OUTPATIENT
Start: 2021-12-31 | End: 2021-12-31

## 2021-12-31 RX ORDER — AMLODIPINE BESYLATE 5 MG/1
5 TABLET ORAL DAILY
Status: DISCONTINUED | OUTPATIENT
Start: 2021-12-31 | End: 2022-01-01

## 2021-12-31 RX ORDER — FUROSEMIDE 10 MG/ML
80 INJECTION INTRAMUSCULAR; INTRAVENOUS ONCE
Status: COMPLETED | OUTPATIENT
Start: 2021-12-31 | End: 2021-12-31

## 2021-12-31 RX ORDER — BISACODYL 10 MG
10 SUPPOSITORY, RECTAL RECTAL
Status: DISCONTINUED | OUTPATIENT
Start: 2021-12-31 | End: 2022-01-07

## 2021-12-31 RX ORDER — HYDRALAZINE HYDROCHLORIDE 20 MG/ML
5 INJECTION INTRAMUSCULAR; INTRAVENOUS ONCE
Status: COMPLETED | OUTPATIENT
Start: 2021-12-31 | End: 2021-12-31

## 2021-12-31 NOTE — PROGRESS NOTES
Received pt on 8L HFNC, refusing AVAPS for the night. Placed pt back on AVAPS during the night for increased WOB. Pt still not tolerating AVAPS well so frequently on and off. Will continue to monitor closely.       12/31/21 5080   BiPAP   $ RT [employer]

## 2021-12-31 NOTE — CONSULTS
[facility]  Report of Consultation    Cheryl Player Patient Status:  Inpatient    1935 MRN [id number]   [facility] 6NE-A Attending Nahun Clinton MD   Hosp Day # 2 PCP Haylie Lama MD       Assessment / Plan:    1) CKD 3- c/ amLODIPine (NORVASC) tab 5 mg, 5 mg, Oral, Daily  •  ferrous sulfate EC tab 325 mg, 325 mg, Oral, Daily with breakfast  •  bisacodyl (DULCOLAX) rectal suppository 10 mg, 10 mg, Rectal, Daily PRN  •  polyethylene glycol (PEG 3350) (MIRALAX) powder packet 17 31   Ht 5' 5\" (1.651 m)   Wt 199 lb 4.7 oz (90.4 kg)   SpO2 92%   BMI 33.16 kg/m²   Temp (24hrs), Av.3 °F (36.8 °C), Min:97.8 °F (36.6 °C), Max:98.7 °F (37.1 °C)       Intake/Output Summary (Last 24 hours) at 2021 1608  Last data filed at

## 2021-12-31 NOTE — PLAN OF CARE
Assumed care of pt at [address]. [place] speaking communicates/comprehends some English.  line utilized for assessment. A/O x 4, calm and cooperative. Afebrile, HTNsive denies c/o pain, states \"breathing better but still diff\".  Dyspnea with minim Provide Smoking Cessation handout, if applicable  - Encourage broncho-pulmonary hygiene including cough, deep breathe, Incentive Spirometry  - Assess the need for suctioning and perform as needed  - Assess and instruct to report SOB or any respiratory diff ADULT  Goal: Achieves stable or improved neurological status  Description: INTERVENTIONS  - Assess for and report changes in neurological status  - Initiate measures to prevent increased intracranial pressure  - Maintain blood pressure and fluid volume wit

## 2021-12-31 NOTE — CONSULTS
THE [facility] Hematology and Oncology Consult Note    Reason for Consult: Possible PE, Anemia  Medical Record Number: [id number]   CSN: [id number]   Referring Physician: No ref.  provider found  PCP: Lindsey Saeed MD    History of Present Illness: 82F with a PMH of Units, 80 Units/kg, Intravenous, Once  •  heparin(PORCINE) 05244sasqi/250mL infusion INITIAL DOSE, 18 Units/kg/hr, Intravenous, Once  •  heparin (PORCINE) drip 92882grufu/250mL infusion CONTINUOUS, 200-3,000 Units/hr, Intravenous, Continuous  •  carvedilol Social History    Socioeconomic History      Marital status:      Tobacco Use      Smoking status: Never Smoker      Smokeless tobacco: Never Used    Substance and Sexual Activity      Alcohol use: No      Drug use: No     No family history on fi 200 mg x 3 days  -TSH, U01, Folic acid, Copper, Retic, SPEP, CRP, LDH  -May need procrit in the future     Hypoxia  Elevated D-dimer  High Probability VQ to RLL  -She is at high risk for PE given her prolonged hospital stay and surgery.  Her d-dimer is non-

## 2021-12-31 NOTE — PROGRESS NOTES
[address] [facility] Cardiology Progress Note    Sanjeev Cesar Patient Status:  Inpatient    1935 MRN [id number]   [facility] 6NE-A Attending Do Vazquez MD   Hosp Day # 2 PCP Iqra Pennington MD     Subjective:  Patient this nancy oriented to person, place, and time. She appears well-developed and well-nourished. HENT:   Head: Normocephalic and atraumatic. Eyes: Pupils are equal, round, and reactive to light. EOM are normal.   Neck: Neck supple.    Cardiovascular: Normal rate and

## 2021-12-31 NOTE — PROGRESS NOTES
[facility]  Progress Note    Josh Dela Cruz Patient Status:  Inpatient    1935 MRN [id number]   [facility] 6NE-A Attending Tristan Reyna MD   Hosp Day # 2 PCP Tim Mederos MD     Subjective:  Josh Dela Cruz is a(n) 82 yea AST 29  --  15   ALT 17  --  15   BILT 0.3  --  0.4   TP 6.9  --  6.7       Recent Labs   Lab 12/30/21  0745 12/31/21  0555   MG 1.9 1.8       Lab Results   Component Value Date    PHOS 3.2 11/27/2021        Recent Labs   Lab 12/29/21  1027   INR 1.07 negative  · Continue diuresis, follow fluid balance, lytes. Consult renal  · TTE noted with normal EF, PASP 40-45mmHg  · ProBNP significantly elevated, BP elevated. Continue diuretics.   · Followup VQ scan and LE doppler given recent surgery  · Follow hgb

## 2021-12-31 NOTE — PROGRESS NOTES
[facility]     Hospitalist Progress Note     Margyannetta Lamar Patient Status:  Inpatient    1935 MRN [id number]   [facility] 6NE-A Attending Helen Hampton MD   Hosp Day # 2 PCP Matt Keith MD     Chief Complaint: hypoxia PCT 0.20*       Cardiac  Recent Labs   Lab 12/29/21  1027   PBNP 9,184*       Creatinine Kinase  No results for input(s): CK in the last 168 hours.     Inflammatory Markers  Recent Labs   Lab 12/30/21  1616 12/31/21  0555   KP  --  91.7   DDIMER 3.66*  - date of discharge: TBD  Discharge is dependent on: course   At this point Ms. Sujey Gardner is expected to be discharge to: [facility]        **Certification      PHYSICIAN Certification of Need for Inpatient Hospitalization - Initial Certification    Patient will r Calm

## 2022-01-01 LAB
ANION GAP SERPL CALC-SCNC: 6 MMOL/L (ref 0–18)
APTT PPP: 144.3 SECONDS (ref 23.3–35.6)
APTT PPP: 177.5 SECONDS (ref 23.3–35.6)
APTT PPP: 252.8 SECONDS (ref 23.3–35.6)
APTT PPP: >300 SECONDS (ref 23.3–35.6)
ARTERIAL PATENCY WRIST A: POSITIVE
BASE EXCESS BLDA CALC-SCNC: 17 MMOL/L (ref ?–2)
BODY TEMPERATURE: 98.6 F
BUN BLD-MCNC: 44 MG/DL (ref 7–18)
CA-I BLD-SCNC: 1.07 MMOL/L (ref 1.12–1.32)
CALCIUM BLD-MCNC: 8.7 MG/DL (ref 8.5–10.1)
CHLORIDE SERPL-SCNC: 94 MMOL/L (ref 98–112)
CO2 SERPL-SCNC: 40 MMOL/L (ref 21–32)
COHGB MFR BLD: 1.5 % SAT (ref 0–3)
CREAT BLD-MCNC: 2.16 MG/DL
CRP SERPL-MCNC: 8.07 MG/DL (ref ?–0.3)
ERYTHROCYTE [DISTWIDTH] IN BLOOD BY AUTOMATED COUNT: 17 %
GLUCOSE BLD-MCNC: 112 MG/DL (ref 70–99)
GLUCOSE BLD-MCNC: 117 MG/DL (ref 70–99)
GLUCOSE BLD-MCNC: 120 MG/DL (ref 70–99)
GLUCOSE BLD-MCNC: 129 MG/DL (ref 70–99)
GLUCOSE BLD-MCNC: 134 MG/DL (ref 70–99)
HCO3 BLDA-SCNC: 42.7 MEQ/L (ref 22–26)
HCT VFR BLD AUTO: 25.4 %
HGB BLD-MCNC: 8.1 G/DL
HGB BLD-MCNC: 8.8 G/DL
HGB RETIC QN AUTO: 32.6 PG (ref 28.2–36.6)
IMM RETICS NFR: 0.17 RATIO (ref 0.1–0.3)
L/M: 6 L/MIN
LACTATE BLDA-SCNC: <1.6 MMOL/L (ref 0.5–2)
LDH SERPL L TO P-CCNC: 191 U/L
MAGNESIUM SERPL-MCNC: 1.7 MG/DL (ref 1.6–2.6)
MCH RBC QN AUTO: 30.7 PG (ref 26–34)
MCHC RBC AUTO-ENTMCNC: 31.9 G/DL (ref 31–37)
MCV RBC AUTO: 96.2 FL
METHGB MFR BLD: 0.5 % SAT (ref 0.4–1.5)
OSMOLALITY SERPL CALC.SUM OF ELEC: 302 MOSM/KG (ref 275–295)
P/F RATIO: 217.1 MMHG
PCO2 BLDA: 60 MM HG (ref 35–45)
PH BLDA: 7.47 [PH] (ref 7.35–7.45)
PHOSPHATE SERPL-MCNC: 3.2 MG/DL (ref 2.5–4.9)
PLATELET # BLD AUTO: 207 10(3)UL (ref 150–450)
PLATELET # BLD AUTO: 207 10(3)UL (ref 150–450)
PO2 BLDA: 96 MM HG (ref 80–105)
POTASSIUM BLD-SCNC: 3.2 MMOL/L (ref 3.6–5.1)
POTASSIUM SERPL-SCNC: 3.3 MMOL/L (ref 3.5–5.1)
RBC # BLD AUTO: 2.64 X10(6)UL
RETICS # AUTO: 73.1 X10(3) UL (ref 22.5–147.5)
RETICS/RBC NFR AUTO: 2.8 %
SAO2 % BLDA FROM PO2: 98 % (ref 92–100)
SAO2 % BLDA: 96 % (ref 92–100)
SODIUM BLD-SCNC: 141 MMOL/L (ref 136–144)
SODIUM SERPL-SCNC: 140 MMOL/L (ref 136–145)
VIT B12 SERPL-MCNC: 456 PG/ML (ref 193–986)
WBC # BLD AUTO: 12 X10(3) UL (ref 4–11)

## 2022-01-01 PROCEDURE — 99291 CRITICAL CARE FIRST HOUR: CPT | Performed by: INTERNAL MEDICINE

## 2022-01-01 PROCEDURE — 99223 1ST HOSP IP/OBS HIGH 75: CPT | Performed by: INTERNAL MEDICINE

## 2022-01-01 PROCEDURE — 99232 SBSQ HOSP IP/OBS MODERATE 35: CPT | Performed by: INTERNAL MEDICINE

## 2022-01-01 RX ORDER — MAGNESIUM OXIDE 400 MG (241.3 MG MAGNESIUM) TABLET
400 TABLET ONCE
Status: COMPLETED | OUTPATIENT
Start: 2022-01-01 | End: 2022-01-01

## 2022-01-01 RX ORDER — FUROSEMIDE 10 MG/ML
80 INJECTION INTRAMUSCULAR; INTRAVENOUS
Status: DISCONTINUED | OUTPATIENT
Start: 2022-01-01 | End: 2022-01-03

## 2022-01-01 RX ORDER — AMLODIPINE BESYLATE 5 MG/1
10 TABLET ORAL DAILY
Status: DISCONTINUED | OUTPATIENT
Start: 2022-01-02 | End: 2022-01-07

## 2022-01-01 RX ORDER — POTASSIUM CHLORIDE 20 MEQ/1
40 TABLET, EXTENDED RELEASE ORAL ONCE
Status: COMPLETED | OUTPATIENT
Start: 2022-01-01 | End: 2022-01-01

## 2022-01-01 NOTE — PROGRESS NOTES
THE [facility] Hematology and Oncology Progress Note   Length of Stay: [address] with a PMH of HTN, Cholangitis c/b by liver abscess (debridedment on 11/18/21), DM2 and CKD4 presented on 12/29/21 with dyspnea.  Hematology consulted for anemia and con 12/31/21  0555 12/31/21  1819 01/01/22  0242   *   < > 93 139* 112*   BUN 25*   < > 35* 41* 44*   CREATSERUM 1.76*   < > 2.09* 2.46* 2.16*   GFRAA 30*   < > 25* 20* 24*   GFRNAA 26*   < > 21* 17* 20*   CA 8.6   < > 8.7 9.0 8.7   ALB 2.7*  --  2.6*

## 2022-01-01 NOTE — PROGRESS NOTES
Assumed care of pt. At [address]. Pt. Mostly [place] citizen speaking. A & O x4. VSS - On 4L HFNC. DOS SANTOS. Denies any pain. Tolerates transfer from bed to commode fairly well, pt. Does have some dyspnea w/ exertion. +BM. Voiding via purwick.  Daughter updated on plan of ca

## 2022-01-01 NOTE — PROGRESS NOTES
[facility]     Hospitalist Progress Note     Justen Ovalle Patient Status:  Inpatient    1935 MRN [id number]   [facility] 6NE-A Attending Tano Capone MD   Hosp Day # 3 PCP Laura Moore MD     Chief Complaint: hypoxia 6L mg/dL (H)).     Recent Labs   Lab 12/29/21  1027   PTP 13.9   INR 1.07       Lab Results   Component Value Date    TSH 6.030 12/31/2021    T4F 1.3 12/31/2021       COVID-19 Lab Results    COVID-19  Lab Results   Component Value Date    COVID19 Not Detected diuresis   - Echo reviewed  - Cardiology crec appreciate    - renal following     # CKD 3  - monitor Cr while being diuresed    #AOCD  - no ss bleeding     # DM with peripheral neuropathy     # Recent septic shock/ cholangitis/ pancreatitis    #Hypothyroid

## 2022-01-01 NOTE — PROGRESS NOTES
[address] [facility] Cardiology Progress Note    Love Covarrubias Patient Status:  Inpatient    1935 MRN [id number]   [facility] 6NE-A Attending Meng Nieves MD   Hosp Day # 3 PCP Raymond Quinonez MD     Subjective:  Pt improving , hipolito Exam:    Objective:    Medications:  • traZODone  25 mg Oral Once at night   • furosemide  80 mg Intravenous BID (Diuretic)   • acetaZOLAMIDE (DIAMOX) IV Push  250 mg Intravenous Once   • amLODIPine  5 mg Oral Daily   • ferrous sulfate  325 mg Oral Daily w

## 2022-01-01 NOTE — PROGRESS NOTES
[facility]  Nephrology Progress Note    Elias Judge Attending:  Emily Tong MD       Assessment and Plan:    1) CKD 3- c/w diabetic and hypertensive nephropathy although there may be an underlying glomerulopathy given proteinuria may predate no edema  Neurologic: Cranial nerves grossly intact, moving all extremities  Skin: Warm and dry, no rashes       Labs:   Lab Results   Component Value Date    WBC 12.0 01/01/2022    HGB 8.1 01/01/2022    HCT 25.4 01/01/2022    .0 01/01/2022    PLT 2 30 g, 30 g, Oral, Q15 Min PRN   Or  glucose-vitamin C (DEX-4) chewable tab 8 tablet, 8 tablet, Oral, Q15 Min PRN  acetaminophen (TYLENOL) tab 650 mg, 650 mg, Oral, Q6H PRN  melatonin tab 3 mg, 3 mg, Oral, Nightly PRN  Insulin Aspart Pen (NOVOLOG) 100 UNIT/

## 2022-01-02 ENCOUNTER — APPOINTMENT (OUTPATIENT)
Dept: GENERAL RADIOLOGY | Facility: HOSPITAL | Age: 87
DRG: 291 | End: 2022-01-02
Attending: INTERNAL MEDICINE
Payer: MEDICARE

## 2022-01-02 LAB
ANION GAP SERPL CALC-SCNC: 7 MMOL/L (ref 0–18)
APTT PPP: 101.1 SECONDS (ref 23.3–35.6)
APTT PPP: 54.9 SECONDS (ref 23.3–35.6)
APTT PPP: 66.8 SECONDS (ref 23.3–35.6)
APTT PPP: 67.7 SECONDS (ref 23.3–35.6)
BUN BLD-MCNC: 55 MG/DL (ref 7–18)
CALCIUM BLD-MCNC: 8.9 MG/DL (ref 8.5–10.1)
CHLORIDE SERPL-SCNC: 94 MMOL/L (ref 98–112)
CO2 SERPL-SCNC: 41 MMOL/L (ref 21–32)
CREAT BLD-MCNC: 2.58 MG/DL
ERYTHROCYTE [DISTWIDTH] IN BLOOD BY AUTOMATED COUNT: 17.2 %
GLUCOSE BLD-MCNC: 117 MG/DL (ref 70–99)
GLUCOSE BLD-MCNC: 124 MG/DL (ref 70–99)
GLUCOSE BLD-MCNC: 162 MG/DL (ref 70–99)
GLUCOSE BLD-MCNC: 166 MG/DL (ref 70–99)
GLUCOSE BLD-MCNC: 98 MG/DL (ref 70–99)
HCT VFR BLD AUTO: 27 %
HGB BLD-MCNC: 8.3 G/DL
MAGNESIUM SERPL-MCNC: 2.1 MG/DL (ref 1.6–2.6)
MCH RBC QN AUTO: 30.1 PG (ref 26–34)
MCHC RBC AUTO-ENTMCNC: 30.7 G/DL (ref 31–37)
MCV RBC AUTO: 97.8 FL
OSMOLALITY SERPL CALC.SUM OF ELEC: 309 MOSM/KG (ref 275–295)
PLATELET # BLD AUTO: 215 10(3)UL (ref 150–450)
PLATELET # BLD AUTO: 215 10(3)UL (ref 150–450)
POTASSIUM SERPL-SCNC: 3.2 MMOL/L (ref 3.5–5.1)
POTASSIUM SERPL-SCNC: 3.2 MMOL/L (ref 3.5–5.1)
RBC # BLD AUTO: 2.76 X10(6)UL
SODIUM SERPL-SCNC: 142 MMOL/L (ref 136–145)
WBC # BLD AUTO: 12.2 X10(3) UL (ref 4–11)

## 2022-01-02 PROCEDURE — 99232 SBSQ HOSP IP/OBS MODERATE 35: CPT | Performed by: INTERNAL MEDICINE

## 2022-01-02 PROCEDURE — 99233 SBSQ HOSP IP/OBS HIGH 50: CPT | Performed by: INTERNAL MEDICINE

## 2022-01-02 PROCEDURE — 71045 X-RAY EXAM CHEST 1 VIEW: CPT | Performed by: INTERNAL MEDICINE

## 2022-01-02 RX ORDER — POTASSIUM CHLORIDE 20 MEQ/1
40 TABLET, EXTENDED RELEASE ORAL ONCE
Status: COMPLETED | OUTPATIENT
Start: 2022-01-02 | End: 2022-01-02

## 2022-01-02 RX ORDER — RIVAROXABAN 15 MG-20MG
15 KIT ORAL 2 TIMES DAILY WITH MEALS
Qty: 1 EACH | Refills: 0 | Status: SHIPPED | OUTPATIENT
Start: 2022-01-02 | End: 2022-01-02

## 2022-01-02 NOTE — OCCUPATIONAL THERAPY NOTE
OCCUPATIONAL THERAPY EVALUATION - INPATIENT     Room Number: 8037/1256-G  Evaluation Date: 1/2/2022  Type of Evaluation: Initial  Presenting Problem: Resp Failure, PE    Physician Order: IP Consult to Occupational Therapy  Reason for Therapy: ADL/IADL Dysf nursing staff:   Transfers: Min A  Toileting location: Commode    EVALUATION SESSION:  Patient Start of Session: Up in chair  ADL:  Eating: Set Up  UB dressing: Min A for robe  LB dressing:  Mod A for socks  Toileting: Min A for jesus-care and clothing manag RW.     SUBJECTIVE   Pt stated, \"I am doing okay. \"    PAIN ASSESSMENT  Ratin  Location: no pain reported       OBJECTIVE  Precautions: Bed/chair alarm       COGNITION  Overall Cognitive Status:  WFL - within functional limits    ASSESSMENTS    Upper Goals:  Pt will verbalize at least 3 energy conservation techniques  Pt will stand at sink for 5 minutes to complete grooming routine    Writer PPE: Surgical mask, goggles, and gloves worn.      Patient/family PPE: Mask not worn

## 2022-01-02 NOTE — PROGRESS NOTES
[facility]  Nephrology Progress Note    Justen Ovalle Attending:  Tano Capone MD       Assessment and Plan:    1) CKD 3- c/w diabetic and hypertensive nephropathy although there may be an underlying glomerulopathy given proteinuria may predate organomegaly  Extremities: Without clubbing, cyanosis; no edema  Neurologic: Cranial nerves grossly intact, moving all extremities  Skin: Warm and dry, no rashes       Labs:   Lab Results   Component Value Date    WBC 12.2 01/02/2022    HGB 8.3 01/02/2022 Oral, Q6H PRN  melatonin tab 3 mg, 3 mg, Oral, Nightly PRN  Insulin Aspart Pen (NOVOLOG) 100 UNIT/ML flexpen 1-5 Units, 1-5 Units, Subcutaneous, TID CC and HS  Ocuvite-Lutein (OCUVITE - EYE VITAMIN) tab 1 tablet, 1 tablet, Oral, Nightly  albuterol 108 (90

## 2022-01-02 NOTE — PROGRESS NOTES
[facility]  Progress Note    Josh Nickyvasiliy Patient Status:  Inpatient    1935 MRN [id number]   [facility] 6NE-A Attending Tristan Reyna MD   Hosp Day # 4 PCP Tim Mederos MD     Subjective:  Josh Dela Cruz is a(n) 82 yea 24* 19*   GFRNAA 26*   < > 21*   < > 17* 20* 16*   CA 8.6   < > 8.7   < > 9.0 8.7 8.9   ALB 2.7*  --  2.6*  --   --   --   --       < > 142   < > 140 140 142   K 4.2   < > 3.2*   < > 3.8 3.3* 3.2*  3.2*      < > 98   < > 97* 94* 94*   CO2 30.0 respiratory failure suspect related to fluid overload in setting of CKD and suspected underlying LUIS  · Probable PE  · Fluid overload, CHF exacerbation  · Altered mental status, delirium, likely related to hypercapnia  · Chronic renal failure, cr recently

## 2022-01-02 NOTE — PHYSICAL THERAPY NOTE
PHYSICAL THERAPY EVALUATION - INPATIENT     Room Number: 0627/4092-J  Evaluation Date: 1/2/2022  Type of Evaluation: Initial  Physician Order: PT Eval and Treat    Presenting Problem: aute respirpatory failure with hypoxia  Co-Morbidities : HTN, CM, decreased balance, decreased endurance, and decreased overall functional mobility. These impairments and comorbidities manifest themselves as functional limitations in independent bed mobility, transfers, and gait.   The patient is below baseline and would with showering, ADL, groceries. Pt uses a RW. SUBJECTIVE  \"I am feeling very tired.  \"      OBJECTIVE  Precautions: Bed/chair alarm       WEIGHT BEARING RESTRICTION                   PAIN ASSESSMENT  Ratin  Location: denies pain       COGNITION  · steps with a railing?: A Lot       AM-PAC Score:  Raw Score: 16   Approx Degree of Impairment: 54.16%   Standardized Score (AM-PAC Scale): 40.78   CMS Modifier (G-Code): CK    FUNCTIONAL ABILITY STATUS  Gait Assessment   Functional Mobility/Gait Assessment

## 2022-01-02 NOTE — PROGRESS NOTES
[facility]  Progress Note    Raya Juarez Patient Status:  Inpatient    1935 MRN [id number]   [facility] 6NE-A Attending Marine Roy MD   Hosp Day # 4 PCP Nanette Zapata MD     Assessment:  #PE with good RV.  Treat with hep 01/02/2022        Miguel Matias MD  1/2/2022  8:26 AM

## 2022-01-02 NOTE — PROGRESS NOTES
[facility]     Hospitalist Progress Note     Zander Barrett Patient Status:  Inpatient    1935 MRN [id number]   [facility] 6NE-A Attending Jaylin Jones MD   Hosp Day # 4 PCP Kelly Lee MD     Chief Complaint: hypoxia 40.0* 41.0*   ALKPHO 83  --  75  --   --   --   --    AST 29  --  15  --   --   --   --    ALT 17  --  15  --   --   --   --    BILT 0.3  --  0.4  --   --   --   --    TP 6.9  --  6.7  --   --   --   --     < > = values in this interval not displayed. above, improved     # Fluid overload  -Cont diuresis   - Echo reviewed  - Cardiology crec appreciate      # CKD 3  - monitor Cr while being diuresed    #AOCD  - no ss bleeding   - iron studies     # DM with peripheral neuropathy     # Recent septic shock/

## 2022-01-02 NOTE — PROGRESS NOTES
[facility] Hematology and Oncology Progress Note   Length of Stay: [address] with a PMH of HTN, Cholangitis c/b by liver abscess (debridedment on 11/18/21), DM2 and CKD4 presented on 12/29/21 with dyspnea.  Hematology consulted for anemia and con 143*   < > 93   < > 139* 112* 98   BUN 25*   < > 35*   < > 41* 44* 55*   CREATSERUM 1.76*   < > 2.09*   < > 2.46* 2.16* 2.58*   GFRAA 30*   < > 25*   < > 20* 24* 19*   GFRNAA 26*   < > 21*   < > 17* 20* 16*   CA 8.6   < > 8.7   < > 9.0 8.7 8.9   ALB 2.7* anticoagulation for provoked VTE  -xarelto on discharge    SANDRA Simmons MD  [facility] Hematology and Oncology

## 2022-01-03 ENCOUNTER — APPOINTMENT (OUTPATIENT)
Dept: CT IMAGING | Facility: HOSPITAL | Age: 87
DRG: 291 | End: 2022-01-03
Attending: INTERNAL MEDICINE
Payer: MEDICARE

## 2022-01-03 LAB
ANION GAP SERPL CALC-SCNC: 6 MMOL/L (ref 0–18)
ANION GAP SERPL CALC-SCNC: 6 MMOL/L (ref 0–18)
APTT PPP: 45.3 SECONDS (ref 23.3–35.6)
APTT PPP: 58.8 SECONDS (ref 23.3–35.6)
APTT PPP: 60.1 SECONDS (ref 23.3–35.6)
BASOPHILS # BLD AUTO: 0.04 X10(3) UL (ref 0–0.2)
BASOPHILS NFR BLD AUTO: 0.3 %
BUN BLD-MCNC: 64 MG/DL (ref 7–18)
BUN BLD-MCNC: 69 MG/DL (ref 7–18)
CALCIUM BLD-MCNC: 9.1 MG/DL (ref 8.5–10.1)
CALCIUM BLD-MCNC: 9.2 MG/DL (ref 8.5–10.1)
CHLORIDE SERPL-SCNC: 97 MMOL/L (ref 98–112)
CHLORIDE SERPL-SCNC: 98 MMOL/L (ref 98–112)
CO2 SERPL-SCNC: 37 MMOL/L (ref 21–32)
CO2 SERPL-SCNC: 38 MMOL/L (ref 21–32)
COPPER, SERUM: 130.3 UG/DL
CREAT BLD-MCNC: 2.92 MG/DL
CREAT BLD-MCNC: 3.03 MG/DL
EOSINOPHIL # BLD AUTO: 0.34 X10(3) UL (ref 0–0.7)
EOSINOPHIL NFR BLD AUTO: 2.3 %
ERYTHROCYTE [DISTWIDTH] IN BLOOD BY AUTOMATED COUNT: 17.2 %
GLUCOSE BLD-MCNC: 104 MG/DL (ref 70–99)
GLUCOSE BLD-MCNC: 119 MG/DL (ref 70–99)
GLUCOSE BLD-MCNC: 129 MG/DL (ref 70–99)
GLUCOSE BLD-MCNC: 162 MG/DL (ref 70–99)
GLUCOSE BLD-MCNC: 174 MG/DL (ref 70–99)
GLUCOSE BLD-MCNC: 187 MG/DL (ref 70–99)
HCT VFR BLD AUTO: 26.7 %
HGB BLD-MCNC: 8 G/DL
IMM GRANULOCYTES # BLD AUTO: 0.12 X10(3) UL (ref 0–1)
IMM GRANULOCYTES NFR BLD: 0.8 %
LYMPHOCYTES # BLD AUTO: 5.86 X10(3) UL (ref 1–4)
LYMPHOCYTES NFR BLD AUTO: 40.1 %
MCH RBC QN AUTO: 30.1 PG (ref 26–34)
MCHC RBC AUTO-ENTMCNC: 30 G/DL (ref 31–37)
MCV RBC AUTO: 100.4 FL
MONOCYTES # BLD AUTO: 1.06 X10(3) UL (ref 0.1–1)
MONOCYTES NFR BLD AUTO: 7.2 %
NEUTROPHILS # BLD AUTO: 7.21 X10 (3) UL (ref 1.5–7.7)
NEUTROPHILS # BLD AUTO: 7.21 X10(3) UL (ref 1.5–7.7)
NEUTROPHILS NFR BLD AUTO: 49.3 %
OSMOLALITY SERPL CALC.SUM OF ELEC: 311 MOSM/KG (ref 275–295)
OSMOLALITY SERPL CALC.SUM OF ELEC: 317 MOSM/KG (ref 275–295)
PLATELET # BLD AUTO: 219 10(3)UL (ref 150–450)
PLATELET # BLD AUTO: 219 10(3)UL (ref 150–450)
PLATELET MORPHOLOGY: NORMAL
POTASSIUM SERPL-SCNC: 3.6 MMOL/L (ref 3.5–5.1)
POTASSIUM SERPL-SCNC: 3.6 MMOL/L (ref 3.5–5.1)
POTASSIUM SERPL-SCNC: 3.8 MMOL/L (ref 3.5–5.1)
POTASSIUM SERPL-SCNC: 4 MMOL/L (ref 3.5–5.1)
RBC # BLD AUTO: 2.66 X10(6)UL
SODIUM SERPL-SCNC: 141 MMOL/L (ref 136–145)
SODIUM SERPL-SCNC: 141 MMOL/L (ref 136–145)
WBC # BLD AUTO: 14.6 X10(3) UL (ref 4–11)

## 2022-01-03 PROCEDURE — 99232 SBSQ HOSP IP/OBS MODERATE 35: CPT | Performed by: INTERNAL MEDICINE

## 2022-01-03 PROCEDURE — 74176 CT ABD & PELVIS W/O CONTRAST: CPT | Performed by: INTERNAL MEDICINE

## 2022-01-03 PROCEDURE — 99233 SBSQ HOSP IP/OBS HIGH 50: CPT | Performed by: INTERNAL MEDICINE

## 2022-01-03 RX ORDER — HEPARIN SODIUM 5000 [USP'U]/ML
30 INJECTION, SOLUTION INTRAVENOUS; SUBCUTANEOUS ONCE
Status: COMPLETED | OUTPATIENT
Start: 2022-01-03 | End: 2022-01-03

## 2022-01-03 RX ORDER — POTASSIUM CHLORIDE 20 MEQ/1
20 TABLET, EXTENDED RELEASE ORAL ONCE
Status: COMPLETED | OUTPATIENT
Start: 2022-01-03 | End: 2022-01-03

## 2022-01-03 NOTE — PROGRESS NOTES
[facility]     Hospitalist Progress Note     Zander Barrett Patient Status:  Inpatient    1935 MRN [id number]   [facility] 6NE-A Attending Jaylin Jones MD   Hosp Day # 5 PCP Kelly Lee MD     Chief Complaint: hypoxia / --   --       < > 142   < > 140  --  142 141  --    K 4.2   < > 3.2*   < > 3.3*   < > 3.2*  3.2* 3.6  3.6 3.8      < > 98   < > 94*  --  94* 97*  --    CO2 30.0   < > 38.0*   < > 40.0*  --  41.0* 38.0*  --    ALKPHO 83  --  75  --   --   --   - failure   - diuresed per renal   - CT chest , VQ scan, US legs reviewed   - heparin drip ongoing     # RENEA-   - lasix held     # presumed PE with high likely wyatt due to risk factors  - heparin drip  - heme rec appreciated     # matabolic alkalosis  - sp d

## 2022-01-03 NOTE — PROGRESS NOTES
..    Progress Note  Herminio Blake Patient Status:  Inpatient    1935 MRN [id number]   [facility] 2NE-A Attending Juan J Foley MD   Hosp Day # 5 PCP Harvinder Juárez MD     Subjective:  Pt sitting up in chair eating breakfast. Nadeem Cohen 12.5*   < > 13.0*   < > 12.0* 12.2* 14.6*   .0*   < > 197.0   < > 207.0  207.0 215.0  215.0 219.0  219.0    < > = values in this interval not displayed.          Recent Labs   Lab 12/29/21  1027   TROPHS 14       Review of Systems   Constitutional: N current cardiac regimen (asa, carvedilol, amlodipine)  · Continue heparin gtt, plans for transition to xarelto upon discharge per hematology  · Diuresis per nephrology   · Daily weights and I/O    Plan of care discussed with patient, RN.     Daya TOMLINSON

## 2022-01-03 NOTE — CM/SW NOTE
SW spoke with pt's daughter, Jody Chen, over the phone to confirm discharge plans. Pt came to hospital from [address]. Daughter stated that they don't want pt to return to [address] and want home health. SW inquired about level of support and assistance at home.  Leidy Gorman

## 2022-01-03 NOTE — CM/SW NOTE
NEFTALY sent updates to Ariela in [place]. PT/OT rec MARK.      DC Christianson, [facility]   / Discharge Planner  [id number]

## 2022-01-03 NOTE — PROGRESS NOTES
[facility]     Nephrology Progress Note    Lavinia Coto Patient Status:  Inpatient    1935 MRN [id number]   [facility] 2NE-A Attending Sandi Berry MD   Hosp Day # 5 PCP Faustina Max MD       SUBJECTIVE:  Patient sitting 219.0   INR 1.07  --   --   --   --   --   --     < > = values in this interval not displayed.        Recent Labs   Lab 12/30/21  0745 12/30/21  0745 12/31/21  0555 12/31/21  1819 01/01/22  0242 01/02/22  0550 01/03/22  0533      < > 142 140 140 142 1 cap 75 mg, 75 mg, Oral, Daily  glucose (DEX4) oral liquid 15 g, 15 g, Oral, Q15 Min PRN   Or  glucose-vitamin C (DEX-4) chewable tab 4 tablet, 4 tablet, Oral, Q15 Min PRN   Or  dextrose 50 % injection 50 mL, 50 mL, Intravenous, Q15 Min PRN   Or  glucose (D

## 2022-01-03 NOTE — PLAN OF CARE
Assumed care at 2000  Pt is AO x 4. [facility]  [facility]. Pt lung sounds are diminished bilateral. Sats 88-92% on 5L high flow. Denies sob at this time. Pt is on telemetry monitoring, NSR. Denies chest pain.  Pt is on heparin pr Assess quality of pulses, skin color and temperature  - Assess for signs of decreased coronary artery perfusion - ex.  Angina  - Evaluate fluid balance, assess for edema, trend weights  Outcome: Progressing     Problem: RESPIRATORY - ADULT  Goal: Achieves o Monitor labs and rhythm and assess patient for signs and symptoms of electrolyte imbalances  - Administer electrolyte replacement as ordered  - Monitor response to electrolyte replacements, including rhythm and repeat lab results as appropriate  - Fluid re

## 2022-01-03 NOTE — PROGRESS NOTES
[facility]  Progress Note    Margy Lamar Patient Status:  Inpatient    1935 MRN [id number]   [facility] 6NE-A Attending Helen Hampton MD   Hosp Day # 5 PCP Matt Keith MD     Subjective:  Margy Lamar is a(n) 82 yea 26*   < > 21*   < > 20* 16* 14*   CA 8.6   < > 8.7   < > 8.7 8.9 9.1   ALB 2.7*  --  2.6*  --   --   --   --       < > 142   < > 140 142 141   K 4.2   < > 3.2*   < > 3.3* 3.2*  3.2* 3.6  3.6      < > 98   < > 94* 94* 97*   CO2 30.0   < > 38.0* reviewed.     Assessment:  · Acute hypoxic and hypercapnic respiratory failure suspect related to fluid overload in setting of CKD and suspected underlying LUIS  · Probable PE  · Fluid overload, CHF exacerbation  · Altered mental status, delirium, likely rel

## 2022-01-04 ENCOUNTER — APPOINTMENT (OUTPATIENT)
Dept: CT IMAGING | Facility: HOSPITAL | Age: 87
DRG: 291 | End: 2022-01-04
Attending: Other
Payer: MEDICARE

## 2022-01-04 PROBLEM — R27.8 ASTERIXIS: Status: ACTIVE | Noted: 2017-08-12

## 2022-01-04 LAB
ANION GAP SERPL CALC-SCNC: 4 MMOL/L (ref 0–18)
APTT PPP: 89.6 SECONDS (ref 23.3–35.6)
ARTERIAL PATENCY WRIST A: POSITIVE
BASE EXCESS BLDA CALC-SCNC: 9.7 MMOL/L (ref ?–2)
BODY TEMPERATURE: 98.5 F
BUN BLD-MCNC: 70 MG/DL (ref 7–18)
CALCIUM BLD-MCNC: 9 MG/DL (ref 8.5–10.1)
CHLORIDE SERPL-SCNC: 99 MMOL/L (ref 98–112)
CO2 SERPL-SCNC: 38 MMOL/L (ref 21–32)
COHGB MFR BLD: 1.6 % SAT (ref 0–3)
CREAT BLD-MCNC: 2.7 MG/DL
GLUCOSE BLD-MCNC: 105 MG/DL (ref 70–99)
GLUCOSE BLD-MCNC: 117 MG/DL (ref 70–99)
GLUCOSE BLD-MCNC: 121 MG/DL (ref 70–99)
GLUCOSE BLD-MCNC: 187 MG/DL (ref 70–99)
GLUCOSE BLD-MCNC: 213 MG/DL (ref 70–99)
HCO3 BLDA-SCNC: 35.7 MEQ/L (ref 22–26)
HGB BLD-MCNC: 8.2 G/DL
L/M: 4 L/MIN
METHGB MFR BLD: 0.6 % SAT (ref 0.4–1.5)
OSMOLALITY SERPL CALC.SUM OF ELEC: 313 MOSM/KG (ref 275–295)
P/F RATIO: 351.6 MMHG
PCO2 BLDA: 59 MM HG (ref 35–45)
PH BLDA: 7.4 [PH] (ref 7.35–7.45)
PO2 BLDA: 74 MM HG (ref 80–105)
POTASSIUM SERPL-SCNC: 3.7 MMOL/L (ref 3.5–5.1)
SAO2 % BLDA FROM PO2: 95 % (ref 92–100)
SAO2 % BLDA: 94 % (ref 92–100)
SODIUM SERPL-SCNC: 141 MMOL/L (ref 136–145)

## 2022-01-04 PROCEDURE — 99232 SBSQ HOSP IP/OBS MODERATE 35: CPT | Performed by: INTERNAL MEDICINE

## 2022-01-04 PROCEDURE — 99223 1ST HOSP IP/OBS HIGH 75: CPT | Performed by: OTHER

## 2022-01-04 PROCEDURE — 70450 CT HEAD/BRAIN W/O DYE: CPT | Performed by: OTHER

## 2022-01-04 RX ORDER — POTASSIUM CHLORIDE 20 MEQ/1
40 TABLET, EXTENDED RELEASE ORAL ONCE
Status: COMPLETED | OUTPATIENT
Start: 2022-01-04 | End: 2022-01-04

## 2022-01-04 RX ORDER — POTASSIUM CHLORIDE 20 MEQ/1
20 TABLET, EXTENDED RELEASE ORAL ONCE
Status: DISCONTINUED | OUTPATIENT
Start: 2022-01-04 | End: 2022-01-04

## 2022-01-04 NOTE — PLAN OF CARE
Assumed pt care at 0730. A&Ox4, legally blind, [place] speaking. 3-5 L, denies chest pain/SOB, lung sounds diminished, VSS, NSR on tele. Voids. Up with 1-2/walker.  POC heparin gtt, lasix dc'ed today, ABD CT, wound care, POC updated with pt and family, Emilie Ramos Progressing     Problem: RESPIRATORY - ADULT  Goal: Achieves optimal ventilation and oxygenation  Description: INTERVENTIONS:  - Assess for changes in respiratory status  - Assess for changes in mentation and behavior  - Position to facilitate oxygenation osmolarity and serum sodium as indicated or ordered  - Monitor response to interventions for patient's volume status, including labs, urine output, blood pressure (other measures as available)  - Encourage oral intake as appropriate  - Instruct patient on

## 2022-01-04 NOTE — PROGRESS NOTES
[facility]     Hospitalist Progress Note     Radha Blake Patient Status:  Inpatient    1935 MRN [id number]   [facility] 6NE-A Attending Kelle Smith MD   Hosp Day # 6 PCP Darci Harvey MD     Chief Complaint: worsening 141   K 4.2   < > 3.2*   < > 3.6  3.6   < > 3.8 4.0 3.7      < > 98   < > 97*  --   --  98 99   CO2 30.0   < > 38.0*   < > 38.0*  --   --  37.0* 38.0*   ONELPHO 83  --  75  --   --   --   --   --   --    AST 29  --  15  --   --   --   --   --   -- reviewed   - heparin drip ongoing , plan to transition to Xarelto per heme     # RENEA- improved   - lasix held   - renal following     # tremors: x2 days or so per pt and family  - neuro [place] daughter who is very concern about the tremors     # presum

## 2022-01-04 NOTE — PROGRESS NOTES
..    Progress Note  Ze Coon Patient Status:  Inpatient    1935 MRN [id number]   [facility] 2NE-A Attending [place] Thorne MD   Hosp Day # 6 PCP Nick Ordonez MD     Subjective:  Pt sitting up in chair.  utilized. 97.8 100.4*   MCH 29.2   < > 30.1   < > 30.7 30.1 30.1   MCHC 28.7*   < > 30.4*   < > 31.9 30.7* 30.0*   RDW 17.0   < > 16.8   < > 17.0 17.2 17.2   NEPRELIM 7.80*  --  8.36*  --   --   --  7.21   WBC 12.5*   < > 13.0*   < > 12.0* 12.2* 14.6*   .0* yesterday  o Net negative 1.8L/down 2lbs since admission  • HTN-well controlled, intermittently hypotensive  • CKD- per nephrology, creatinine 2.7  • DM  • Anemia- per hematology, remains stable at 8    Plan:  · Continue current cardiac regimen (asa, carve

## 2022-01-04 NOTE — PLAN OF CARE
Assumed care around 1930. A/Ox4. Legally blind, [place], [place] speaking only. On 4.5L NC w/ sats >90. Monitor shows NSR. Voids in commode, wears Purewick. Last BM 1/3. No reports of pain. Up with 2x walker and pivot. Lower extremity weakness noted.  Small abd ex. Angina  - Evaluate fluid balance, assess for edema, trend weights  Outcome: Progressing     Problem: RESPIRATORY - ADULT  Goal: Achieves optimal ventilation and oxygenation  Description: INTERVENTIONS:  - Assess for changes in respiratory status  - Ass intake, output and patient weight  - Monitor urine specific gravity, serum osmolarity and serum sodium as indicated or ordered  - Monitor response to interventions for patient's volume status, including labs, urine output, blood pressure (other measures as

## 2022-01-04 NOTE — DIETARY NOTE
[facility]    NUTRITION ASSESSMENT    Pt does not meet malnutrition criteria. NUTRITION INTERVENTION:  1. Meal and Snacks - monitor patient po intake. Encourage adequate po of appropriate diet.   2. Medical Food Supplements - RD added Magic Cup Osman Becekr Calorie Restriction/Carb Controlled: 1800 kcal/60 grams; Sodium Restriction: 1.5 GM NA; Fluid Restriction: 1500 ml; Is Patient on Accuchecks?  Yes; Cardiac     Oral Supplements: Magic Cup Daily    Percent Meals Eaten (last 3 days)     Date/Time Percent Meal

## 2022-01-04 NOTE — PROGRESS NOTES
[facility]  Progress Note    Lavinia Coto Patient Status:  Inpatient    1935 MRN [id number]   [facility] 2NE-A Attending Sandi Berry MD   Hosp Day # 6 PCP Faustina Max MD     Subjective:  Lavinia Coto is a(n) 82 yea --  2.92* 2.70*     Recent Labs   Lab 12/29/21  1027 12/31/21  1819 01/03/22  1244 01/03/22  2155 01/04/22  0459   PTP 13.9  --   --   --   --    INR 1.07  --   --   --   --    PTT 26.5   < > 60.1* 58.8* 89.6*    < > = values in this interval not displayed abscess liver wedge resection and JANINA 11/18, bile leak s/p ERCP with sphincterotomy and stent placement 11/19  · chronic bronchitis  · DM, HTN  · Anemia, chronic  · Obesity, likely LUIS  ·     Plan:  · Follow respiratory status closely, goal sats 88-92% giv

## 2022-01-04 NOTE — PLAN OF CARE
Patient is aox3, vss, [place] speaking, 4L, lungs diminished, SB, no edema, abdomen distended, wound cleansed with salene and packed with iodoform (pigtail sticking out). New purewick placed this am by PCT. Heparin gtt at 13 ml/hr.  PTT in am.  Hematology

## 2022-01-04 NOTE — PHYSICAL THERAPY NOTE
PHYSICAL THERAPY TREATMENT NOTE - INPATIENT    Room Number: 2621/2621-A     Session: 1     Number of Visits to Meet Established Goals: 5    Presenting Problem: aute respirpatory failure with hypoxia  Co-Morbidities : HTN, CM, gout   Per EMR: History rel transfers Sit to/from Stand at assistance level: modified independent      Goal #3 Patient is able to ambulate 100 feet with assist device: walker - rolling at assistance level: modified independent      Goal #4     Goal #5     Goal #6     Goal Comments: G walker  Pattern: Shuffle    Skilled Therapy Provided      Transfer Mobility:  Sit<>Stand: from chair to RW x 2 min/mod A, vcs to utilize UE on stable support  Tolerated bouts of standing with RW x 1 minute, difficulty maintaining UE support on RW  Amb 4 ft

## 2022-01-04 NOTE — CM/SW NOTE
Xarelto RX priced at [place].  Copay is $0.    DC Howard, [facility]   / Discharge [address] [facility].

## 2022-01-04 NOTE — CONSULTS
Neurology H&P    Edgar Mccann Patient Status:  Inpatient    1935 MRN [id number]   [facility] 2NE-A Attending Donal Bhatt MD   Hosp Day # 6 PCP Farhan Chacon MD     Subjective:  Edgar Mccann is a(n) 82year old female with Diagnosis Date   • Acute pancreatitis, unspecified complication status, unspecified pancreatitis type 11/15/2018   • Arthritis    • Back disorder    • Back pain    • Cholangitis    • Diabetes [facility]    • Essential hypertension    • Gout    • Hepatic absce Value Date    CREATSERUM 2.70 01/04/2022    BUN 70 01/04/2022     01/04/2022    K 3.7 01/04/2022    CL 99 01/04/2022    CO2 38.0 01/04/2022     01/04/2022    CA 9.0 01/04/2022    PTT 89.6 01/04/2022    PGLU 117 01/04/2022       Imaging:  No CN

## 2022-01-04 NOTE — PROGRESS NOTES
[facility]     Nephrology Progress Note    Lavinia Coto Patient Status:  Inpatient    1935 MRN [id number]   [facility] 2NE-A Attending Sandi Berry MD   Hosp Day # 6 PCP Faustina Max MD       SUBJECTIVE:    No acute issue And  Umeclidinium Bromide (INCRUSE ELLIPTA) 62.5 MCG/INH inhaler 1 puff, 1 puff, Inhalation, Daily          Physical Exam:   /38 (BP Location: Right arm)   Pulse 57   Temp 97.4 °F (36.3 °C) (Oral)   Resp 18   Ht 5' 5\" (1.651 m)   Wt 193 lb (87.5 kg) close to 1.9 mg/dL or so and has had significant worsening in renal function with attempted aggressive diuresis. Volume status has improved  Cr improving; monitor; continue to hold diuretics today     2.   Acute respiratory failure-imaging and clinical his

## 2022-01-04 NOTE — CM/SW NOTE
SW spoke with daughter, Chin Solorzano, over the phone about discharge plans. Per RN and hospitalist, daughter agreeable to returning to [facility]. SW spoke with daughter about offering other placement.  Informed daughter that pt will have limited opportunities for plac

## 2022-01-05 LAB
ALBUMIN SERPL ELPH-MCNC: 3.34 G/DL (ref 3.75–5.21)
ALBUMIN SERPL-MCNC: 2.5 G/DL (ref 3.4–5)
ALBUMIN/GLOB SERPL: 0.6 {RATIO} (ref 1–2)
ALBUMIN/GLOB SERPL: 0.99 {RATIO} (ref 1–2)
ALP LIVER SERPL-CCNC: 62 U/L
ALPHA1 GLOB SERPL ELPH-MCNC: 0.42 G/DL (ref 0.19–0.46)
ALPHA2 GLOB SERPL ELPH-MCNC: 0.85 G/DL (ref 0.48–1.05)
ALT SERPL-CCNC: 8 U/L
AMMONIA PLAS-MCNC: 22 UMOL/L (ref 11–32)
ANION GAP SERPL CALC-SCNC: 5 MMOL/L (ref 0–18)
APTT PPP: 113.8 SECONDS (ref 23.3–35.6)
APTT PPP: 33.8 SECONDS (ref 23.3–35.6)
AST SERPL-CCNC: 12 U/L (ref 15–37)
B-GLOBULIN SERPL ELPH-MCNC: 0.85 G/DL (ref 0.68–1.23)
BASOPHILS # BLD AUTO: 0.04 X10(3) UL (ref 0–0.2)
BASOPHILS NFR BLD AUTO: 0.3 %
BILIRUB SERPL-MCNC: 0.4 MG/DL (ref 0.1–2)
BUN BLD-MCNC: 82 MG/DL (ref 7–18)
CALCIUM BLD-MCNC: 8.7 MG/DL (ref 8.5–10.1)
CHLORIDE SERPL-SCNC: 102 MMOL/L (ref 98–112)
CO2 SERPL-SCNC: 35 MMOL/L (ref 21–32)
CREAT BLD-MCNC: 3.04 MG/DL
EOSINOPHIL # BLD AUTO: 0.45 X10(3) UL (ref 0–0.7)
EOSINOPHIL NFR BLD AUTO: 3.5 %
ERYTHROCYTE [DISTWIDTH] IN BLOOD BY AUTOMATED COUNT: 17.2 %
GAMMA GLOB SERPL ELPH-MCNC: 1.25 G/DL (ref 0.62–1.7)
GLOBULIN PLAS-MCNC: 4 G/DL (ref 2.8–4.4)
GLUCOSE BLD-MCNC: 106 MG/DL (ref 70–99)
GLUCOSE BLD-MCNC: 114 MG/DL (ref 70–99)
GLUCOSE BLD-MCNC: 132 MG/DL (ref 70–99)
GLUCOSE BLD-MCNC: 147 MG/DL (ref 70–99)
GLUCOSE BLD-MCNC: 160 MG/DL (ref 70–99)
HCT VFR BLD AUTO: 25 %
HGB BLD-MCNC: 7.5 G/DL
IMM GRANULOCYTES # BLD AUTO: 0.07 X10(3) UL (ref 0–1)
IMM GRANULOCYTES NFR BLD: 0.5 %
KAPPA LC FREE SER-MCNC: 11.7 MG/DL (ref 0.33–1.94)
KAPPA LC FREE/LAMBDA FREE SER NEPH: 2.23 {RATIO} (ref 0.26–1.65)
LAMBDA LC FREE SERPL-MCNC: 5.25 MG/DL (ref 0.57–2.63)
LYMPHOCYTES # BLD AUTO: 5.62 X10(3) UL (ref 1–4)
LYMPHOCYTES NFR BLD AUTO: 44.1 %
MAGNESIUM SERPL-MCNC: 2.7 MG/DL (ref 1.6–2.6)
MCH RBC QN AUTO: 30.5 PG (ref 26–34)
MCHC RBC AUTO-ENTMCNC: 30 G/DL (ref 31–37)
MCV RBC AUTO: 101.6 FL
MONOCYTES # BLD AUTO: 0.9 X10(3) UL (ref 0.1–1)
MONOCYTES NFR BLD AUTO: 7.1 %
NEUTROPHILS # BLD AUTO: 5.66 X10 (3) UL (ref 1.5–7.7)
NEUTROPHILS # BLD AUTO: 5.66 X10(3) UL (ref 1.5–7.7)
NEUTROPHILS NFR BLD AUTO: 44.5 %
OSMOLALITY SERPL CALC.SUM OF ELEC: 319 MOSM/KG (ref 275–295)
PLATELET # BLD AUTO: 190 10(3)UL (ref 150–450)
POTASSIUM SERPL-SCNC: 4.3 MMOL/L (ref 3.5–5.1)
POTASSIUM SERPL-SCNC: 4.3 MMOL/L (ref 3.5–5.1)
PROT SERPL-MCNC: 6.5 G/DL (ref 6.4–8.2)
PROT SERPL-MCNC: 6.7 G/DL (ref 6.4–8.2)
RBC # BLD AUTO: 2.46 X10(6)UL
SODIUM SERPL-SCNC: 142 MMOL/L (ref 136–145)
WBC # BLD AUTO: 12.7 X10(3) UL (ref 4–11)

## 2022-01-05 PROCEDURE — 99233 SBSQ HOSP IP/OBS HIGH 50: CPT | Performed by: HOSPITALIST

## 2022-01-05 PROCEDURE — 99232 SBSQ HOSP IP/OBS MODERATE 35: CPT | Performed by: OTHER

## 2022-01-05 PROCEDURE — 99233 SBSQ HOSP IP/OBS HIGH 50: CPT | Performed by: INTERNAL MEDICINE

## 2022-01-05 PROCEDURE — 99232 SBSQ HOSP IP/OBS MODERATE 35: CPT | Performed by: INTERNAL MEDICINE

## 2022-01-05 RX ORDER — SODIUM CHLORIDE 9 MG/ML
INJECTION, SOLUTION INTRAVENOUS CONTINUOUS
Status: DISCONTINUED | OUTPATIENT
Start: 2022-01-05 | End: 2022-01-06

## 2022-01-05 NOTE — CM/SW NOTE
[facility] SNF can accommodate up to 10L O2.     RN updated.     DC Aguirre, [facility]   / Discharge Planner  [id number]

## 2022-01-05 NOTE — PROGRESS NOTES
[facility]  Progress Note    Thalia Ac Patient Status:  Inpatient    1935 MRN [id number]   [facility] 2NE-A Attending Gilma Hall MD   Hosp Day # 7 PCP Thomas Cortes MD     Subjective:  Thalia Ac is a(n) 82 yea --  37.0* 38.0*   BUN 55* 64*  --  69* 70*   CREATSERUM 2.58* 3.03*  --  2.92* 2.70*     Recent Labs   Lab 12/29/21  1027 12/31/21  1819 01/03/22  2155 01/04/22  0459 01/05/22  0658   PTP 13.9  --   --   --   --    INR 1.07  --   --   --   --    PTT 26.5 hypercapnic respiratory failure suspect related to fluid overload in setting of CKD and suspected underlying LUIS  · Probable PE remains on anticoagulation  · Fluid overload, CHF exacerbation  · Altered mental status, delirium, likely related to hypercapnia

## 2022-01-05 NOTE — PROGRESS NOTES
[id number] [facility] Neurology Progress Note    Lelo Acosta Patient Status:  Inpatient    1935 MRN [id number]   [facility] 2NE-A Attending Nicole Kline MD   Hosp Day # 7 PCP Piotr Moore MD     CC: myoclonic mv    Sheldon Polk mL, Intravenous, Q15 Min PRN   Or  glucose (DEX4) oral liquid 30 g, 30 g, Oral, Q15 Min PRN   Or  glucose-vitamin C (DEX-4) chewable tab 8 tablet, 8 tablet, Oral, Q15 Min PRN  acetaminophen (TYLENOL) tab 650 mg, 650 mg, Oral, Q6H PRN  melatonin tab 3 mg, 3 Value Date    WBC 12.7 01/05/2022    HGB 7.5 01/05/2022    HCT 25.0 01/05/2022    .0 01/05/2022    CREATSERUM 3.04 01/05/2022    BUN 82 01/05/2022     01/05/2022    K 4.3 01/05/2022    K 4.3 01/05/2022     01/05/2022    CO2 35.0 01/05/20

## 2022-01-05 NOTE — PROGRESS NOTES
[facility]     Nephrology Progress Note    Love Erp Patient Status:  Inpatient    1935 MRN [id number]   [facility] 2NE-A Attending Meng Nieves MD   Hosp Day # 7 PCP Raymond Quinonez MD       SUBJECTIVE:  Patient stable t 101.6*   .0*   < > 189.0 207.0  207.0 215.0  215.0 219.0  219.0 190.0   INR 1.07  --   --   --   --   --   --     < > = values in this interval not displayed.        Recent Labs   Lab 12/30/21  0745 12/30/21  0745 12/31/21  0555 12/31/21  1819 01/01/ Intravenous, Continuous  carvedilol (COREG) tab 25 mg, 25 mg, Oral, BID with meals  guaiFENesin-codeine (ROBITUSSIN AC) 100-10 MG/5ML syrup 10 mL, 10 mL, Oral, Q6H PRN  hydrALAZINE (APRESOLINE) tab 50 mg, 50 mg, Oral, Q8H TRE  levothyroxine (SYNTHROID) tab hepatic abscess.     #4.  Hypertension-blood pressure is stable today and will continue carvedilol, hydralazine and amlodipine          Irma Purcell MD  1/5/2022  9:42 AM

## 2022-01-05 NOTE — PROGRESS NOTES
[facility]     Hospitalist Progress Note     Sanjeev Cesar Patient Status:  Inpatient    1935 MRN [id number]   [facility] 2NE-A Attending Max Nuñez MD   Hosp Day # 7 PCP Iqra Pennington MD     Chief Complaint: SOB   Afshin Louis values in this interval not displayed. Estimated Creatinine Clearance: 12.4 mL/min (A) (based on SCr of 3.04 mg/dL (H)). No results for input(s): PTP, INR in the last 168 hours.      COVID-19 Lab Results  COVID-19  Lab Results   Component Value Date drip-> Xarelto upon dc   - heme rec appreciated      # Metabolic alkalosis- IVF      # Acute metabolic encephalopathy- resolved      # Fluid overload- improved.  Now on IVF per renal   - Echo reviewed- normal EF PAP 40-45  - Cardiology rec appreciate

## 2022-01-05 NOTE — PROGRESS NOTES
[facility]  Progress Note    Lucian Shields Patient Status:  Inpatient    1935 MRN [id number]   [facility] 2NE-A Attending Elver Dillon MD   Hosp Day # 7 PCP Olga Figueroa MD       SUBJECTIVE:    Lying in bed, appears comfo --   --  0.4   TP 6.7  --  6.7  --   --   --  6.5    < > = values in this interval not displayed.        MEDICATIONS:    • apixaban  10 mg Oral BID   • acetaZOLAMIDE (DIAMOX) IV Push  250 mg Intravenous Once   • amLODIPine  10 mg Oral Daily   • ferrous sulf

## 2022-01-05 NOTE — PLAN OF CARE
Pt AO, no complaints of pain, saturating low/mid 90s at 3-4L.  used; no questions at this time. Up to commade and chair with 1 & a walker. BM today. Fluids started due to elevated creatinine. Plan to discharge to [facility] when ready.     Problem: P weights  Outcome: Progressing     Problem: METABOLIC/FLUID AND ELECTROLYTES - ADULT  Goal: Glucose maintained within prescribed range  Description: INTERVENTIONS:  - Monitor Blood Glucose as ordered  - Assess for signs and symptoms of hyperglycemia and hyp

## 2022-01-05 NOTE — CM/SW NOTE
Almita priced at $0.    DC Rosario, [facility]   / Discharge [facility].

## 2022-01-06 LAB
ALBUMIN SERPL-MCNC: 2.5 G/DL (ref 3.4–5)
ALBUMIN/GLOB SERPL: 0.6 {RATIO} (ref 1–2)
ALP LIVER SERPL-CCNC: 67 U/L
ALT SERPL-CCNC: 8 U/L
ANION GAP SERPL CALC-SCNC: 5 MMOL/L (ref 0–18)
AST SERPL-CCNC: 8 U/L (ref 15–37)
BASOPHILS # BLD AUTO: 0.03 X10(3) UL (ref 0–0.2)
BASOPHILS NFR BLD AUTO: 0.3 %
BILIRUB SERPL-MCNC: 0.4 MG/DL (ref 0.1–2)
BUN BLD-MCNC: 81 MG/DL (ref 7–18)
CALCIUM BLD-MCNC: 8.7 MG/DL (ref 8.5–10.1)
CHLORIDE SERPL-SCNC: 104 MMOL/L (ref 98–112)
CO2 SERPL-SCNC: 33 MMOL/L (ref 21–32)
CREAT BLD-MCNC: 2.89 MG/DL
EOSINOPHIL # BLD AUTO: 0.44 X10(3) UL (ref 0–0.7)
EOSINOPHIL NFR BLD AUTO: 4 %
ERYTHROCYTE [DISTWIDTH] IN BLOOD BY AUTOMATED COUNT: 17.2 %
GLOBULIN PLAS-MCNC: 3.9 G/DL (ref 2.8–4.4)
GLUCOSE BLD-MCNC: 104 MG/DL (ref 70–99)
GLUCOSE BLD-MCNC: 124 MG/DL (ref 70–99)
GLUCOSE BLD-MCNC: 136 MG/DL (ref 70–99)
GLUCOSE BLD-MCNC: 139 MG/DL (ref 70–99)
GLUCOSE BLD-MCNC: 148 MG/DL (ref 70–99)
HCT VFR BLD AUTO: 24.4 %
HGB BLD-MCNC: 7.4 G/DL
IMM GRANULOCYTES # BLD AUTO: 0.04 X10(3) UL (ref 0–1)
IMM GRANULOCYTES NFR BLD: 0.4 %
LYMPHOCYTES # BLD AUTO: 5.43 X10(3) UL (ref 1–4)
LYMPHOCYTES NFR BLD AUTO: 49.1 %
MCH RBC QN AUTO: 30.3 PG (ref 26–34)
MCHC RBC AUTO-ENTMCNC: 30.3 G/DL (ref 31–37)
MCV RBC AUTO: 100 FL
MONOCYTES # BLD AUTO: 0.78 X10(3) UL (ref 0.1–1)
MONOCYTES NFR BLD AUTO: 7.1 %
NEUTROPHILS # BLD AUTO: 4.34 X10 (3) UL (ref 1.5–7.7)
NEUTROPHILS # BLD AUTO: 4.34 X10(3) UL (ref 1.5–7.7)
NEUTROPHILS NFR BLD AUTO: 39.1 %
OSMOLALITY SERPL CALC.SUM OF ELEC: 319 MOSM/KG (ref 275–295)
PLATELET # BLD AUTO: 181 10(3)UL (ref 150–450)
POTASSIUM SERPL-SCNC: 4.3 MMOL/L (ref 3.5–5.1)
PROT SERPL-MCNC: 6.4 G/DL (ref 6.4–8.2)
RBC # BLD AUTO: 2.44 X10(6)UL
SODIUM SERPL-SCNC: 142 MMOL/L (ref 136–145)
WBC # BLD AUTO: 11.1 X10(3) UL (ref 4–11)

## 2022-01-06 PROCEDURE — 99232 SBSQ HOSP IP/OBS MODERATE 35: CPT | Performed by: HOSPITALIST

## 2022-01-06 PROCEDURE — 99232 SBSQ HOSP IP/OBS MODERATE 35: CPT | Performed by: INTERNAL MEDICINE

## 2022-01-06 PROCEDURE — 99232 SBSQ HOSP IP/OBS MODERATE 35: CPT | Performed by: NURSE PRACTITIONER

## 2022-01-06 PROCEDURE — 99233 SBSQ HOSP IP/OBS HIGH 50: CPT | Performed by: INTERNAL MEDICINE

## 2022-01-06 RX ORDER — MELATONIN
325
Qty: 30 TABLET | Refills: 0 | Status: SHIPPED | COMMUNITY
Start: 2022-01-06

## 2022-01-06 NOTE — PLAN OF CARE
Received in be patient and VS appear stable denies CP/SOB. Sat in 90's on oxygen. No tremors noted neuro MD aware discussed pt mannerisms and neuro finding with hi. He palns to update med regime and call dtr today.  Pt up in chair at bedside at time of this

## 2022-01-06 NOTE — PROGRESS NOTES
[facility]  Progress Note    Merlyn Hampton Patient Status:  Inpatient    1935 MRN [id number]   [facility] 2NE-A Attending Janice Mcnair MD   Hosp Day # 8 PCP Lindsey Saeed MD     Subjective:  Merlyn Hampton is a(n) 82 ye found.   Ct brain noted-- small vessel disease   cxr reviewed       Medications reviewed     Assessment and Plan:   Patient Active Problem List:     Elevated serum immunoglobulin free light chain level     Anemia     Asterixis     RENEA (acute kidney injury) elevated but compensated.   No AVAPS in several days-- plan to recheck ABG  · Rapid COVID negative; RVP negative  · To Eliquis  · TTE noted with normal EF, PASP 40-45mmHg  · Ongoing discharge planning  · PPX: Eliquis PPI   ·     Sinan Ludwig MD  1/

## 2022-01-06 NOTE — PROGRESS NOTES
[facility]     Nephrology Progress Note    Vinay Ards Patient Status:  Inpatient    1935 MRN [id number]   [facility] 2NE-A Attending Luis Fabian MD   Hosp Day # 8 PCP Franklyn Cooper MD       SUBJECTIVE:  Patient remained .0  207.0 215.0  215.0 219.0  219.0 190.0 181.0       Recent Labs   Lab 12/31/21  0555 12/31/21  1819 01/01/22  0242 01/01/22  0242 01/02/22  0550 01/02/22  0550 01/03/22  0533 01/03/22  0533 01/03/22  1244 01/03/22  1847 01/04/22  0459 01/05/22 100-10 MG/5ML syrup 10 mL, 10 mL, Oral, Q6H PRN  hydrALAZINE (APRESOLINE) tab 50 mg, 50 mg, Oral, Q8H TRE  levothyroxine (SYNTHROID) tab 50 mcg, 50 mcg, Oral, Before breakfast  pantoprazole (PROTONIX) EC tab 40 mg, 40 mg, Oral, QAM AC  glucose (DEX4) oral carvedilol, hydralazine and amlodipine        No objection to discharge from nephrology standpoint      Liane Chu MD  1/6/2022  8:57 AM

## 2022-01-06 NOTE — PROGRESS NOTES
[id number] [facility] Neurology Progress Note    Thalia Ac Patient Status:  Inpatient    1935 MRN [id number]   [facility] 2NE-A Attending Giuseppe Mansfield MD   Hosp Day # 8 PCP Thomas Cortes MD     CC:  Myoclonica movements    Ballesteros Min PRN  acetaminophen (TYLENOL) tab 650 mg, 650 mg, Oral, Q6H PRN  melatonin tab 3 mg, 3 mg, Oral, Nightly PRN  Insulin Aspart Pen (NOVOLOG) 100 UNIT/ML flexpen 1-5 Units, 1-5 Units, Subcutaneous, TID CC and HS  Ocuvite-Lutein (OCUVITE - EYE VITAMIN) tab [address] [facility]  Pager [phone]  1/6/2022, 2:53 PM     Above discussed with Dr. Thom Garsia, who already saw patient and spoke with RN. Jerking/tremors improved from 1/5. Leave lyrica discontinued. Neurology will sign off at this time.   P

## 2022-01-06 NOTE — PROGRESS NOTES
[facility]     Hospitalist Progress Note     Michael Sherwood Patient Status:  Inpatient    1935 MRN [id number]   [facility] 2NE-A Attending Jasmin Thorpe MD   Hosp Day # 8 PCP Louie Ross MD     Chief Complaint: LILIAM Mattson 6.4    < > = values in this interval not displayed. Estimated Creatinine Clearance: 13 mL/min (A) (based on SCr of 2.89 mg/dL (H)). No results for input(s): PTP, INR in the last 168 hours.      COVID-19 Lab Results  COVID-19  Lab Results   Component Va with high likely wyatt due to risk factors- Eliquis plan for 3 months      # Metabolic alkalosis- IVF and improving      # Acute metabolic encephalopathy- resolved      # Fluid overload- improved.  Placed on gentle fluids over last 24 hours   - Echo reviewed

## 2022-01-06 NOTE — PROGRESS NOTES
[facility]  Progress Note    Zander Barrett Patient Status:  Inpatient    1935 MRN [id number]   [facility] 2NE-A Attending Edilberto Galindo MD   Hosp Day # 8 PCP Kelly Lee MD       SUBJECTIVE:    No new events.  Patient says Intravenous Once   • amLODIPine  10 mg Oral Daily   • ferrous sulfate  325 mg Oral Daily with breakfast   • polyethylene glycol (PEG 3350)  17 g Oral Daily   • carvedilol  25 mg Oral BID with meals   • hydrALAZINE  50 mg Oral Q8H [address]   • levothyroxine  50

## 2022-01-07 VITALS
WEIGHT: 186.06 LBS | OXYGEN SATURATION: 91 % | SYSTOLIC BLOOD PRESSURE: 149 MMHG | HEIGHT: 65 IN | RESPIRATION RATE: 18 BRPM | DIASTOLIC BLOOD PRESSURE: 50 MMHG | TEMPERATURE: 98 F | BODY MASS INDEX: 31 KG/M2 | HEART RATE: 63 BPM

## 2022-01-07 LAB
ANION GAP SERPL CALC-SCNC: 5 MMOL/L (ref 0–18)
ARTERIAL PATENCY WRIST A: POSITIVE
BASE EXCESS BLDA CALC-SCNC: 5 MMOL/L (ref ?–2)
BODY TEMPERATURE: 98.6 F
BUN BLD-MCNC: 76 MG/DL (ref 7–18)
CA-I BLD-SCNC: 1.26 MMOL/L (ref 1.12–1.32)
CALCIUM BLD-MCNC: 8.8 MG/DL (ref 8.5–10.1)
CHLORIDE SERPL-SCNC: 107 MMOL/L (ref 98–112)
CO2 SERPL-SCNC: 31 MMOL/L (ref 21–32)
COHGB MFR BLD: 1.6 % SAT (ref 0–3)
CREAT BLD-MCNC: 2.6 MG/DL
GLUCOSE BLD-MCNC: 102 MG/DL (ref 70–99)
GLUCOSE BLD-MCNC: 112 MG/DL (ref 70–99)
GLUCOSE BLD-MCNC: 144 MG/DL (ref 70–99)
GLUCOSE BLD-MCNC: 150 MG/DL (ref 70–99)
HCO3 BLDA-SCNC: 31 MEQ/L (ref 22–26)
HGB BLD-MCNC: 9.6 G/DL
L/M: 2.5 L/MIN
LACTATE BLDA-SCNC: <1.6 MMOL/L (ref 0.5–2)
METHGB MFR BLD: 0.5 % SAT (ref 0.4–1.5)
OSMOLALITY SERPL CALC.SUM OF ELEC: 319 MOSM/KG (ref 275–295)
PCO2 BLDA: 53 MM HG (ref 35–45)
PH BLDA: 7.38 [PH] (ref 7.35–7.45)
PO2 BLDA: 69 MM HG (ref 80–105)
POTASSIUM BLD-SCNC: 4.1 MMOL/L (ref 3.6–5.1)
POTASSIUM SERPL-SCNC: 4.1 MMOL/L (ref 3.5–5.1)
SAO2 % BLDA FROM PO2: 93 % (ref 92–100)
SAO2 % BLDA: 93 % (ref 92–100)
SODIUM BLD-SCNC: 144 MMOL/L (ref 136–144)
SODIUM SERPL-SCNC: 143 MMOL/L (ref 136–145)

## 2022-01-07 PROCEDURE — 99239 HOSP IP/OBS DSCHRG MGMT >30: CPT | Performed by: HOSPITALIST

## 2022-01-07 PROCEDURE — 99233 SBSQ HOSP IP/OBS HIGH 50: CPT | Performed by: INTERNAL MEDICINE

## 2022-01-07 RX ORDER — AMLODIPINE BESYLATE 10 MG/1
10 TABLET ORAL DAILY
Qty: 30 TABLET | Refills: 0 | Status: SHIPPED | OUTPATIENT
Start: 2022-01-08

## 2022-01-07 RX ORDER — TIOTROPIUM BROMIDE 18 UG/1
18 CAPSULE ORAL; RESPIRATORY (INHALATION) DAILY
Qty: 30 CAPSULE | Refills: 0 | Status: SHIPPED | OUTPATIENT
Start: 2022-01-07 | End: 2022-02-06

## 2022-01-07 NOTE — PROGRESS NOTES
[facility]     Nephrology Progress Note    Susanne Randall Patient Status:  Inpatient    1935 MRN [id number]   [facility] 2NE-A Attending Odean Landau, MD   Hosp Day # 9 PCP Nolan Simons MD       SUBJECTIVE:  Patient up in  215.0  215.0 219.0  219.0 190.0 181.0       Recent Labs   Lab 01/01/22  0242 01/01/22  0242 01/02/22  0550 01/03/22  0533 01/03/22  1847 01/04/22  0459 01/05/22  0658 01/06/22  0535 01/07/22  0535      < > 142   < > 141 141 142 142 143   K 3.3*   < > PRN   Or  dextrose 50 % injection 50 mL, 50 mL, Intravenous, Q15 Min PRN   Or  glucose (DEX4) oral liquid 30 g, 30 g, Oral, Q15 Min PRN   Or  glucose-vitamin C (DEX-4) chewable tab 8 tablet, 8 tablet, Oral, Q15 Min PRN  acetaminophen (TYLENOL) tab 650 mg,

## 2022-01-07 NOTE — PHYSICAL THERAPY NOTE
PHYSICAL THERAPY TREATMENT NOTE - INPATIENT    Room Number: 2621/2621-A     Session: 2    Number of Visits to Meet Established Goals: 5    Presenting Problem: aute respirpatory failure with hypoxia  Co-Morbidities : HTN, CM, gout   Per EMR: History rela able to ambulate 100 feet with assist device: walker - rolling at assistance level: modified independent      Goal #4     Goal #5     Goal #6     Goal Comments: Goals established on 1/2/2022 1/7/2022 all goals ongoing      SUBJECTIVE    ipad translato commode c RW, max A for jesus care and brief change, pt t/f to [facility] chair, on RA pt 89-91. Pt gait trained c min A, RW 2L O2 and chair follow. Pt limits distance d/t c/o fatigue, O2 sats WNL on 2l O2. Sit>sup min A, pt left in bed, needs met.          [place]

## 2022-01-07 NOTE — PHYSICAL THERAPY NOTE
IP PT attempted, pt understands some english, however, requesting a . Ipad in room, not plugged in and out of charge. Will re-attempt as schedule permits.

## 2022-01-07 NOTE — PROGRESS NOTES
[facility]     Hospitalist Progress Note     Herminio Blake Patient Status:  Inpatient    1935 MRN [id number]   [facility] 2NE-A Attending Meet Bryan MD   Hosp Day # 9 PCP Harvinder Juárez MD     Chief Complaint: LILIAM Tellez 168 hours.      COVID-19 Lab Results  COVID-19  Lab Results   Component Value Date    COVID19 Not Detected 12/30/2021    COVID19 Not Detected 12/29/2021    COVID19 Not Detected 12/02/2021     Pro-Calcitonin  No results for input(s): PCT in the last 168 hour appreciated and signed off      # RENEA on CKD 3- as above      #AOCD- drifting down with dilutional effect- stable and monitor   - no ss bleeding   - iron studies noted- being replaced   - CT AP neg for acute bleed      # DM with peripheral neuropathy - hyp

## 2022-01-07 NOTE — PROGRESS NOTES
[facility]  Progress Note    Raya Juarez Patient Status:  Inpatient    1935 MRN [id number]   [facility] 2NE-A Attending Iris Valentine MD   Hosp Day # 9 PCP Nanette Zapata MD     Subjective:  Raya Juarez is a(n) 82 ye immunoglobulin free light chain level     Anemia     Asterixis     RENEA (acute kidney injury) ([facility] [address]     Stage 3 chronic kidney disease (HCC)     Renal insufficiency     Essential hypertension     Azotemia     Type 2 diabetes mellitus without complication, w hypercapnia--instructed on importance of following up patient's daughter states that she is in agreement    Lety Hopkins MD  1/7/2022  2:34 PM

## 2022-01-07 NOTE — PLAN OF CARE
A&ox4, primarily [place] speaking. O2 sats WDL on 3L. Sinus yusra/NSR, heart rates in 60s. Abdominal wound present, dressing changed this evening, dressing clean/dry/intact. Briefed for stress incontinence, using bed side commode. Improving appetite.  Up wi

## 2022-01-07 NOTE — CM/SW NOTE
01/07/22 1424   Discharge disposition   Expected discharge disposition [address] Cornelio Provider   (Surendra Nuñez)   Discharge transportation [address] cleared for discharge to [place]. Updates sent earlier today.  NEERAJ curry

## 2022-01-08 NOTE — DISCHARGE SUMMARY
[facility] HOSPITALIST  DISCHARGE SUMMARY     Sanjeev Cesar Patient Status:  Inpatient    1935 MRN [id number]   [facility] 2NE-A Attending No att. providers found   [facility] Day # 9 PCP Iqra Pennington MD     Date of Admission: 2021 Illness: Herminio Blake is a 82year old female with recent admission for septic shock secondary to cholangitis and perforated viscus, pancreatitis complicated by bile leak and RENEA, anemia of chronic disease, hypertension, diabetes and hypothyroidism pr taking these medications      Instructions Prescription details   amLODIPine 10 MG Tabs  Commonly known as: NORVASC      Take 1 tablet (10 mg total) by mouth daily.    Quantity: 30 tablet  Refills: 0     apixaban 5 MG Tabs  Commonly known as: Welby Speak      T SYNTHROID      Take 50 mcg by mouth before breakfast.   Refills: 0     linagliptin 5 mg Tabs  Commonly known as: TRADJENTA      Take 5 mg by mouth daily. Refills: 0     Lutein 40 MG Caps      Take 40 mg by mouth nightly.    Refills: 0     Melatonin 3 MG C 28448  [phone]      As needed      -----------------------------------------------------------------------------------------------  PATIENT DISCHARGE INSTRUCTIONS: See electronic chart    David Penny MD 1/8/2022    Time spent:  > 30 minutes

## 2022-01-08 NOTE — PLAN OF CARE
Pt and VS remained stable this shift. Denies CP. Mild SOB with exertion sat maintained in 90's on oxygen. MDs rounded ok to discharge to SNF. PICC line discontinued per attending christy well. Left PICC line appeared intact. SW aware transport set up.  Report c

## 2022-01-14 RX ORDER — SODIUM CHLORIDE, SODIUM LACTATE, POTASSIUM CHLORIDE, CALCIUM CHLORIDE 600; 310; 30; 20 MG/100ML; MG/100ML; MG/100ML; MG/100ML
INJECTION, SOLUTION INTRAVENOUS CONTINUOUS
Status: CANCELLED | OUTPATIENT
Start: 2022-01-14

## 2022-01-14 NOTE — PAT NURSING NOTE
I received Medical info and med list from [facility]       I spoke to Swengel who said she had received my fax with the Diabetic Instructions, and Med Blood thinners instructions. She is checking about stop date for pt blood thinner.      I informed her Endo wi

## 2022-01-14 NOTE — PAT NURSING NOTE
[facility] Requested her to fax PAT the Med sheet, face sheet, any applicable POA/DNR documents. Aquilino Hou will arrange for transportation D. O. procedure.  Pt signs own consent

## 2022-01-17 ENCOUNTER — ANESTHESIA EVENT (OUTPATIENT)
Dept: ENDOSCOPY | Facility: HOSPITAL | Age: 87
End: 2022-01-17
Payer: MEDICARE

## 2022-01-20 NOTE — PAT NURSING NOTE
Unable to drop order for indomethacin suppository-pt allergic to Ibuprofen. Faxed information to surgeon and notified office.

## 2022-01-21 ENCOUNTER — APPOINTMENT (OUTPATIENT)
Dept: GENERAL RADIOLOGY | Facility: HOSPITAL | Age: 87
End: 2022-01-21
Attending: INTERNAL MEDICINE
Payer: MEDICARE

## 2022-01-21 ENCOUNTER — HOSPITAL ENCOUNTER (OUTPATIENT)
Facility: HOSPITAL | Age: 87
Setting detail: HOSPITAL OUTPATIENT SURGERY
Discharge: HOME | End: 2022-01-21
Attending: INTERNAL MEDICINE | Admitting: INTERNAL MEDICINE
Payer: MEDICARE

## 2022-01-21 ENCOUNTER — ANESTHESIA (OUTPATIENT)
Dept: ENDOSCOPY | Facility: HOSPITAL | Age: 87
End: 2022-01-21
Payer: MEDICARE

## 2022-01-21 VITALS
SYSTOLIC BLOOD PRESSURE: 123 MMHG | OXYGEN SATURATION: 95 % | HEIGHT: 64 IN | TEMPERATURE: 97 F | BODY MASS INDEX: 35.68 KG/M2 | DIASTOLIC BLOOD PRESSURE: 58 MMHG | RESPIRATION RATE: 18 BRPM | HEART RATE: 68 BPM | WEIGHT: 209 LBS

## 2022-01-21 DIAGNOSIS — Z46.89 ENCOUNTER FOR REMOVAL OF BILIARY STENT: ICD-10-CM

## 2022-01-21 LAB
GLUCOSE BLD-MCNC: 102 MG/DL (ref 70–99)
SARS-COV-2 RNA RESP QL NAA+PROBE: NOT DETECTED

## 2022-01-21 PROCEDURE — 0FPD8DZ REMOVAL OF INTRALUMINAL DEVICE FROM PANCREATIC DUCT, VIA NATURAL OR ARTIFICIAL OPENING ENDOSCOPIC: ICD-10-PCS | Performed by: INTERNAL MEDICINE

## 2022-01-21 PROCEDURE — 82962 GLUCOSE BLOOD TEST: CPT

## 2022-01-21 PROCEDURE — 74328 X-RAY BILE DUCT ENDOSCOPY: CPT | Performed by: INTERNAL MEDICINE

## 2022-01-21 RX ORDER — SODIUM CHLORIDE, SODIUM LACTATE, POTASSIUM CHLORIDE, CALCIUM CHLORIDE 600; 310; 30; 20 MG/100ML; MG/100ML; MG/100ML; MG/100ML
INJECTION, SOLUTION INTRAVENOUS CONTINUOUS
Status: DISCONTINUED | OUTPATIENT
Start: 2022-01-21 | End: 2022-01-21

## 2022-01-21 RX ORDER — DEXTROSE MONOHYDRATE 25 G/50ML
50 INJECTION, SOLUTION INTRAVENOUS
Status: DISCONTINUED | OUTPATIENT
Start: 2022-01-21 | End: 2022-01-21

## 2022-01-21 RX ORDER — LIDOCAINE HYDROCHLORIDE 10 MG/ML
INJECTION, SOLUTION EPIDURAL; INFILTRATION; INTRACAUDAL; PERINEURAL AS NEEDED
Status: DISCONTINUED | OUTPATIENT
Start: 2022-01-21 | End: 2022-01-21 | Stop reason: SURG

## 2022-01-21 RX ORDER — HYDROMORPHONE HYDROCHLORIDE 1 MG/ML
0.4 INJECTION, SOLUTION INTRAMUSCULAR; INTRAVENOUS; SUBCUTANEOUS EVERY 5 MIN PRN
Status: DISCONTINUED | OUTPATIENT
Start: 2022-01-21 | End: 2022-01-21

## 2022-01-21 RX ORDER — ONDANSETRON 2 MG/ML
4 INJECTION INTRAMUSCULAR; INTRAVENOUS AS NEEDED
Status: DISCONTINUED | OUTPATIENT
Start: 2022-01-21 | End: 2022-01-21

## 2022-01-21 RX ORDER — METOCLOPRAMIDE HYDROCHLORIDE 5 MG/ML
10 INJECTION INTRAMUSCULAR; INTRAVENOUS AS NEEDED
Status: DISCONTINUED | OUTPATIENT
Start: 2022-01-21 | End: 2022-01-21

## 2022-01-21 RX ORDER — NALOXONE HYDROCHLORIDE 0.4 MG/ML
80 INJECTION, SOLUTION INTRAMUSCULAR; INTRAVENOUS; SUBCUTANEOUS AS NEEDED
Status: DISCONTINUED | OUTPATIENT
Start: 2022-01-21 | End: 2022-01-21

## 2022-01-21 RX ORDER — DIPHENHYDRAMINE HYDROCHLORIDE 50 MG/ML
12.5 INJECTION INTRAMUSCULAR; INTRAVENOUS AS NEEDED
Status: DISCONTINUED | OUTPATIENT
Start: 2022-01-21 | End: 2022-01-21

## 2022-01-21 RX ADMIN — LIDOCAINE HYDROCHLORIDE 100 MG: 10 INJECTION, SOLUTION EPIDURAL; INFILTRATION; INTRACAUDAL; PERINEURAL at 08:41:00

## 2022-01-21 RX ADMIN — SODIUM CHLORIDE, SODIUM LACTATE, POTASSIUM CHLORIDE, CALCIUM CHLORIDE: 600; 310; 30; 20 INJECTION, SOLUTION INTRAVENOUS at 09:04:00

## 2022-01-21 NOTE — ANESTHESIA POSTPROCEDURE EVALUATION
[facility] Patient Status:  Hospital Outpatient Surgery   Age/Gender 82year old female MRN [id number]   Location [address] [address] Attending 3 Armaan Butler [facility], [facility] Day # 0 PCP MD Henok Fried

## 2022-01-21 NOTE — OPERATIVE REPORT
Williamrakesh Shields Patient Status:  Hospital Outpatient Surgery    1935 MRN [id number]   Location [id number] [place] [address] Attending Yelena Stephens, DO   Hosp Day # 0 PCP Olga Figueroa MD     PREOPERATIVE DIAGNOSIS/INDICATION: jagwire. The wire was advanced to the intrahepatics. The balloon was inflated to 15 mm and an occlusion cholangiogram obtained. The bile duct was dilated to 15 mm. There was no significant leak visualized. No filling defect was present on balloon sweep.  Abel Acevedo

## 2022-01-21 NOTE — ANESTHESIA PREPROCEDURE EVALUATION
PRE-OP EVALUATION    Patient Name: Zander Barrett    Admit Diagnosis: Encounter for removal of biliary stent [Z46.89]    Pre-op Diagnosis: Encounter for removal of biliary stent [Z46.89]    ENDOSCOPIC RETROGRADE CHOLANGIOPANCREATOGRAPHY (ERCP) with sten Tab, Take 1 tablet (25 mg total) by mouth 2 (two) times daily with meals. , Disp: , Rfl: 0  pantoprazole 40 MG Oral Tab EC, Take 1 tablet (40 mg total) by mouth daily. , Disp: 30 tablet, Rfl: 0  Ipratropium-Albuterol (COMBIVENT RESPIMAT)  MCG/ACT Inhal 01/06/2022    .0 01/06/2022     Lab Results   Component Value Date     01/07/2022    K 4.1 01/07/2022     01/07/2022    CO2 31.0 01/07/2022    BUN 76 (H) 01/07/2022    CREATSERUM 2.60 (H) 01/07/2022     (H) 01/07/2022    CA 8.8 01

## 2022-01-21 NOTE — H&P
History & Physical Examination    Patient Name: Tori Rdz  MRN: [id number]  CSN: [id number]  YOB: 1935    Diagnosis: history of bile leak s/p plastic biliary stent placement     Present Illness: 83 y/o F history as above presents for E Units by mouth once a week., Disp: , Rfl:   Linagliptin 5 MG Oral Tab, Take 5 mg by mouth daily.   , Disp: , Rfl: , 1/20/2022 at Unknown time  Levothyroxine Sodium 50 MCG Oral Tab, Take 50 mcg by mouth before breakfast., Disp: , Rfl: , 1/20/2022 at Unknown HEART [ x] [x ]    LUNGS [ x] [ x]    ABDOMEN [ x] [x ]    UROGENITAL [ n/a] [ n/a]    EXTREMITIES [ x] [x ]    OTHER        [ x ] I have discussed the risks and benefits and alternatives with the patient/family.   They understand and agree to proceed wit

## 2022-02-09 ENCOUNTER — APPOINTMENT (OUTPATIENT)
Dept: HEMATOLOGY/ONCOLOGY | Facility: HOSPITAL | Age: 87
End: 2022-02-09
Attending: INTERNAL MEDICINE
Payer: MEDICARE

## 2022-02-16 ENCOUNTER — APPOINTMENT (OUTPATIENT)
Dept: HEMATOLOGY/ONCOLOGY | Facility: HOSPITAL | Age: 87
End: 2022-02-16
Attending: INTERNAL MEDICINE
Payer: MEDICARE

## 2022-03-25 ENCOUNTER — OFFICE VISIT (OUTPATIENT)
Dept: SURGERY | Facility: CLINIC | Age: 87
End: 2022-03-25
Payer: MEDICARE

## 2022-03-25 VITALS
SYSTOLIC BLOOD PRESSURE: 157 MMHG | WEIGHT: 209 LBS | HEART RATE: 61 BPM | HEIGHT: 64 IN | TEMPERATURE: 98 F | BODY MASS INDEX: 35.68 KG/M2 | DIASTOLIC BLOOD PRESSURE: 69 MMHG

## 2022-03-25 DIAGNOSIS — K80.10 CALCULUS OF GALLBLADDER WITH CHRONIC CHOLECYSTITIS WITHOUT OBSTRUCTION: ICD-10-CM

## 2022-03-25 DIAGNOSIS — R10.11 RIGHT UPPER QUADRANT PAIN: Primary | ICD-10-CM

## 2022-03-25 PROCEDURE — 99215 OFFICE O/P EST HI 40 MIN: CPT | Performed by: SURGERY

## 2022-04-05 NOTE — PROGRESS NOTES
Chart reviewed, pt not seen. D/w RN. Check CBC tomorrow. Transition to [employer], covered by insurance. OK to discharge tomorrow. Tano Brock M.D.     [facility] Hematology Oncology [address] no

## 2022-07-17 NOTE — PROGRESS NOTES
[facility]  Progress Note    Zander Barrett Patient Status:  Inpatient    1935 MRN [id number]   [facility] 6NE-A Attending Jaylin Jones MD   Hosp Day # 3 PCP Kelly Lee MD     Subjective:  Zander Barrett is a(n) 82 yea Ma contacted pt twice but the line stated she wasn't accepting calls  At this time 7/17    > 2.09* 2.46* 2.16*   GFRAA 30*   < > 25* 20* 24*   GFRNAA 26*   < > 21* 17* 20*   CA 8.6   < > 8.7 9.0 8.7   ALB 2.7*  --  2.6*  --   --       < > 142 140 140   K 4.2   < > 3.2* 3.8 3.3*      < > 98 97* 94*   CO2 30.0   < > 38.0* 38.0* 40.0* suspected underlying LUIS  · Probable PE  · Fluid overload, CHF exacerbation  · Altered mental status, delirium, likely related to hypercapnia  · Chronic renal failure, cr recently at 1.98-2.2  · Leukocytosis  · Recent cholecystitis, pancreatitis s/p perfor

## 2024-01-10 ENCOUNTER — MED REC SCAN ONLY (OUTPATIENT)
Dept: NEPHROLOGY | Facility: CLINIC | Age: 89
End: 2024-01-10

## 2024-01-25 ENCOUNTER — MED REC SCAN ONLY (OUTPATIENT)
Dept: NEPHROLOGY | Facility: CLINIC | Age: 89
End: 2024-01-25

## 2024-08-13 ENCOUNTER — HOSPITAL ENCOUNTER (INPATIENT)
Facility: HOSPITAL | Age: 89
LOS: 3 days | Discharge: HOME HEALTH | End: 2024-08-16
Attending: EMERGENCY MEDICINE | Admitting: INTERNAL MEDICINE
Payer: MEDICARE

## 2024-08-13 ENCOUNTER — HOSPITAL ENCOUNTER (INPATIENT)
Facility: HOSPITAL | Age: 89
LOS: 3 days | Discharge: HOME | End: 2024-08-16
Attending: EMERGENCY MEDICINE | Admitting: INTERNAL MEDICINE
Payer: MEDICARE

## 2024-08-13 ENCOUNTER — APPOINTMENT (OUTPATIENT)
Dept: CT IMAGING | Facility: HOSPITAL | Age: 89
End: 2024-08-13
Attending: EMERGENCY MEDICINE
Payer: MEDICARE

## 2024-08-13 ENCOUNTER — APPOINTMENT (OUTPATIENT)
Dept: GENERAL RADIOLOGY | Facility: HOSPITAL | Age: 89
End: 2024-08-13
Attending: EMERGENCY MEDICINE
Payer: MEDICARE

## 2024-08-13 DIAGNOSIS — G25.3 MYOCLONIC JERKING: ICD-10-CM

## 2024-08-13 DIAGNOSIS — J96.01 ACUTE RESPIRATORY FAILURE WITH HYPOXIA (HCC): Primary | ICD-10-CM

## 2024-08-13 DIAGNOSIS — I10 UNCONTROLLED HYPERTENSION: ICD-10-CM

## 2024-08-13 DIAGNOSIS — R29.6 FREQUENT FALLS: ICD-10-CM

## 2024-08-13 DIAGNOSIS — E87.70 HYPERVOLEMIA, UNSPECIFIED HYPERVOLEMIA TYPE: ICD-10-CM

## 2024-08-13 DIAGNOSIS — T81.30XA WOUND DEHISCENCE: ICD-10-CM

## 2024-08-13 LAB
ALBUMIN SERPL-MCNC: 3.9 G/DL (ref 3.2–4.8)
ALBUMIN/GLOB SERPL: 1.1 {RATIO} (ref 1–2)
ALP LIVER SERPL-CCNC: 82 U/L
ALT SERPL-CCNC: 10 U/L
ANION GAP SERPL CALC-SCNC: 7 MMOL/L (ref 0–18)
AST SERPL-CCNC: 19 U/L (ref ?–34)
BASOPHILS # BLD AUTO: 0.04 X10(3) UL (ref 0–0.2)
BASOPHILS NFR BLD AUTO: 0.4 %
BILIRUB SERPL-MCNC: 0.4 MG/DL (ref 0.2–1.1)
BILIRUB UR QL STRIP.AUTO: NEGATIVE
BUN BLD-MCNC: 49 MG/DL (ref 9–23)
CALCIUM BLD-MCNC: 8.9 MG/DL (ref 8.7–10.4)
CHLORIDE SERPL-SCNC: 103 MMOL/L (ref 98–112)
CLARITY UR REFRACT.AUTO: CLEAR
CO2 SERPL-SCNC: 24 MMOL/L (ref 21–32)
CREAT BLD-MCNC: 3.41 MG/DL
EGFRCR SERPLBLD CKD-EPI 2021: 13 ML/MIN/1.73M2 (ref 60–?)
EOSINOPHIL # BLD AUTO: 0.05 X10(3) UL (ref 0–0.7)
EOSINOPHIL NFR BLD AUTO: 0.5 %
ERYTHROCYTE [DISTWIDTH] IN BLOOD BY AUTOMATED COUNT: 15.1 %
FLUAV + FLUBV RNA SPEC NAA+PROBE: NEGATIVE
FLUAV + FLUBV RNA SPEC NAA+PROBE: NEGATIVE
GLOBULIN PLAS-MCNC: 3.5 G/DL (ref 2–3.5)
GLUCOSE BLD-MCNC: 165 MG/DL (ref 70–99)
GLUCOSE UR STRIP.AUTO-MCNC: 100 MG/DL
HCT VFR BLD AUTO: 33.4 %
HGB BLD-MCNC: 11 G/DL
IMM GRANULOCYTES # BLD AUTO: 0.1 X10(3) UL (ref 0–1)
IMM GRANULOCYTES NFR BLD: 1 %
KETONES UR STRIP.AUTO-MCNC: NEGATIVE MG/DL
LEUKOCYTE ESTERASE UR QL STRIP.AUTO: NEGATIVE
LYMPHOCYTES # BLD AUTO: 1.57 X10(3) UL (ref 1–4)
LYMPHOCYTES NFR BLD AUTO: 15.7 %
MCH RBC QN AUTO: 30 PG (ref 26–34)
MCHC RBC AUTO-ENTMCNC: 32.9 G/DL (ref 31–37)
MCV RBC AUTO: 91 FL
MONOCYTES # BLD AUTO: 0.19 X10(3) UL (ref 0.1–1)
MONOCYTES NFR BLD AUTO: 1.9 %
NEUTROPHILS # BLD AUTO: 8.03 X10 (3) UL (ref 1.5–7.7)
NEUTROPHILS # BLD AUTO: 8.03 X10(3) UL (ref 1.5–7.7)
NEUTROPHILS NFR BLD AUTO: 80.5 %
NITRITE UR QL STRIP.AUTO: NEGATIVE
NT-PROBNP SERPL-MCNC: 9214 PG/ML (ref ?–450)
OSMOLALITY SERPL CALC.SUM OF ELEC: 295 MOSM/KG (ref 275–295)
PH UR STRIP.AUTO: 8 [PH] (ref 5–8)
PLATELET # BLD AUTO: 170 10(3)UL (ref 150–450)
POTASSIUM SERPL-SCNC: 5.1 MMOL/L (ref 3.5–5.1)
PROT SERPL-MCNC: 7.4 G/DL (ref 5.7–8.2)
PROT UR STRIP.AUTO-MCNC: 600 MG/DL
RBC # BLD AUTO: 3.67 X10(6)UL
RSV RNA SPEC NAA+PROBE: NEGATIVE
SARS-COV-2 RNA RESP QL NAA+PROBE: NOT DETECTED
SODIUM SERPL-SCNC: 134 MMOL/L (ref 136–145)
SP GR UR STRIP.AUTO: 1.01 (ref 1–1.03)
TROPONIN I SERPL HS-MCNC: 6 NG/L
UROBILINOGEN UR STRIP.AUTO-MCNC: NORMAL MG/DL
WBC # BLD AUTO: 10 X10(3) UL (ref 4–11)

## 2024-08-13 PROCEDURE — 99223 1ST HOSP IP/OBS HIGH 75: CPT | Performed by: STUDENT IN AN ORGANIZED HEALTH CARE EDUCATION/TRAINING PROGRAM

## 2024-08-13 PROCEDURE — 70450 CT HEAD/BRAIN W/O DYE: CPT | Performed by: EMERGENCY MEDICINE

## 2024-08-13 PROCEDURE — 71045 X-RAY EXAM CHEST 1 VIEW: CPT | Performed by: EMERGENCY MEDICINE

## 2024-08-13 RX ORDER — AMLODIPINE BESYLATE 5 MG/1
10 TABLET ORAL DAILY
Status: DISCONTINUED | OUTPATIENT
Start: 2024-08-14 | End: 2024-08-14

## 2024-08-13 RX ORDER — LEVOTHYROXINE SODIUM 50 UG/1
50 TABLET ORAL
Status: DISCONTINUED | OUTPATIENT
Start: 2024-08-14 | End: 2024-08-16

## 2024-08-13 RX ORDER — HYDRALAZINE HYDROCHLORIDE 50 MG/1
50 TABLET, FILM COATED ORAL EVERY 8 HOURS SCHEDULED
Status: DISCONTINUED | OUTPATIENT
Start: 2024-08-14 | End: 2024-08-16

## 2024-08-13 RX ORDER — NICOTINE POLACRILEX 4 MG
15 LOZENGE BUCCAL
Status: DISCONTINUED | OUTPATIENT
Start: 2024-08-13 | End: 2024-08-16

## 2024-08-13 RX ORDER — ECHINACEA PURPUREA EXTRACT 125 MG
1 TABLET ORAL
Status: DISCONTINUED | OUTPATIENT
Start: 2024-08-13 | End: 2024-08-16

## 2024-08-13 RX ORDER — DEXTROSE MONOHYDRATE 25 G/50ML
50 INJECTION, SOLUTION INTRAVENOUS
Status: DISCONTINUED | OUTPATIENT
Start: 2024-08-13 | End: 2024-08-16

## 2024-08-13 RX ORDER — METOCLOPRAMIDE HYDROCHLORIDE 5 MG/ML
5 INJECTION INTRAMUSCULAR; INTRAVENOUS EVERY 8 HOURS PRN
Status: DISCONTINUED | OUTPATIENT
Start: 2024-08-13 | End: 2024-08-16

## 2024-08-13 RX ORDER — POLYETHYLENE GLYCOL 3350 17 G/17G
17 POWDER, FOR SOLUTION ORAL DAILY PRN
Status: DISCONTINUED | OUTPATIENT
Start: 2024-08-13 | End: 2024-08-16

## 2024-08-13 RX ORDER — PANTOPRAZOLE SODIUM 40 MG/1
40 TABLET, DELAYED RELEASE ORAL
Status: DISCONTINUED | OUTPATIENT
Start: 2024-08-14 | End: 2024-08-16

## 2024-08-13 RX ORDER — FUROSEMIDE 10 MG/ML
40 INJECTION INTRAMUSCULAR; INTRAVENOUS ONCE
Status: COMPLETED | OUTPATIENT
Start: 2024-08-13 | End: 2024-08-13

## 2024-08-13 RX ORDER — SENNOSIDES 8.6 MG
17.2 TABLET ORAL NIGHTLY PRN
Status: DISCONTINUED | OUTPATIENT
Start: 2024-08-13 | End: 2024-08-16

## 2024-08-13 RX ORDER — MELATONIN
3 NIGHTLY PRN
Status: DISCONTINUED | OUTPATIENT
Start: 2024-08-13 | End: 2024-08-16

## 2024-08-13 RX ORDER — NICOTINE POLACRILEX 4 MG
30 LOZENGE BUCCAL
Status: DISCONTINUED | OUTPATIENT
Start: 2024-08-13 | End: 2024-08-16

## 2024-08-13 RX ORDER — ONDANSETRON 2 MG/ML
4 INJECTION INTRAMUSCULAR; INTRAVENOUS EVERY 6 HOURS PRN
Status: DISCONTINUED | OUTPATIENT
Start: 2024-08-13 | End: 2024-08-16

## 2024-08-13 RX ORDER — CARVEDILOL 12.5 MG/1
25 TABLET ORAL 2 TIMES DAILY WITH MEALS
Status: DISCONTINUED | OUTPATIENT
Start: 2024-08-14 | End: 2024-08-14

## 2024-08-13 RX ORDER — HYDRALAZINE HYDROCHLORIDE 20 MG/ML
10 INJECTION INTRAMUSCULAR; INTRAVENOUS ONCE
Status: COMPLETED | OUTPATIENT
Start: 2024-08-13 | End: 2024-08-13

## 2024-08-13 RX ORDER — BISACODYL 10 MG
10 SUPPOSITORY, RECTAL RECTAL
Status: DISCONTINUED | OUTPATIENT
Start: 2024-08-13 | End: 2024-08-16

## 2024-08-13 RX ORDER — HYDRALAZINE HYDROCHLORIDE 25 MG/1
25 TABLET, FILM COATED ORAL EVERY 8 HOURS PRN
Status: DISCONTINUED | OUTPATIENT
Start: 2024-08-13 | End: 2024-08-14

## 2024-08-13 RX ORDER — ACETAMINOPHEN 500 MG
1000 TABLET ORAL EVERY 8 HOURS PRN
Status: DISCONTINUED | OUTPATIENT
Start: 2024-08-13 | End: 2024-08-16

## 2024-08-13 RX ORDER — BENZONATATE 100 MG/1
200 CAPSULE ORAL 3 TIMES DAILY PRN
Status: DISCONTINUED | OUTPATIENT
Start: 2024-08-13 | End: 2024-08-16

## 2024-08-13 NOTE — ED INITIAL ASSESSMENT (HPI)
Pt complaining increased weakness since she fell this morning after going to the bathroom; denies LOC; daughter on her way

## 2024-08-13 NOTE — ED PROVIDER NOTES
Patient Seen in: [facility] Emergency Department      History     Chief Complaint   Patient presents with    Fall     No blood thinners; Aox4; [place] SPEAKING     Stated Complaint: fall; no thinners; increased weakness    Subjective:   HPI    This is an 89-year-old woman, history of diabetes hypertension CKD, here for evaluation of multiple falls.  Patient prefers that her daughter interpret for her her daughters at bedside.  Daughter states that patient is legally blind secondary to macular degeneration, states that over the past few days she has had an increase in jerking motions which she has had previously.  States her blood pressure has been running higher than usual while above 200 systolic though it is never really well-controlled.  Daughter states patient has had decreased p.o. intake over the past few days, patient has had no recent vomiting fevers diarrhea patient denies any chest pain or shortness of breath any other complaints.  Denies any leg swelling, of note patient was found to be hypoxic on arrival in the high 80s, was placed on 2 L oxygen via nasal cannula.      Objective:   Past Medical History:    Acute pancreatitis, unspecified complication status, unspecified pancreatitis type (HCC)    Arthritis    Back disorder    Back pain    Blood disorder    thrombocytopenia    Cholangitis (HCC)    Diabetes (HCC)    Disorder of thyroid    Essential hypertension    Gout    Hearing impairment    some loss    Hepatic abscess (HCC)    Neuropathy    Pneumonia due to organism    recently 1/2022    Renal disorder    CKD    Stroke (HCC)    mini 1990    UTI (urinary tract infection)    Visual impairment    left eye doesnt see\",  macular degeneration              Past Surgical History:   Procedure Laterality Date    Appendectomy      Appendectomy      Knee replacement surgery  02/2016    right    Tonsillectomy                  Social History     Socioeconomic History    Marital status:    Tobacco Use     Smoking status: Never    Smokeless tobacco: Never   Vaping Use    Vaping status: Never Used   Substance and Sexual Activity    Alcohol use: No    Drug use: No              Review of Systems    Positive for stated Chief Complaint: Fall (No blood thinners; Aox4; [place] SPEAKING)    Other systems are as noted in HPI.  Constitutional and vital signs reviewed.      All other systems reviewed and negative except as noted above.    Physical Exam     ED Triage Vitals   BP 08/13/24 1730 (!) 192/94   Pulse 08/13/24 1730 63   Resp 08/13/24 1730 17   Temp 08/13/24 1800 97.2 °F (36.2 °C)   Temp src 08/13/24 1800 Temporal   SpO2 08/13/24 1730 99 %   O2 Device 08/13/24 1730 None (Room air)       Current Vitals:   Vital Signs  BP: (!) 175/98  Pulse: 69  Resp: 14  Temp: 97.2 °F (36.2 °C)  Temp src: Temporal  MAP (mmHg): (!) 117    Oxygen Therapy  SpO2: 100 %  O2 Device: None (Room air)  O2 Flow Rate (L/min): 2 L/min            Physical Exam        Physical Exam  Vitals signs and nursing note reviewed.   General: Mildly woman sitting up in bed in no acute distress.  Head: Normocephalic and atraumatic.   HEENT:  Mucous membranes are moist.   Cardiovascular:  Normal rate and regular rhythm.  No Edema  Pulmonary:  Pulmonary effort is normal.  There were rales to the bilateral bases, good air entry bilaterally.  Abdominal: Soft nontender nondistended, normal bowel sounds, no guarding no rebound tenderness  Skin: Warm and dry  Neurological: Awake alert, speech is normal, intermittent myoclonic jerks noted, motor 5/5 in bilateral upper and lower extremities.  sensation is grossly intact throughout.  No facial asymmetry.      ED Course     Labs Reviewed   COMP METABOLIC PANEL (14) - Abnormal; Notable for the following components:       Result Value    Glucose 165 (*)     Sodium 134 (*)     BUN 49 (*)     Creatinine 3.41 (*)     eGFR-Cr 13 (*)     All other components within normal limits   CBC WITH DIFFERENTIAL WITH PLATELET - Abnormal;  Notable for the following components:    RBC 3.67 (*)     HGB 11.0 (*)     HCT 33.4 (*)     Neutrophil Absolute Prelim 8.03 (*)     Neutrophil Absolute 8.03 (*)     All other components within normal limits   URINALYSIS WITH CULTURE REFLEX - Abnormal; Notable for the following components:    Glucose Urine 100 (*)     Blood Urine 1+ (*)     Protein Urine 600 (*)     RBC Urine 3-5 (*)     All other components within normal limits   PRO BETA NATRIURETIC PEPTIDE - Abnormal; Notable for the following components:    Pro-Beta Natriuretic Peptide 9,214 (*)     All other components within normal limits   TROPONIN I HIGH SENSITIVITY - Normal   SARS-COV-2/FLU A AND B/RSV BY PCR (GENEXPERT) - Normal    Narrative:     This test is intended for the qualitative detection and differentiation of SARS-CoV-2, influenza A, influenza B, and respiratory syncytial virus (RSV) viral RNA in nasopharyngeal or nares swabs from individuals suspected of respiratory viral infection consistent with COVID-19 by their healthcare provider. Signs and symptoms of respiratory viral infection due to SARS-CoV-2, influenza, and RSV can be similar.    Test performed using the Xpert Xpress SARS-CoV-2/FLU/RSV (real time RT-PCR)  assay on the GeneXpert instrument, [employer], [place] [address].   This test is being used under the Food and Drug Administration's Emergency Use Authorization.    The authorized Fact Sheet for Healthcare Providers for this assay is available upon request from the laboratory.   RAINBOW DRAW LAVENDER   RAINBOW DRAW LIGHT GREEN   RAINBOW DRAW BLUE   RAINBOW DRAW GOLD     EKG    Rate, intervals and axes as noted on EKG Report.  Rate: 65  Rhythm: Sinus Rhythm  Reading: No acute ischemic changes               CT BRAIN OR HEAD (CPT=70450)    Result Date: 8/13/2024  PROCEDURE:  CT BRAIN OR HEAD (92923)  COMPARISON:  [facility] , CT, CT BRAIN OR HEAD (01316), 1/04/2022, 5:12 PM.  INDICATIONS:  fall; no thinners; increased weakness  TECHNIQUE:   Noncontrast CT scanning is performed through the brain. Dose reduction techniques were used. Dose information is transmitted to the ACR (American College of Radiology) NRDR (National Radiology Data Registry) which includes the Dose Index Registry.  PATIENT STATED HISTORY: (As transcribed by Technologist)  Increased weakness and fall.    FINDINGS:  There is cerebral atrophy with symmetric prominence of the ventricles. There are patchy areas of low attenuation in the periventricular and deep white matter which are nonspecific but most consistent with small vessel ischemic changes. There is no evidence of hemorrhage, mass, midline shift, or extra-axial fluid collection.  The visualized paranasal sinuses show polypoid mucosal thickening versus retention cyst in the right maxillary sinus.. No evidence of depressed skull fracture.            CONCLUSION: 1. No acute intracranial findings 2. Cerebral atrophy with chronic microvascular ischemic changes.    LOCATION:  [facility]   Dictated by (CST): Olvin Mcdermott MD on 8/13/2024 at 7:36 PM     Finalized by (CST): Olvin Mcdermott MD on 8/13/2024 at 7:37 PM       XR CHEST AP PORTABLE  (CPT=71045)    Result Date: 8/13/2024  PROCEDURE:  XR CHEST AP PORTABLE  (CPT=71045)  TECHNIQUE:  AP chest radiograph was obtained.  COMPARISON:  [facility] , XR, XR CHEST AP PORTABLE  (CPT=71045), 1/02/2022, 12:35 PM.  INDICATIONS:  fall; no thinners; increased weakness  PATIENT STATED HISTORY: (As transcribed by Technologist)              CONCLUSION:  Cardiomegaly with pulmonary venous congestion.  Increased interstitial markings throughout both lungs concerning for pulmonary edema.  More focal airspace disease in the right lung base.   LOCATION:  [facility]      Dictated by (CST): Kenyatta Gonzalez MD on 8/13/2024 at 7:03 PM     Finalized by (CST): Kenyatta Gonzalez MD on 8/13/2024 at 7:03 PM               [facility]     This is an 89-year-old woman here for evaluation of frequent falls weakness, intermittent myoclonic  jerking.  Poor p.o. intake over the past few days.  Differential includes UTI worsening renal function electrolyte abnormality acute intracranial hemorrhage acute CVA, acute MI, volume overload.  Patient has a history of CHF with preserved EF, reviewed notes from 2021, patient was admitted for worsening renal function volume overload did improve with diuresis.  She was recently admitted to [facility] mentioned in discharge summary below, found to have myoclonic jerks, uncontrolled hypertension, similar to that of today.  CT of the head was performed shows no acute intracranial hemorrhage, patient otherwise appears to be neurologically intact.  Did require 1 dose of IV hydralazine blood pressure did improve to 170 systolic, chest x-ray with volume overload patient with oxygen requirement, did receive 40 mg IV Lasix does not currently appear to be on a diuretic.  For further diuresis, continued monitoring.  Case endorsed the [facility] Hospitalist who agrees with plan for admission.    I independently viewed and interpreted the following imaging: CT head with no acute intracranial hemorrhage.      Reviewed [facility] internal medicine discharge summary from 1/27/2024    Formatting of this note is different from the original.  [facility] Hospitalist Discharge Summary     Patient ID:  Asmita Cunningham  [id number]  4/22/1935     Admit date: 1/26/2024     Discharge date: 1/27/2024  6:31 PM      Admitting Physician: Marti Boswell MD      Discharge Physician: Eliza Fuller M.D.     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 Sesay's admission HPI:  \" Pt is a 88 y.o. female with PMH of very poorly controlled hypertension, CKD stage IV, NIDDM 2,, myoclonic  jerks and asterixis, PE, cholecystitis, pancreatitis complicated by perforated viscus (2021), chronic anemia and elevated serum immunoglobulin free light chains presents for increasing tremors at home.     Patient is a poor historian, hard of hearing, nearly blind, Mosotho-speaking.  Family is at bedside to translate and assist with communication.     Able to report patient has caregiver several times a week and this morning noted patient to have uncontrollable flapping tremors beyond baseline.  Patient has history of occasional subtle myoclonic jerks today's symptoms were much worse.  Jerks global, without associated symptoms, no loss of consciousness or AMS.  Denies history of seizures.  Denies lifestyle changes or med changes.  Patient/family report decreased p.o. intake last few days but denies any overt illness or sick contacts.  Patient and family request nephrology eval given her known CKD stage III/IV and concerns symptoms are related to worsening kidney function.  On ER evaluation noted to be significantly hypertensive /78, status post IV hydralazine improved to 150/87 and probably fanny back up to 187/78.  Creatinine 3.29, baseline unknown.  Neurology consulted from ED for jerking motions.  Nephrology consulted for CKD stage IV with suspected RENEA.\"     Acute on chronic myoclonic jerking  - long hx of similar sx, worsening with renal fnx  - ct brain wnl  - ammonia wnl  - neuro consulted, EEG without evidence of seizures  - low suspicion for seizures  - metabolic w/u relatively benign, though bun is 47 this degree of uremia would not be typical for cause of myoclonic jerks  - no obvious medication culprits, but will lower dose of lyrica in case this is contributing to symptoms     Suspected RENEA on CKD 3/4  - suspect uncontrolled htn plays significant role in renal disease  - Cr 3.29, bun 47, baseline perhaps 2.8  - renal consulted, renal US with findings c/w parenchymal disease  - OP follow up      Uncontrolled hypertension with hypertensive urgency  - bp 204/78 on admit  - pt states normal sbp range 160-170 at home  - pt/family declines lowering bp below 150  - s/p iv hydral in ED, pt refuses further  - c/w home coreg, hydral  - nephrology follow up     Chronic anemia, normocytic  Anemia of chronic disease  - Hg 9.7, baseline ~11  - iron studies not consistent with VINI  - b12/folate wnl  - no active source of bleeding by history          Total critical care time: Approximately 60 minutes  Due to a high probability of clinically significant, life threatening deterioration, the patient required my highest level of preparedness to intervene emergently and I personally spent this critical care time directly and personally managing the patient. This critical care time included obtaining a history; examining the patient; pulse oximetry; ordering and review of studies; arranging urgent treatment with development of a management plan; evaluation of patient's response to treatment; frequent reassessment; and, discussions with other providers.  This critical care time was performed to assess and manage the high probability of imminent, life-threatening deterioration that could result in multi-organ failure. It was exclusive of separately billable procedures and treating other patients and teaching time.                           Medical Decision Making      Disposition and Plan     Clinical Impression:  1. Acute respiratory failure with hypoxia (HCC)    2. Myoclonic jerking    3. Uncontrolled hypertension    4. Hypervolemia, unspecified hypervolemia type    5. Frequent falls         Disposition:  Admit  8/13/2024  9:04 pm    Follow-up:  No follow-up provider specified.        Medications Prescribed:  Current Discharge Medication List                            Hospital Problems       Present on Admission  Date Reviewed: 4/7/2022            ICD-10-CM Noted POA    * (Principal) Acute respiratory failure with hypoxia  (HCC) J96.01 8/13/2024 Unknown

## 2024-08-14 ENCOUNTER — APPOINTMENT (OUTPATIENT)
Dept: CV DIAGNOSTICS | Facility: HOSPITAL | Age: 89
End: 2024-08-14
Attending: STUDENT IN AN ORGANIZED HEALTH CARE EDUCATION/TRAINING PROGRAM
Payer: MEDICARE

## 2024-08-14 LAB
ALBUMIN SERPL-MCNC: 3.2 G/DL (ref 3.2–4.8)
ALBUMIN/GLOB SERPL: 1.1 {RATIO} (ref 1–2)
ALP LIVER SERPL-CCNC: 73 U/L
ALT SERPL-CCNC: 7 U/L
ANION GAP SERPL CALC-SCNC: 6 MMOL/L (ref 0–18)
AST SERPL-CCNC: 17 U/L (ref ?–34)
ATRIAL RATE: 468 BPM
BASOPHILS # BLD AUTO: 0.04 X10(3) UL (ref 0–0.2)
BASOPHILS NFR BLD AUTO: 0.4 %
BILIRUB SERPL-MCNC: 0.4 MG/DL (ref 0.2–1.1)
BUN BLD-MCNC: 49 MG/DL (ref 9–23)
CALCIUM BLD-MCNC: 8.6 MG/DL (ref 8.7–10.4)
CHLORIDE SERPL-SCNC: 105 MMOL/L (ref 98–112)
CO2 SERPL-SCNC: 25 MMOL/L (ref 21–32)
CREAT BLD-MCNC: 3.86 MG/DL
EGFRCR SERPLBLD CKD-EPI 2021: 11 ML/MIN/1.73M2 (ref 60–?)
EOSINOPHIL # BLD AUTO: 0.05 X10(3) UL (ref 0–0.7)
EOSINOPHIL NFR BLD AUTO: 0.5 %
ERYTHROCYTE [DISTWIDTH] IN BLOOD BY AUTOMATED COUNT: 15.3 %
EST. AVERAGE GLUCOSE BLD GHB EST-MCNC: 120 MG/DL (ref 68–126)
GLOBULIN PLAS-MCNC: 2.9 G/DL (ref 2–3.5)
GLUCOSE BLD-MCNC: 106 MG/DL (ref 70–99)
GLUCOSE BLD-MCNC: 120 MG/DL (ref 70–99)
GLUCOSE BLD-MCNC: 126 MG/DL (ref 70–99)
GLUCOSE BLD-MCNC: 147 MG/DL (ref 70–99)
GLUCOSE BLD-MCNC: 151 MG/DL (ref 70–99)
GLUCOSE BLD-MCNC: 94 MG/DL (ref 70–99)
HBA1C MFR BLD: 5.8 % (ref ?–5.7)
HCT VFR BLD AUTO: 29.4 %
HGB BLD-MCNC: 10 G/DL
IMM GRANULOCYTES # BLD AUTO: 0.09 X10(3) UL (ref 0–1)
IMM GRANULOCYTES NFR BLD: 0.8 %
INR BLD: 1.05 (ref 0.8–1.2)
LYMPHOCYTES # BLD AUTO: 2.59 X10(3) UL (ref 1–4)
LYMPHOCYTES NFR BLD AUTO: 24 %
MAGNESIUM SERPL-MCNC: 2.9 MG/DL (ref 1.6–2.6)
MCH RBC QN AUTO: 30.8 PG (ref 26–34)
MCHC RBC AUTO-ENTMCNC: 34 G/DL (ref 31–37)
MCV RBC AUTO: 90.5 FL
MONOCYTES # BLD AUTO: 0.86 X10(3) UL (ref 0.1–1)
MONOCYTES NFR BLD AUTO: 8 %
NEUTROPHILS # BLD AUTO: 7.14 X10 (3) UL (ref 1.5–7.7)
NEUTROPHILS # BLD AUTO: 7.14 X10(3) UL (ref 1.5–7.7)
NEUTROPHILS NFR BLD AUTO: 66.3 %
OSMOLALITY SERPL CALC.SUM OF ELEC: 295 MOSM/KG (ref 275–295)
P-R INTERVAL: 240 MS
PHOSPHATE SERPL-MCNC: 6.1 MG/DL (ref 2.4–5.1)
PLATELET # BLD AUTO: 146 10(3)UL (ref 150–450)
POTASSIUM SERPL-SCNC: 4.8 MMOL/L (ref 3.5–5.1)
PROT SERPL-MCNC: 6.1 G/DL (ref 5.7–8.2)
PROTHROMBIN TIME: 13.7 SECONDS (ref 11.6–14.8)
Q-T INTERVAL: 434 MS
QRS DURATION: 90 MS
QTC CALCULATION (BEZET): 451 MS
R AXIS: -9 DEGREES
RBC # BLD AUTO: 3.25 X10(6)UL
SODIUM SERPL-SCNC: 136 MMOL/L (ref 136–145)
T AXIS: 53 DEGREES
VENTRICULAR RATE: 65 BPM
WBC # BLD AUTO: 10.8 X10(3) UL (ref 4–11)

## 2024-08-14 PROCEDURE — 99232 SBSQ HOSP IP/OBS MODERATE 35: CPT | Performed by: HOSPITALIST

## 2024-08-14 PROCEDURE — 93306 TTE W/DOPPLER COMPLETE: CPT | Performed by: STUDENT IN AN ORGANIZED HEALTH CARE EDUCATION/TRAINING PROGRAM

## 2024-08-14 RX ORDER — NIFEDIPINE 30 MG/1
30 TABLET, EXTENDED RELEASE ORAL ONCE
Status: COMPLETED | OUTPATIENT
Start: 2024-08-14 | End: 2024-08-14

## 2024-08-14 RX ORDER — NIFEDIPINE 30 MG/1
30 TABLET, EXTENDED RELEASE ORAL DAILY
Status: DISCONTINUED | OUTPATIENT
Start: 2024-08-14 | End: 2024-08-14

## 2024-08-14 RX ORDER — CARVEDILOL 12.5 MG/1
25 TABLET ORAL 2 TIMES DAILY WITH MEALS
Status: DISCONTINUED | OUTPATIENT
Start: 2024-08-14 | End: 2024-08-16

## 2024-08-14 RX ORDER — FUROSEMIDE 10 MG/ML
40 INJECTION INTRAMUSCULAR; INTRAVENOUS ONCE
Status: COMPLETED | OUTPATIENT
Start: 2024-08-14 | End: 2024-08-14

## 2024-08-14 RX ORDER — NIFEDIPINE 30 MG
30 TABLET, EXTENDED RELEASE ORAL DAILY
COMMUNITY
End: 2024-08-16

## 2024-08-14 RX ORDER — GABAPENTIN 100 MG/1
100 CAPSULE ORAL 2 TIMES DAILY
Status: ON HOLD | COMMUNITY
End: 2024-08-16

## 2024-08-14 RX ORDER — VALACYCLOVIR HYDROCHLORIDE 1 G/1
1000 TABLET, FILM COATED ORAL 3 TIMES DAILY
COMMUNITY
End: 2024-08-16

## 2024-08-14 RX ORDER — ASCORBIC ACID 500 MG
500 TABLET ORAL DAILY
COMMUNITY
End: 2024-08-16

## 2024-08-14 RX ORDER — HEPARIN SODIUM 5000 [USP'U]/ML
5000 INJECTION, SOLUTION INTRAVENOUS; SUBCUTANEOUS EVERY 12 HOURS SCHEDULED
Status: DISCONTINUED | OUTPATIENT
Start: 2024-08-14 | End: 2024-08-16

## 2024-08-14 RX ORDER — TRAMADOL HYDROCHLORIDE 50 MG/1
50 TABLET ORAL DAILY PRN
Status: ON HOLD | COMMUNITY
End: 2024-08-16

## 2024-08-14 RX ORDER — NIFEDIPINE 60 MG/1
60 TABLET, EXTENDED RELEASE ORAL DAILY
Status: DISCONTINUED | OUTPATIENT
Start: 2024-08-15 | End: 2024-08-16

## 2024-08-14 NOTE — H&P
[facility]     Asmita Cunningham Patient Status:  Emergency    1935 MRN [id number]   Location [facility] EMERGENCY DEPARTMENT Attending Bernabe Purdy MD   Hosp Day # 0 PCP Marlon Charles MD     Chief Complaint: Fall    History of Present Illness: Asmita Cunningham is a 89 year old female with PMHx HTN, myoclonic jerks, hypothyroidism, macular degeneration who presents for fall at home.     Patient notes that she was in normal state of health yesterday and this morning started feeling weak, was having myoclonic shaking episodes at home when she attempted to go to the shower, felt weak and fell to the floor, no head trauma noted.  States that she was unable to get up from the floor and thus EMS called for evaluation.  Patient denies any other associated fevers, chills, cough, congestion, rhinorrhea, dysuria, urinary frequency.     Past Medical History:  Past Medical History:    Acute pancreatitis, unspecified complication status, unspecified pancreatitis type (HCC)    Arthritis    Back disorder    Back pain    Blood disorder    thrombocytopenia    Cholangitis (HCC)    Diabetes (HCC)    Disorder of thyroid    Essential hypertension    Gout    Hearing impairment    some loss    Hepatic abscess (HCC)    Neuropathy    Pneumonia due to organism    recently 2022    Renal disorder    CKD    Stroke (HCC)    mini     UTI (urinary tract infection)    Visual impairment    left eye doesnt see\",  macular degeneration        Past Surgical History:   Past Surgical History:   Procedure Laterality Date    Appendectomy      Appendectomy      Knee replacement surgery  2016    right    Tonsillectomy         Social History:  reports that she has never smoked. She has never used smokeless tobacco. She reports that she does not drink alcohol and does not use drugs.    Family History: History reviewed. No pertinent family history.    Allergies:   Allergies   Allergen Reactions     Ibuprofen ITCHING       Medications:    No current facility-administered medications on file prior to encounter.     Current Outpatient Medications on File Prior to Encounter   Medication Sig Dispense Refill    amLODIPine 10 MG Oral Tab Take 1 tablet (10 mg total) by mouth daily. 30 tablet 0    ferrous sulfate 325 (65 FE) MG Oral Tab EC Take 1 tablet (325 mg total) by mouth every other day. (Patient taking differently: Take 325 mg by mouth 3 (three) times daily.) 30 tablet 0    apixaban 5 MG Oral Tab Take 2 tabs (10mg) by mouth twice daily for 7 days, then take 1 tab (5mg) by mouth twice daily thereafter. (Patient taking differently: Take 5 mg by mouth 2 (two) times daily.) 74 tablet 0    Melatonin 3 MG Oral Cap Take 9 mg by mouth at bedtime.      hydrALAZINE 50 MG Oral Tab Take 50 mg by mouth every 8 (eight) hours. Hold for SBP <100, HR <60 90 tablet 0    carvedilol 25 MG Oral Tab Take 1 tablet (25 mg total) by mouth 2 (two) times daily with meals.  0    pantoprazole 40 MG Oral Tab EC Take 1 tablet (40 mg total) by mouth daily. 30 tablet 0    Ipratropium-Albuterol (COMBIVENT RESPIMAT)  MCG/ACT Inhalation Aero Soln Inhale 1 puff into the lungs 4 (four) times daily as needed (SOB). 1 Inhaler 1    guaiFENesin-codeine 100-10 MG/5ML Oral Solution Take 10 mL by mouth every 6 (six) hours as needed for cough or congestion. 240 mL 0    ergocalciferol 90642 units Oral Cap Take 50,000 Units by mouth once a week.      Linagliptin 5 MG Oral Tab Take 5 mg by mouth daily.        Levothyroxine Sodium 50 MCG Oral Tab Take 50 mcg by mouth before breakfast.      Lutein 40 MG Oral Cap Take 40 mg by mouth nightly.           Review of Systems:   A comprehensive 14 point review of systems was completed.    Pertinent positives and negatives noted in the HPI.    Physical Exam:    BP (!) 175/98   Pulse 69   Temp 97.2 °F (36.2 °C) (Temporal)   Resp 14   SpO2 100%   General: No acute distress. Alert and oriented x 3.  HEENT:  Normocephalic atraumatic. Moist mucous membranes. EOM-I. PERRLA. Anicteric.  Neck: No lymphadenopathy. No JVD. No carotid bruits.  Respiratory: Clear to auscultation bilaterally. No wheezes. No rhonchi.  Cardiovascular: S1, S2. Regular rate and rhythm. No murmurs, rubs or gallops. Equal pulses.   Chest and Back: No tenderness or deformity.  Abdomen: Soft, nontender, nondistended.  Positive bowel sounds. No rebound, guarding or organomegaly.  Neurologic: No focal neurological deficits. CNII-XII grossly intact.  Musculoskeletal: Moves all extremities.  Extremities: No edema or cyanosis.  Integument: No rashes or lesions.   Psychiatric: Appropriate mood and affect.    Diagnostic Data:      Labs:  Recent Labs   Lab 08/13/24  1738   WBC 10.0   HGB 11.0*   MCV 91.0   .0       Recent Labs   Lab 08/13/24  1738   *   BUN 49*   CREATSERUM 3.41*   CA 8.9   ALB 3.9   *   K 5.1      CO2 24.0   ALKPHO 82   AST 19   ALT 10   BILT 0.4   TP 7.4       CrCl cannot be calculated (Unknown ideal weight.).    No results for input(s): \"PTP\", \"INR\" in the last 168 hours.    No results for input(s): \"TROP\", \"CK\" in the last 168 hours.    Imaging: Imaging data reviewed in [facility].  CT BRAIN OR HEAD (CPT=70450)    Result Date: 8/13/2024  CONCLUSION: 1. No acute intracranial findings 2. Cerebral atrophy with chronic microvascular ischemic changes.    LOCATION:  [facility]   Dictated by (CST): Olvin Mcdermott MD on 8/13/2024 at 7:36 PM     Finalized by (CST): Olvin Mcdermott MD on 8/13/2024 at 7:37 PM       XR CHEST AP PORTABLE  (CPT=71045)    Result Date: 8/13/2024  CONCLUSION:  Cardiomegaly with pulmonary venous congestion.  Increased interstitial markings throughout both lungs concerning for pulmonary edema.  More focal airspace disease in the right lung base.   LOCATION:  [facility]      Dictated by (CST): Kenyatta Gonzalez MD on 8/13/2024 at 7:03 PM     Finalized by (CST): Kenyatta Gonzalez MD on 8/13/2024 at 7:03 PM           ASSESSMENT / PLAN:     # ADHF   - CXR with findings c/f pulm edema   - s/p IV lasix in ER, hold further diuresis pending TTE   - TTE ordered   - Wean O2 as tolerated     # Myoclonic jerking   - Per discharge summary from [facility], long history of chronic myoclonic jerking episodes, workup negative, EEG without evidence of seizures    # Fall   - CTH without acute findings   - CXR without c/f PNA   - UA without findings c/f UTI   - COVID/flu negative    - PT/OT/SW eval    # HTN urgency    - now resolving, resume home BP meds, add hydral PO PRN     # RENEA on CKD - diuresis as above, trend chem  # Hypothyroidism - continue home levothyroxine   # GERD - continue PPI  # Pre-DM2 - LDSSI, repeat A1c ordered      Code Status: Full Code    Plan of care discussed with patient, ED physician    Bam Bah MD  8/13/2024      Supplementary Documentation:      MDM : Patient's ER labs, imaging reviewed.  A.m. labs ordered.  ER management discussed with ED physician, decision made for patient to be admitted to the hospital for further medical management.

## 2024-08-14 NOTE — ED QUICK NOTES
Orders for admission, patient is aware of plan and ready to go upstairs. Any questions, please call [facility] at extension [phone].     Patient Covid vaccination status: Unvaccinated     COVID Test Ordered in ED: SARS-CoV-2/Flu A and B/RSV by PCR (GeneXpert)    COVID Suspicion at Admission: N/A    Running Infusions:  None    Mental Status/LOC at time of transport: AOX3-4; Citizen of [place] SPEAKING ONLY    Other pertinent information: Daughter temperarly at bedside; walks w/walker    CIWA score: N/A   NIH score:  N/A

## 2024-08-14 NOTE — PROGRESS NOTES
[facility]   part of [facility]     Hospitalist Progress Note     Asmita Cunningham Patient Status:  Inpatient    1935 MRN [id number]   Location [facility] 2NE-A Attending Myla Oliver,    Hosp Day # 1 PCP Marlon Charles MD     Chief Complaint: fall    Subjective:     Patient seen w/ caretaker at the bedside. No cp or sob    Objective:    Review of Systems:   A comprehensive review of systems was completed; pertinent positive and negatives stated in subjective.    Vital signs:  Temp:  [97.2 °F (36.2 °C)-97.9 °F (36.6 °C)] 97.7 °F (36.5 °C)  Pulse:  [54-72] 54  Resp:  [14-42] 22  BP: (124-236)/() 162/51  SpO2:  [93 %-100 %] 98 %    Physical Exam:    General: No acute distress  Respiratory: No wheezes, no rhonchi  Cardiovascular: S1, S2, regular rate and rhythm  Abdomen: Soft, Non-tender, non-distended, positive bowel sounds  Neuro: No new focal deficits.   Extremities: LE edema present    Diagnostic Data:    Labs:  Recent Labs   Lab 248 24  0634   WBC 10.0 10.8   HGB 11.0* 10.0*   MCV 91.0 90.5   .0 146.0*   INR  --  1.05       Recent Labs   Lab 248 24  0634   * 94   BUN 49* 49*   CREATSERUM 3.41* 3.86*   CA 8.9 8.6*   ALB 3.9 3.2   * 136   K 5.1 4.8    105   CO2 24.0 25.0   ALKPHO 82 73   AST 19 17   ALT 10 7*   BILT 0.4 0.4   TP 7.4 6.1       CrCl cannot be calculated (Unknown ideal weight.).    Recent Labs   Lab 24  1738   TROPHS 6       Recent Labs   Lab 24  0634   PTP 13.7   INR 1.05                  Microbiology    No results found for this visit on 24.      Imaging: Reviewed in Epic.    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, workup negative, EEG without evidence of seizures   -pt on valacyclovir, will dc for now and monitor    #Fall  -2/2 above  -threapies to see    #HTN urgency  -resume home meds, add po hydralazine  -increase nifedipine     #Peripheral neuropathy  -on gabapentin 100mg, will increase to 300    # RENEA on CKD unknown stage - diuresis as above, trend chem  # Hypothyroidism - continue home levothyroxine   # GERD - continue PPI  # Pre-DM2   -A1c 5.8  -ISS         Myla Oliver,     Supplementary Documentation:     Quality:  DVT Mechanical Prophylaxis:   SCDs, Early ambuation  DVT Pharmacologic Prophylaxis   Medication    apixaban (Eliquis) tab 5 mg                Code Status: Full Code  Alberto: External urinary catheter in place  Alberto Duration (in days):   Central line:    VIRGIL: 8/15/2024    Discharge is dependent on: clinical improvement  At this point Ms. Cunningham is expected to be discharge to: tbd    The 21st Century Cures Act makes medical notes like these available to patients in the interest of transparency. Please be advised this is a medical document. Medical documents are intended to carry relevant information, facts as evident, and the clinical opinion of the practitioner. The medical note is intended as peer to peer communication and may appear blunt or direct. It is written in medical language and may contain abbreviations or verbiage that are unfamiliar.              **Certification      PHYSICIAN Certification of Need for Inpatient Hospitalization - Initial Certification    Patient will require inpatient services that will reasonably be expected to span two midnight's based on the clinical documentation in H+P.   Based on patients current state of illness, I anticipate that, after discharge, patient will  require TBD.

## 2024-08-14 NOTE — PLAN OF CARE
NURSING ADMISSION NOTE      Patient admitted via Cart  Oriented to room.  Safety precautions initiated.  Bed in low position.  Call light in reach.    Patient admitted for acute respiratory failure with hypoxia .  Admission assessment complete.     Admission navigator completed with help of grand-daughter via phone and .  Admission orders received and initiated.

## 2024-08-14 NOTE — CM/SW NOTE
08/14/24 1200   CM/SW Referral Data   Referral Source Social Work (self-referral)   Reason for Referral Discharge planning   Informant EMR;Clinical Staff Member;Patient;Other  (Caregiver)   Medical Hx   Does patient have an established PCP? Yes   Patient Info   Patient's Home Environment Condo/Apt with elevator   Number of Levels in Home 1   Patient lives with Alone   Patient Status Prior to Admission   Services in place prior to admission Home Health Care;DME/Supplies at home   Home Health Provider Info [facility]   Type of DME/Supplies Wheeled Walker;Wheelchair;Shower Chair   Discharge Needs   Anticipated D/C needs Home health care     HOME SITUATION per PT eval  Type of Home: Apartment (senior living)   Home Layout: One level;Elevator  Stairs to Enter : 0  Stairs to Bedroom: 0     Lives With: Alone;Caregiver part-time  Patient Owned Equipment: Rolling walker     Prior Level of [place] per PT eval: Pt reports she is typically mod ind with ADLs except CG assist with showers and groceries. Pt ambulates with a RW.    Patient is an 89 y/o female admitted due to acute respiratory failure. SW noted and acknowledged consult order for discharge planning. Per PT eval, anticipated therapy need for pt at [place] is home health.    NEFTALY met with pt and pt's caregiver (CG) at bedside to discuss DC plan. Pt's CG understands and speaks English, so she was able to translate for pt and provide additional information.    Pt lives in a 1 level apartment. There is elevator access for pt to utilize in her apartment building. Per CG, pt owns a rolling walker, wheelchair, and shower chair at home. CG is with pt M,W,F for 5 hours a day and T,TH for 3 hours a day. Pt's daughter Sowmya lives in [place], but pt's granddaughter, Anupama, lives in the [facility] close to pt.    NEFTALY discussed  services for pt at [place]. CG informed NEFTALY that pt is current with [facility] and they would like for pt to use this agency  again at [place]. NETFALY stated she would send referral to them. Pt and CG thanked NEFTALY and denied having any further questions at this time.    NEFTALY sent HH referral to [facility] via [facility]. NEFTALY also sent a message to them inquiring if pt is current with them. Awaiting response.     to remain available for support and/or discharge planning.    Addendum: NEFTALY was notified by [facility] that pt is current with them. NEFTALY place ELIZABETH order and uploaded it to [employer] referral. NEFTALY reserved [facility] in [employer] and added their information to pt's AVS.    RADHA Romero  Discharge Planner  [phone]

## 2024-08-14 NOTE — PLAN OF CARE
Received pt at 0730.  Pt is A&Ox4, Montene[place]in speaking-  utilized, no pain. On 2L of 02 via [place], baseline room air, lungs diminished, no coughing. NSR/SB, no chest pain. Mixed continence of B&B, purewick in place. Pt updated with plan of care.     Approx 0800- spoke with daughter Sowmya, per daughter- unaware of all medications- caregiver coming around 10 AM and can review the remaining medications with staff.        Problem: CARDIOVASCULAR - ADULT  Goal: Maintains optimal cardiac output and hemodynamic stability  Description: INTERVENTIONS:  - Monitor vital signs, rhythm, and trends  - Monitor for bleeding, hypotension and signs of decreased cardiac output  - Evaluate effectiveness of vasoactive medications to optimize hemodynamic stability  - Monitor arterial and/or venous puncture sites for bleeding and/or hematoma  - Assess quality of pulses, skin color and temperature  - Assess for signs of decreased coronary artery perfusion - ex. Angina  - Evaluate fluid balance, assess for edema, trend weights  8/14/2024 0917 by Nancy Roe RN  Outcome: Progressing  8/14/2024 0916 by Nancy Roe RN  Outcome: Progressing  Goal: Absence of cardiac arrhythmias or at baseline  Description: INTERVENTIONS:  - Continuous cardiac monitoring, monitor vital signs, obtain 12 lead EKG if indicated  - Evaluate effectiveness of antiarrhythmic and heart rate control medications as ordered  - Initiate emergency measures for life threatening arrhythmias  - Monitor electrolytes and administer replacement therapy as ordered  8/14/2024 0917 by Nancy Roe, RN  Outcome: Progressing  8/14/2024 0916 by Nancy Roe RN  Outcome: Progressing     Problem: SAFETY ADULT - FALL  Goal: Free from fall injury  Description: INTERVENTIONS:  - Assess pt frequently for physical needs  - Identify cognitive and physical deficits and behaviors that affect risk of falls.  - [place] fall precautions as indicated by assessment.  - Educate  pt/family on patient safety including physical limitations  - Instruct pt to call for assistance with activity based on assessment  - Modify environment to reduce risk of injury  - Provide assistive devices as appropriate  - Consider OT/PT consult to assist with strengthening/mobility  - Encourage toileting schedule  8/14/2024 0917 by Nancy Roe RN  Outcome: Progressing  8/14/2024 0916 by Nancy Roe RN  Outcome: Progressing     Problem: Patient/Family Goals  Goal: Patient/Family Long Term Goal  Description: Patient's Long Term Goal: to prevent readmission  Stay out of hospital 8-14    Interventions:  - take medications as prescribed  - med compliance, follow ups    - See additional Care Plan goals for specific interventions  8/14/2024 0917 by Nancy Roe RN  Outcome: Progressing  8/14/2024 0916 by Nancy Roe RN  Outcome: Progressing  Goal: Patient/Family Short Term Goal  Description: Patient's Short Term Goal: to be discharged  No pain 8-14    Interventions:   - Echo  - BP managmement  - PRN meds, hot/cold packs, tell nurse in  Problem: RESPIRATORY - ADULT  Goal: Achieves optimal ventilation and oxygenation  Description: INTERVENTIONS:  - Assess for changes in respiratory status  - Assess for changes in mentation and behavior  - Position to facilitate oxygenation and minimize respiratory effort  - Oxygen supplementation based on oxygen saturation or ABGs  - Provide Smoking Cessation handout, if applicable  - Encourage broncho-pulmonary hygiene including cough, deep breathe, Incentive Spirometry  - Assess the need for suctioning and perform as needed  - Assess and instruct to report SOB or any respiratory difficulty  - Respiratory Therapy support as indicated  - Manage/alleviate anxiety  - Monitor for signs/symptoms of CO2 retention  8/14/2024 0917 by Nancy Roe RN  Outcome: Not Progressing  8/14/2024 0916 by Nancy Roe RN  Outcome: Progressing    pain     - See additional Care Plan  goals for specific interventions  8/14/2024 0917 by Nancy Roe, RN  Outcome: Progressing  8/14/2024 0916 by Nancy Roe, RN  Outcome: Progressing

## 2024-08-14 NOTE — ED QUICK NOTES
RN Station patient report given to MEÑO Dillard. Plan of care reviewed. Pt up for CT and needs hydralzine order; daughter at bedside; will be admitted.

## 2024-08-14 NOTE — OCCUPATIONAL THERAPY NOTE
OCCUPATIONAL THERAPY EVALUATION - INPATIENT    Room Number: 2600/2600-A  Evaluation Date: 8/14/2024     Type of Evaluation: Initial  Presenting Problem: fall    Physician Order: IP Consult to Occupational Therapy  Reason for Therapy:  ADL/IADL Dysfunction and Discharge Planning      OCCUPATIONAL THERAPY ASSESSMENT   Patient is a 89 year old female admitted on 8/13/2024 with Presenting Problem: fall. Co-Morbidities : myoclonic jerks, HTN, macular degneration, hypothyroidism, DM, neuropathy, CVA, TKA  Patient is currently functioning near baseline with toileting, upper body dressing, lower body dressing, grooming, bed mobility, transfers, static sitting balance, dynamic sitting balance, static standing balance, dynamic standing balance, maintaining seated position, and functional standing tolerance.  Prior to admission, patient's baseline is Mod I.  Patient met all OT goals at Sup level.  Patient reports no further questions/concerns at this time.     Patient will benefit from continued skilled OT Services at discharge to promote prior level of function and safety with additional support and return home with home health OT      WEIGHT BEARING RESTRICTION  Weight Bearing Restriction: None                Recommendations for nursing staff:   Transfers: Sup  Toileting location: Toilet    EVALUATION SESSION:  Patient at start of session: supine in bed for session  FUNCTIONAL TRANSFER ASSESSMENT  Sit to Stand: Edge of Bed; Chair  Edge of Bed: Supervision  Chair: Supervision    BED MOBILITY  Rolling: Supervision  Supine to Sit : Supervision  Scooting: Sup to EOB    BALANCE ASSESSMENT  Static Sitting: Supervision  Sitting Bilateral: Supervision  Static Standing: Supervision  Standing Bilateral: Supervision (to amb in room)    FUNCTIONAL ADL ASSESSMENT  UB Dressing Seated: Supervision (for shall)  LB Dressing Seated: Supervision (for doffing and donning brief in standing and socks in sitting)      ACTIVITY TOLERANCE: vitals  stable                         O2 SATURATIONS       COGNITION  Overall Cognitive Status:  WFL - within functional limits  COGNITION ASSESSMENTS       Upper Extremity:   ROM: within functional limits   Strength: is within functional limits   Coordination:  Gross motor: WNL  Fine motor: WNL  Sensation: Light touch:  intact    EDUCATION PROVIDED  Patient: Role of Occupational Therapy; Plan of Care  Patient's Response to Education: Verbalized Understanding; Returned Demonstration    Equipment used: RW  Demonstrates functional use    Therapist comments: Pt reported fatigue at home, pt educated on work simplification and energy conservation for at home to assist with independence with fatigue at home.    Patient End of Session: Up in chair;Needs met;Call light within reach;All patient questions and concerns addressed;SCDs in place;Alarm set    OCCUPATIONAL PROFILE    HOME SITUATION  Type of Home: Apartment (senior living)  Home Layout: One level;Elevator  Lives With: Alone;Caregiver part-time    Toilet and Equipment: Standard height toilet  Shower/Tub and Equipment: Walk-in shower  Other Equipment: None    Occupation/Status: retired  Hand Dominance: Right          Prior Level of Function: Pt typically independent with ADLs and mobility. Pt does not use AD.    SUBJECTIVE  Pt stated, \"I can do it.\"    PAIN ASSESSMENT  Ratin  Location: no pain at this time       OBJECTIVE  Precautions: Bed/chair alarm;Low vision; needed  Fall Risk: High fall risk    WEIGHT BEARING RESTRICTION  Weight Bearing Restriction: None                AM-PAC ‘6-Clicks’ Inpatient Daily Activity Short Form  -   Putting on and taking off regular lower body clothing?: A Little  -   Bathing (including washing, rinsing, drying)?: A Little  -   Toileting, which includes using toilet, bedpan or urinal? : A Little  -   Putting on and taking off regular upper body clothing?: A Little  -   Taking care of personal grooming such as brushing teeth?: A  Little  -   Eating meals?: A Little    AM-PAC Score:  Score: 18  Approx Degree of Impairment: 46.65%  Standardized Score (AM-PAC Scale): 38.66      ADDITIONAL TESTS     NEUROLOGICAL FINDINGS        PLAN   Patient has been evaluated and presents with no skilled Occupational Therapy needs at this time.  Patient discharged from Occupational Therapy services.  Please re-order if a new functional limitation presents during this admission.      Patient Evaluation Complexity Level:   Occupational Profile/Medical History LOW - Brief history including review of medical or therapy records    Specific performance deficits impacting engagement in ADL/IADL LOW  1 - 3 performance deficits    Client Assessment/Performance Deficits LOW - No comorbidities nor modifications of tasks    Clinical Decision Making LOW - Analysis of occupational profile, problem-focused assessments, limited treatment options    Overall Complexity LOW     OT Session Time: 30 minutes  Self-Care Home Management: 15 minutes  Therapeutic Activity: 0 minutes  Neuromuscular Re-education: 0 minutes  Therapeutic Exercise: 0 minutes  Cognitive Skills: 0 minutes  Sensory Integrative: 0 minutes  Orthotic Management and Trainin minutes  Can add/delete any of these

## 2024-08-14 NOTE — PHYSICAL THERAPY NOTE
PHYSICAL THERAPY EVALUATION - INPATIENT     Room Number: 2600/2600-A  Evaluation Date: 8/14/2024  Type of Evaluation: Initial  Physician Order: PT Eval and Treat    Presenting Problem: acute respiratory failure, hypertensive urgency, fall  Co-Morbidities : myoclonic jerks, HTN, macular degneration, hypothyroidism, DM, neuropathy, CVA, TKA  Reason for Therapy: Mobility Dysfunction and Discharge Planning    PHYSICAL THERAPY ASSESSMENT   Patient is currently functioning below baseline with bed mobility, transfers, and gait.  Prior to admission, patient's baseline is mod ind with RW.  Patient is requiring contact guard assist as a result of the following impairments: decreased functional strength, decreased endurance/aerobic capacity, pain, impaired   balance, decreased muscular endurance, and decreased safety awareness . Physical Therapy will continue to follow for duration of hospitalization.      Patient will benefit from continued skilled PT Services at discharge to promote prior level of function and safety with additional support and return home with home health PT.    PLAN  PT Treatment Plan: Bed mobility;Endurance;Energy conservation;Patient education;Family education;Gait training;Strengthening;Transfer training;Balance training  Rehab Potential : Good  Frequency (Obs): 3-5x/week  Number of Visits to Meet Established Goals: 3      CURRENT GOALS    Goal #1 Patient is able to demonstrate supine - sit EOB @ level: supervision     Goal #2 Patient is able to demonstrate transfers Sit to/from Stand at assistance level: supervision     Goal #3 Patient is able to ambulate 150 feet with assist device: walker - rolling at assistance level: supervision     Goal #4    Goal #5    Goal #6    Goal Comments: Goals established on 8/14/2024      PHYSICAL THERAPY MEDICAL/SOCIAL HISTORY  History related to current admission: Patient is a 89 year old female admitted on 8/13/2024 from home for falls and weakness.  Pt diagnosed with  acute respiratory failure and hypertensive urgency. Imaging negative for acute injury or intracranial process.       HOME SITUATION  Type of Home: Apartment (senior living)   Home Layout: One level;Elevator  Stairs to Enter : 0     Stairs to Bedroom: 0       Lives With: Alone;Caregiver part-time     Patient Owned Equipment: Rolling walker       Prior Level of [place]: Pt reports she is typically mod ind with ADLs except CG assist with showers and groceries. Pt ambulates with a RW.     SUBJECTIVE  Pt pleasant and cooperative      OBJECTIVE  Precautions: Bed/chair alarm;Low vision; needed  Fall Risk: High fall risk    WEIGHT BEARING RESTRICTION  Weight Bearing Restriction: None                PAIN ASSESSMENT  Rating: Unable to rate  Location: RLE  Management Techniques: Activity promotion;Relaxation;Repositioning    COGNITION  Orientation Level:  oriented x4  Memory:  impaired working memory and forgetful  Safety Judgement:  decreased awareness of need for assistance and decreased awareness of need for safety  Awareness of Errors:  assistance required to identify errors made, assistance required to correct errors made, and decreased awareness of errors     RANGE OF MOTION AND STRENGTH ASSESSMENT  Upper extremity ROM and strength are within functional limits     Lower extremity ROM is within functional limits     Lower extremity strength is within functional limits, except mild deconditioning      BALANCE  Static Sitting: Good  Dynamic Sitting: Good  Static Standing: Poor +  Dynamic Standing: Poor +    ADDITIONAL TESTS                                    ACTIVITY TOLERANCE   SBP in the 180s, RN notified. Pt asymptomatic. Pt on RA for session with stable SPO2.                       O2 WALK       NEUROLOGICAL FINDINGS                        AM-PAC '6-Clicks' INPATIENT SHORT FORM - BASIC MOBILITY  How much difficulty does the patient currently have...  Patient Difficulty: Turning over in bed (including  adjusting bedclothes, sheets and blankets)?: None   Patient Difficulty: Sitting down on and standing up from a chair with arms (e.g., wheelchair, bedside commode, etc.): A Little   Patient Difficulty: Moving from lying on back to sitting on the side of the bed?: A Little   How much help from another person does the patient currently need...   Help from Another: Moving to and from a bed to a chair (including a wheelchair)?: A Little   Help from Another: Need to walk in hospital room?: A Little   Help from Another: Climbing 3-5 steps with a railing?: A Little       AM-PAC Score:  Raw Score: 19   Approx Degree of Impairment: 41.77%   Standardized Score (AM-PAC Scale): 45.44   CMS Modifier (G-Code): CK    FUNCTIONAL ABILITY STATUS  Gait Assessment   Functional Mobility/Gait Assessment  Gait Assistance: Contact guard assist  Distance (ft): 5  Assistive Device: Rolling walker  Pattern: Within Functional Limits    Skilled Therapy Provided: Per RN okay to work with pt. Pt received in supine and was agreeable to PT session.  utilized for session.    Bed Mobility:  Rolling: NT  Supine to sit: supervision   Sit to supine: NT     Transfer Mobility:  Sit to stand: CGA, pt frequently attempting to stand prior to this therapist being ready or RW being in place, needs frequent safety cues   Stand to sit: min A to control descent  Gait = Pt ambulated in room with RW and CGA. Pt with c/o RLE pain (unable to state if this in new or chronic)     Pt assisted in changing brief.    Therapist's Comments: Pt educated on role of therapy, goals for session, safety, fall prevention, and activity recommendations.     Exercise/Education Provided:  Bed mobility  Energy conservation  Functional activity tolerated  Gait training  Posture  Strengthening  Transfer training    Patient End of Session: Up in chair;Needs met;Call light within reach;RN aware of session/findings;All patient questions and concerns addressed;Alarm set      Patient  Evaluation Complexity Level:  History Moderate - 1 or 2 personal factors and/or co-morbidities   Examination of body systems Low -  addressing 1-2 elements   Clinical Presentation Low- Stable   Clinical Decision Making Low Complexity       PT Session Time: 30 minutes  Therapeutic Activity: 10 minutes

## 2024-08-14 NOTE — DISCHARGE INSTRUCTIONS
[facility], [place] [address], [place] [address]  Phone: [phone]  Fax: [phone]    Going Home Instructions  In this section you will find the tools which will guide you through the first few days after you leave the hospital. Continued use of these tools will help you develop the skills necessary to keep your heart failure under control.     Home Care Instructions Following Heart Failure - the most important things to do every day include:   Weigh yourself and review the “Self-Check Plan” sheet every morning.   Call your cardiologist office if you are in the “Pay Attention-Use Caution” (yellow zone) or “Medical Alert-Warning!” (red zone) as outlined in the Self-Check Plan sheet.  Take your medicines as prescribed.  Limit your sodium (salt) intake.  Know when to call your cardiologist, primary doctor, or nurse.  Know when to seek emergency care.      Things for You to Remember:   1. See your doctor or healthcare provider as written on your discharge instructions.  It is important that you attend this appointment to make sure your symptoms are under control.     2. Your recommended sodium intake is 2812-8286 mg daily.    3.  Weigh yourself every day.    4. Some exercise and activity is important to help keep your heart functioning and strong. Unless instructed not to exercise, you may walk at a slow to moderate pace for 10-15 minutes 2-3 days per week to start. Pace your activity to prevent shortness of breath or fatigue. Stop exercising if you develop chest pain, lightheadedness, or significant shortness of breath.       Call Your Cardiologist If:   You gain 2-3 pounds in one day or 5 pounds in one week.  You have more difficulty breathing.  You are getting more tired with normal activity.  You are more short of breath lying down, or awaken at night short of breath.  You have swelling of your feet or legs.  You urinate less often during the day and more often at  night.  You have cramps in your legs.  You have blurred vision or see yellowish-green halos around objects of lights.    Go to the Emergency Room If:   You have pain or tightness in your chest  You are extremely short of breath  You are coughing up pink-frothy mucus  You are traveling and develop symptoms of worsening heart failure      ** Please follow up with your cardiologist or Advanced Practice Provider as written on your discharge instructions. If you are not provided with an appointment, let your nurse know so you can get an appointment**

## 2024-08-14 NOTE — PLAN OF CARE
Assumed care of patient at 2310. Patient is alert and orientated x3-4. Turkish speaking,  used. Currently 2L O2 nasal cannula. Lung sounds clear/diminished. Continuous pulse ox maintained. NSR on tele. Continent of bowel. External catheter in place. Up 1 assist and walker. Call light within reach. Fall precautions in place.    Plan of care:  Echo  Blood pressure management    Problem: CARDIOVASCULAR - ADULT  Goal: Maintains optimal cardiac output and hemodynamic stability  Description: INTERVENTIONS:  - Monitor vital signs, rhythm, and trends  - Monitor for bleeding, hypotension and signs of decreased cardiac output  - Evaluate effectiveness of vasoactive medications to optimize hemodynamic stability  - Monitor arterial and/or venous puncture sites for bleeding and/or hematoma  - Assess quality of pulses, skin color and temperature  - Assess for signs of decreased coronary artery perfusion - ex. Angina  - Evaluate fluid balance, assess for edema, trend weights  Outcome: Progressing  Goal: Absence of cardiac arrhythmias or at baseline  Description: INTERVENTIONS:  - Continuous cardiac monitoring, monitor vital signs, obtain 12 lead EKG if indicated  - Evaluate effectiveness of antiarrhythmic and heart rate control medications as ordered  - Initiate emergency measures for life threatening arrhythmias  - Monitor electrolytes and administer replacement therapy as ordered  Outcome: Progressing     Problem: SAFETY ADULT - FALL  Goal: Free from fall injury  Description: INTERVENTIONS:  - Assess pt frequently for physical needs  - Identify cognitive and physical deficits and behaviors that affect risk of falls.  - [place] fall precautions as indicated by assessment.  - Educate pt/family on patient safety including physical limitations  - Instruct pt to call for assistance with activity based on assessment  - Modify environment to reduce risk of injury  - Provide assistive devices as appropriate  - Consider OT/PT  consult to assist with strengthening/mobility  - Encourage toileting schedule  Outcome: Progressing       Problem: RESPIRATORY - ADULT  Goal: Achieves optimal ventilation and oxygenation  Description: INTERVENTIONS:  - Assess for changes in respiratory status  - Assess for changes in mentation and behavior  - Position to facilitate oxygenation and minimize respiratory effort  - Oxygen supplementation based on oxygen saturation or ABGs  - Provide Smoking Cessation handout, if applicable  - Encourage broncho-pulmonary hygiene including cough, deep breathe, Incentive Spirometry  - Assess the need for suctioning and perform as needed  - Assess and instruct to report SOB or any respiratory difficulty  - Respiratory Therapy support as indicated  - Manage/alleviate anxiety  - Monitor for signs/symptoms of CO2 retention  Outcome: Progressing

## 2024-08-15 ENCOUNTER — APPOINTMENT (OUTPATIENT)
Dept: GENERAL RADIOLOGY | Facility: HOSPITAL | Age: 89
End: 2024-08-15
Attending: HOSPITALIST
Payer: MEDICARE

## 2024-08-15 LAB
ARTERIAL PATENCY WRIST A: POSITIVE
BASE EXCESS BLDA CALC-SCNC: 1.3 MMOL/L (ref ?–2)
BODY TEMPERATURE: 98.6 F
COHGB MFR BLD: 0 % SAT (ref 0–3)
FIO2: 21 %
GLUCOSE BLD-MCNC: 108 MG/DL (ref 70–99)
GLUCOSE BLD-MCNC: 122 MG/DL (ref 70–99)
GLUCOSE BLD-MCNC: 138 MG/DL (ref 70–99)
GLUCOSE BLD-MCNC: 155 MG/DL (ref 70–99)
HCO3 BLDA-SCNC: 26 MEQ/L (ref 21–27)
HGB BLD-MCNC: 9 G/DL
METHGB MFR BLD: 0 % SAT (ref 0.4–1.5)
OXYHGB MFR BLDA: 97 % (ref 92–100)
PCO2 BLDA: 31 MM HG (ref 35–45)
PH BLDA: 7.5 [PH] (ref 7.35–7.45)
PO2 BLDA: 70 MM HG (ref 80–100)

## 2024-08-15 PROCEDURE — 99233 SBSQ HOSP IP/OBS HIGH 50: CPT | Performed by: HOSPITALIST

## 2024-08-15 PROCEDURE — 73502 X-RAY EXAM HIP UNI 2-3 VIEWS: CPT | Performed by: HOSPITALIST

## 2024-08-15 PROCEDURE — 71045 X-RAY EXAM CHEST 1 VIEW: CPT | Performed by: HOSPITALIST

## 2024-08-15 PROCEDURE — 73610 X-RAY EXAM OF ANKLE: CPT | Performed by: HOSPITALIST

## 2024-08-15 RX ORDER — FUROSEMIDE 10 MG/ML
40 INJECTION INTRAMUSCULAR; INTRAVENOUS ONCE
Status: COMPLETED | OUTPATIENT
Start: 2024-08-15 | End: 2024-08-15

## 2024-08-15 RX ORDER — TRAMADOL HYDROCHLORIDE 50 MG/1
50 TABLET ORAL EVERY 12 HOURS PRN
Status: DISCONTINUED | OUTPATIENT
Start: 2024-08-15 | End: 2024-08-16

## 2024-08-15 RX ORDER — TRAMADOL HYDROCHLORIDE 50 MG/1
50 TABLET ORAL EVERY 6 HOURS PRN
Status: DISCONTINUED | OUTPATIENT
Start: 2024-08-15 | End: 2024-08-15 | Stop reason: DRUGHIGH

## 2024-08-15 NOTE — PLAN OF CARE
Received pt at 0730.  Pt is A&Ox4, Dominican speaking-  utilized, complaints of R ankle pain- PRN tylenol not helpful per pt- will notify MD. Room air, lungs are diminished, no coughing. NSR, no chest pain. Mixed continence of B&B, purewick in place. Pt updated with plan of care.      Problem: CARDIOVASCULAR - ADULT  Goal: Maintains optimal cardiac output and hemodynamic stability  Description: INTERVENTIONS:  - Monitor vital signs, rhythm, and trends  - Monitor for bleeding, hypotension and signs of decreased cardiac output  - Evaluate effectiveness of vasoactive medications to optimize hemodynamic stability  - Monitor arterial and/or venous puncture sites for bleeding and/or hematoma  - Assess quality of pulses, skin color and temperature  - Assess for signs of decreased coronary artery perfusion - ex. Angina  - Evaluate fluid balance, assess for edema, trend weights  Outcome: Progressing  Goal: Absence of cardiac arrhythmias or at baseline  Description: INTERVENTIONS:  - Continuous cardiac monitoring, monitor vital signs, obtain 12 lead EKG if indicated  - Evaluate effectiveness of antiarrhythmic and heart rate control medications as ordered  - Initiate emergency measures for life threatening arrhythmias  - Monitor electrolytes and administer replacement therapy as ordered  Outcome: Progressing     Problem: SAFETY ADULT - FALL  Goal: Free from fall injury  Description: INTERVENTIONS:  - Assess pt frequently for physical needs  - Identify cognitive and physical deficits and behaviors that affect risk of falls.  - [place] fall precautions as indicated by assessment.  - Educate pt/family on patient safety including physical limitations  - Instruct pt to call for assistance with activity based on assessment  - Modify environment to reduce risk of injury  - Provide assistive devices as appropriate  - Consider OT/PT consult to assist with strengthening/mobility  - Encourage toileting schedule  Outcome:  Progressing     Problem: Patient/Family Goals  Goal: Patient/Family Long Term Goal  Description: Patient's Long Term Goal: to prevent readmission  Stay out of hospital 8-14    Interventions:  - take medications as prescribed  - med compliance, follow ups    - See additional Care Plan goals for specific interventions  Outcome: Progressing  Goal: Patient/Family Short Term Goal  Description: Patient's Short Term Goal: to be discharged  No pain 8-14  Go home today 8-15    Interventions:   - Echo  - BP managmement  - PRN meds, hot/cold packs, tell nurse in pain   - monitor BP's, round with MD    - See additional Care Plan goals for specific interventions  Outcome: Progressing     Problem: RESPIRATORY - ADULT  Goal: Achieves optimal ventilation and oxygenation  Description: INTERVENTIONS:  - Assess for changes in respiratory status  - Assess for changes in mentation and behavior  - Position to facilitate oxygenation and minimize respiratory effort  - Oxygen supplementation based on oxygen saturation or ABGs  - Provide Smoking Cessation handout, if applicable  - Encourage broncho-pulmonary hygiene including cough, deep breathe, Incentive Spirometry  - Assess the need for suctioning and perform as needed  - Assess and instruct to report SOB or any respiratory difficulty  - Respiratory Therapy support as indicated  - Manage/alleviate anxiety  - Monitor for signs/symptoms of CO2 retention  Outcome: Progressing

## 2024-08-15 NOTE — PLAN OF CARE
Assumed patient care at 1930. Patient alert and oriented x 4. Dutch speaking-  utilized. No complains of pain. Maintaining O2 saturation WNL on room air, 2 L/min via NC at night. NSR/SB on tele monitor. Incontinent at times, purewick in place. Patient and family updated on plan of care. Safety precautions in place, call light within reach, bed alarm on. All needs met at this time.       Problem: CARDIOVASCULAR - ADULT  Goal: Maintains optimal cardiac output and hemodynamic stability  Description: INTERVENTIONS:  - Monitor vital signs, rhythm, and trends  - Monitor for bleeding, hypotension and signs of decreased cardiac output  - Evaluate effectiveness of vasoactive medications to optimize hemodynamic stability  - Monitor arterial and/or venous puncture sites for bleeding and/or hematoma  - Assess quality of pulses, skin color and temperature  - Assess for signs of decreased coronary artery perfusion - ex. Angina  - Evaluate fluid balance, assess for edema, trend weights  Outcome: Progressing  Goal: Absence of cardiac arrhythmias or at baseline  Description: INTERVENTIONS:  - Continuous cardiac monitoring, monitor vital signs, obtain 12 lead EKG if indicated  - Evaluate effectiveness of antiarrhythmic and heart rate control medications as ordered  - Initiate emergency measures for life threatening arrhythmias  - Monitor electrolytes and administer replacement therapy as ordered  Outcome: Progressing     Problem: SAFETY ADULT - FALL  Goal: Free from fall injury  Description: INTERVENTIONS:  - Assess pt frequently for physical needs  - Identify cognitive and physical deficits and behaviors that affect risk of falls.  - [place] fall precautions as indicated by assessment.  - Educate pt/family on patient safety including physical limitations  - Instruct pt to call for assistance with activity based on assessment  - Modify environment to reduce risk of injury  - Provide assistive devices as appropriate  -  Consider OT/PT consult to assist with strengthening/mobility  - Encourage toileting schedule  Outcome: Progressing     Problem: Patient/Family Goals  Goal: Patient/Family Long Term Goal  Description: Patient's Long Term Goal: to prevent readmission  Stay out of hospital 8-14    Interventions:  - take medications as prescribed  - med compliance, follow ups    - See additional Care Plan goals for specific interventions  Outcome: Progressing  Goal: Patient/Family Short Term Goal  Description: Patient's Short Term Goal: to be discharged  No pain 8-14    Interventions:   - Echo  - BP managmement  - PRN meds, hot/cold packs, tell nurse in pain     - See additional Care Plan goals for specific interventions  Outcome: Progressing     Problem: RESPIRATORY - ADULT  Goal: Achieves optimal ventilation and oxygenation  Description: INTERVENTIONS:  - Assess for changes in respiratory status  - Assess for changes in mentation and behavior  - Position to facilitate oxygenation and minimize respiratory effort  - Oxygen supplementation based on oxygen saturation or ABGs  - Provide Smoking Cessation handout, if applicable  - Encourage broncho-pulmonary hygiene including cough, deep breathe, Incentive Spirometry  - Assess the need for suctioning and perform as needed  - Assess and instruct to report SOB or any respiratory difficulty  - Respiratory Therapy support as indicated  - Manage/alleviate anxiety  - Monitor for signs/symptoms of CO2 retention  Outcome: Progressing

## 2024-08-15 NOTE — PROGRESS NOTES
[facility]   part of Located within [facility]     Hospitalist Progress Note     Asmita Cunningham Patient Status:  Inpatient    1935 MRN [id number]   Location [facility] 2NE-A Attending Myla Oliver,    Hosp Day # 2 PCP Marlon Charles MD     Chief Complaint: fall    Subjective:     Patient seen using cool translate.  Patient complains of right hip pain and right ankle pain.  Patient also says she feels more short of breath.    Objective:    Review of Systems:   A comprehensive review of systems was completed; pertinent positive and negatives stated in subjective.    Vital signs:  Temp:  [97.9 °F (36.6 °C)-98.3 °F (36.8 °C)] 98.3 °F (36.8 °C)  Pulse:  [56-66] 56  Resp:  [16-21] 17  BP: (130-157)/(45-64) 143/57  SpO2:  [91 %-96 %] 92 %    Physical Exam:    General: No acute distress  Respiratory: No wheezes, no rhonchi  Cardiovascular: S1, S2, regular rate and rhythm  Abdomen: Soft, Non-tender, non-distended, positive bowel sounds  Neuro: No new focal deficits.   Extremities: LE edema present    Diagnostic Data:    Labs:  Recent Labs   Lab 24  1738 24  0634   WBC 10.0 10.8   HGB 11.0* 10.0*   MCV 91.0 90.5   .0 146.0*   INR  --  1.05       Recent Labs   Lab 24  1738 24  0634   * 94   BUN 49* 49*   CREATSERUM 3.41* 3.86*   CA 8.9 8.6*   ALB 3.9 3.2   * 136   K 5.1 4.8    105   CO2 24.0 25.0   ALKPHO 82 73   AST 19 17   ALT 10 7*   BILT 0.4 0.4   TP 7.4 6.1       Estimated Creatinine Clearance: 8.9 mL/min (A) (based on SCr of 3.86 mg/dL (H)).    Recent Labs   Lab 24  1738   TROPHS 6       Recent Labs   Lab 24  0634   PTP 13.7   INR 1.05                  Microbiology    No results found for this visit on 24.      Imaging: Reviewed in Epic.    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, workup negative, EEG without evidence of seizures   -S/p valacyclovir, held since admission    #Fall  -2/2 above  -threapies to see    #Right hip pain  #Right ankle pain  -X-ray right hip and ankle    #HTN urgency  -resume home meds, add po hydralazine  -increase nifedipine     #Peripheral neuropathy  -on gabapentin 100mg, will increase to 300  -Add Ultram    # RENEA on CKD unknown stage - diuresis as above, trend chem  # Hypothyroidism - continue home levothyroxine   # GERD - continue PPI  # Pre-DM2   -A1c 5.8  -ISS         Myla Oliver DO    Supplementary Documentation:     Quality:  DVT Mechanical Prophylaxis:   SCDs, Early ambuation  DVT Pharmacologic Prophylaxis   Medication    heparin (Porcine) 5000 UNIT/ML injection 5,000 Units                Code Status: Full Code  Alberto: External urinary catheter in place  Alberto Duration (in days):   Central line:    VIRGIL: 8/15/2024    Discharge is dependent on: clinical improvement  At this point Ms. Cunningham is expected to be discharge to: tbd    The 21st Century Cures Act makes medical notes like these available to patients in the interest of transparency. Please be advised this is a medical document. Medical documents are intended to carry relevant information, facts as evident, and the clinical opinion of the practitioner. The medical note is intended as peer to peer communication and may appear blunt or direct. It is written in medical language and may contain abbreviations or verbiage that are unfamiliar.              **Certification      PHYSICIAN Certification of  Need for Inpatient Hospitalization - Initial Certification    Patient will require inpatient services that will reasonably be expected to span two midnight's based on the clinical documentation in H+P.   Based on patients current state of illness, I anticipate that, after discharge, patient will require TBD.

## 2024-08-16 VITALS
WEIGHT: 178.38 LBS | HEART RATE: 57 BPM | SYSTOLIC BLOOD PRESSURE: 145 MMHG | RESPIRATION RATE: 18 BRPM | DIASTOLIC BLOOD PRESSURE: 57 MMHG | TEMPERATURE: 98 F | BODY MASS INDEX: 30.45 KG/M2 | OXYGEN SATURATION: 93 % | HEIGHT: 64.02 IN

## 2024-08-16 LAB
ANION GAP SERPL CALC-SCNC: 6 MMOL/L (ref 0–18)
BUN BLD-MCNC: 71 MG/DL (ref 9–23)
CALCIUM BLD-MCNC: 8.5 MG/DL (ref 8.7–10.4)
CHLORIDE SERPL-SCNC: 101 MMOL/L (ref 98–112)
CO2 SERPL-SCNC: 25 MMOL/L (ref 21–32)
CREAT BLD-MCNC: 5.58 MG/DL
EGFRCR SERPLBLD CKD-EPI 2021: 7 ML/MIN/1.73M2 (ref 60–?)
ERYTHROCYTE [DISTWIDTH] IN BLOOD BY AUTOMATED COUNT: 15.4 %
GLUCOSE BLD-MCNC: 105 MG/DL (ref 70–99)
GLUCOSE BLD-MCNC: 115 MG/DL (ref 70–99)
HCT VFR BLD AUTO: 27.5 %
HGB BLD-MCNC: 9.4 G/DL
MAGNESIUM SERPL-MCNC: 3 MG/DL (ref 1.6–2.6)
MCH RBC QN AUTO: 30.4 PG (ref 26–34)
MCHC RBC AUTO-ENTMCNC: 34.2 G/DL (ref 31–37)
MCV RBC AUTO: 89 FL
OSMOLALITY SERPL CALC.SUM OF ELEC: 295 MOSM/KG (ref 275–295)
PLATELET # BLD AUTO: 155 10(3)UL (ref 150–450)
POTASSIUM SERPL-SCNC: 4.9 MMOL/L (ref 3.5–5.1)
RBC # BLD AUTO: 3.09 X10(6)UL
SODIUM SERPL-SCNC: 132 MMOL/L (ref 136–145)
WBC # BLD AUTO: 9.1 X10(3) UL (ref 4–11)

## 2024-08-16 PROCEDURE — 99239 HOSP IP/OBS DSCHRG MGMT >30: CPT | Performed by: HOSPITALIST

## 2024-08-16 RX ORDER — TRAMADOL HYDROCHLORIDE 50 MG/1
50 TABLET ORAL EVERY 12 HOURS PRN
Qty: 20 TABLET | Refills: 0 | Status: SHIPPED | OUTPATIENT
Start: 2024-08-16 | End: 2024-08-26

## 2024-08-16 RX ORDER — HYDRALAZINE HYDROCHLORIDE 50 MG/1
50 TABLET, FILM COATED ORAL EVERY 8 HOURS SCHEDULED
Qty: 270 TABLET | Refills: 0 | Status: SHIPPED | OUTPATIENT
Start: 2024-08-16 | End: 2024-11-14

## 2024-08-16 RX ORDER — PANTOPRAZOLE SODIUM 40 MG/1
40 TABLET, DELAYED RELEASE ORAL
Qty: 90 TABLET | Refills: 0 | Status: SHIPPED | OUTPATIENT
Start: 2024-08-17 | End: 2024-11-15

## 2024-08-16 RX ORDER — GABAPENTIN 100 MG/1
100 CAPSULE ORAL 3 TIMES DAILY
Qty: 270 CAPSULE | Refills: 0 | Status: SHIPPED | OUTPATIENT
Start: 2024-08-16 | End: 2024-11-14

## 2024-08-16 NOTE — PLAN OF CARE
Pt is A&O x4. Peruvian speaking,  utilized. R ankle pain managed w/ PRN medication.   Maintaining O2 on RA. NS/SB on tele, S1&S2 present. Denies SOB & cardiac symptoms.  Bowel sounds active. Mixed incontinence. Purewick and brief in place.  Pt updated on plan of care. Bed in lowest position. Bed alarm on. Call light within reach.     Problem: CARDIOVASCULAR - ADULT  Goal: Maintains optimal cardiac output and hemodynamic stability  Description: INTERVENTIONS:  - Monitor vital signs, rhythm, and trends  - Monitor for bleeding, hypotension and signs of decreased cardiac output  - Evaluate effectiveness of vasoactive medications to optimize hemodynamic stability  - Monitor arterial and/or venous puncture sites for bleeding and/or hematoma  - Assess quality of pulses, skin color and temperature  - Assess for signs of decreased coronary artery perfusion - ex. Angina  - Evaluate fluid balance, assess for edema, trend weights  Outcome: Progressing  Goal: Absence of cardiac arrhythmias or at baseline  Description: INTERVENTIONS:  - Continuous cardiac monitoring, monitor vital signs, obtain 12 lead EKG if indicated  - Evaluate effectiveness of antiarrhythmic and heart rate control medications as ordered  - Initiate emergency measures for life threatening arrhythmias  - Monitor electrolytes and administer replacement therapy as ordered  Outcome: Progressing     Problem: SAFETY ADULT - FALL  Goal: Free from fall injury  Description: INTERVENTIONS:  - Assess pt frequently for physical needs  - Identify cognitive and physical deficits and behaviors that affect risk of falls.  - [place] fall precautions as indicated by assessment.  - Educate pt/family on patient safety including physical limitations  - Instruct pt to call for assistance with activity based on assessment  - Modify environment to reduce risk of injury  - Provide assistive devices as appropriate  - Consider OT/PT consult to assist with  strengthening/mobility  - Encourage toileting schedule  Outcome: Progressing     Problem: Patient/Family Goals  Goal: Patient/Family Long Term Goal  Description: Patient's Long Term Goal: to prevent readmission  Stay out of hospital 8-14    Interventions:  - take medications as prescribed  - med compliance, follow ups    - See additional Care Plan goals for specific interventions  Outcome: Progressing  Goal: Patient/Family Short Term Goal  Description: Patient's Short Term Goal: to be discharged  No pain 8-14  Go home today 8-15    Interventions:   - Echo  - BP managmement  - PRN meds, hot/cold packs, tell nurse in pain   - monitor BP's, round with MD    - See additional Care Plan goals for specific interventions  Outcome: Progressing     Problem: RESPIRATORY - ADULT  Goal: Achieves optimal ventilation and oxygenation  Description: INTERVENTIONS:  - Assess for changes in respiratory status  - Assess for changes in mentation and behavior  - Position to facilitate oxygenation and minimize respiratory effort  - Oxygen supplementation based on oxygen saturation or ABGs  - Provide Smoking Cessation handout, if applicable  - Encourage broncho-pulmonary hygiene including cough, deep breathe, Incentive Spirometry  - Assess the need for suctioning and perform as needed  - Assess and instruct to report SOB or any respiratory difficulty  - Respiratory Therapy support as indicated  - Manage/alleviate anxiety  - Monitor for signs/symptoms of CO2 retention  Outcome: Progressing

## 2024-08-16 NOTE — PROGRESS NOTES
Heart Failure Nurse  Progress Note    This writer started heart failure education process with patient with the use of the  Services as patient is American speaking. Per patient, she will weigh herself when her caregivers are with her which is 3 days a week as patient says her vision is poor and she cannot read the scale. Reviewed the heart failure symptom tracking sheet, patient said her caregivers and family can help her review as she cannot read english or read print due to poor vision. Started education on a low sodium diet, but patient said she did not want any further education as she was tired and \"I know everything.\" Did leave the educational materials with patient and said she can have her family review.     Family/Friend present during education: none    Additional consultations required: MAXI Ricks RN (signature)

## 2024-08-16 NOTE — DISCHARGE SUMMARY
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, workup negative, EEG without evidence of seizures   -S/p valacyclovir, held since admission     #Fall  -2/2 above  -threapies to see     #Right hip pain  #Right ankle pain  -X-ray right hip and ankle     #HTN urgency  -resume home meds, add po hydralazine  -increase nifedipine      #Peripheral neuropathy  -on gabapentin 100mg, will increase to 300  -Add Ultram     # RENEA on CKD unknown stage - diuresis as above, trend chem  # Hypothyroidism - continue home levothyroxine   # GERD - continue PPI  # Pre-DM2   -A1c 5.8  -ISS       History of Present Illness: Asmita Cunningham is a 89 year old female with PMHx HTN, myoclonic jerks, hypothyroidism, macular degeneration who presents for fall at home.      Patient notes that she was in normal state of health yesterday and this morning started feeling weak, was having myoclonic shaking episodes at home when she attempted to go to the shower, felt weak and fell to the floor, no head trauma noted.  States that she was unable to get up from the floor and thus EMS called for evaluation.  Patient denies any other associated fevers, chills, cough, congestion, rhinorrhea, dysuria, urinary frequency.     Brief Synopsis: Patient presented to hospital for concerns of weakness/fall.  Patient admitted and diagnosed with HFpEF exacerbation and tolerated IV diuresis.  Patient on room air.   Patient stable for discharge from a HFpEF standpoint.  Patient did have some concerns of myoclonic jerking which seems to be from patient's valacyclovir.  That has been since DC'd and patient has no returning symptoms.  At this point patient is euvolemic with no complaints.  Ambulating well.  Patient stable for discharge.    Lace+ Score: 64  59-90 High Risk  29-58 Medium Risk  0-28   Low Risk       TCM Follow-Up Recommendation:  LACE > 58: High Risk of readmission after discharge from the hospital.      Procedures during hospitalization:       Incidental or significant findings and recommendations (brief descriptions):      Lab/Test results pending at Discharge:       Consultants:      Discharge Medication List:     Discharge Medications        ASK your doctor about these medications        Instructions Prescription details   B Complex-C-Folic Acid Tabs      Take by mouth daily.   Refills: 0     carvedilol 25 MG Tabs  Commonly known as: Coreg      Take 1 tablet (25 mg total) by mouth 2 (two) times daily with meals.   Refills: 0     cholecalciferol 1000 UNITS Caps  Commonly known as: Vitamin D3      Take 1 capsule (1,000 Units total) by mouth daily.   Refills: 0     ferrous sulfate 325 (65 FE) MG Tbec      Take 1 tablet (325 mg total) by mouth every other day.   Quantity: 30 tablet  Refills: 0     gabapentin 100 MG Caps  Commonly known as: Neurontin      Take 1 capsule (100 mg total) by mouth in the morning and 1 capsule (100 mg total) before bedtime.   Refills: 0     guaiFENesin-codeine 100-10 MG/5ML Soln  Commonly known as: Robitussin AC      Take 10 mL by mouth every 6 (six) hours as needed for cough or congestion.   Quantity: 240 mL  Refills: 0     ipratropium-albuterol  MCG/ACT Aers  Commonly known as: Combivent Respimat      Inhale 1 puff into the lungs 4 (four) times daily as needed (SOB).   Quantity: 1 Inhaler  Refills: 1     levothyroxine 50 MCG Tabs  Commonly known as: Synthroid      Take 1 tablet (50  mcg total) by mouth before breakfast.   Refills: 0     linaGLIPtin 5 mg Tabs  Commonly known as: Tradjenta      Take 1 tablet (5 mg total) by mouth daily.   Refills: 0     Melatonin 3 MG Caps      Take 3 capsules (9 mg total) by mouth at bedtime.   Refills: 0     NIFEdipine ER 30 MG Tb24  Commonly known as: Adalat CC      Take 1 tablet (30 mg total) by mouth daily. When blood pressure is \"normal\", takes 15mg. When blood pressure is high, takes 30 mg   Refills: 0     NON FORMULARY      Take 50 mg by mouth daily. OTC Zinc   Refills: 0     NON FORMULARY      Take 500 mg by mouth in the morning and 500 mg before bedtime. Magnesium OTC.   Refills: 0     traMADol 50 MG Tabs  Commonly known as: Ultram      Take 1 tablet (50 mg total) by mouth daily as needed for Pain.   Refills: 0     valACYclovir 1 G Tabs  Commonly known as: Valtrex      Take 1 tablet (1,000 mg total) by mouth in the morning, at noon, and at bedtime.   Refills: 0     Vitamin C 500 MG Tabs  Commonly known as: VITAMIN C      Take 1 tablet (500 mg total) by mouth daily.   Refills: 0              [place] reviewed[place]     Follow-up appointment:   Marlon Charles MD  [address] [address]  [phone]    Schedule an appointment as soon as possible for a visit in 1 week(s)      Appointments for Next 30 Days 2024 - 9/15/2024      None            Vital signs:  Temp:  [97.7 °F (36.5 °C)-98.2 °F (36.8 °C)] 97.7 °F (36.5 °C)  Pulse:  [53-57] 57  Resp:  [15-18] 18  BP: (125-155)/(49-58) 145/57  SpO2:  [93 %-96 %] 93 %    Physical Exam:    General: No acute distress   Lungs: clear to auscultation  Cardiovascular: S1, S2  Abdomen: Soft      -----------------------------------------------------------------------------------------------  PATIENT DISCHARGE INSTRUCTIONS: See electronic chart    Myla Oliver,     Total time spent on discharge plannin minutes     The  Cures Act makes medical notes like these available to patients in the  interest of transparency. Please be advised this is a medical document. Medical documents are intended to carry relevant information, facts as evident, and the clinical opinion of the practitioner. The medical note is intended as peer to peer communication and may appear blunt or direct. It is written in medical language and may contain abbreviations or verbiage that are unfamiliar.

## 2024-08-16 NOTE — PROGRESS NOTES
Sitting on bedside chair.Son on bedside chair.DC instruction given.Verbalized understanding.DC tele.JANNET hl.

## 2024-08-16 NOTE — CARDIAC REHAB
Consult received for heart failure education.  Heart failure education to be completed by Heart .  Clinical notes reviewed. Patient not appropriate for cardiac rehab outpatient exercise program.  Information deferred.

## 2024-09-03 ENCOUNTER — APPOINTMENT (OUTPATIENT)
Dept: GENERAL RADIOLOGY | Facility: HOSPITAL | Age: 89
End: 2024-09-03
Attending: EMERGENCY MEDICINE
Payer: MEDICARE

## 2024-09-03 ENCOUNTER — HOSPITAL ENCOUNTER (INPATIENT)
Facility: HOSPITAL | Age: 89
LOS: 10 days | Discharge: HOME | DRG: 682 | End: 2024-09-13
Attending: EMERGENCY MEDICINE | Admitting: INTERNAL MEDICINE
Payer: MEDICARE

## 2024-09-03 ENCOUNTER — APPOINTMENT (OUTPATIENT)
Dept: GENERAL RADIOLOGY | Facility: HOSPITAL | Age: 89
DRG: 682 | End: 2024-09-03
Attending: EMERGENCY MEDICINE
Payer: MEDICARE

## 2024-09-03 ENCOUNTER — HOSPITAL ENCOUNTER (INPATIENT)
Facility: HOSPITAL | Age: 89
End: 2024-09-03
Attending: EMERGENCY MEDICINE | Admitting: INTERNAL MEDICINE
Payer: MEDICARE

## 2024-09-03 ENCOUNTER — HOSPITAL ENCOUNTER (INPATIENT)
Facility: HOSPITAL | Age: 89
LOS: 10 days | Discharge: HOME | End: 2024-09-13
Attending: EMERGENCY MEDICINE | Admitting: INTERNAL MEDICINE
Payer: MEDICARE

## 2024-09-03 DIAGNOSIS — N18.9 CHRONIC KIDNEY DISEASE, UNSPECIFIED CKD STAGE: ICD-10-CM

## 2024-09-03 DIAGNOSIS — R79.89 ELEVATED TROPONIN: ICD-10-CM

## 2024-09-03 DIAGNOSIS — R00.1 BRADYCARDIA: ICD-10-CM

## 2024-09-03 DIAGNOSIS — J96.01 ACUTE HYPOXEMIC RESPIRATORY FAILURE (HCC): ICD-10-CM

## 2024-09-03 DIAGNOSIS — J81.0 ACUTE PULMONARY EDEMA (HCC): Primary | ICD-10-CM

## 2024-09-03 PROBLEM — I16.1 HYPERTENSIVE EMERGENCY: Status: ACTIVE | Noted: 2024-09-03

## 2024-09-03 PROBLEM — E11.22 TYPE 2 DIABETES MELLITUS WITH STAGE 5 CHRONIC KIDNEY DISEASE NOT ON CHRONIC DIALYSIS, WITHOUT LONG-TERM CURRENT USE OF INSULIN (HCC): Status: ACTIVE | Noted: 2024-09-03

## 2024-09-03 PROBLEM — N18.5 TYPE 2 DIABETES MELLITUS WITH STAGE 5 CHRONIC KIDNEY DISEASE NOT ON CHRONIC DIALYSIS, WITHOUT LONG-TERM CURRENT USE OF INSULIN (HCC): Status: ACTIVE | Noted: 2024-09-03

## 2024-09-03 PROBLEM — N17.9 ACUTE KIDNEY INJURY SUPERIMPOSED ON CKD (HCC): Status: ACTIVE | Noted: 2024-09-03

## 2024-09-03 PROBLEM — N17.9 ACUTE KIDNEY INJURY SUPERIMPOSED ON CKD: Status: ACTIVE | Noted: 2024-09-03

## 2024-09-03 LAB
ALBUMIN SERPL-MCNC: 3.7 G/DL (ref 3.2–4.8)
ALBUMIN/GLOB SERPL: 1.2 {RATIO} (ref 1–2)
ALP LIVER SERPL-CCNC: 77 U/L
ALT SERPL-CCNC: 8 U/L
ANION GAP SERPL CALC-SCNC: 7 MMOL/L (ref 0–18)
ARTERIAL PATENCY WRIST A: POSITIVE
AST SERPL-CCNC: 15 U/L (ref ?–34)
BASE EXCESS BLDA CALC-SCNC: -0.9 MMOL/L (ref ?–2)
BASOPHILS # BLD AUTO: 0.03 X10(3) UL (ref 0–0.2)
BASOPHILS NFR BLD AUTO: 0.3 %
BILIRUB SERPL-MCNC: 0.3 MG/DL (ref 0.2–1.1)
BODY TEMPERATURE: 98.6 F
BUN BLD-MCNC: 62 MG/DL (ref 9–23)
CA-I BLD-SCNC: 1.13 MMOL/L (ref 0.95–1.32)
CALCIUM BLD-MCNC: 8.5 MG/DL (ref 8.7–10.4)
CHLORIDE SERPL-SCNC: 110 MMOL/L (ref 98–112)
CHOLEST SERPL-MCNC: 188 MG/DL (ref ?–200)
CO2 SERPL-SCNC: 25 MMOL/L (ref 21–32)
COHGB MFR BLD: 0.6 % SAT (ref 0–3)
CREAT BLD-MCNC: 4.87 MG/DL
EGFRCR SERPLBLD CKD-EPI 2021: 8 ML/MIN/1.73M2 (ref 60–?)
EOSINOPHIL # BLD AUTO: 0.11 X10(3) UL (ref 0–0.7)
EOSINOPHIL NFR BLD AUTO: 1.2 %
ERYTHROCYTE [DISTWIDTH] IN BLOOD BY AUTOMATED COUNT: 16 %
GLOBULIN PLAS-MCNC: 3 G/DL (ref 2–3.5)
GLUCOSE BLD-MCNC: 106 MG/DL (ref 70–99)
HCO3 BLDA-SCNC: 24.2 MEQ/L (ref 21–27)
HCT VFR BLD AUTO: 26.2 %
HDLC SERPL-MCNC: 38 MG/DL (ref 40–59)
HGB BLD-MCNC: 8.4 G/DL
HGB BLD-MCNC: 8.4 G/DL
IMM GRANULOCYTES # BLD AUTO: 0.07 X10(3) UL (ref 0–1)
IMM GRANULOCYTES NFR BLD: 0.8 %
L/M: 3 L/MIN
LACTATE BLD-SCNC: 0.4 MMOL/L (ref 0.5–2)
LDLC SERPL CALC-MCNC: 130 MG/DL (ref ?–100)
LYMPHOCYTES # BLD AUTO: 1.8 X10(3) UL (ref 1–4)
LYMPHOCYTES NFR BLD AUTO: 20.1 %
MCH RBC QN AUTO: 30.1 PG (ref 26–34)
MCHC RBC AUTO-ENTMCNC: 32.1 G/DL (ref 31–37)
MCV RBC AUTO: 93.9 FL
METHGB MFR BLD: 1.4 % SAT (ref 0.4–1.5)
MONOCYTES # BLD AUTO: 0.39 X10(3) UL (ref 0.1–1)
MONOCYTES NFR BLD AUTO: 4.4 %
NEUTROPHILS # BLD AUTO: 6.54 X10 (3) UL (ref 1.5–7.7)
NEUTROPHILS # BLD AUTO: 6.54 X10(3) UL (ref 1.5–7.7)
NEUTROPHILS NFR BLD AUTO: 73.2 %
NONHDLC SERPL-MCNC: 150 MG/DL (ref ?–130)
NT-PROBNP SERPL-MCNC: ABNORMAL PG/ML (ref ?–450)
OSMOLALITY SERPL CALC.SUM OF ELEC: 312 MOSM/KG (ref 275–295)
OXYHGB MFR BLDA: 92.2 % (ref 92–100)
PCO2 BLDA: 36 MM HG (ref 35–45)
PH BLDA: 7.42 [PH] (ref 7.35–7.45)
PLATELET # BLD AUTO: 175 10(3)UL (ref 150–450)
PO2 BLDA: 67 MM HG (ref 80–100)
POTASSIUM BLD-SCNC: 4.7 MMOL/L (ref 3.6–5.1)
POTASSIUM SERPL-SCNC: 4.5 MMOL/L (ref 3.5–5.1)
PROT SERPL-MCNC: 6.7 G/DL (ref 5.7–8.2)
RBC # BLD AUTO: 2.79 X10(6)UL
SARS-COV-2 RNA RESP QL NAA+PROBE: NOT DETECTED
SODIUM BLD-SCNC: 139 MMOL/L (ref 135–145)
SODIUM SERPL-SCNC: 142 MMOL/L (ref 136–145)
TRIGL SERPL-MCNC: 111 MG/DL (ref 30–149)
TROPONIN I SERPL HS-MCNC: 113 NG/L
VLDLC SERPL CALC-MCNC: 20 MG/DL (ref 0–30)
WBC # BLD AUTO: 8.9 X10(3) UL (ref 4–11)

## 2024-09-03 PROCEDURE — 5A0935A ASSISTANCE WITH RESPIRATORY VENTILATION, LESS THAN 24 CONSECUTIVE HOURS, HIGH NASAL FLOW/VELOCITY: ICD-10-PCS | Performed by: EMERGENCY MEDICINE

## 2024-09-03 PROCEDURE — 71045 X-RAY EXAM CHEST 1 VIEW: CPT | Performed by: EMERGENCY MEDICINE

## 2024-09-03 PROCEDURE — 99223 1ST HOSP IP/OBS HIGH 75: CPT | Performed by: INTERNAL MEDICINE

## 2024-09-03 RX ORDER — ONDANSETRON 2 MG/ML
4 INJECTION INTRAMUSCULAR; INTRAVENOUS EVERY 6 HOURS PRN
Status: DISCONTINUED | OUTPATIENT
Start: 2024-09-03 | End: 2024-09-13

## 2024-09-03 RX ORDER — MELATONIN
3 NIGHTLY PRN
Status: DISCONTINUED | OUTPATIENT
Start: 2024-09-03 | End: 2024-09-13

## 2024-09-03 RX ORDER — FUROSEMIDE 10 MG/ML
80 INJECTION INTRAMUSCULAR; INTRAVENOUS ONCE
Status: COMPLETED | OUTPATIENT
Start: 2024-09-03 | End: 2024-09-03

## 2024-09-03 RX ORDER — FUROSEMIDE 10 MG/ML
40 INJECTION INTRAMUSCULAR; INTRAVENOUS ONCE
Status: COMPLETED | OUTPATIENT
Start: 2024-09-03 | End: 2024-09-03

## 2024-09-03 RX ORDER — HEPARIN SODIUM 5000 [USP'U]/ML
5000 INJECTION, SOLUTION INTRAVENOUS; SUBCUTANEOUS EVERY 12 HOURS SCHEDULED
Status: DISCONTINUED | OUTPATIENT
Start: 2024-09-03 | End: 2024-09-13

## 2024-09-03 RX ORDER — NITROGLYCERIN 20 MG/100ML
20 INJECTION INTRAVENOUS
Status: DISCONTINUED | OUTPATIENT
Start: 2024-09-03 | End: 2024-09-05

## 2024-09-03 RX ORDER — ACETAMINOPHEN 500 MG
500 TABLET ORAL EVERY 4 HOURS PRN
Status: DISCONTINUED | OUTPATIENT
Start: 2024-09-03 | End: 2024-09-13

## 2024-09-03 NOTE — CONSULTS
Cardiology Consultation    Asmita Cunningham Patient Status:  Emergency    1935 MRN [id number]   Location [facility] EMERGENCY DEPARTMENT Attending Burke Gupta,    Hosp Day # 0 PCP No primary care provider on file.     Reason for Consultation:  CHF      History of Present Illness:  Asmita Cunningham is a a(n) 89 year old female. Irish speaking,  line used.  H/o HTN and CRI.  She was admitted 24 with weakness and found to have CHF.  Echo with normal EF.  Managed by hospitalist team with some IV lasix and dc'd on .  For the past week, she has had progressive dyspnea, did not want to come in earlier because of the holiday.  Also quite edematous.  No chest pain.  Here, BNP elevated, CXR with pulmonary edema.  Troponin 113.  EKG w/o ischemic changes.    History:  Past Medical History:    Acute pancreatitis, unspecified complication status, unspecified pancreatitis type (HCC)    Arthritis    Back disorder    Back pain    Blood disorder    thrombocytopenia    Cholangitis (HCC)    Diabetes (HCC)    Disorder of thyroid    Essential hypertension    Gout    Hearing impairment    some loss    Hepatic abscess (HCC)    Neuropathy    Pneumonia due to organism    recently 2022    Renal disorder    CKD    Stroke (HCC)    mini     UTI (urinary tract infection)    Visual impairment    left eye doesnt see\",  macular degeneration     Past Surgical History:   Procedure Laterality Date    Appendectomy      Appendectomy      Knee replacement surgery  2016    right    Tonsillectomy       History reviewed. No pertinent family history.      Allergies:  Allergies   Allergen Reactions    Ibuprofen ITCHING       Medications:   furosemide  80 mg Intravenous Once       Continuous Infusions:      Social History:   reports that she has never smoked. She has never used smokeless tobacco. She reports that she does not drink alcohol and does not use drugs.    Review of Systems:  All systems were reviewed  and are negative except as described above in HPI.    Physical Exam:      Temp:  [97.6 °F (36.4 °C)] 97.6 °F (36.4 °C)  Pulse:  [61] 61  Resp:  [20-22] 20  BP: (169-186)/(54-72) 186/72  SpO2:  [88 %-99 %] 99 %  FiO2 (%):  [50 %] 50 %    Last 3 Weights   09/03/24 1134 179 lb (81.2 kg)   08/13/24 2315 178 lb 5.6 oz (80.9 kg)   03/25/22 1246 209 lb (94.8 kg)           General: No apparent distress  HEENT: No focal deficits.  Neck: supple. JVP normal  Cardiac: Regular rhythm, S1, S2 normal,   No rub or gallop.  aoEM  Lungs: Crackles BL  Abdomen: Soft, non-tender.   Extremities: 2+ edema  Neurologic: no focal deficits  Skin: Warm and dry.          Telemetry: sinus    Laboratories and Data:  Diagnostics:    EKG, 9/3/2024:  reviewed    CXR, 9/3/2024:  reviewed    Labs:   HEM:  Recent Labs   Lab 09/03/24  1142   WBC 8.9   HGB 8.4*   .0       Chem:  Recent Labs   Lab 09/03/24  1142      K 4.5      CO2 25.0   BUN 62*   CREATSERUM 4.87*   CA 8.5*   *       Recent Labs   Lab 09/03/24  1142   ALT 8*   AST 15   ALB 3.7       No results for input(s): \"PTP\", \"INR\" in the last 168 hours.    No results for input(s): \"TROP\", \"CK\" in the last 168 hours.      Impression:   Acute on chronic HFpEF.  Echo performed Aug 2024.  Mostly due to uncontrolled HTN and CRI.  HTN, accelerated.  CRI, acutely worse the past 3 weeks.    Plan:  40 mg IV lasix given, will give another 80 mg.  Begin bumex drip 1 mg/hr.  Begin IV NTG 20 mg/min.  Continue carvedilol.  Daily BMP's.  Nephrology should be involved.      Olvin Manzano MD  9/3/2024  2:09 PM  C5

## 2024-09-03 NOTE — H&P
[facility]                                                               History & Physical         Asmita Cunningham Patient Status:  Emergency    1935 MRN [id number]   Location [facility] EMERGENCY DEPARTMENT Attending Burke Gupta,    Hosp Day # 0 PCP No primary care provider on file.     Chief Complaint: Shortness of breath, pedal edema    History of Present Illness:  Asmita Cunningham is a 89 year old female admitted with shortness of breath and pedal edema.  Patient seen with help of video telemetry ID number [id number] Kiryl translating North Korean for patient.  Patient seen with patient's nephew at bedside.  Patient states shortness of breath started 1 week back and has been grossly worsening.  Has history of hypertension.   On arrival to the emergency room ED triage RN, patient hypoxic 85% on room air and found to be tachypneic and in respiratory distress and started on BiPAP initially in emergency room and then transition down to Vapotherm with 50% FiO2 when I saw the patient while still in the emergency room.  Patient also found to have uncontrolled hypertension with blood pressure up to 210/105 while in emergency room with tachypnea and chest x-ray with moderate to severe pulmonary edema and was given IV Lasix and started on nitroglycerin drip and Bumex drip from the emergency room.      History:  Past Medical History:    Acute pancreatitis, unspecified complication status, unspecified pancreatitis type (HCC)    Arthritis    Back disorder    Back pain    Blood disorder    thrombocytopenia    Cholangitis (HCC)    Diabetes (HCC)    Disorder of thyroid    Essential hypertension    Gout    Hearing impairment    some loss    Hepatic abscess (HCC)    Neuropathy    Pneumonia due to organism    recently 2022    Renal disorder    CKD    Stroke (HCC)    mini     UTI (urinary tract infection)    Visual impairment    left eye doesnt see\",  macular degeneration       Past Surgical History:    Procedure Laterality Date    Appendectomy      Appendectomy      Knee replacement surgery  02/2016    right    Tonsillectomy         Family history:  History reviewed. No pertinent family history.   Reviewed    Social history:   reports that she has never smoked. She has never used smokeless tobacco. She reports that she does not drink alcohol and does not use drugs.    Allergies:  Allergies   Allergen Reactions    Ibuprofen ITCHING       Home Medications:  (Not in a hospital admission)      Review of Systems:  A comprehensive 14 point review of systems was completed.  Pertinent positives and negatives noted in the the HPI.    Physical Exam:     Vital signs: Blood pressure (!) 186/72, pulse 61, temperature 97.6 °F (36.4 °C), resp. rate 20, height 5' 4\" (1.626 m), weight 179 lb (81.2 kg), SpO2 99%, not currently breastfeeding.  General: No acute distress.   HEENT: Moist mucous membranes.  Respiratory: Decreased breath sounds at bases, bibasilar crackles  Cardiovascular: S1, S2.  Regular rate and rhythm.    Abdomen: Soft, nontender, nondistended.  Positive bowel sounds.   Neurologic: Awake alert, no focal neurological deficits.  Musculoskeletal: Full range of motion of all extremities.  Bilateral pitting pedal edema  Psychiatric: Appropriate mood and affect.      Diagnostic Data:      Laboratory Data:   Lab Results   Component Value Date    WBC 8.9 09/03/2024    HGB 8.4 09/03/2024    HCT 26.2 09/03/2024    .0 09/03/2024    CREATSERUM 4.87 09/03/2024    BUN 62 09/03/2024     09/03/2024    K 4.5 09/03/2024     09/03/2024    CO2 25.0 09/03/2024     09/03/2024    CA 8.5 09/03/2024    ALB 3.7 09/03/2024    ALKPHO 77 09/03/2024    BILT 0.3 09/03/2024    TP 6.7 09/03/2024    AST 15 09/03/2024    ALT 8 09/03/2024       Imaging:  Imaging data reviewed in Epic.  Chest x-ray done in ER showed  CONCLUSION:       Stable cardiomegaly.  Findings of congestive heart failure with moderate/severe pulmonary  edema including interstitial and alveolar components.  Small bilateral pleural effusions are suspected.  No pneumothorax.         ASSESSMENT / PLAN:     #Hypertensive emergency with acute pulmonary edema and CHF exacerbation  #Acute pulmonary edema  #Acute on chronic diastolic CHF exacerbation  #Mild aortic stenosis  #Exertional shortness of breath and peripheral edema due to above  On IV nitroglycerin drip started from the emergency room  On IV Bumex drip started from the emergency room  Cardiology consulted from ER  2D echo done on 8/14/2024 reviewed in [facility] which showed EF of 55 to 60% and grade 1 diastolic dysfunction along with mild aortic stenosis at that time.  #Acute hypoxic respiratory failure due to acute pulmonary edema and CHF exacerbation  Status post IV Lasix  On nitroglycerin drip and IV Bumex drip started from the emergency room  Initially on BiPAP from the emergency room, weaned down to Vapotherm with 50% FiO2 while I saw patient for admission but still in the emergency room  Pulmonary consult  Wean oxygen as able  #Diabetes mellitus type 2 with CKD stage V  Hyperglycemia protocol  #Acute kidney injury on CKD stage V  Nephrology consult  On chart review patient has a GFR between 11-13 last month, currently GFR ranging from 7-8  Follow BMP in a.m.    Quality:  DVT Prophylaxis: SCD, subcutaneous heparin  CODE status:   Code Status: Full Code  Alberto: No urinary catheter in place  Central line:  No    Plan of care discussed with patient, patient's nephew at bedside.  Patient seen with help of video telemetry ID number [id number] Kylah translating Lithuanian for patient.      Old chart reviewed in epic including recent discharge summary from recent admission from 8/13/2024 to 8/16/2024.  Discussed with ER Physician.  Patient has severe potential for deterioration and low threshold to transfer to cardiac critical care unit      Patricia Soto MD  9/3/2024  2:02 PM

## 2024-09-03 NOTE — ED INITIAL ASSESSMENT (HPI)
Pt in per EMS from home. Has been on oxygen for past 2 weeks. States she ran out of tanks at home and no one else at home. Unknown how many L she wears at home. SOB hypoxia at 85% RA. Her own PCP called 911. Patient 88% RA. Qatari speaking using

## 2024-09-04 ENCOUNTER — APPOINTMENT (OUTPATIENT)
Dept: CV DIAGNOSTICS | Facility: HOSPITAL | Age: 89
End: 2024-09-04
Attending: INTERNAL MEDICINE
Payer: MEDICARE

## 2024-09-04 ENCOUNTER — APPOINTMENT (OUTPATIENT)
Dept: CV DIAGNOSTICS | Facility: HOSPITAL | Age: 89
DRG: 682 | End: 2024-09-04
Attending: INTERNAL MEDICINE
Payer: MEDICARE

## 2024-09-04 PROBLEM — I50.33 ACUTE ON CHRONIC HEART FAILURE WITH PRESERVED EJECTION FRACTION (HCC): Status: ACTIVE | Noted: 2021-12-29

## 2024-09-04 LAB
ANION GAP SERPL CALC-SCNC: 9 MMOL/L (ref 0–18)
ATRIAL RATE: 61 BPM
BASOPHILS # BLD AUTO: 0.02 X10(3) UL (ref 0–0.2)
BASOPHILS NFR BLD AUTO: 0.2 %
BUN BLD-MCNC: 63 MG/DL (ref 9–23)
CALCIUM BLD-MCNC: 8.2 MG/DL (ref 8.7–10.4)
CHLORIDE SERPL-SCNC: 109 MMOL/L (ref 98–112)
CO2 SERPL-SCNC: 24 MMOL/L (ref 21–32)
CREAT BLD-MCNC: 5.02 MG/DL
CREAT UR-SCNC: 22 MG/DL
EGFRCR SERPLBLD CKD-EPI 2021: 8 ML/MIN/1.73M2 (ref 60–?)
EOSINOPHIL # BLD AUTO: 0.23 X10(3) UL (ref 0–0.7)
EOSINOPHIL NFR BLD AUTO: 2.9 %
ERYTHROCYTE [DISTWIDTH] IN BLOOD BY AUTOMATED COUNT: 15.9 %
GLUCOSE BLD-MCNC: 88 MG/DL (ref 70–99)
HCT VFR BLD AUTO: 22.8 %
HGB BLD-MCNC: 7.4 G/DL
IMM GRANULOCYTES # BLD AUTO: 0.06 X10(3) UL (ref 0–1)
IMM GRANULOCYTES NFR BLD: 0.7 %
LYMPHOCYTES # BLD AUTO: 1.96 X10(3) UL (ref 1–4)
LYMPHOCYTES NFR BLD AUTO: 24.3 %
MAGNESIUM SERPL-MCNC: 2.5 MG/DL (ref 1.6–2.6)
MCH RBC QN AUTO: 30.7 PG (ref 26–34)
MCHC RBC AUTO-ENTMCNC: 32.5 G/DL (ref 31–37)
MCV RBC AUTO: 94.6 FL
MONOCYTES # BLD AUTO: 0.57 X10(3) UL (ref 0.1–1)
MONOCYTES NFR BLD AUTO: 7.1 %
NEUTROPHILS # BLD AUTO: 5.21 X10 (3) UL (ref 1.5–7.7)
NEUTROPHILS # BLD AUTO: 5.21 X10(3) UL (ref 1.5–7.7)
NEUTROPHILS NFR BLD AUTO: 64.8 %
OSMOLALITY SERPL CALC.SUM OF ELEC: 311 MOSM/KG (ref 275–295)
P AXIS: 23 DEGREES
P-R INTERVAL: 194 MS
PLATELET # BLD AUTO: 158 10(3)UL (ref 150–450)
POTASSIUM SERPL-SCNC: 4.3 MMOL/L (ref 3.5–5.1)
PROT UR-MCNC: 349.2 MG/DL (ref ?–14)
Q-T INTERVAL: 452 MS
QRS DURATION: 82 MS
QTC CALCULATION (BEZET): 455 MS
R AXIS: -14 DEGREES
RBC # BLD AUTO: 2.41 X10(6)UL
SODIUM SERPL-SCNC: 142 MMOL/L (ref 136–145)
T AXIS: 34 DEGREES
VENTRICULAR RATE: 61 BPM
WBC # BLD AUTO: 8.1 X10(3) UL (ref 4–11)

## 2024-09-04 PROCEDURE — 99222 1ST HOSP IP/OBS MODERATE 55: CPT | Performed by: INTERNAL MEDICINE

## 2024-09-04 PROCEDURE — 99223 1ST HOSP IP/OBS HIGH 75: CPT | Performed by: INTERNAL MEDICINE

## 2024-09-04 PROCEDURE — 99233 SBSQ HOSP IP/OBS HIGH 50: CPT | Performed by: INTERNAL MEDICINE

## 2024-09-04 RX ORDER — CARVEDILOL 6.25 MG/1
6.25 TABLET ORAL 2 TIMES DAILY WITH MEALS
Status: DISCONTINUED | OUTPATIENT
Start: 2024-09-04 | End: 2024-09-04

## 2024-09-04 RX ORDER — FERROUS SULFATE 325(65) MG
325 TABLET, DELAYED RELEASE (ENTERIC COATED) ORAL
Status: DISCONTINUED | OUTPATIENT
Start: 2024-09-04 | End: 2024-09-12

## 2024-09-04 RX ORDER — HYDRALAZINE HYDROCHLORIDE 50 MG/1
50 TABLET, FILM COATED ORAL EVERY 8 HOURS SCHEDULED
Status: DISCONTINUED | OUTPATIENT
Start: 2024-09-04 | End: 2024-09-12

## 2024-09-04 RX ORDER — CARVEDILOL 12.5 MG/1
25 TABLET ORAL 2 TIMES DAILY WITH MEALS
Status: DISCONTINUED | OUTPATIENT
Start: 2024-09-04 | End: 2024-09-10

## 2024-09-04 RX ORDER — NIFEDIPINE 60 MG/1
60 TABLET, EXTENDED RELEASE ORAL DAILY
Status: DISCONTINUED | OUTPATIENT
Start: 2024-09-04 | End: 2024-09-04

## 2024-09-04 RX ORDER — ALBUTEROL SULFATE 90 UG/1
2 INHALANT RESPIRATORY (INHALATION) EVERY 4 HOURS PRN
Status: DISCONTINUED | OUTPATIENT
Start: 2024-09-04 | End: 2024-09-13

## 2024-09-04 RX ORDER — LEVOTHYROXINE SODIUM 50 UG/1
50 TABLET ORAL
Status: DISCONTINUED | OUTPATIENT
Start: 2024-09-04 | End: 2024-09-13

## 2024-09-04 RX ORDER — NIFEDIPINE 60 MG/1
60 TABLET, EXTENDED RELEASE ORAL DAILY
Status: DISCONTINUED | OUTPATIENT
Start: 2024-09-05 | End: 2024-09-10

## 2024-09-04 RX ORDER — PANTOPRAZOLE SODIUM 40 MG/1
40 TABLET, DELAYED RELEASE ORAL
Status: DISCONTINUED | OUTPATIENT
Start: 2024-09-04 | End: 2024-09-13

## 2024-09-04 RX ORDER — GABAPENTIN 100 MG/1
100 CAPSULE ORAL 3 TIMES DAILY
Status: DISCONTINUED | OUTPATIENT
Start: 2024-09-04 | End: 2024-09-13

## 2024-09-04 RX ORDER — HYDRALAZINE HYDROCHLORIDE 50 MG/1
100 TABLET, FILM COATED ORAL EVERY 8 HOURS SCHEDULED
Status: DISCONTINUED | OUTPATIENT
Start: 2024-09-04 | End: 2024-09-04

## 2024-09-04 RX ORDER — HYDRALAZINE HYDROCHLORIDE 50 MG/1
50 TABLET, FILM COATED ORAL EVERY 8 HOURS SCHEDULED
Status: DISCONTINUED | OUTPATIENT
Start: 2024-09-04 | End: 2024-09-04

## 2024-09-04 NOTE — CONSULTS
[facility] Pulmonary Consult Note       Asmita Cunningham Patient Status:  Inpatient    1935 MRN [id number]   Location [facility] 2NE-A Attending Patricia Soto MD   Hosp Day # 1 PCP JOSÉ Crawley     Reason for Consultation:  Hypoxic respiratory failure    History of Present Illness:  Asmita Cunningham is a a(n) 89 year old female who presents for worsening dyspnea on exertion.  Details are provided from daughter via telephone.  Patient is [place]e-speaking.  Patient has had worsening shortness of breath over the past several days.  Workup in the emergency room showed renal failure with a creatinine of 5 as well as chest x-ray with moderate to severe pulmonary edema.  Patient has no history of COPD.  Patient is a never smoker.    History:  Past Medical History:    Acute pancreatitis, unspecified complication status, unspecified pancreatitis type (HCC)    Arthritis    Back disorder    Back pain    Blood disorder    thrombocytopenia    Cholangitis (HCC)    Diabetes (HCC)    Disorder of thyroid    Essential hypertension    Gout    Hearing impairment    some loss    Hepatic abscess (HCC)    Neuropathy    Pneumonia due to organism    recently 2022    Renal disorder    CKD    Stroke (HCC)    mini     UTI (urinary tract infection)    Visual impairment    left eye doesnt see\",  macular degeneration     Past Surgical History:   Procedure Laterality Date    Appendectomy      Appendectomy      Knee replacement surgery  2016    right    Tonsillectomy       History reviewed. No pertinent family history.   reports that she has never smoked. She has never used smokeless tobacco. She reports that she does not drink alcohol and does not use drugs.    Allergies:  Allergies   Allergen Reactions    Ibuprofen ITCHING       Medications:    Current Facility-Administered Medications:     carvedilol (Coreg) tab 25 mg, 25 mg, Oral, BID with meals    hydrALAZINE (Apresoline) tab 50 mg, 50 mg, Oral, Q8H  [facility]    [START ON 9/5/2024] NIFEdipine ER (Procardia-XL) 24 hr tab 60 mg, 60 mg, Oral, Daily    ferrous sulfate DR tab 325 mg, 325 mg, Oral, Q48H    gabapentin (Neurontin) cap 100 mg, 100 mg, Oral, TID    umeclidinium bromide (Incruse Ellipta) 62.5 MCG/ACT inhaler 1 puff, 1 puff, Inhalation, Daily    albuterol (Ventolin HFA) 108 (90 Base) MCG/ACT inhaler 2 puff, 2 puff, Inhalation, Q4H PRN    levothyroxine (Synthroid) tab 50 mcg, 50 mcg, Oral, Before breakfast    pantoprazole (Protonix) DR tab 40 mg, 40 mg, Oral, Before breakfast    heparin (Porcine) 5000 UNIT/ML injection 5,000 Units, 5,000 Units, Subcutaneous, 2 times per day    acetaminophen (Tylenol Extra Strength) tab 500 mg, 500 mg, Oral, Q4H PRN    ondansetron (Zofran) 4 MG/2ML injection 4 mg, 4 mg, Intravenous, Q6H PRN    melatonin tab 3 mg, 3 mg, Oral, Nightly PRN    bumetanide (Bumex) 12.5 mg in 50 mL infusion, 1 mg/hr, Intravenous, Continuous    nitroGLYCERIN in dextrose 5% 50 mg/250mL infusion premix, 20 mcg/min, Intravenous, Titrated    Review of Systems:   All other review of systems are negative.    Vital signs in last 24 hours:  Patient Vitals for the past 24 hrs:   BP Temp Temp src Pulse Resp SpO2 Weight   09/04/24 1551 155/57 97.9 °F (36.6 °C) Oral 59 18 98 % --   09/04/24 1216 156/60 97.7 °F (36.5 °C) Oral 58 18 97 % --   09/04/24 1106 -- -- -- 61 -- 96 % --   09/04/24 0832 (!) 174/65 97.8 °F (36.6 °C) Oral 62 18 98 % --   09/04/24 0528 155/67 98.3 °F (36.8 °C) Oral 61 18 97 % 182 lb 3.2 oz (82.6 kg)   09/04/24 0411 -- -- -- 65 -- -- --   09/04/24 0206 151/62 -- -- 65 16 94 % --   09/03/24 2350 (!) 163/72 97.8 °F (36.6 °C) Oral 64 20 96 % --   09/03/24 2246 -- -- -- -- -- -- 184 lb 6.3 oz (83.6 kg)   09/03/24 2030 -- -- -- 66 -- 98 % 184 lb 6.4 oz (83.6 kg)   09/03/24 2020 159/63 97.6 °F (36.4 °C) Oral 62 18 99 % --   09/03/24 1900 (!) 210/77 -- -- 61 16 100 % --   09/03/24 1834 -- -- -- -- -- 100 % --   09/03/24 1830 (!) 222/85 -- -- 63 20 100 %  --   09/03/24 1815 (!) 215/89 -- -- 62 19 100 % --   09/03/24 1807 -- -- -- -- -- 99 % --   09/03/24 1745 (!) 186/76 -- -- 60 14 99 % --   09/03/24 1730 (!) 200/78 -- -- 58 16 99 % --   09/03/24 1715 (!) 198/72 -- -- 59 17 99 % --   09/03/24 1700 (!) 196/84 -- -- 60 14 99 % --       Intake/Output:    Intake/Output Summary (Last 24 hours) at 9/4/2024 1645  Last data filed at 9/4/2024 1551  Gross per 24 hour   Intake 1078 ml   Output 1600 ml   Net -522 ml       Physical Exam:   General: alert, cooperative, oriented.  No respiratory distress.   Head: Normocephalic, without obvious abnormality, atraumatic.   Eyes: Conjunctivae/corneas clear.  No scleral icterus.  No conjunctival     hemorrhage.   Nose: Nares normal.   Throat: Lips, mucosa, and tongue normal.  No thrush noted.   Neck: Soft, supple neck; trachea midline, no adenopathy, no thyromegaly.   Lungs: diminished breath sounds bilaterally   Chest wall: No tenderness or deformity.   Heart: Regular rate and rhythm, normal S1S2, no murmur.   Abdomen: soft, non-tender, non-distended, no masses, no guarding, no     rebound, positive BS.   Extremity: no edema, no cyanosis   Skin: No rashes or lesions.   Neurological: Alert, interactive, no focal deficits    Lab Data Review:  Recent Labs   Lab 09/03/24  1142 09/04/24  0649   WBC 8.9 8.1   HGB 8.4* 7.4*   HCT 26.2* 22.8*   .0 158.0       Recent Labs   Lab 09/03/24  1142 09/04/24  0649    142   K 4.5 4.3    109   CO2 25.0 24.0   BUN 62* 63*   ALT 8*  --    AST 15  --        Recent Labs   Lab 09/04/24  0649   MG 2.5       Lab Results   Component Value Date    PHOS 6.1 (H) 08/14/2024        No results for input(s): \"PT\", \"INR\", \"PTT\" in the last 168 hours.    Recent Labs   Lab 09/03/24  1314   ABGPHT 7.42   KZVFPT5Q 36   BPXQJ0V 67*   ABGHCO3 24.2   ABGBE -0.9   TEMP 98.6   AMAURY Positive   SITE Right Radial   DEV Nasal cannula   THGB 8.4*       Invalid input(s): \"CKTOTAL\", \"TROPONINI\", \"TROPONINT\",  \"CKMBINDEX\"      Cultures:   No results found for this visit on 09/03/24.    Radiology:  XR CHEST AP PORTABLE  (CPT=71045)  Narrative: PROCEDURE:  XR CHEST AP PORTABLE  (CPT=71045)     TECHNIQUE:  AP chest radiograph was obtained.     COMPARISON:  [facility] , XR, XR CHEST AP PORTABLE  (CPT=71045), 8/15/2024, 1:04 PM.     INDICATIONS:  hypoxia with EMS 85% RA     PATIENT STATED HISTORY: (As transcribed by Technologist)  Family shares she has had worsening SOB for 3 weeks.                          Impression: CONCLUSION:       Stable cardiomegaly.  Findings of congestive heart failure with moderate/severe pulmonary edema including interstitial and alveolar components.  Small bilateral pleural effusions are suspected.  No pneumothorax.        LOCATION:  [facility]                 Dictated by (CST): Roberto García MD on 9/03/2024 at 12:59 PM       Finalized by (CST): Roberto García MD on 9/03/2024 at 12:59 PM         Assessment:  Acute hypoxic respiratory failure likely secondary to CHF exacerbation versus renal failure, was as high as 15 L overnight, now on 5  Acute heart failure with preserved ejection fraction EF 55 to 60% from 8/14/24  Acute kidney injury on chronic kidney disease stage IV  Uncontrolled hypertension in the setting of above  Shortness of breath in the setting of above  Diabetes mellitus type 2      Plan:  Close monitoring of oxygenation status, wean oxygen as able  Ongoing diuresis per cardiology in conjunction with nephrology  Do not see any indication now for bronchodilators or antibiotics from a pulmonary perspective  I suspect that etiology of her shortness of breath is more cardiac/nephrology related  Will monitor oxygenation status, we could proceed with thoracentesis in the near future if needed      Thank you for the consultation.  Will follow with you.    Bernadine Cha MD  [facility]  Office: [phone]

## 2024-09-04 NOTE — PROGRESS NOTES
Heart Failure Nurse  Progress Note    Patient was seen for Heart Failure education on 8/16/24. Per patient, she does not weigh herself daily as she is always the same weight- reiterated the importance of daily weighing. Reviewed the HF symptom sheet, per patient's grandson, that can be reviewed daily with patient by family and/or caregiver. Per patient, she has not been eating as much recently- reiterated the importance of a low sodium diet. Per patient, she has been taking her medication. Explained importance of attending her follow up appointment that will be on patient's discharge paperwork. Per patient's great grandson, patient's granddaughter will take patient to her appointments.     Family/Friend present during education: great grandson Thiago.    Nini Ricks RN (signature)  Per patient, the Heart Failure Call Back Team may call her granddaughter, Anupama Hernandez

## 2024-09-04 NOTE — DISCHARGE INSTRUCTIONS
[facility]   [address], [place] [address]  Phone: [phone]  Fax: [phone]   night.  You have cramps in your legs.  You have blurred vision or see yellowish-green halos around objects of lights.    Go to the Emergency Room If:   You have pain or tightness in your chest  You are extremely short of breath  You are coughing up pink-frothy mucus  You are traveling and develop symptoms of worsening heart failure      ** Please follow up with your cardiologist or Advanced Practice Provider as written on your discharge instructions. If you are not provided with an appointment, let your nurse know so you can get an appointment**     [facility]   [address] [place], [place]   [phone]   Dialysis Scheduled: Monday, Wednesday, Friday at 11:00am

## 2024-09-04 NOTE — H&P (VIEW-ONLY)
[facility]  Report of Consultation    Asmita Cunningham Patient Status:  Inpatient    1935 MRN [id number]   Location [facility] 2NE-A Attending Patricia Soto MD   Hosp Day # 1 PCP JOSÉ Crawley       Assessment / Plan:    1) RENEA / CKD 4- due to uncontrolled HTN exac by cardiorenal syndrome / decompensated HF. UA relatively bland; meds benign. PLAN- focus on BP / HF mgmt.     2) HFpEF exac by CKD- agree with bumex gtt + diuril as per cards    3) Uncontrolled HTN- exac by fluid overload. Continue usual coreg / VEGA / nifedipine; consider catapres; on NTG gtt per cards. Obtain renal artery doppler    4) Anemia- primarily due to CKD; SPEP neg . Check Fe stores, will need EPO    5) Mild type 2 DM      Reason for Consultation:  RENEA / CKD 4 / CHF    History of Present Illness:  Asmita Cunningham is a a(n) 89 year old female. Dictation to follow    History:  Past Medical History:    Acute pancreatitis, unspecified complication status, unspecified pancreatitis type (HCC)    Arthritis    Back disorder    Back pain    Blood disorder    thrombocytopenia    Cholangitis (HCC)    Diabetes (HCC)    Disorder of thyroid    Essential hypertension    Gout    Hearing impairment    some loss    Hepatic abscess (HCC)    Neuropathy    Pneumonia due to organism    recently 2022    Renal disorder    CKD    Stroke (HCC)    mini     UTI (urinary tract infection)    Visual impairment    left eye doesnt see\",  macular degeneration     Past Surgical History:   Procedure Laterality Date    Appendectomy      Appendectomy      Knee replacement surgery  2016    right    Tonsillectomy       History reviewed. No pertinent family history.  Denies family history of kidney disease.    reports that she has never smoked. She has never used smokeless tobacco. She reports that she does not drink alcohol and does not use drugs.    Allergies:  Allergies   Allergen Reactions    Ibuprofen ITCHING        Medications:    Current Facility-Administered Medications:     carvedilol (Coreg) tab 25 mg, 25 mg, Oral, BID with meals    hydrALAZINE (Apresoline) tab 50 mg, 50 mg, Oral, Q8H TRE    NIFEdipine ER (Procardia-XL) 24 hr tab 60 mg, 60 mg, Oral, Daily    ferrous sulfate DR tab 325 mg, 325 mg, Oral, Q48H    gabapentin (Neurontin) cap 100 mg, 100 mg, Oral, TID    umeclidinium bromide (Incruse Ellipta) 62.5 MCG/ACT inhaler 1 puff, 1 puff, Inhalation, Daily    albuterol (Ventolin HFA) 108 (90 Base) MCG/ACT inhaler 2 puff, 2 puff, Inhalation, Q4H PRN    levothyroxine (Synthroid) tab 50 mcg, 50 mcg, Oral, Before breakfast    pantoprazole (Protonix) DR tab 40 mg, 40 mg, Oral, Before breakfast    heparin (Porcine) 5000 UNIT/ML injection 5,000 Units, 5,000 Units, Subcutaneous, 2 times per day    acetaminophen (Tylenol Extra Strength) tab 500 mg, 500 mg, Oral, Q4H PRN    ondansetron (Zofran) 4 MG/2ML injection 4 mg, 4 mg, Intravenous, Q6H PRN    melatonin tab 3 mg, 3 mg, Oral, Nightly PRN    bumetanide (Bumex) 12.5 mg in 50 mL infusion, 1 mg/hr, Intravenous, Continuous    nitroGLYCERIN in dextrose 5% 50 mg/250mL infusion premix, 20 mcg/min, Intravenous, Titrated  No current outpatient medications on file.       Review of Systems:  Please see HPI for pertinent positives. 10 point review of systems otherwise reviewed and negative.     Physical Exam:  /60 (BP Location: Right arm)   Pulse 58   Temp 97.7 °F (36.5 °C) (Oral)   Resp 18   Ht 5' 4\" (1.626 m)   Wt 182 lb 3.2 oz (82.6 kg)   SpO2 97%   BMI 31.27 kg/m²   Temp (24hrs), Av.8 °F (36.6 °C), Min:97.6 °F (36.4 °C), Max:98.3 °F (36.8 °C)       Intake/Output Summary (Last 24 hours) at 2024 1237  Last data filed at 2024 1106  Gross per 24 hour   Intake 838 ml   Output 500 ml   Net 338 ml     Wt Readings from Last 3 Encounters:   24 182 lb 3.2 oz (82.6 kg)   24 178 lb 5.6 oz (80.9 kg)   22 209 lb (94.8 kg)     General: awake  alert  HEENT: No scleral icterus, MMM  Neck: Supple, no AMANDEEP or thyromegaly  Cardiac: Regular rate and rhythm, S1, S2 normal, no murmur, rub, or gallop  Lungs: Decreased breath sounds at the bases bilaterally.   Abdomen: Soft, non-tender. + bowel sounds, no palpable organomegaly  Extremities: Without clubbing, cyanosis; + LE eema  Neurologic: Cranial nerves grossly intact, moving all extremities  Skin: Warm and dry, no rashes      Laboratory Data:  Lab Results   Component Value Date    WBC 8.1 09/04/2024    HGB 7.4 09/04/2024    HCT 22.8 09/04/2024    .0 09/04/2024    CREATSERUM 5.02 09/04/2024    BUN 63 09/04/2024     09/04/2024    K 4.3 09/04/2024     09/04/2024    CO2 24.0 09/04/2024    GLU 88 09/04/2024    CA 8.2 09/04/2024    MG 2.5 09/04/2024       Imaging:  All imaging studies reviewed.      Thank you for allowing me to participate in the care of your patient.    Linn Santillan MD  9/4/2024  12:37 PM

## 2024-09-04 NOTE — CONSULTS
[facility]  Report of Consultation    Asmita Cunningham Patient Status:  Inpatient    1935 MRN [id number]   Location [facility] 2NE-A Attending Patricia Soto MD   Hosp Day # 1 PCP JOSÉ Crawley       Assessment / Plan:    1) RENEA / CKD 4- due to uncontrolled HTN exac by cardiorenal syndrome / decompensated HF. UA relatively bland; meds benign. PLAN- focus on BP / HF mgmt.     2) HFpEF exac by CKD- agree with bumex gtt + diuril as per cards    3) Uncontrolled HTN- exac by fluid overload. Continue usual coreg / VEGA / nifedipine; consider catapres; on NTG gtt per cards. Obtain renal artery doppler    4) Anemia- primarily due to CKD; SPEP neg . Check Fe stores, will need EPO    5) Mild type 2 DM      Reason for Consultation:  RENEA / CKD 4 / CHF    History of Present Illness:  Asmita Cunningham is a a(n) 89 year old female. Dictation to follow    History:  Past Medical History:    Acute pancreatitis, unspecified complication status, unspecified pancreatitis type (HCC)    Arthritis    Back disorder    Back pain    Blood disorder    thrombocytopenia    Cholangitis (HCC)    Diabetes (HCC)    Disorder of thyroid    Essential hypertension    Gout    Hearing impairment    some loss    Hepatic abscess (HCC)    Neuropathy    Pneumonia due to organism    recently 2022    Renal disorder    CKD    Stroke (HCC)    mini     UTI (urinary tract infection)    Visual impairment    left eye doesnt see\",  macular degeneration     Past Surgical History:   Procedure Laterality Date    Appendectomy      Appendectomy      Knee replacement surgery  2016    right    Tonsillectomy       History reviewed. No pertinent family history.  Denies family history of kidney disease.    reports that she has never smoked. She has never used smokeless tobacco. She reports that she does not drink alcohol and does not use drugs.    Allergies:  Allergies   Allergen Reactions    Ibuprofen ITCHING        Medications:    Current Facility-Administered Medications:     carvedilol (Coreg) tab 25 mg, 25 mg, Oral, BID with meals    hydrALAZINE (Apresoline) tab 50 mg, 50 mg, Oral, Q8H TRE    NIFEdipine ER (Procardia-XL) 24 hr tab 60 mg, 60 mg, Oral, Daily    ferrous sulfate DR tab 325 mg, 325 mg, Oral, Q48H    gabapentin (Neurontin) cap 100 mg, 100 mg, Oral, TID    umeclidinium bromide (Incruse Ellipta) 62.5 MCG/ACT inhaler 1 puff, 1 puff, Inhalation, Daily    albuterol (Ventolin HFA) 108 (90 Base) MCG/ACT inhaler 2 puff, 2 puff, Inhalation, Q4H PRN    levothyroxine (Synthroid) tab 50 mcg, 50 mcg, Oral, Before breakfast    pantoprazole (Protonix) DR tab 40 mg, 40 mg, Oral, Before breakfast    heparin (Porcine) 5000 UNIT/ML injection 5,000 Units, 5,000 Units, Subcutaneous, 2 times per day    acetaminophen (Tylenol Extra Strength) tab 500 mg, 500 mg, Oral, Q4H PRN    ondansetron (Zofran) 4 MG/2ML injection 4 mg, 4 mg, Intravenous, Q6H PRN    melatonin tab 3 mg, 3 mg, Oral, Nightly PRN    bumetanide (Bumex) 12.5 mg in 50 mL infusion, 1 mg/hr, Intravenous, Continuous    nitroGLYCERIN in dextrose 5% 50 mg/250mL infusion premix, 20 mcg/min, Intravenous, Titrated  No current outpatient medications on file.       Review of Systems:  Please see HPI for pertinent positives. 10 point review of systems otherwise reviewed and negative.     Physical Exam:  /60 (BP Location: Right arm)   Pulse 58   Temp 97.7 °F (36.5 °C) (Oral)   Resp 18   Ht 5' 4\" (1.626 m)   Wt 182 lb 3.2 oz (82.6 kg)   SpO2 97%   BMI 31.27 kg/m²   Temp (24hrs), Av.8 °F (36.6 °C), Min:97.6 °F (36.4 °C), Max:98.3 °F (36.8 °C)       Intake/Output Summary (Last 24 hours) at 2024 1237  Last data filed at 2024 1106  Gross per 24 hour   Intake 838 ml   Output 500 ml   Net 338 ml     Wt Readings from Last 3 Encounters:   24 182 lb 3.2 oz (82.6 kg)   24 178 lb 5.6 oz (80.9 kg)   22 209 lb (94.8 kg)     General: awake  alert  HEENT: No scleral icterus, MMM  Neck: Supple, no AMANDEEP or thyromegaly  Cardiac: Regular rate and rhythm, S1, S2 normal, no murmur, rub, or gallop  Lungs: Decreased breath sounds at the bases bilaterally.   Abdomen: Soft, non-tender. + bowel sounds, no palpable organomegaly  Extremities: Without clubbing, cyanosis; + LE eema  Neurologic: Cranial nerves grossly intact, moving all extremities  Skin: Warm and dry, no rashes      Laboratory Data:  Lab Results   Component Value Date    WBC 8.1 09/04/2024    HGB 7.4 09/04/2024    HCT 22.8 09/04/2024    .0 09/04/2024    CREATSERUM 5.02 09/04/2024    BUN 63 09/04/2024     09/04/2024    K 4.3 09/04/2024     09/04/2024    CO2 24.0 09/04/2024    GLU 88 09/04/2024    CA 8.2 09/04/2024    MG 2.5 09/04/2024       Imaging:  All imaging studies reviewed.      Thank you for allowing me to participate in the care of your patient.    Linn Santillan MD  9/4/2024  12:37 PM

## 2024-09-04 NOTE — ED PROVIDER NOTES
Patient Seen in: [facility] Emergency Department      History     Chief Complaint   Patient presents with    Difficulty Breathing     Stated Complaint: hypoxia with EMS 85% RA    Subjective:   HPI    This is an 89-year-old female, Togolese-speaking, history with  presents emergency room for evaluation of shortness of breath.  Patient was admitted a few weeks ago to the hospital shortness of breath was found to be in CHF.  Patient reports over the last week or 2 she is having worsening shortness of breath with lower extremity swelling.  Denies any chest pain.  Per EMS patient was hypoxic, 88% on room air.  Patient denies any fevers or chills denies cough.  Denies abdominal pain.  Denies melena hematochezia denies urinary symptoms.    Objective:   Past Medical History:    Acute pancreatitis, unspecified complication status, unspecified pancreatitis type (HCC)    Arthritis    Back disorder    Back pain    Blood disorder    thrombocytopenia    Cholangitis (HCC)    Diabetes (HCC)    Disorder of thyroid    Essential hypertension    Gout    Hearing impairment    some loss    Hepatic abscess (HCC)    Neuropathy    Pneumonia due to organism    recently 1/2022    Renal disorder    CKD    Stroke (HCC)    mini 1990    UTI (urinary tract infection)    Visual impairment    left eye doesnt see\",  macular degeneration              Past Surgical History:   Procedure Laterality Date    Appendectomy      Appendectomy      Knee replacement surgery  02/2016    right    Tonsillectomy                  Social History     Socioeconomic History    Marital status:    Tobacco Use    Smoking status: Never    Smokeless tobacco: Never   Vaping Use    Vaping status: Never Used   Substance and Sexual Activity    Alcohol use: No    Drug use: No     Social Determinants of Health     Food Insecurity: No Food Insecurity (8/14/2024)    Food Insecurity     Food Insecurity: Never true   Transportation Needs: No  Transportation Needs (8/14/2024)    Transportation Needs     Lack of Transportation: No   Housing Stability: Low Risk  (8/14/2024)    Housing Stability     Housing Instability: No              Review of Systems    Positive for stated Chief Complaint: Difficulty Breathing    Other systems are as noted in HPI.  Constitutional and vital signs reviewed.      All other systems reviewed and negative except as noted above.    Physical Exam     ED Triage Vitals   BP 09/03/24 1136 (!) 169/54   Pulse 09/03/24 1134 61   Resp 09/03/24 1134 22   Temp 09/03/24 1156 97.6 °F (36.4 °C)   Temp src --    SpO2 09/03/24 1134 (!) 88 %   O2 Device 09/03/24 1134 None (Room air)       Current Vitals:   Vital Signs  BP: (!) 210/77  Pulse: 61  Resp: 16  Temp: 97.6 °F (36.4 °C)  MAP (mmHg): (!) 109    Oxygen Therapy  SpO2: 100 %  O2 Device: High flow nasal cannula  FiO2 (%): 50 %  O2 Flow Rate (L/min): 10 L/min            Physical Exam    GENERAL: Patient is awake, alert,   HEENT:  no scleral icterus.  Mucous membranes are moist,  Scalp is atraumatic.  NECK: Neck is supple, there is no nuchal rigidity.    HEART: Regular rate and rhythm, no murmurs.  LUNGS: Decreased breath sounds bilaterally with bibasilar Rales  ABDOMEN: Soft, nondistended,non tender  EXTREMITIES: Bilateral lower extremity edema, no calf tenderness       ED Course     Labs Reviewed   COMP METABOLIC PANEL (14) - Abnormal; Notable for the following components:       Result Value    Glucose 106 (*)     BUN 62 (*)     Creatinine 4.87 (*)     Calcium, Total 8.5 (*)     Calculated Osmolality 312 (*)     eGFR-Cr 8 (*)     ALT 8 (*)     All other components within normal limits   CBC WITH DIFFERENTIAL WITH PLATELET - Abnormal; Notable for the following components:    RBC 2.79 (*)     HGB 8.4 (*)     HCT 26.2 (*)     All other components within normal limits   TROPONIN I HIGH SENSITIVITY - Abnormal; Notable for the following components:    Troponin I (High Sensitivity) 113 (*)     All  other components within normal limits   PRO BETA NATRIURETIC PEPTIDE - Abnormal; Notable for the following components:    Pro-Beta Natriuretic Peptide 34,633 (*)     All other components within normal limits   LIPID PANEL - Abnormal; Notable for the following components:    HDL Cholesterol 38 (*)     LDL Cholesterol 130 (*)     Non HDL Chol 150 (*)     All other components within normal limits   ABG PANEL W ELECT AND LACTATE - Abnormal; Notable for the following components:    ABG pO2 67 (*)     Total Hemoglobin 8.4 (*)     Lactic Acid (Blood Gas) 0.4 (*)     All other components within normal limits   RAPID SARS-COV-2 BY PCR - Normal   RAINBOW DRAW LAVENDER   RAINBOW DRAW LIGHT GREEN   RAINBOW DRAW BLUE     EKG    Rate, intervals and axes as noted on EKG Report.  Rate: 61  Rhythm: Sinus Rhythm  Reading: Normal sinus rhythm no ST elevation                 A total of 35 minutes of critical care time (exclusive of billable procedures) was administered to manage the patient's respiratory instability and cardiovascular instability due to her acute hypoxemic respiratory failure/pulmonary edema.  This involved direct patient intervention, complex decision making, and/or extensive discussions with the patient, family, and clinical staff.           MDM        Differential diagnosis before testing includes but not limited to CHF/pulmonary edema, pneumonia, pneumothorax, pleural effusion, electrolyte abnormality which is a medical condition that poses a threat to life/function    Radiographic images  I personally reviewed the radiographs and my individual interpretation shows chest x-ray pulmonary edema  I also reviewed the official reports that showed chest x-ray cardiomegaly, CHF with moderate/severe pulmonary edema    Discussion of management (consult/physicians, social work, pharmacy,ect) [facility] Soto      Medications Provided: IV Lasix, IV nitroglycerin    Course of Events  during Emergency Room Visit include patient placed on cardiac monitor and pulse ox, patient noted to be hypoxic, supplemental oxygen applied.  Chest x-ray consistent with pulmonary edema, respiratory therapy was consulted and Vapotherm was started.  Patient was given IV Lasix.  CBC white count 8.9 hemoglobin 8.4 platelet 175.  Troponin elevated at 113.  BNP 34,006 and 33.  Chemistry sodium 142 potassium 4.5 BUN 62 creatinine 4.87 glucose 100.  Discussed with cardiologist who did come to the emergency department, recommended starting nitroglycerin infusion, this was started in the ER, cardiology also before Bumex drip and additional 80 mg of Lasix which was given.  Discussed with hospitalist physician.  Patient was admitted for further evaluation and treatment.  Shared decision making was utilized           Disposition:    Admission  I have discussed with the patient the results of test, differential diagnosis, and treatment plan. They expressed clear understanding of these instructions and agrees to the plan provided.     ing and all questions were answered.    Note to patient: The 21st Century Cures Act makes medical notes like these available to patients in the interest of transparency. However, this is a medical document intended as peer to peer communication. It is written in medical language and may contain abbreviations or verbiage that are unfamiliar. It may appear blunt or direct. Medical documents are intended to carry relevant information, facts as evident, and the clinical opinion of the practitioner.           Admission disposition: 9/3/2024  1:59 PM                                        Medical Decision Making      Disposition and Plan     Clinical Impression:  1. Acute pulmonary edema (HCC)    2. Chronic kidney disease, unspecified CKD stage    3. Elevated troponin    4. Acute hypoxemic respiratory failure (HCC)         Disposition:  Admit  9/3/2024  1:59 pm    Follow-up:  No follow-up provider  specified.        Medications Prescribed:  Current Discharge Medication List                            Hospital Problems       Present on Admission  Date Reviewed: 4/7/2022            ICD-10-CM Noted POA    * (Principal) Acute pulmonary edema (HCC) J81.0 9/3/2024 Unknown    Acute kidney injury superimposed on CKD ([facility]) N17.9, N18.9 9/3/2024 Unknown    Acute on chronic congestive heart failure ([facility]) I50.9 12/29/2021 Yes    Acute respiratory failure with hypoxia ([facility]) J96.01 8/13/2024 Yes    Hypertensive emergency I16.1 9/3/2024 Unknown    Type 2 diabetes mellitus with stage 5 chronic kidney disease not on chronic dialysis, without long-term current use of insulin ([facility]) E11.22, N18.5 9/3/2024 Unknown

## 2024-09-04 NOTE — DIETARY NOTE
[facility]   part of [facility]   CLINICAL NUTRITION    [facility]     Admitting diagnosis:  Acute pulmonary edema (HCC) [J81.0]  Elevated troponin [R79.89]  Acute hypoxemic respiratory failure (HCC) [J96.01]  Chronic kidney disease, unspecified CKD stage [N18.9]    Ht: 162.6 cm (5' 4\")  Wt: 82.6 kg (182 lb 3.2 oz).   Body mass index is 31.27 kg/m².  IBW: 54.4 kg    Wt Readings from Last 6 Encounters:   09/04/24 82.6 kg (182 lb 3.2 oz)   08/13/24 80.9 kg (178 lb 5.6 oz)   03/25/22 94.8 kg (209 lb)   01/21/22 94.8 kg (209 lb)   01/06/22 84.4 kg (186 lb 1.1 oz)   12/28/21 88 kg (194 lb)        Labs/Meds reviewed    Diet:       Procedures    Cardiac diet Sodium Restriction: 2 GM NA; Fluid Restriction: 2000 ml; Is Patient on Accuchecks? No     Percent Meals Eaten (last 3 days)       Date/Time Percent Meals Eaten (%)    09/03/24 2000 100 %    09/04/24 1106 95 %              Pt chart reviewed d/t HF consult. 89/F admitted d/t SOB from CHF. Pt has BLE. Pt received Heart Failure Nutrition Therapy that explains to limit Na to 2300 mg, check nutrition labels for Na content, what to do when eating out, and foods that are and are not recommended. Great grandson in room helped interpret and handout in Turkish was given to pt. Pt declined Turkish . Pt was eating lunch at time of visit. Last BM 9/2.      Patient reports good appetite at this time.  Nursing notes reports Percent Meals Eaten (%): 95 % intake for last meal.  Tolerating po diet without diarrhea, emesis, or constipation.   No significant weight changes noted.     Patient is at low nutrition risk at this time.    Please consult if patient status changes or nutrition issues arise.    Ronald OsegueraCarolina  Dietetic Intern  [phone]

## 2024-09-04 NOTE — PHYSICAL THERAPY NOTE
PHYSICAL THERAPY EVALUATION - INPATIENT     Room Number: 2624/2624-A  Evaluation Date: 9/4/2024  Type of Evaluation: Initial  Physician Order: PT Eval and Treat    Presenting Problem: acute pulmonary edema  Co-Morbidities : DM, thrombocytopenia, HTN, CKD, stroke, neuropathy, TKA, macular degeneration  Reason for Therapy: Mobility Dysfunction and Discharge Planning    PHYSICAL THERAPY ASSESSMENT   Patient is currently functioning near baseline with bed mobility, transfers, and gait.  Prior to admission, patient's baseline is mod ind with RW.  Patient is requiring contact guard assist as a result of the following impairments: decreased functional strength, decreased endurance/aerobic capacity, impaired   balance, and decreased muscular endurance. Physical Therapy will continue to follow for duration of hospitalization.      Patient will benefit from continued skilled PT Services at discharge to promote prior level of function.  Anticipate patient will return home with home health PT.    PLAN  PT Treatment Plan: Bed mobility;Endurance;Energy conservation;Gait training;Family education;Patient education;Strengthening;Transfer training;Balance training  Rehab Potential : Good  Frequency (Obs): 3-5x/week  Number of Visits to Meet Established Goals: 4      CURRENT GOALS    Goal #1 Patient is able to demonstrate supine - sit EOB @ level: supervision     Goal #2 Patient is able to demonstrate transfers Sit to/from Stand at assistance level: supervision     Goal #3 Patient is able to ambulate 150 feet with assist device: walker - rolling at assistance level: supervision     Goal #4    Goal #5    Goal #6    Goal Comments: Goals established on 9/4/2024      PHYSICAL THERAPY MEDICAL/SOCIAL HISTORY  History related to current admission: Patient is a 89 year old female admitted on 9/3/2024 from home for shortness of breath, hypoxia.  Pt diagnosed with hypoxic respiratory failure.      HOME SITUATION  Type of Home: Apartment   Home  Layout: One level;Elevator                Lives With: Alone;Caregiver part-time     Patient Owned Equipment: Rolling walker       Prior Level of [place]: Pt reports she is typically mod ind with ADLs except CG assist with showers and groceries. Pt ambulates with a RW.     SUBJECTIVE  \"Thank you\"      OBJECTIVE  Precautions: Bed/chair alarm;Low vision; needed  Fall Risk: High fall risk    WEIGHT BEARING RESTRICTION  Weight Bearing Restriction: None                PAIN ASSESSMENT  Ratin          COGNITION  Overall Cognitive Status:  WFL - within functional limits  Pt mildly impulsive during session    RANGE OF MOTION AND STRENGTH ASSESSMENT  Upper extremity ROM and strength are within functional limits     Lower extremity ROM is within functional limits     Lower extremity strength is within functional limits, mild deconditioning      BALANCE  Static Sitting: Good  Dynamic Sitting: Good  Static Standing: Fair -  Dynamic Standing: Poor +    ADDITIONAL TESTS                                    ACTIVITY TOLERANCE   Pt on 5 L O2 at rest and 6 L O2 with ambulation during session.                       O2 WALK       NEUROLOGICAL FINDINGS                        AM-PAC '6-Clicks' INPATIENT SHORT FORM - BASIC MOBILITY  How much difficulty does the patient currently have...  Patient Difficulty: Turning over in bed (including adjusting bedclothes, sheets and blankets)?: None   Patient Difficulty: Sitting down on and standing up from a chair with arms (e.g., wheelchair, bedside commode, etc.): A Little   Patient Difficulty: Moving from lying on back to sitting on the side of the bed?: A Little   How much help from another person does the patient currently need...   Help from Another: Moving to and from a bed to a chair (including a wheelchair)?: A Little   Help from Another: Need to walk in hospital room?: A Little   Help from Another: Climbing 3-5 steps with a railing?: A Little       AM-PAC Score:  Raw Score:  19   Approx Degree of Impairment: 41.77%   Standardized Score (AM-PAC Scale): 45.44   CMS Modifier (G-Code): CK    FUNCTIONAL ABILITY STATUS  Gait Assessment   Functional Mobility/Gait Assessment  Gait Assistance: Contact guard assist  Distance (ft): 10, 10  Assistive Device: Rolling walker  Pattern: Shuffle    Skilled Therapy Provided: Per RN okay to work with pt. Pt received in supine and was agreeable to PT session.  services utilized for session via Ipad.     Bed Mobility:  Rolling: NT  Supine to sit: supervision   Sit to supine: NT     Transfer Mobility:  Sit to stand: CGA    Stand to sit: CGA  Gait = pt ambulated to/from bathroom with RW and CGA     Therapist's Comments: pt educated on role of therapy, goals for session, safety, fall prevention, and activity recommendations.     Exercise/Education Provided:  Bed mobility  Energy conservation  Functional activity tolerated  Gait training  Posture  Strengthening  Transfer training    Patient End of Session: Up in chair;Needs met;Call light within reach;RN aware of session/findings;All patient questions and concerns addressed;Alarm set      Patient Evaluation Complexity Level:  History Moderate - 1 or 2 personal factors and/or co-morbidities   Examination of body systems Low -  addressing 1-2 elements   Clinical Presentation Low- Stable   Clinical Decision Making Low Complexity       PT Session Time: 30 minutes  Therapeutic Activity: 10 minutes

## 2024-09-04 NOTE — PLAN OF CARE
Assumed care of patient at 20:00 from ED.   Alert and oriented x4, Malagasy speaking.  Sinus rhythm on tele, nitro and bumex gtt maintained.  12L NC weaned to 5L, lung sounds with crackles bilaterally, no cough noted. Continuous pulse oximetry maintained. Denies shortness of breath and dizziness.   No complaints of pain.   Continent of bowel and bladder.   Upper and lower dentures present at bedside.  Safety precautions maintained    Discussed plan of care with patient. All questions answered.    Problem: CARDIOVASCULAR - ADULT  Goal: Maintains optimal cardiac output and hemodynamic stability  Description: INTERVENTIONS:  - Monitor vital signs, rhythm, and trends  - Monitor for bleeding, hypotension and signs of decreased cardiac output  - Evaluate effectiveness of vasoactive medications to optimize hemodynamic stability  - Monitor arterial and/or venous puncture sites for bleeding and/or hematoma  - Assess quality of pulses, skin color and temperature  - Assess for signs of decreased coronary artery perfusion - ex. Angina  - Evaluate fluid balance, assess for edema, trend weights  Outcome: Progressing  Goal: Absence of cardiac arrhythmias or at baseline  Description: INTERVENTIONS:  - Continuous cardiac monitoring, monitor vital signs, obtain 12 lead EKG if indicated  - Evaluate effectiveness of antiarrhythmic and heart rate control medications as ordered  - Initiate emergency measures for life threatening arrhythmias  - Monitor electrolytes and administer replacement therapy as ordered  Outcome: Progressing     Problem: SAFETY ADULT - FALL  Goal: Free from fall injury  Description: INTERVENTIONS:  - Assess pt frequently for physical needs  - Identify cognitive and physical deficits and behaviors that affect risk of falls.  - [place] fall precautions as indicated by assessment.  - Educate pt/family on patient safety including physical limitations  - Instruct pt to call for assistance with activity based on  assessment  - Modify environment to reduce risk of injury  - Provide assistive devices as appropriate  - Consider OT/PT consult to assist with strengthening/mobility  - Encourage toileting schedule  Outcome: Progressing     Problem: Patient/Family Goals  Goal: Patient/Family Long Term Goal  Description: Patient's Long Term Goal: Go home    Interventions:  - routine lab draws  - medication compliance   - MD rounding  - See additional Care Plan goals for specific interventions  Outcome: Progressing  Goal: Patient/Family Short Term Goal  Description: Patient's Short Term Goal: pain free    Interventions:   - ice pack  -pain medication  - See additional Care Plan goals for specific interventions  Outcome: Progressing     Problem: RESPIRATORY - ADULT  Goal: Achieves optimal ventilation and oxygenation  Description: INTERVENTIONS:  - Assess for changes in respiratory status  - Assess for changes in mentation and behavior  - Position to facilitate oxygenation and minimize respiratory effort  - Oxygen supplementation based on oxygen saturation or ABGs  - Provide Smoking Cessation handout, if applicable  - Encourage broncho-pulmonary hygiene including cough, deep breathe, Incentive Spirometry  - Assess the need for suctioning and perform as needed  - Assess and instruct to report SOB or any respiratory difficulty  - Respiratory Therapy support as indicated  - Manage/alleviate anxiety  - Monitor for signs/symptoms of CO2 retention  Outcome: Progressing

## 2024-09-04 NOTE — ED QUICK NOTES
Orders for admission, patient is aware of plan and ready to go upstairs. Any questions, please call ED RN Fifi at extension [phone].     Patient Covid vaccination status: Unvaccinated     COVID Test Ordered in ED: Rapid SARS-CoV-2 by PCR    COVID Suspicion at Admission: N/A    Running Infusions:    bumetanide (Bumex) 12.5 mg in 50 mL infusion 1 mg/hr (09/03/24 1647)    nitroGLYCERIN in dextrose 5% 20 mcg/min (09/03/24 1725)        Mental Status/LOC at time of transport: A&Ox4    Other pertinent information:   CIWA score: N/A   NIH score:  N/A

## 2024-09-04 NOTE — PROGRESS NOTES
[facility]   part of [facility]     Hospitalist Progress Note     Asmita Cunningham Patient Status:  Inpatient    1935 MRN [id number]   Location [facility] 2NE-A Attending Patricia Soto MD   Hosp Day # 1 PCP JOSÉ Crawley     Chief Complaint: Shortness of breath, pedal edema    Subjective:     Patient seen with patient's great-grandson and RN at bedside.  When patient talks about running out of supplies at home while in the emergency room, patient may have been talking about nebulizer supplies as reported by patient's great grandson at bedside.  On chart review and [place] prescription monitoring better patient may have been prescribed nebulizer treatment by an outside physician in 2024.    Patient feeling significantly better today.  Shortness of breath improving.  Pedal edema improving.  Oxygen requirement improving, currently on 5 L of oxygen by nasal cannula.  Blood pressure improving, nitroglycerin drip being weaned down as able    Objective:    Review of Systems:   A comprehensive review of systems was completed; pertinent positive and negatives stated in subjective.    Vital signs:  Temp:  [97.6 °F (36.4 °C)-98.3 °F (36.8 °C)] 97.7 °F (36.5 °C)  Pulse:  [58-66] 58  Resp:  [14-30] 18  BP: (151-222)/() 156/60  SpO2:  [94 %-100 %] 97 %    Physical Exam:    /60 (BP Location: Right arm)   Pulse 58   Temp 97.7 °F (36.5 °C) (Oral)   Resp 18   Ht 5' 4\" (1.626 m)   Wt 182 lb 3.2 oz (82.6 kg)   SpO2 97%   BMI 31.27 kg/m²   On 5 L of oxygen by nasal cannula  General: No acute distress  Respiratory: No wheezes, no rhonchi  Cardiovascular: S1, S2, regular rate and rhythm  Abdomen: Soft, Non-tender, non-distended, positive bowel sounds  Neuro: No new focal deficits.   Extremities: Trace pedal edema      Diagnostic Data:    Labs:  Recent Labs   Lab 24  1142 24  0649   WBC 8.9 8.1   HGB 8.4* 7.4*   MCV 93.9 94.6   .0 158.0       Recent Labs    Lab 09/03/24  1142 09/04/24  0649   * 88   BUN 62* 63*   CREATSERUM 4.87* 5.02*   CA 8.5* 8.2*   ALB 3.7  --     142   K 4.5 4.3    109   CO2 25.0 24.0   ALKPHO 77  --    AST 15  --    ALT 8*  --    BILT 0.3  --    TP 6.7  --        Estimated Creatinine Clearance: 6.8 mL/min (A) (based on SCr of 5.02 mg/dL (H)).    Recent Labs   Lab 09/03/24  1142   TROPHS 113*       No results for input(s): \"PTP\", \"INR\" in the last 168 hours.               Microbiology    No results found for this visit on 09/03/24.      Imaging: Reviewed in Epic.    Medications:    carvedilol  25 mg Oral BID with meals    hydrALAZINE  50 mg Oral Q8H [facility]    [START ON 9/5/2024] NIFEdipine ER  60 mg Oral Daily    ferrous sulfate  325 mg Oral Q48H    gabapentin  100 mg Oral TID    umeclidinium bromide  1 puff Inhalation Daily    levothyroxine  50 mcg Oral Before breakfast    pantoprazole  40 mg Oral Before breakfast    heparin  5,000 Units Subcutaneous 2 times per day       Assessment & Plan:      #Hypertensive emergency with acute pulmonary edema and CHF exacerbation  #Acute pulmonary edema  #Acute on chronic diastolic CHF exacerbation  #Mild aortic stenosis  #Exertional shortness of breath and peripheral edema due to above  Improving   on IV nitroglycerin drip which is being weaned off as able   on IV Bumex drip    IV Diuril 1 dose given on 9/4/2024 per cardiology  Will continue home blood pressure medications including Coreg, hydralazine, Procardia XL  Follow blood pressure  Cardiology consult appreciated  2D echo done on 8/14/2024 reviewed in [facility] which showed EF of 55 to 60% and grade 1 diastolic dysfunction along with mild aortic stenosis at that time.  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  Discussed with patient, patient's great grandson at bedside translating [place] for patient.  Discussed with RN at bedside.      Patricia Soto MD    Supplementary Documentation:     Quality:  DVT Mechanical Prophylaxis:   SCDs,    DVT Pharmacologic Prophylaxis   Medication    heparin (Porcine) 5000 UNIT/ML injection 5,000 Units                Code Status: Full Code  Alberto: External urinary catheter in place  Alberto Duration (in days):   Central line:    VIRGIL: 9/11/2024    Discharge is dependent on: Clinical progress  At this point Ms. Cunningham is expected to be discharge to: Home    The 21st Century Cures Act makes medical notes like these available to patients in the interest of transparency. Please be advised this is a medical document. Medical documents are intended to carry relevant information, facts as evident, and the clinical opinion of the practitioner. The medical note is intended as peer to peer communication and may appear blunt or direct. It is written in medical language and may contain abbreviations or verbiage that are unfamiliar.              **Certification      PHYSICIAN Certification of Need for Inpatient Hospitalization - Initial Certification    Patient will require inpatient services that will reasonably be expected to span two midnight's based on the clinical documentation in H+P.   Based on patients current state of illness, I anticipate that, after discharge, patient will require TBD.

## 2024-09-04 NOTE — PROGRESS NOTES
Cardiology Consultation    Asmita Cunningham Patient Status:  Emergency    1935 MRN [id number]   [facility] EMERGENCY DEPARTMENT Attending Burke Gupta,    Hosp Day # 1 PCP JOSÉ Crawley     S:  Significant SOB, minimal UOP with bumex ggt.  BP continues to be elevated.     History:  Past Medical History:    Acute pancreatitis, unspecified complication status, unspecified pancreatitis type (HCC)    Arthritis    Back disorder    Back pain    Blood disorder    thrombocytopenia    Cholangitis (HCC)    Diabetes (HCC)    Disorder of thyroid    Essential hypertension    Gout    Hearing impairment    some loss    Hepatic abscess (HCC)    Neuropathy    Pneumonia due to organism    recently 2022    Renal disorder    CKD    Stroke (HCC)    mini     UTI (urinary tract infection)    Visual impairment    left eye doesnt see\",  macular degeneration     Past Surgical History:   Procedure Laterality Date    Appendectomy      Appendectomy      Knee replacement surgery  2016    right    Tonsillectomy       History reviewed. No pertinent family history.      Allergies:  Allergies   Allergen Reactions    Ibuprofen ITCHING       Medications:   NIFEdipine ER  60 mg Oral Daily    carvedilol  6.25 mg Oral BID with meals    hydrALAZINE  100 mg Oral Q8H TRE    heparin  5,000 Units Subcutaneous 2 times per day       Continuous Infusions:   bumetanide (Bumex) 12.5 mg in 50 mL infusion 1 mg/hr (24 0927)    nitroGLYCERIN in dextrose 5% 20 mcg/min (24 0000)       Social History:   reports that she has never smoked. She has never used smokeless tobacco. She reports that she does not drink alcohol and does not use drugs.    Review of Systems:  All systems were reviewed and are negative except as described above in HPI.    Physical Exam:      Temp:  [97.6 °F (36.4 °C)-98.3 °F (36.8 °C)] 97.8 °F (36.6 °C)  Pulse:  [58-66] 62  Resp:  [13-30] 18  BP: (151-222)/() 174/65  SpO2:  [94 %-100 %] 98  %  FiO2 (%):  [50 %] 50 %    Last 3 Weights   09/04/24 0528 182 lb 3.2 oz (82.6 kg)   09/03/24 2246 184 lb 6.3 oz (83.6 kg)   09/03/24 2030 184 lb 6.4 oz (83.6 kg)   09/03/24 1134 179 lb (81.2 kg)   08/13/24 2315 178 lb 5.6 oz (80.9 kg)   03/25/22 1246 209 lb (94.8 kg)       General: No apparent distress  HEENT: No focal deficits.  Neck: supple. JVP normal  Cardiac: Regular rhythm, S1, S2 normal,   No rub or gallop.  aoEM  Lungs: Crackles BL  Abdomen: Soft, non-tender.   Extremities: 2+ edema  Neurologic: no focal deficits  Skin: Warm and dry.          Telemetry: sinus    Laboratories and Data:  Diagnostics:    EKG, 9/3/2024:  reviewed    CXR, 9/3/2024:  reviewed    Labs:   HEM:  Recent Labs   Lab 09/03/24  1142 09/04/24  0649   WBC 8.9 8.1   HGB 8.4* 7.4*   .0 158.0       Chem:  Recent Labs   Lab 09/03/24  1142 09/04/24  0649    142   K 4.5 4.3    109   CO2 25.0 24.0   BUN 62* 63*   CREATSERUM 4.87* 5.02*   CA 8.5* 8.2*   MG  --  2.5   * 88       Recent Labs   Lab 09/03/24  1142   ALT 8*   AST 15   ALB 3.7       No results for input(s): \"PTP\", \"INR\" in the last 168 hours.    No results for input(s): \"TROP\", \"CK\" in the last 168 hours.      Impression:   Acute on chronic HFpEF.  Echo performed Aug 2024.  Mostly due to uncontrolled HTN and CKD  HTN, accelerated.  CKD, acutely worse the past 3 weeks.  Troponin elevation- demand in the setting of RENEA, hypertension   Acute on chronic anemia, Hgb 7.4    Plan:  Continue bumex ggt at 1  Give diuril 500   Increase hydralazine to 100 mg tid  Wean nitroglycerin ggt   Continue carvedilol.  Daily BMP's.  Appreciate nephrology input    L3    Bernabe Pacheco MD  ITV Cardiology   [facility]

## 2024-09-04 NOTE — PLAN OF CARE
Received pt at shift change. AxOx4, Greek speaking. 5L O2 w stats maintaining >90%. Denies pain, some SOB at rest. Vitals stable, SBP elevated this am. NSR on tele. Bumex and NTG gtts infusing.  See flowsheets for additional assessments.   Pt updated on POC. Call light within reach. All needs met at this time.     Plan:  -NTG gtt @ 20 mcg/min  -Bumex gtt @ 1 mg/hr  -Wean O2 as able  -PT eval  -DW // I&O  -Monitor renal function    Problem: CARDIOVASCULAR - ADULT  Goal: Maintains optimal cardiac output and hemodynamic stability  Description: INTERVENTIONS:  - Monitor vital signs, rhythm, and trends  - Monitor for bleeding, hypotension and signs of decreased cardiac output  - Evaluate effectiveness of vasoactive medications to optimize hemodynamic stability  - Monitor arterial and/or venous puncture sites for bleeding and/or hematoma  - Assess quality of pulses, skin color and temperature  - Assess for signs of decreased coronary artery perfusion - ex. Angina  - Evaluate fluid balance, assess for edema, trend weights  Outcome: Progressing  Goal: Absence of cardiac arrhythmias or at baseline  Description: INTERVENTIONS:  - Continuous cardiac monitoring, monitor vital signs, obtain 12 lead EKG if indicated  - Evaluate effectiveness of antiarrhythmic and heart rate control medications as ordered  - Initiate emergency measures for life threatening arrhythmias  - Monitor electrolytes and administer replacement therapy as ordered  Outcome: Progressing     Problem: SAFETY ADULT - FALL  Goal: Free from fall injury  Description: INTERVENTIONS:  - Assess pt frequently for physical needs  - Identify cognitive and physical deficits and behaviors that affect risk of falls.  - [place] fall precautions as indicated by assessment.  - Educate pt/family on patient safety including physical limitations  - Instruct pt to call for assistance with activity based on assessment  - Modify environment to reduce risk of injury  - Provide  assistive devices as appropriate  - Consider OT/PT consult to assist with strengthening/mobility  - Encourage toileting schedule  Outcome: Progressing     Problem: Patient/Family Goals  Goal: Patient/Family Long Term Goal  Description: Patient's Long Term Goal: Go home    Interventions:  - routine lab draws  - medication compliance   - MD rounding  - See additional Care Plan goals for specific interventions  Outcome: Progressing  Goal: Patient/Family Short Term Goal  Description: Patient's Short Term Goal: pain free    Interventions:   - ice pack  -pain medication  - See additional Care Plan goals for specific interventions  Outcome: Progressing     Problem: RESPIRATORY - ADULT  Goal: Achieves optimal ventilation and oxygenation  Description: INTERVENTIONS:  - Assess for changes in respiratory status  - Assess for changes in mentation and behavior  - Position to facilitate oxygenation and minimize respiratory effort  - Oxygen supplementation based on oxygen saturation or ABGs  - Provide Smoking Cessation handout, if applicable  - Encourage broncho-pulmonary hygiene including cough, deep breathe, Incentive Spirometry  - Assess the need for suctioning and perform as needed  - Assess and instruct to report SOB or any respiratory difficulty  - Respiratory Therapy support as indicated  - Manage/alleviate anxiety  - Monitor for signs/symptoms of CO2 retention  Outcome: Progressing

## 2024-09-05 ENCOUNTER — APPOINTMENT (OUTPATIENT)
Dept: ULTRASOUND IMAGING | Facility: HOSPITAL | Age: 89
End: 2024-09-05
Attending: INTERNAL MEDICINE
Payer: MEDICARE

## 2024-09-05 ENCOUNTER — APPOINTMENT (OUTPATIENT)
Dept: ULTRASOUND IMAGING | Facility: HOSPITAL | Age: 89
DRG: 682 | End: 2024-09-05
Attending: INTERNAL MEDICINE
Payer: MEDICARE

## 2024-09-05 LAB
ANION GAP SERPL CALC-SCNC: 6 MMOL/L (ref 0–18)
BUN BLD-MCNC: 71 MG/DL (ref 9–23)
CALCIUM BLD-MCNC: 8.4 MG/DL (ref 8.7–10.4)
CHLORIDE SERPL-SCNC: 105 MMOL/L (ref 98–112)
CO2 SERPL-SCNC: 28 MMOL/L (ref 21–32)
CREAT BLD-MCNC: 5.49 MG/DL
DEPRECATED HBV CORE AB SER IA-ACNC: 162.3 NG/ML
EGFRCR SERPLBLD CKD-EPI 2021: 7 ML/MIN/1.73M2 (ref 60–?)
ERYTHROCYTE [DISTWIDTH] IN BLOOD BY AUTOMATED COUNT: 15.7 %
GLUCOSE BLD-MCNC: 92 MG/DL (ref 70–99)
HCT VFR BLD AUTO: 23.7 %
HGB BLD-MCNC: 7.7 G/DL
IRON SATN MFR SERPL: 19 %
IRON SERPL-MCNC: 38 UG/DL
MCH RBC QN AUTO: 30.7 PG (ref 26–34)
MCHC RBC AUTO-ENTMCNC: 32.5 G/DL (ref 31–37)
MCV RBC AUTO: 94.4 FL
OSMOLALITY SERPL CALC.SUM OF ELEC: 308 MOSM/KG (ref 275–295)
PLATELET # BLD AUTO: 167 10(3)UL (ref 150–450)
POTASSIUM SERPL-SCNC: 4.2 MMOL/L (ref 3.5–5.1)
RBC # BLD AUTO: 2.51 X10(6)UL
SODIUM SERPL-SCNC: 139 MMOL/L (ref 136–145)
TOTAL IRON BINDING CAPACITY: 202 UG/DL (ref 250–425)
TRANSFERRIN SERPL-MCNC: 140 MG/DL (ref 250–380)
WBC # BLD AUTO: 8.5 X10(3) UL (ref 4–11)

## 2024-09-05 PROCEDURE — 99232 SBSQ HOSP IP/OBS MODERATE 35: CPT | Performed by: INTERNAL MEDICINE

## 2024-09-05 PROCEDURE — 76775 US EXAM ABDO BACK WALL LIM: CPT | Performed by: INTERNAL MEDICINE

## 2024-09-05 PROCEDURE — 99233 SBSQ HOSP IP/OBS HIGH 50: CPT | Performed by: INTERNAL MEDICINE

## 2024-09-05 PROCEDURE — 93975 VASCULAR STUDY: CPT | Performed by: INTERNAL MEDICINE

## 2024-09-05 NOTE — PROGRESS NOTES
[facility] Pulmonary Progress Note    Asmita Cunningham Patient Status:  Inpatient    1935 MRN [id number]   Location [facility] 2NE-A Attending Patricia Soto MD   Hosp Day # 2 PCP JOSÉ Crawley     Subjective:  Asmita Cunningham is a(n) 89 year old female who is admitted for respiratory failure.    Overnight: no acute events overnight, feels slightly better, oxygenation slightly better    Objective:  /59 (BP Location: Left arm)   Pulse 52   Temp 97.6 °F (36.4 °C) (Oral)   Resp 20   Ht 5' 4\" (1.626 m)   Wt 187 lb 4.8 oz (85 kg)   SpO2 91%   BMI 32.15 kg/m²       Temp (24hrs), Av °F (36.7 °C), Min:97.4 °F (36.3 °C), Max:98.9 °F (37.2 °C)      Intake/Output:    Intake/Output Summary (Last 24 hours) at 2024 1424  Last data filed at 2024 1234  Gross per 24 hour   Intake 240 ml   Output 2800 ml   Net -2560 ml       Physical Exam:   General: alert, cooperative, oriented.  No respiratory distress.   Head: Normocephalic, without obvious abnormality, atraumatic.   Throat: Lips, mucosa, and tongue normal.  No thrush noted.   Neck: trachea midline, no adenopathy, no thyromegaly. No JVD.   Lungs: diminished breath sounds bilaterally   Chest wall: No tenderness or deformity.   Heart: regular rate and rhythm, S1, S2 normal, no murmur, click, rub or gallop   Abdomen: soft, non-distended, no masses, no guarding, no     Rebound.   Extremity: No edema or cyanosis   Skin: No rashes or lesions.   Neurological: Alert, interactive, no focal deficits    Lab Data Review:  Recent Labs     24  1142 24  0649 24  0621   WBC 8.9 8.1 8.5   HGB 8.4* 7.4* 7.7*   .0 158.0 167.0     Recent Labs     24  1142 24  0649 24  0621    142 139   K 4.5 4.3 4.2    109 105   CO2 25.0 24.0 28.0   BUN 62* 63* 71*   CREATSERUM 4.87* 5.02* 5.49*     No results for input(s): \"PTP\", \"INR\", \"PTT\" in the last 168 hours.    Cultures:   No results found for this visit on  09/03/24.    Radiology:  US KIDNEY W DOPPLER ([id number])  Narrative: PROCEDURE:  US KIDNEY W DOPPLER ([id number])     COMPARISON:  None.     INDICATIONS:  RENEA / CKD 4 / eval for renal artery stenosis     TECHNIQUE:  Ultrasound of the bilateral kidneys were performed with Doppler evaluation of the renal arteries and veins. The renal vessels were evaluated with B-mode ultrasound, Doppler color flow, and spectral analysis.        FINDINGS:  Suboptimal exam due to patient bowel gas and body habitus.  RIGHT KIDNEY:  Measuring 9.6 x 5.8 x 5.1 cm with increased echogenicity  LEFT KIDNEY:  Measuring 11.3 x 5.1 x 6.8 cm with increased echogenicity     Doppler velocity measurements and renal/aortic ratios are as follows:  AORTA:     185 cm/s     RIGHT RENAL ARTERY  ORIGIN:    34 cm/s,  ratio = 0.2  PROXIMAL:  37 cm/s,  ratio = 0.2  MID:       41 cm/s,  ratio = 0.2  DISTAL:    58 cm/s,  ratio = 0.3     MAXIMUM ACCELERATION TIME:  1.0 seconds     LEFT RENAL ARTERY  ORIGIN:    138 cm/s,  ratio = 0.8  PROXIMAL:  88 cm/s,  ratio = 0.5  MID:       75 cm/s,  ratio = 0.4  DISTAL:    43 cm/s,  ratio = 0.2     MAXIMUM ACCELERATION TIME:  0.04 seconds     RENAL VEINS:  There is normal Doppler wave form.                   Impression: CONCLUSION:       1. Increased acceleration time in the segmental arterial vasculature of the right kidney suggesting the possibility of underlying renal artery stenosis.  This could be further assessed with CTA of the abdominal aorta as clinically directed.     2. Increased echogenicity of the kidneys suggesting medical renal disease.  Clinical correlation recommended.       Please see above for further details.     LOCATION:  [id number]              Dictated by (CST): Curtis Nuñez MD on 9/05/2024 at 11:56 AM       Finalized by (CST): Curtis Nuñez MD on 9/05/2024 at 11:59 AM         Medications reviewed     Assessment and Plan:   Patient Active Problem List   Diagnosis    Elevated serum  immunoglobulin free light chain level    Anemia    Asterixis    RENEA (acute kidney injury) (HCC)    Stage 3 chronic kidney disease (HCC)    Renal insufficiency    Essential hypertension    Azotemia    Type 2 diabetes mellitus without complication, without long-term current use of insulin ([facility])    Diabetes mellitus type 2 in obese    Proteinuria    CKD (chronic kidney disease) stage 4, GFR 15-29 ml/min ([facility])    Wound dehiscence    Acute on chronic congestive heart failure (HCC)    Hypervolemia    Primary hypertension    Thrombocytopenia (HCC)    Acute on chronic heart failure with preserved ejection fraction ([facility])    Hypoxia    Acute respiratory failure with hypoxia and hypercapnia ([facility])    Acute encephalopathy    Hypercapnia    Metabolic alkalosis    Other pulmonary embolism without acute cor pulmonale ([facility])    Myoclonic jerking    Acute respiratory failure with hypoxia ([facility])    Uncontrolled hypertension    Hypervolemia, unspecified hypervolemia type    Frequent falls    Acute pulmonary edema ([facility])    Hypertensive emergency    Acute kidney injury superimposed on CKD ([facility])    Type 2 diabetes mellitus with stage 5 chronic kidney disease not on chronic dialysis, without long-term current use of insulin ([facility])    Chronic kidney disease, unspecified CKD stage    Elevated troponin    Acute hypoxemic respiratory failure ([facility])       Assessment:  Acute hypoxic respiratory failure likely secondary to CHF exacerbation versus renal failure, was as high as 15 L overnight, now down to 3  Acute heart failure with preserved ejection fraction EF 55 to 60% from 8/14/24  Acute kidney injury on chronic kidney disease stage IV  Uncontrolled hypertension in the setting of above  Shortness of breath in the setting of above  Diabetes mellitus type 2        Plan:  Close monitoring of oxygenation status, wean oxygen as able  Ongoing diuresis per cardiology in conjunction with nephrology  Do not see any indication now for bronchodilators or  antibiotics from a pulmonary perspective  I suspect that etiology of her shortness of breath is more cardiac/nephrology related  Will monitor oxygenation status, we could proceed with thoracentesis in the near future if needed        Bernadine Cha MD  [facility]  Office: [phone]

## 2024-09-05 NOTE — PLAN OF CARE
PT A/O, 97% ON 5L, SR/SB, HR IN 50'S WITH SLEEP, VOIDING PER [place], BUMEX GTT INFUSING, AFEBRILE, LABS AND ULTRASOUND OF KIDNEYS IN AM, INSTRUCTED PT ON POC    Problem: RESPIRATORY - ADULT  Goal: Achieves optimal ventilation and oxygenation  Description: INTERVENTIONS:  - Assess for changes in respiratory status  - Assess for changes in mentation and behavior  - Position to facilitate oxygenation and minimize respiratory effort  - Oxygen supplementation based on oxygen saturation or ABGs  - Provide Smoking Cessation handout, if applicable  - Encourage broncho-pulmonary hygiene including cough, deep breathe, Incentive Spirometry  - Assess the need for suctioning and perform as needed  - Assess and instruct to report SOB or any respiratory difficulty  - Respiratory Therapy support as indicated  - Manage/alleviate anxiety  - Monitor for signs/symptoms of CO2 retention  Outcome: Progressing

## 2024-09-05 NOTE — IMAGING NOTE
Yumiko WILDER made aware preparation for tomorrow's renal biopsy at 1 pm in CT scan:    Stat INR in am.  Keep NPO after midnight.  Patent saline lock for sedation meds.  May give oral medications in the morning especially BP meds..   Hold Heparin subcutaneous 6 hours prior.

## 2024-09-05 NOTE — PROGRESS NOTES
Cardiology Consultation    Asmita Cunningham Patient Status:  Emergency    1935 MRN [id number]   Location [facility] EMERGENCY DEPARTMENT Attending Burke Gupta,    Hosp Day # 2 PCP JOSÉ Crawley     S:  Appears much more comfortable today, off oxygen      History:  Past Medical History:    Acute pancreatitis, unspecified complication status, unspecified pancreatitis type (HCC)    Arthritis    Back disorder    Back pain    Blood disorder    thrombocytopenia    Cholangitis (HCC)    Diabetes (HCC)    Disorder of thyroid    Essential hypertension    Gout    Hearing impairment    some loss    Hepatic abscess (HCC)    Neuropathy    Pneumonia due to organism    recently 2022    Renal disorder    CKD    Stroke (HCC)    mini     UTI (urinary tract infection)    Visual impairment    left eye doesnt see\",  macular degeneration     Past Surgical History:   Procedure Laterality Date    Appendectomy      Appendectomy      Knee replacement surgery  2016    right    Tonsillectomy       History reviewed. No pertinent family history.      Allergies:  Allergies   Allergen Reactions    Ibuprofen ITCHING       Medications:   epoetin nadeem  10,000 Units Subcutaneous Weekly    carvedilol  25 mg Oral BID with meals    hydrALAZINE  50 mg Oral Q8H TRE    NIFEdipine ER  60 mg Oral Daily    ferrous sulfate  325 mg Oral Q48H    gabapentin  100 mg Oral TID    umeclidinium bromide  1 puff Inhalation Daily    levothyroxine  50 mcg Oral Before breakfast    pantoprazole  40 mg Oral Before breakfast    [Held by provider] heparin  5,000 Units Subcutaneous 2 times per day       Continuous Infusions:   bumetanide (Bumex) 12.5 mg in 50 mL infusion 1 mg/hr (24 0625)       Social History:   reports that she has never smoked. She has never used smokeless tobacco. She reports that she does not drink alcohol and does not use drugs.    Review of Systems:  All systems were reviewed and are negative except as described  above in HPI.    Physical Exam:      Temp:  [97.4 °F (36.3 °C)-98.9 °F (37.2 °C)] 97.6 °F (36.4 °C)  Pulse:  [52-60] 52  Resp:  [18-20] 20  BP: (143-154)/(49-62) 152/59  SpO2:  [91 %-99 %] 91 %    Last 3 Weights   09/05/24 0500 187 lb 4.8 oz (85 kg)   09/04/24 0528 182 lb 3.2 oz (82.6 kg)   09/03/24 2246 184 lb 6.3 oz (83.6 kg)   09/03/24 2030 184 lb 6.4 oz (83.6 kg)   09/03/24 1134 179 lb (81.2 kg)   08/13/24 2315 178 lb 5.6 oz (80.9 kg)   03/25/22 1246 209 lb (94.8 kg)       General: No apparent distress  HEENT: No focal deficits.  Neck: supple. JVP normal  Cardiac: Regular rhythm, S1, S2 normal,   2/6 systolic murmur  Lungs: Crackles BL, bases  Abdomen: Soft, non-tender.   Extremities: +1  Neurologic: no focal deficits  Skin: Warm and dry.          Telemetry: sinus    Laboratories and Data:  Diagnostics:    EKG, 9/3/2024:  reviewed    CXR, 9/3/2024:  reviewed    Labs:   HEM:  Recent Labs   Lab 09/03/24  1142 09/04/24  0649 09/05/24  0621   WBC 8.9 8.1 8.5   HGB 8.4* 7.4* 7.7*   .0 158.0 167.0       Chem:  Recent Labs   Lab 09/03/24  1142 09/04/24  0649 09/05/24  0621    142 139   K 4.5 4.3 4.2    109 105   CO2 25.0 24.0 28.0   BUN 62* 63* 71*   CREATSERUM 4.87* 5.02* 5.49*   CA 8.5* 8.2* 8.4*   MG  --  2.5  --    * 88 92       Recent Labs   Lab 09/03/24  1142   ALT 8*   AST 15   ALB 3.7       No results for input(s): \"PTP\", \"INR\" in the last 168 hours.    No results for input(s): \"TROP\", \"CK\" in the last 168 hours.      Impression:   Acute on chronic HFpEF.  Echo performed Aug 2024.  Mostly due to uncontrolled HTN and CKD  HTN, accelerated.  CKD, acutely worse the past 3 weeks.  Troponin elevation- demand in the setting of RENEA, hypertension   Acute on chronic anemia, Hgb 7.4    Plan:  Continue bumex ggt at 1 and diuril   Continue hydralazine to 100 mg tid  Continue carvedilol.  Daily BMP's  Appreciate nephrology input    L3    Bernabe Pacheco MD  ITV Cardiology   [facility]  [place]

## 2024-09-05 NOTE — CARDIAC REHAB
Consult received for Heart Failure Education.  Clinical notes reviewed. Patient not appropriate for outpatient phase 2 Cardiac Rehab program at this time.

## 2024-09-05 NOTE — CM/SW NOTE
Pt discussed in rounds and chart was reviewed. Anticipated therapy need for pt at ID is home health.    Per chart review, pt appears to be current with [facility]. SW sent referral / ELIZABETH order to them via Aidin. SW also sent them a message requesting confirmation if pt is current with them.     to remain available for support and/or discharge planning.    Addendum: SW received a message in Aidin from Kevin Washington at [facility] informing SW that pt is current with them and that they are able to accept pt again. NEFTALY reserved [facility].    RADHA Romero  Discharge Planner  [phone]

## 2024-09-05 NOTE — PROGRESS NOTES
[facility]   part of [facility]     Hospitalist Progress Note     Asmita Cunningham Patient Status:  Inpatient    1935 MRN [id number]   Location [facility] 2NE-A Attending Patricia Soto MD   Hosp Day # 2 PCP JOSÉ Crawley     Chief Complaint: Shortness of breath, pedal edema    Subjective:     Patient sitting up in the chair  Patient feeling significantly better today.  Shortness of breath improving.  Pedal edema improving.  Oxygen requirement improving, currently on 2 L of oxygen via nasal cannula and being weaned off as able    Objective:    Review of Systems:   A comprehensive review of systems was completed; pertinent positive and negatives stated in subjective.    Vital signs:  Temp:  [97.4 °F (36.3 °C)-98.9 °F (37.2 °C)] 97.7 °F (36.5 °C)  Pulse:  [52-64] 64  Resp:  [18-20] 18  BP: (143-154)/(49-63) 149/63  SpO2:  [89 %-99 %] 89 %    Physical Exam:    /63 (BP Location: Right arm)   Pulse 64   Temp 97.7 °F (36.5 °C) (Oral)   Resp 18   Ht 5' 4\" (1.626 m)   Wt 187 lb 4.8 oz (85 kg)   SpO2 (!) 89%   BMI 32.15 kg/m²   On 2 L of oxygen by nasal cannula  General: No acute distress  Respiratory: No wheezes, no rhonchi  Cardiovascular: S1, S2, regular rate and rhythm  Abdomen: Soft, Non-tender, non-distended, positive bowel sounds  Neuro: No new focal deficits.   Extremities: Trace pedal edema      Diagnostic Data:    Labs:  Recent Labs   Lab 24  1142 24  0649 24  0621   WBC 8.9 8.1 8.5   HGB 8.4* 7.4* 7.7*   MCV 93.9 94.6 94.4   .0 158.0 167.0       Recent Labs   Lab 24  1142 24  0649 24  0621   * 88 92   BUN 62* 63* 71*   CREATSERUM 4.87* 5.02* 5.49*   CA 8.5* 8.2* 8.4*   ALB 3.7  --   --     142 139   K 4.5 4.3 4.2    109 105   CO2 25.0 24.0 28.0   ALKPHO 77  --   --    AST 15  --   --    ALT 8*  --   --    BILT 0.3  --   --    TP 6.7  --   --        Estimated Creatinine Clearance: 6.2 mL/min (A) (based on SCr  of 5.49 mg/dL (H)).    Recent Labs   Lab 09/03/24  1142   TROPHS 113*       No results for input(s): \"PTP\", \"INR\" in the last 168 hours.               Microbiology    No results found for this visit on 09/03/24.      Imaging: Reviewed in Epic.    Medications:    epoetin nadeem  10,000 Units Subcutaneous Weekly    carvedilol  25 mg Oral BID with meals    hydrALAZINE  50 mg Oral Q8H TRE    NIFEdipine ER  60 mg Oral Daily    ferrous sulfate  325 mg Oral Q48H    gabapentin  100 mg Oral TID    umeclidinium bromide  1 puff Inhalation Daily    levothyroxine  50 mcg Oral Before breakfast    pantoprazole  40 mg Oral Before breakfast    [Held by provider] heparin  5,000 Units Subcutaneous 2 times per day       Assessment & Plan:      #Hypertensive emergency with acute pulmonary edema and CHF exacerbation  #Acute pulmonary edema  #Acute on chronic diastolic CHF exacerbation  #Mild aortic stenosis  #Exertional shortness of breath and peripheral edema due to above  Improving   on IV nitroglycerin drip which is being weaned off as able   on IV Bumex drip    IV Diuril 1 dose given on 9/4/2024 per cardiology  Will continue home blood pressure medications including Coreg, hydralazine, Procardia XL  Follow blood pressure  Cardiology consult appreciated  2D echo done on 8/14/2024 reviewed in [facility] which showed EF of 55 to 60% and grade 1 diastolic dysfunction along with mild aortic stenosis at that time.  #Acute hypoxic respiratory failure due to acute pulmonary edema and CHF exacerbation  Status post IV Lasix  On nitroglycerin drip and IV Bumex drip   Initially on BiPAP from the emergency room, weaned down to Vapotherm with 50% FiO2 while I saw patient for admission on 9/3/2024, currently on oxygen by nasal cannula 5 L on 9/4/2024  Pulmonary consult  Wean oxygen as able  #Diabetes mellitus type 2 with CKD stage V  Hyperglycemia protocol  #Acute kidney injury on CKD stage IV-V  Nephrology consult appreciated  renal artery Dopplers done  today, results pending at this time  Nephrology ordered for CT-guided renal biopsy which is planned for tomorrow and DVT prophylaxis held for this  On chart review patient has a GFR between 11-13 last month, currently GFR ranging from 7-8  Follow BMP in a.m.  Discussed with patient, patient seen with video telemetry  ID number [id number] Irineo translating [place] for patient.  Discussed with RN at bedside.    My colleague Dr. Adorno will follow from morning tomorrow    Patricia Soto MD    Supplementary Documentation:     Quality:  DVT Mechanical Prophylaxis:   SCDs,    DVT Pharmacologic Prophylaxis   Medication    [Held by provider] heparin (Porcine) 5000 UNIT/ML injection 5,000 Units                Code Status: Full Code  Alberto: External urinary catheter in place  Alberto Duration (in days):   Central line:    VIRGIL: 9/11/2024    Discharge is dependent on: Clinical progress  At this point Ms. Cuninngham is expected to be discharge to: Home    The 21st Century Cures Act makes medical notes like these available to patients in the interest of transparency. Please be advised this is a medical document. Medical documents are intended to carry relevant information, facts as evident, and the clinical opinion of the practitioner. The medical note is intended as peer to peer communication and may appear blunt or direct. It is written in medical language and may contain abbreviations or verbiage that are unfamiliar.              **Certification      PHYSICIAN Certification of Need for Inpatient Hospitalization - Initial Certification    Patient will require inpatient services that will reasonably be expected to span two midnight's based on the clinical documentation in H+P.   Based on patients current state of illness, I anticipate that, after discharge, patient will require TBD.

## 2024-09-05 NOTE — PROGRESS NOTES
[facility]  Nephrology Progress Note    Asmita Xuangeliquefranciscaveto Attending:  Patricia Soto MD       Assessment and Plan:    1) RENEA / CKD 4- due to uncontrolled HTN exac by cardiorenal syndrome / decompensated HF; this may be superimposed on an an underlying glomerulopathy given development of nephrotic range proteinuria since - ie FSGS / membranous GN, etc. PLAN- focus on BP / volume mgmt    2) Pulmonary edema / fluid overload- primarily due to worsening renal failure superimposed on diastolic dysfunction- continue bumex gtt + diuril     3) Uncontrolled HTN- exac by fluid overload. Continue usual coreg / VEGA / nifedipine. Await renal artery doppler; reassess meds after euvolemic     4) Anemia- primarily due to CKD; SPEP neg . Fe stores noted -> IV Fe / EPO; recheck monoclonal protein study     5) Mild type 2 DM    D/W daughter by phone (not much insight). Left VM for granddaughter- will discuss kidney biopsy, dialysis, etc.       Subjective:  Awake alert feels better    Physical Exam:   /53 (BP Location: Right arm)   Pulse 57   Temp 97.4 °F (36.3 °C) (Oral)   Resp 18   Ht 5' 4\" (1.626 m)   Wt 187 lb 4.8 oz (85 kg)   SpO2 93%   BMI 32.15 kg/m²   Temp (24hrs), Av °F (36.7 °C), Min:97.4 °F (36.3 °C), Max:98.9 °F (37.2 °C)       Intake/Output Summary (Last 24 hours) at 2024 0944  Last data filed at 2024 0540  Gross per 24 hour   Intake 960 ml   Output 2300 ml   Net -1340 ml     Wt Readings from Last 3 Encounters:   24 187 lb 4.8 oz (85 kg)   24 178 lb 5.6 oz (80.9 kg)   22 209 lb (94.8 kg)     General: awake aelrt  HEENT: No scleral icterus, MMM  Neck: Supple, no AMANDEEP or thyromegaly  Cardiac: Regular rate and rhythm, S1, S2 normal, no murmur or tub  Lungs: Decreased BS at bases bilaterally   Abdomen: Soft, non-tender. + bowel sounds, no palpable organomegaly  Extremities: Without clubbing, cyanosis; minimal LE edema  Neurologic: Cranial nerves grossly intact, moving all  extremities  Skin: Warm and dry, no rashes       Labs:   Lab Results   Component Value Date    WBC 8.5 09/05/2024    HGB 7.7 09/05/2024    HCT 23.7 09/05/2024    .0 09/05/2024    CREATSERUM 5.49 09/05/2024    BUN 71 09/05/2024     09/05/2024    K 4.2 09/05/2024     09/05/2024    CO2 28.0 09/05/2024    GLU 92 09/05/2024    CA 8.4 09/05/2024       Imaging:  All imaging studies reviewed.    Meds:   Current Facility-Administered Medications   Medication Dose Route Frequency    carvedilol (Coreg) tab 25 mg  25 mg Oral BID with meals    hydrALAZINE (Apresoline) tab 50 mg  50 mg Oral Q8H TRE    NIFEdipine ER (Procardia-XL) 24 hr tab 60 mg  60 mg Oral Daily    ferrous sulfate DR tab 325 mg  325 mg Oral Q48H    gabapentin (Neurontin) cap 100 mg  100 mg Oral TID    umeclidinium bromide (Incruse Ellipta) 62.5 MCG/ACT inhaler 1 puff  1 puff Inhalation Daily    albuterol (Ventolin HFA) 108 (90 Base) MCG/ACT inhaler 2 puff  2 puff Inhalation Q4H PRN    levothyroxine (Synthroid) tab 50 mcg  50 mcg Oral Before breakfast    pantoprazole (Protonix) DR tab 40 mg  40 mg Oral Before breakfast    heparin (Porcine) 5000 UNIT/ML injection 5,000 Units  5,000 Units Subcutaneous 2 times per day    acetaminophen (Tylenol Extra Strength) tab 500 mg  500 mg Oral Q4H PRN    ondansetron (Zofran) 4 MG/2ML injection 4 mg  4 mg Intravenous Q6H PRN    melatonin tab 3 mg  3 mg Oral Nightly PRN    bumetanide (Bumex) 12.5 mg in 50 mL infusion  1 mg/hr Intravenous Continuous    nitroGLYCERIN in dextrose 5% 50 mg/250mL infusion premix  20 mcg/min Intravenous Titrated         Questions/concerns were discussed with patient and/or family by bedside.          Linn Santillan MD  9/5/2024  9:44 AM

## 2024-09-05 NOTE — PLAN OF CARE
Assumed care for patient this morning.  AOx4. X1 assist with walker. Ambulating in room to bathroom.  NSR on cardiac monitor.  O2 weaned down from 5L to 1L at rest. Denies sob, chest discomfort, n/v.  Denies pain. Voiding, good urine output, urge incontinence at times.      [employer]  #[id number] used for assessment and plan of care.    Pt completed US kidney today.   Diet restarted.    Received instructions from radiology to keep pt NPO 6 hrs prior to CT, STAT INR in AM (ordered), sips with meds in the morning okay, and hold heparin 6 hrs prior.No specific IV needed just a second one if Bumex gtt still infusing.      Problem: CARDIOVASCULAR - ADULT  Goal: Maintains optimal cardiac output and hemodynamic stability  Description: INTERVENTIONS:  - Monitor vital signs, rhythm, and trends  - Monitor for bleeding, hypotension and signs of decreased cardiac output  - Evaluate effectiveness of vasoactive medications to optimize hemodynamic stability  - Monitor arterial and/or venous puncture sites for bleeding and/or hematoma  - Assess quality of pulses, skin color and temperature  - Assess for signs of decreased coronary artery perfusion - ex. Angina  - Evaluate fluid balance, assess for edema, trend weights  Outcome: Progressing  Goal: Absence of cardiac arrhythmias or at baseline  Description: INTERVENTIONS:  - Continuous cardiac monitoring, monitor vital signs, obtain 12 lead EKG if indicated  - Evaluate effectiveness of antiarrhythmic and heart rate control medications as ordered  - Initiate emergency measures for life threatening arrhythmias  - Monitor electrolytes and administer replacement therapy as ordered  Outcome: Progressing     Problem: SAFETY ADULT - FALL  Goal: Free from fall injury  Description: INTERVENTIONS:  - Assess pt frequently for physical needs  - Identify cognitive and physical deficits and behaviors that affect risk of falls.  - [place] fall precautions as indicated by assessment.  -  Educate pt/family on patient safety including physical limitations  - Instruct pt to call for assistance with activity based on assessment  - Modify environment to reduce risk of injury  - Provide assistive devices as appropriate  - Consider OT/PT consult to assist with strengthening/mobility  - Encourage toileting schedule  Outcome: Progressing     Problem: Patient/Family Goals  Goal: Patient/Family Long Term Goal  Description: Patient's Long Term Goal: Go home    Interventions:  - routine lab draws  - medication compliance   - MD rounding  - See additional Care Plan goals for specific interventions  Outcome: Progressing  Goal: Patient/Family Short Term Goal  Description: Patient's Short Term Goal: pain free    Interventions:   - ice pack  -pain medication  - See additional Care Plan goals for specific interventions  Outcome: Progressing     Problem: RESPIRATORY - ADULT  Goal: Achieves optimal ventilation and oxygenation  Description: INTERVENTIONS:  - Assess for changes in respiratory status  - Assess for changes in mentation and behavior  - Position to facilitate oxygenation and minimize respiratory effort  - Oxygen supplementation based on oxygen saturation or ABGs  - Provide Smoking Cessation handout, if applicable  - Encourage broncho-pulmonary hygiene including cough, deep breathe, Incentive Spirometry  - Assess the need for suctioning and perform as needed  - Assess and instruct to report SOB or any respiratory difficulty  - Respiratory Therapy support as indicated  - Manage/alleviate anxiety  - Monitor for signs/symptoms of CO2 retention  Outcome: Progressing

## 2024-09-06 ENCOUNTER — APPOINTMENT (OUTPATIENT)
Dept: CT IMAGING | Facility: HOSPITAL | Age: 89
End: 2024-09-06
Attending: INTERNAL MEDICINE
Payer: MEDICARE

## 2024-09-06 ENCOUNTER — APPOINTMENT (OUTPATIENT)
Dept: CT IMAGING | Facility: HOSPITAL | Age: 89
DRG: 682 | End: 2024-09-06
Attending: INTERNAL MEDICINE
Payer: MEDICARE

## 2024-09-06 PROBLEM — N18.5 CKD (CHRONIC KIDNEY DISEASE) STAGE 5, GFR LESS THAN 15 ML/MIN (HCC): Status: ACTIVE | Noted: 2024-09-03

## 2024-09-06 LAB
ANION GAP SERPL CALC-SCNC: 11 MMOL/L (ref 0–18)
ANTI-MPO ANTIBODIES: <0.2 UNITS
ANTI-PR3 ANTIBODIES: <0.2 UNITS
BUN BLD-MCNC: 80 MG/DL (ref 9–23)
CALCIUM BLD-MCNC: 8.1 MG/DL (ref 8.7–10.4)
CHLORIDE SERPL-SCNC: 99 MMOL/L (ref 98–112)
CO2 SERPL-SCNC: 27 MMOL/L (ref 21–32)
CREAT BLD-MCNC: 5.9 MG/DL
DSDNA IGG SERPL IA-ACNC: 0.7 IU/ML
EGFRCR SERPLBLD CKD-EPI 2021: 6 ML/MIN/1.73M2 (ref 60–?)
ENA AB SER QL IA: 0.3 UG/L
ENA AB SER QL IA: NEGATIVE
ERYTHROCYTE [DISTWIDTH] IN BLOOD BY AUTOMATED COUNT: 15.5 %
GLUCOSE BLD-MCNC: 106 MG/DL (ref 70–99)
GLUCOSE BLD-MCNC: 94 MG/DL (ref 70–99)
HCT VFR BLD AUTO: 24.4 %
HGB BLD-MCNC: 8.3 G/DL
INR BLD: 1.04 (ref 0.8–1.2)
MCH RBC QN AUTO: 31 PG (ref 26–34)
MCHC RBC AUTO-ENTMCNC: 34 G/DL (ref 31–37)
MCV RBC AUTO: 91 FL
OSMOLALITY SERPL CALC.SUM OF ELEC: 308 MOSM/KG (ref 275–295)
PLATELET # BLD AUTO: 168 10(3)UL (ref 150–450)
POTASSIUM SERPL-SCNC: 4.1 MMOL/L (ref 3.5–5.1)
PROTHROMBIN TIME: 13.6 SECONDS (ref 11.6–14.8)
RBC # BLD AUTO: 2.68 X10(6)UL
SODIUM SERPL-SCNC: 137 MMOL/L (ref 136–145)
URATE SERPL-MCNC: 7.7 MG/DL
WBC # BLD AUTO: 8.7 X10(3) UL (ref 4–11)

## 2024-09-06 PROCEDURE — 99152 MOD SED SAME PHYS/QHP 5/>YRS: CPT | Performed by: INTERNAL MEDICINE

## 2024-09-06 PROCEDURE — 99232 SBSQ HOSP IP/OBS MODERATE 35: CPT | Performed by: INTERNAL MEDICINE

## 2024-09-06 PROCEDURE — 99233 SBSQ HOSP IP/OBS HIGH 50: CPT | Performed by: INTERNAL MEDICINE

## 2024-09-06 PROCEDURE — 74150 CT ABDOMEN W/O CONTRAST: CPT | Performed by: INTERNAL MEDICINE

## 2024-09-06 RX ORDER — FLUMAZENIL 0.1 MG/ML
0.2 INJECTION INTRAVENOUS AS NEEDED
Status: DISCONTINUED | OUTPATIENT
Start: 2024-09-06 | End: 2024-09-06 | Stop reason: HOSPADM

## 2024-09-06 RX ORDER — PREDNISONE 20 MG/1
20 TABLET ORAL
Status: DISCONTINUED | OUTPATIENT
Start: 2024-09-06 | End: 2024-09-10

## 2024-09-06 RX ORDER — DEXTROSE MONOHYDRATE 25 G/50ML
50 INJECTION, SOLUTION INTRAVENOUS
Status: DISCONTINUED | OUTPATIENT
Start: 2024-09-06 | End: 2024-09-13

## 2024-09-06 RX ORDER — NALOXONE HYDROCHLORIDE 0.4 MG/ML
0.08 INJECTION, SOLUTION INTRAMUSCULAR; INTRAVENOUS; SUBCUTANEOUS AS NEEDED
Status: DISCONTINUED | OUTPATIENT
Start: 2024-09-06 | End: 2024-09-06 | Stop reason: HOSPADM

## 2024-09-06 RX ORDER — MIDAZOLAM HYDROCHLORIDE 1 MG/ML
INJECTION INTRAMUSCULAR; INTRAVENOUS
Status: DISPENSED
Start: 2024-09-06 | End: 2024-09-06

## 2024-09-06 RX ORDER — COLCHICINE 0.6 MG/1
0.3 TABLET ORAL ONCE
Status: COMPLETED | OUTPATIENT
Start: 2024-09-06 | End: 2024-09-06

## 2024-09-06 RX ORDER — NICOTINE POLACRILEX 4 MG
30 LOZENGE BUCCAL
Status: DISCONTINUED | OUTPATIENT
Start: 2024-09-06 | End: 2024-09-13

## 2024-09-06 RX ORDER — NICOTINE POLACRILEX 4 MG
15 LOZENGE BUCCAL
Status: DISCONTINUED | OUTPATIENT
Start: 2024-09-06 | End: 2024-09-13

## 2024-09-06 RX ORDER — HYDRALAZINE HYDROCHLORIDE 20 MG/ML
INJECTION INTRAMUSCULAR; INTRAVENOUS
Status: DISPENSED
Start: 2024-09-06 | End: 2024-09-06

## 2024-09-06 RX ORDER — MIDAZOLAM HYDROCHLORIDE 1 MG/ML
1 INJECTION INTRAMUSCULAR; INTRAVENOUS EVERY 5 MIN PRN
Status: DISCONTINUED | OUTPATIENT
Start: 2024-09-06 | End: 2024-09-06 | Stop reason: HOSPADM

## 2024-09-06 RX ORDER — HYDROCODONE BITARTRATE AND ACETAMINOPHEN 5; 325 MG/1; MG/1
1 TABLET ORAL EVERY 6 HOURS PRN
Status: DISCONTINUED | OUTPATIENT
Start: 2024-09-06 | End: 2024-09-13

## 2024-09-06 RX ORDER — SODIUM CHLORIDE 9 MG/ML
INJECTION, SOLUTION INTRAVENOUS CONTINUOUS
Status: DISCONTINUED | OUTPATIENT
Start: 2024-09-06 | End: 2024-09-06 | Stop reason: HOSPADM

## 2024-09-06 NOTE — INTERVAL H&P NOTE
The above referenced H&P was reviewed by Sharron Bah MD on 9/6/2024, the patient was examined and no significant changes have occurred in the patient's condition since the H&P was performed.  Risks, benefits, alternative treatments and consequences of no treatment were discussed.  We will proceed with procedure as planned.  Mallampati: 2  Cardiac: Regular rate  Respiratory: Non-labored  ASA: 2      Sharron Bah MD  9/6/2024  10:08 AM

## 2024-09-06 NOTE — PLAN OF CARE
Assumed patient at 1930. Alert and oriented x 4 on 1L nasal canula. Lung sounds diminished with crackles at bases. Normal sinus on tele. Continent of bowel and bladder. Up 1 and a walker. Patient updated on plan of care, patient verbalized understanding.     Plan of care: renal biopsy NPO after midnight.     Problem: CARDIOVASCULAR - ADULT  Goal: Maintains optimal cardiac output and hemodynamic stability  Description: INTERVENTIONS:  - Monitor vital signs, rhythm, and trends  - Monitor for bleeding, hypotension and signs of decreased cardiac output  - Evaluate effectiveness of vasoactive medications to optimize hemodynamic stability  - Monitor arterial and/or venous puncture sites for bleeding and/or hematoma  - Assess quality of pulses, skin color and temperature  - Assess for signs of decreased coronary artery perfusion - ex. Angina  - Evaluate fluid balance, assess for edema, trend weights  Outcome: Progressing  Goal: Absence of cardiac arrhythmias or at baseline  Description: INTERVENTIONS:  - Continuous cardiac monitoring, monitor vital signs, obtain 12 lead EKG if indicated  - Evaluate effectiveness of antiarrhythmic and heart rate control medications as ordered  - Initiate emergency measures for life threatening arrhythmias  - Monitor electrolytes and administer replacement therapy as ordered  Outcome: Progressing     Problem: SAFETY ADULT - FALL  Goal: Free from fall injury  Description: INTERVENTIONS:  - Assess pt frequently for physical needs  - Identify cognitive and physical deficits and behaviors that affect risk of falls.  - [place] fall precautions as indicated by assessment.  - Educate pt/family on patient safety including physical limitations  - Instruct pt to call for assistance with activity based on assessment  - Modify environment to reduce risk of injury  - Provide assistive devices as appropriate  - Consider OT/PT consult to assist with strengthening/mobility  - Encourage toileting  schedule  Outcome: Progressing     Problem: Patient/Family Goals  Goal: Patient/Family Long Term Goal  Description: Patient's Long Term Goal: Go home    Interventions:  - routine lab draws  - medication compliance   - MD rounding  - See additional Care Plan goals for specific interventions  Outcome: Progressing  Goal: Patient/Family Short Term Goal  Description: Patient's Short Term Goal: pain free    Interventions:   - ice pack  -pain medication  - See additional Care Plan goals for specific interventions  Outcome: Progressing     Problem: RESPIRATORY - ADULT  Goal: Achieves optimal ventilation and oxygenation  Description: INTERVENTIONS:  - Assess for changes in respiratory status  - Assess for changes in mentation and behavior  - Position to facilitate oxygenation and minimize respiratory effort  - Oxygen supplementation based on oxygen saturation or ABGs  - Provide Smoking Cessation handout, if applicable  - Encourage broncho-pulmonary hygiene including cough, deep breathe, Incentive Spirometry  - Assess the need for suctioning and perform as needed  - Assess and instruct to report SOB or any respiratory difficulty  - Respiratory Therapy support as indicated  - Manage/alleviate anxiety  - Monitor for signs/symptoms of CO2 retention  Outcome: Progressing

## 2024-09-06 NOTE — PROGRESS NOTES
Cardiology Consultation    Asmita Cunningham Patient Status:  Emergency    1935 MRN [id number]   Location [facility] EMERGENCY DEPARTMENT Attending Burke Gupta DO   Hosp Day # 3 PCP JOSÉ Crawley     S:  No compliants   Cr is uptrending   BP is better      History:  Past Medical History:    Acute pancreatitis, unspecified complication status, unspecified pancreatitis type (HCC)    Arthritis    Back disorder    Back pain    Blood disorder    thrombocytopenia    Cholangitis (HCC)    Diabetes (HCC)    Disorder of thyroid    Essential hypertension    Gout    Hearing impairment    some loss    Hepatic abscess (HCC)    Neuropathy    Pneumonia due to organism    recently 2022    Renal disorder    CKD    Stroke (HCC)    mini     UTI (urinary tract infection)    Visual impairment    left eye doesnt see\",  macular degeneration     Past Surgical History:   Procedure Laterality Date    Appendectomy      Appendectomy      Knee replacement surgery  2016    right    Tonsillectomy       History reviewed. No pertinent family history.      Allergies:  Allergies   Allergen Reactions    Ibuprofen ITCHING       Medications:   midazolam        fentaNYL        hydrALAzine        predniSONE  20 mg Oral Daily with breakfast    colchicine  0.3 mg Oral Once    epoetin nadeem  10,000 Units Subcutaneous Weekly    carvedilol  25 mg Oral BID with meals    hydrALAZINE  50 mg Oral Q8H TRE    NIFEdipine ER  60 mg Oral Daily    ferrous sulfate  325 mg Oral Q48H    gabapentin  100 mg Oral TID    umeclidinium bromide  1 puff Inhalation Daily    levothyroxine  50 mcg Oral Before breakfast    pantoprazole  40 mg Oral Before breakfast    [Held by provider] heparin  5,000 Units Subcutaneous 2 times per day       Continuous Infusions:   bumetanide (Bumex) 12.5 mg in 50 mL infusion 1 mg/hr (24 0853)       Social History:   reports that she has never smoked. She has never used smokeless tobacco. She reports that she does  not drink alcohol and does not use drugs.    Review of Systems:  All systems were reviewed and are negative except as described above in HPI.    Physical Exam:      Temp:  [97.6 °F (36.4 °C)-98.4 °F (36.9 °C)] 97.7 °F (36.5 °C)  Pulse:  [52-67] 55  Resp:  [15-20] 18  BP: (117-163)/() 155/62  SpO2:  [89 %-96 %] 94 %    Last 3 Weights   09/06/24 0442 181 lb (82.1 kg)   09/05/24 0500 187 lb 4.8 oz (85 kg)   09/04/24 0528 182 lb 3.2 oz (82.6 kg)   09/03/24 2246 184 lb 6.3 oz (83.6 kg)   09/03/24 2030 184 lb 6.4 oz (83.6 kg)   09/03/24 1134 179 lb (81.2 kg)   08/13/24 2315 178 lb 5.6 oz (80.9 kg)   03/25/22 1246 209 lb (94.8 kg)       General: No apparent distress  HEENT: No focal deficits.  Neck: supple. JVP normal  Cardiac: Regular rhythm, S1, S2 normal,   2/6 systolic murmur  Lungs: Crackles BL, bases  Abdomen: Soft, non-tender.   Extremities: no edema  Neurologic: no focal deficits  Skin: Warm and dry.          Telemetry: sinus    Laboratories and Data:  Diagnostics:    EKG, 9/3/2024:  reviewed    CXR, 9/3/2024:  reviewed    Labs:   HEM:  Recent Labs   Lab 09/03/24  1142 09/04/24  0649 09/05/24  0621 09/06/24  0703   WBC 8.9 8.1 8.5 8.7   HGB 8.4* 7.4* 7.7* 8.3*   .0 158.0 167.0 168.0       Chem:  Recent Labs   Lab 09/03/24  1142 09/04/24  0649 09/05/24  0621 09/06/24  0703    142 139 137   K 4.5 4.3 4.2 4.1    109 105 99   CO2 25.0 24.0 28.0 27.0   BUN 62* 63* 71* 80*   CREATSERUM 4.87* 5.02* 5.49* 5.90*   CA 8.5* 8.2* 8.4* 8.1*   MG  --  2.5  --   --    * 88 92 94       Recent Labs   Lab 09/03/24  1142   ALT 8*   AST 15   ALB 3.7       Recent Labs   Lab 09/06/24  0703   PTP 13.6   INR 1.04       No results for input(s): \"TROP\", \"CK\" in the last 168 hours.      Impression:   Acute on chronic HFpEF.  Echo performed Aug 2024.  Mostly due to uncontrolled HTN and CKD  HTN, accelerated- now better controlled   CKD, acutely worse the past 3 weeks. Cr is uptrending   Troponin elevation-  demand in the setting of RENEA, hypertension   Acute on chronic anemia, Hgb 7.4    Plan:  Continue bumex ggt at 1, UOP is trailing off a bit.  Getting intermittent diuril   Continue hydralazine to 100 mg tid  Continue carvedilol.  Daily BMP's  Appreciate nephrology input    L3    Bernabe Pacheco MD  ITV Cardiology   [facility]

## 2024-09-06 NOTE — PROGRESS NOTES
[facility] Pulmonary Progress Note    Asmita Cunningham Patient Status:  Inpatient    1935 MRN [id number]   Location [facility] 2NE-A Attending Patricia Soto MD   Hosp Day # 3 PCP JOSÉ Crawley     Subjective:  Asmita Cunningham is a(n) 89 year old female who is admitted for respiratory failure.    Overnight: no acute events overnight, continues to feel better    Objective:  /62 (BP Location: Right arm)   Pulse 66   Temp 97.7 °F (36.5 °C) (Oral)   Resp 18   Ht 5' 4\" (1.626 m)   Wt 181 lb (82.1 kg)   SpO2 94%   BMI 31.07 kg/m²       Temp (24hrs), Av °F (36.7 °C), Min:97.6 °F (36.4 °C), Max:98.4 °F (36.9 °C)      Intake/Output:    Intake/Output Summary (Last 24 hours) at 2024 1241  Last data filed at 2024 1217  Gross per 24 hour   Intake 872.87 ml   Output 1250 ml   Net -377.13 ml       Physical Exam:   General: alert, cooperative, oriented.  No respiratory distress.   Head: Normocephalic, without obvious abnormality, atraumatic.   Throat: Lips, mucosa, and tongue normal.  No thrush noted.   Neck: trachea midline, no adenopathy, no thyromegaly. No JVD.   Lungs: diminished breath sounds bilaterally   Chest wall: No tenderness or deformity.   Heart: regular rate and rhythm, S1, S2 normal, no murmur, click, rub or gallop   Abdomen: soft, non-distended, no masses, no guarding, no     Rebound.   Extremity: No edema or cyanosis   Skin: No rashes or lesions.   Neurological: Alert, interactive, no focal deficits    Lab Data Review:  Recent Labs     24  1142 24  0649 24  0621   WBC 8.9 8.1 8.5   HGB 8.4* 7.4* 7.7*   .0 158.0 167.0     Recent Labs     24  1142 24  0649 24  0621    142 139   K 4.5 4.3 4.2    109 105   CO2 25.0 24.0 28.0   BUN 62* 63* 71*   CREATSERUM 4.87* 5.02* 5.49*     Recent Labs   Lab 24  0703   PTP 13.6   INR 1.04       Cultures:   No results found for this visit on 24.    Radiology:  CT ABDOMEN  (CPT=74150)  Narrative: PROCEDURE:  CT ABDOMEN (CPT=74150)     COMPARISON:  [facility] , CT, CT ABDOMEN+PELVIS(CPT=74176), 1/03/2022, 12:02 PM.     INDICATIONS:  kdiney biuopsy     TECHNIQUE:  Noncontrast multislice scanning was performed from the dome of the diaphragm to the iliac crests.  Dose reduction techniques were used. Dose information is transmitted to the ACR (American College of Radiology) NRDR (National Radiology Data   Registry) which includes the Dose Index Registry.     PATIENT STATED HISTORY: (As transcribed by Technologist)  Patient was here for a renal biospy.          FINDINGS:    Limited CT through the abdomen was performed for planned renal biopsy procedure.  The bilateral kidneys are atrophic, left greater than right.  The right renal atrophy is greatest within the lower segment with small target for renal biopsy and increased   risk for complications/bleeding.  Findings were discussed with Dr. Santillan at the time of the procedure and decision was made to abort the biopsy.  Otherwise no acute abnormality in the visualized abdomen.  No hepatic steatosis.  No significant biliary duct dilation.  The visualized pancreas is within normal limits.  No retroperitoneal lymphadenopathy.  Atherosclerotic calcifications in the aorta without aneurysm.  No dilated bowel in the field of view.    Degenerative changes of the spine.  Bibasilar atelectasis.                   Impression: CONCLUSION:  Limited CT through the abdomen was performed for planned renal biopsy procedure.  The bilateral kidneys are atrophic, left greater than right.  The right renal atrophy is greatest within the lower pole with small target for renal biopsy and   increased risk for complications/bleeding.  Findings were discussed with Dr. Santillan at the time of the procedure and decision was made to abort the biopsy.           LOCATION:  [facility]        Dictated by (CST): Olvin Mcdermott MD on 9/06/2024 at 10:43 AM       Finalized by (CST): Olvin  MD Sharron on 9/06/2024 at 10:49 AM         Medications reviewed     Assessment and Plan:   Patient Active Problem List   Diagnosis    Elevated serum immunoglobulin free light chain level    Anemia    Asterixis    RENEA (acute kidney injury) (HCC)    Stage 3 chronic kidney disease (HCC)    Renal insufficiency    Essential hypertension    Azotemia    Type 2 diabetes mellitus without complication, without long-term current use of insulin (HCC)    Diabetes mellitus type 2 in obese    Proteinuria    CKD (chronic kidney disease) stage 4, GFR 15-29 ml/min ([facility])    Wound dehiscence    Acute on chronic congestive heart failure (HCC)    Hypervolemia    Primary hypertension    Thrombocytopenia (HCC)    Acute on chronic heart failure with preserved ejection fraction (HCC)    Hypoxia    Acute respiratory failure with hypoxia and hypercapnia ([facility])    Acute encephalopathy    Hypercapnia    Metabolic alkalosis    Other pulmonary embolism without acute cor pulmonale ([facility])    Myoclonic jerking    Acute respiratory failure with hypoxia ([facility])    Uncontrolled hypertension    Hypervolemia, unspecified hypervolemia type    Frequent falls    Acute pulmonary edema (HCC)    Hypertensive emergency    Acute kidney injury superimposed on CKD ([facility])    Type 2 diabetes mellitus with stage 5 chronic kidney disease not on chronic dialysis, without long-term current use of insulin ([facility])    CKD (chronic kidney disease) stage 5, GFR less than 15 ml/min ([facility])    Elevated troponin    Acute hypoxemic respiratory failure ([facility])       Assessment:  Acute hypoxic respiratory failure likely secondary to CHF exacerbation versus renal failure, was as high as 15 L overnight, now down to 1  Acute heart failure with preserved ejection fraction EF 55 to 60% from 8/14/24  Acute kidney injury on chronic kidney disease stage IV  Uncontrolled hypertension in the setting of above  Shortness of breath in the setting of above  Diabetes mellitus type 2        Plan:  Close  monitoring of oxygenation status, wean oxygen as able, hopefully on room air by tomorrow  Ongoing diuresis per cardiology in conjunction with nephrology  S/p renal biopsy today  Perhaps will require dialysis in the near future  Do not see any indication now for bronchodilators or antibiotics from a pulmonary perspective  I suspect that etiology of her shortness of breath is more cardiac/nephrology related  All questions answered        Bernadine Cha MD  [facility]  Office: [phone]

## 2024-09-06 NOTE — PROGRESS NOTES
[facility]   part of [facility]     Hospitalist Progress Note     Asmita Cunningham Patient Status:  Inpatient    1935 MRN [id number]   Location [facility] 2NE-A Attending Eli Adorno, DO   Hosp Day # 3 PCP Pola Sanchez, NAYELI-SABRINA     Chief Complaint: SOB    Subjective:     Patient reports her SOB is much improved. Having some intermittent RLE pain. Language line  used. Unable to complete renal biopsy this morning d/t high risk for bleeding/complications    Objective:    Review of Systems:   A comprehensive review of systems was completed; pertinent positive and negatives stated in subjective.    Vital signs:  Temp:  [97.6 °F (36.4 °C)-98.4 °F (36.9 °C)] 97.7 °F (36.5 °C)  Pulse:  [54-67] 66  Resp:  [15-19] 18  BP: (117-163)/() 155/62  SpO2:  [89 %-96 %] 94 %    Physical Exam:    General: No acute distress  Respiratory: No wheezes, no rhonchi  Cardiovascular: S1, S2, regular rate and rhythm  Abdomen: Soft, Non-tender, non-distended, positive bowel sounds  Neuro: No new focal deficits.   Extremities: No edema    Diagnostic Data:    Labs:  Recent Labs   Lab 24  1142 24  0649 24  0624  0703   WBC 8.9 8.1 8.5 8.7   HGB 8.4* 7.4* 7.7* 8.3*   MCV 93.9 94.6 94.4 91.0   .0 158.0 167.0 168.0   INR  --   --   --  1.04       Recent Labs   Lab 24  1142 24  0649 24  0624  0703   * 88 92 94   BUN 62* 63* 71* 80*   CREATSERUM 4.87* 5.02* 5.49* 5.90*   CA 8.5* 8.2* 8.4* 8.1*   ALB 3.7  --   --   --     142 139 137   K 4.5 4.3 4.2 4.1    109 105 99   CO2 25.0 24.0 28.0 27.0   ALKPHO 77  --   --   --    AST 15  --   --   --    ALT 8*  --   --   --    BILT 0.3  --   --   --    TP 6.7  --   --   --        Estimated Creatinine Clearance: 5.8 mL/min (A) (based on SCr of 5.9 mg/dL (H)).    Recent Labs   Lab 24  1142   TROPHS 113*       Recent Labs   Lab 24  0703   PTP 13.6   INR 1.04         Microbiology    No results found for this visit on 09/03/24.      Imaging: Reviewed in Epic.    Medications:    midazolam        fentaNYL        hydrALAzine        predniSONE  20 mg Oral Daily with breakfast    epoetin naedem  10,000 Units Subcutaneous Weekly    carvedilol  25 mg Oral BID with meals    hydrALAZINE  50 mg Oral Q8H TRE    NIFEdipine ER  60 mg Oral Daily    ferrous sulfate  325 mg Oral Q48H    gabapentin  100 mg Oral TID    umeclidinium bromide  1 puff Inhalation Daily    levothyroxine  50 mcg Oral Before breakfast    pantoprazole  40 mg Oral Before breakfast    [Held by provider] heparin  5,000 Units Subcutaneous 2 times per day       Assessment & Plan:      #RENEA on CKD 4; likely multifactorial d/t cardiorenal syndrome/HFpEF decompensation, poorly controlled HTN, and possible underlying renal disease  - unable to complete renal biopsy 9/6 d/t high risk for complications/bleeding  - continue HTN medication management  - diuresis   - renal artery with dopplers showing renal disease   - nephrology following  - may need dilaysis    #HFpEF exacerbation  #Acute hypoxic respiratory failure  #Volume overload d/t above  - BIPAP/vapotherm weaned  - continue to wean supplemental O2 as tolerated   - recent echo with pEF 55-60% and G1DD  - diuresis with bumex drip, PRN diuril   - carvedilol   - cardiology following    #HTN emergency  - nitroglycerin drip weaned   - PTA hydralazine, carvedilol, nifedipine  - cardiology following    #Elevated troponin d/t poor renal clearance and demand  - telemetry  - echo reviewed  - cardiology following    #DM2 with hyperglycemia  - hyperglycemia protocol    #RLE pain > ?gout, suspect component of neuropathy   - uric acid WNL  - short course prednisone/colchicine    #acute on chronic AOCD  - BL Hgb appears to be in mid-9's  - epo, iron supplementation   - follow CBC     #GERD  - PPI    #Hypothyroidism  - levothyroxine         Eli Adorno,     Supplementary Documentation:      Quality:  DVT Mechanical Prophylaxis:   SCDs,    DVT Pharmacologic Prophylaxis   Medication    [Held by provider] heparin (Porcine) 5000 UNIT/ML injection 5,000 Units                Code Status: Full Code  Alberto: External urinary catheter in place  Alberto Duration (in days):   Central line:    VIRGIL: 9/11/2024    Discharge is dependent on: clinical state  At this point Ms. Cunningham is expected to be discharge to: home    The 21st Century Cures Act makes medical notes like these available to patients in the interest of transparency. Please be advised this is a medical document. Medical documents are intended to carry relevant information, facts as evident, and the clinical opinion of the practitioner. The medical note is intended as peer to peer communication and may appear blunt or direct. It is written in medical language and may contain abbreviations or verbiage that are unfamiliar.

## 2024-09-06 NOTE — IMAGING NOTE
NPO status verified  Pertinent labs and radiology imaging reviewed  Consent signed and on file - used [employer] audio  for consent with patient.     Biopsy cancelled.  Timeout and sedation started early for 's systolic consistently.  Nephrologist MD called for concerns of risk given the  images.  Radiologist discussed with nephrologist MD over the phone in CT scan room, decided to cancel procedure.  Nephro MD to update granddaughter.   Report given to Sherly WILDER. Patient transported back to 2624 accompanied by RN and transporter.

## 2024-09-06 NOTE — PLAN OF CARE
Pt denies c/o pain, malaise, or cardiac symptoms. A&Ox4. Lungs diminished bilaterally with equal expansion, on room air. Pt NSR/SB on monitor with regular rate. Abdomen soft and non-tender with active bowel sounds in all four quadrants. Purewick in place. Pt and family member updated with plan of care.     Problem: CARDIOVASCULAR - ADULT  Goal: Maintains optimal cardiac output and hemodynamic stability  Description: INTERVENTIONS:  - Monitor vital signs, rhythm, and trends  - Monitor for bleeding, hypotension and signs of decreased cardiac output  - Evaluate effectiveness of vasoactive medications to optimize hemodynamic stability  - Monitor arterial and/or venous puncture sites for bleeding and/or hematoma  - Assess quality of pulses, skin color and temperature  - Assess for signs of decreased coronary artery perfusion - ex. Angina  - Evaluate fluid balance, assess for edema, trend weights  Outcome: Progressing  Goal: Absence of cardiac arrhythmias or at baseline  Description: INTERVENTIONS:  - Continuous cardiac monitoring, monitor vital signs, obtain 12 lead EKG if indicated  - Evaluate effectiveness of antiarrhythmic and heart rate control medications as ordered  - Initiate emergency measures for life threatening arrhythmias  - Monitor electrolytes and administer replacement therapy as ordered  Outcome: Progressing     Problem: SAFETY ADULT - FALL  Goal: Free from fall injury  Description: INTERVENTIONS:  - Assess pt frequently for physical needs  - Identify cognitive and physical deficits and behaviors that affect risk of falls.  - [place] fall precautions as indicated by assessment.  - Educate pt/family on patient safety including physical limitations  - Instruct pt to call for assistance with activity based on assessment  - Modify environment to reduce risk of injury  - Provide assistive devices as appropriate  - Consider OT/PT consult to assist with strengthening/mobility  - Encourage toileting  schedule  Outcome: Progressing     Problem: Patient/Family Goals  Goal: Patient/Family Long Term Goal  Description: Patient's Long Term Goal: Go home    Interventions:  - routine lab draws  - medication compliance   - MD rounding  - See additional Care Plan goals for specific interventions  Outcome: Progressing  Goal: Patient/Family Short Term Goal  Description: Patient's Short Term Goal: pain free    Interventions:   - ice pack  -pain medication  - See additional Care Plan goals for specific interventions  Outcome: Progressing     Problem: RESPIRATORY - ADULT  Goal: Achieves optimal ventilation and oxygenation  Description: INTERVENTIONS:  - Assess for changes in respiratory status  - Assess for changes in mentation and behavior  - Position to facilitate oxygenation and minimize respiratory effort  - Oxygen supplementation based on oxygen saturation or ABGs  - Provide Smoking Cessation handout, if applicable  - Encourage broncho-pulmonary hygiene including cough, deep breathe, Incentive Spirometry  - Assess the need for suctioning and perform as needed  - Assess and instruct to report SOB or any respiratory difficulty  - Respiratory Therapy support as indicated  - Manage/alleviate anxiety  - Monitor for signs/symptoms of CO2 retention  Outcome: Progressing

## 2024-09-06 NOTE — CM/SW NOTE
09/06/24 1700   CM/SW Referral Data   Referral Source Social Work (self-referral)   Reason for Referral Discharge planning   Informant EMR;Clinical Staff Member;Patient      Reason for  Pt. is Limited English Proficient   Limited English- Service Selected Video    Name/ID Anupama / [id number]    utilized for: Discharge planning   Medical Hx   Does patient have an established PCP? Yes   Patient Info   Patient's Current Mental Status at Time of Assessment Alert;Oriented   Patient's Home Environment Condo/Apt with elevator   Patient lives with Alone   Patient Status Prior to Admission   Services in place prior to admission Home Health Care;DME/Supplies at home  (Caregiver)   Home Health Provider Info [facility]   Type of DME/Supplies Wheeled Walker;Rollator Walker;Wheelchair   Discharge Needs   Anticipated D/C needs Home health care   Choice of Post-Acute Provider   Patient/family choice [facility]     HOME SITUATION per PT eval  Type of Home: Apartment   Home Layout: One level;Elevator     Lives With: Alone;Caregiver part-time  Patient Owned Equipment: Rolling walker     Prior Level of [place] per PT eval: Pt reports she is typically mod ind with ADLs except CG assist with showers and groceries. Pt ambulates with a RW.    Patient is an 88 y/o female admitted due to acute pulmonary edema. Anticipated therapy need for pt at DC is . Per chart review, pt is current with [facility]. NEFTALY sent ELIZABETH to them via [employer].    Per chart review, pt also noted that pt reports having been on O2 for the past 2 weeks but stated she ran out of tanks. SW was notified by RN that pt was using small tanks that were privately purchased from [employer].    NEFTALY met with pt at bedside to discuss DC plan.  was used as pt is Tristanian speaking. Pt reports she lives alone in an apartment. Pt confirmed there is an elevator she is able to use to get to her apartment. Pt  also informed SW that she has a part-time caregiver 4 days a week. Pt stated the caregiver is with her for 5 hours on 3 of those days and 6 hours on the 1 other day. Pt reports she also has grandkids within the area and stated they take good care of her. Pt also reported owning a rolling walker, rollator, and wheelchair, but reports being fairly independent with her ADLs. Pt did state that the caregiver helps her with groceries shopping and bathing.     SW discussed HH services. Pt confirmed she is current with [facility] and is agreeable with resuming services when back home.    SW inquired about home O2. Pt reports she has been utilizing small tanks that have been privately purchased for her at [facility]. SW demonstrated a picture of [employer] Oxygen tank and pt confirmed that is what she has been utilizing. NEFTALY educated pt on home O2 and informed her that [employer] is not appropriate if she is needing continuous O2. Pt initially needing 12L O2 when admitted but was weaned down to 1L this morning. SW stated she will continue to monitor for home O2 needs for pt. Pt thanked NEFTALY and denied having any other questions at this time.     to remain available for support and/or discharge planning.    Janelle Lopez, [facility]  Discharge Planner  [phone]

## 2024-09-06 NOTE — PROGRESS NOTES
[facility]  Nephrology Progress Note    Asmita Xuangeliquesridevi Attending:  Patricia Soto MD       Assessment and Plan:    1) RENEA / CKD 4- due to uncontrolled HTN exac by cardiorenal syndrome / decompensated HF; this may be superimposed on an an underlying glomerulopathy given development of nephrotic range proteinuria since - ie FSGS / membranous GN, etc. PLAN- focus on BP / volume mgmt    2) Pulmonary edema / fluid overload- primarily due to worsening renal failure superimposed on diastolic dysfunction- continue bumex gtt + diuril     3) Uncontrolled HTN- exac by fluid overload. Continue usual coreg / VEGA / nifedipine. Renal artery doppler noted - ? R VALENTINE ?- will review with rads; reassess meds after euvolemic     4) Anemia- primarily due to CKD; Fe stores noted -> IV Fe / EPO; await monoclonal protein study     5) Mild type 2 DM    6) R ankle pain- relatively acute onset, suspect gout. Check uric acid; prednisone / colchicine    D/W granddaughter by phone yesterday. For CT guided renal biopsy today      Subjective:  Awake alert doing OK overall but with onset of R ankle pain    Physical Exam:   BP (!) 163/51 (BP Location: Right arm)   Pulse 58   Temp 98.2 °F (36.8 °C) (Oral)   Resp 19   Ht 5' 4\" (1.626 m)   Wt 181 lb (82.1 kg)   SpO2 96%   BMI 31.07 kg/m²   Temp (24hrs), Av.8 °F (36.6 °C), Min:97.4 °F (36.3 °C), Max:98.4 °F (36.9 °C)       Intake/Output Summary (Last 24 hours) at 2024 0644  Last data filed at 2024 0442  Gross per 24 hour   Intake 632.87 ml   Output 1250 ml   Net -617.13 ml     Wt Readings from Last 3 Encounters:   24 181 lb (82.1 kg)   24 178 lb 5.6 oz (80.9 kg)   22 209 lb (94.8 kg)     General: awake aelrt  HEENT: No scleral icterus, MMM  Neck: Supple, no AMANDEEP or thyromegaly  Cardiac: Regular rate and rhythm, S1, S2 normal, no murmur or tub  Lungs: Decreased BS at bases bilaterally   Abdomen: Soft, non-tender. + bowel sounds, no palpable  organomegaly  Extremities: Without clubbing, cyanosis; minimal LE edema  Neurologic: Cranial nerves grossly intact, moving all extremities  Skin: Warm and dry, no rashes       Labs:          Imaging:  All imaging studies reviewed.    Meds:   Current Facility-Administered Medications   Medication Dose Route Frequency    epoetin nadeem (Epogen, Procrit) 30169 UNIT/ML injection 10,000 Units  10,000 Units Subcutaneous Weekly    carvedilol (Coreg) tab 25 mg  25 mg Oral BID with meals    hydrALAZINE (Apresoline) tab 50 mg  50 mg Oral Q8H TRE    NIFEdipine ER (Procardia-XL) 24 hr tab 60 mg  60 mg Oral Daily    ferrous sulfate DR tab 325 mg  325 mg Oral Q48H    gabapentin (Neurontin) cap 100 mg  100 mg Oral TID    umeclidinium bromide (Incruse Ellipta) 62.5 MCG/ACT inhaler 1 puff  1 puff Inhalation Daily    albuterol (Ventolin HFA) 108 (90 Base) MCG/ACT inhaler 2 puff  2 puff Inhalation Q4H PRN    levothyroxine (Synthroid) tab 50 mcg  50 mcg Oral Before breakfast    pantoprazole (Protonix) DR tab 40 mg  40 mg Oral Before breakfast    [Held by provider] heparin (Porcine) 5000 UNIT/ML injection 5,000 Units  5,000 Units Subcutaneous 2 times per day    acetaminophen (Tylenol Extra Strength) tab 500 mg  500 mg Oral Q4H PRN    ondansetron (Zofran) 4 MG/2ML injection 4 mg  4 mg Intravenous Q6H PRN    melatonin tab 3 mg  3 mg Oral Nightly PRN    bumetanide (Bumex) 12.5 mg in 50 mL infusion  1 mg/hr Intravenous Continuous         Questions/concerns were discussed with patient and/or family by bedside.          Linn Santillan MD  9/6/2024  645 AM

## 2024-09-06 NOTE — PROCEDURES
[facility]   part of [facility]  Procedure Note    Asmita Cunningham Patient Status:  Inpatient    1935 MRN [id number]   Location [facility] 2NE-A Attending Eli Adorno, DO   Hosp Day # 3 PCP JOSÉ Crawley     Procedure: Renal biopsy    Pre-Procedure Diagnosis:  RENEA on CKD    Post-Procedure Diagnosis: Same    Anesthesia:  Sedation    Findings:    B/l renal atrophy L>R, with lower pole thinning greatest. Small target, increasing risk for complications/bleeding. In addition Hb lower and BP elevated.  Spoke with Dr. Santillan at the time of the procedure. Decision made to abort procedure.    Specimens: None    Blood Loss:  0ml    Tourniquet Time: 0  Complications:  None  Drains:  None    Secondary Diagnosis:  None    Sharron Bah MD  2024

## 2024-09-06 NOTE — PHYSICAL THERAPY NOTE
Attempted to see Pt this AM - MEÑO Dubois aware of attempt.  Pt awaiting renal biopsy this morning - not agreeable to OOB mobility.  Will f/u later today if time permits, after all other patients are attempted per tentative schedule.

## 2024-09-07 LAB
ANION GAP SERPL CALC-SCNC: 12 MMOL/L (ref 0–18)
ANION GAP SERPL CALC-SCNC: 12 MMOL/L (ref 0–18)
BUN BLD-MCNC: 86 MG/DL (ref 9–23)
BUN BLD-MCNC: 92 MG/DL (ref 9–23)
CALCIUM BLD-MCNC: 8.3 MG/DL (ref 8.7–10.4)
CALCIUM BLD-MCNC: 8.3 MG/DL (ref 8.7–10.4)
CHLORIDE SERPL-SCNC: 96 MMOL/L (ref 98–112)
CHLORIDE SERPL-SCNC: 98 MMOL/L (ref 98–112)
CO2 SERPL-SCNC: 25 MMOL/L (ref 21–32)
CO2 SERPL-SCNC: 27 MMOL/L (ref 21–32)
CREAT BLD-MCNC: 6.35 MG/DL
CREAT BLD-MCNC: 6.4 MG/DL
EGFRCR SERPLBLD CKD-EPI 2021: 6 ML/MIN/1.73M2 (ref 60–?)
EGFRCR SERPLBLD CKD-EPI 2021: 6 ML/MIN/1.73M2 (ref 60–?)
ERYTHROCYTE [DISTWIDTH] IN BLOOD BY AUTOMATED COUNT: 15.6 %
GLUCOSE BLD-MCNC: 151 MG/DL (ref 70–99)
GLUCOSE BLD-MCNC: 95 MG/DL (ref 70–99)
HCT VFR BLD AUTO: 23.5 %
HGB BLD-MCNC: 7.9 G/DL
MCH RBC QN AUTO: 30.9 PG (ref 26–34)
MCHC RBC AUTO-ENTMCNC: 33.6 G/DL (ref 31–37)
MCV RBC AUTO: 91.8 FL
OSMOLALITY SERPL CALC.SUM OF ELEC: 308 MOSM/KG (ref 275–295)
OSMOLALITY SERPL CALC.SUM OF ELEC: 309 MOSM/KG (ref 275–295)
PLATELET # BLD AUTO: 175 10(3)UL (ref 150–450)
POTASSIUM SERPL-SCNC: 4 MMOL/L (ref 3.5–5.1)
POTASSIUM SERPL-SCNC: 4.2 MMOL/L (ref 3.5–5.1)
RBC # BLD AUTO: 2.56 X10(6)UL
SODIUM SERPL-SCNC: 135 MMOL/L (ref 136–145)
SODIUM SERPL-SCNC: 135 MMOL/L (ref 136–145)
WBC # BLD AUTO: 9.8 X10(3) UL (ref 4–11)

## 2024-09-07 PROCEDURE — 99233 SBSQ HOSP IP/OBS HIGH 50: CPT | Performed by: INTERNAL MEDICINE

## 2024-09-07 PROCEDURE — 99232 SBSQ HOSP IP/OBS MODERATE 35: CPT | Performed by: INTERNAL MEDICINE

## 2024-09-07 NOTE — PROGRESS NOTES
Progress Note  Asmita Cunningham Patient Status:  Inpatient    1935 MRN [id number]   Location [facility] 2NE-A Attending Eli Adorno, DO   Hosp Day # 4 PCP JOSÉ Crawley     Subjective:  She is feeling ok today. Denies chest pain. Has some shortness of breath. Used Tunisian     Objective:  /51 (BP Location: Right arm)   Pulse 57   Temp 98 °F (36.7 °C) (Oral)   Resp 18   Ht 5' 4\" (1.626 m)   Wt 181 lb (82.1 kg)   SpO2 91%   BMI 31.07 kg/m²     Intake/Output:    Intake/Output Summary (Last 24 hours) at 2024 0744  Last data filed at 2024 0650  Gross per 24 hour   Intake 240 ml   Output 2001 ml   Net -1761 ml       Last 3 Weights   24 0442 181 lb (82.1 kg)   24 0500 187 lb 4.8 oz (85 kg)   24 0528 182 lb 3.2 oz (82.6 kg)   24 2246 184 lb 6.3 oz (83.6 kg)   24 2030 184 lb 6.4 oz (83.6 kg)   24 1134 179 lb (81.2 kg)   24 2315 178 lb 5.6 oz (80.9 kg)   22 1246 209 lb (94.8 kg)       Labs:  Recent Labs   Lab 24  0649 24  0621 24  0703   GLU 88 92 94   BUN 63* 71* 80*   CREATSERUM 5.02* 5.49* 5.90*   EGFRCR 8* 7* 6*   CA 8.2* 8.4* 8.1*    139 137   K 4.3 4.2 4.1    105 99   CO2 24.0 28.0 27.0     Recent Labs   Lab 24  1142 24  0649 24  0621 24  0703 24  0705   RBC 2.79* 2.41* 2.51* 2.68* 2.56*   HGB 8.4* 7.4* 7.7* 8.3* 7.9*   HCT 26.2* 22.8* 23.7* 24.4* 23.5*   MCV 93.9 94.6 94.4 91.0 91.8   MCH 30.1 30.7 30.7 31.0 30.9   MCHC 32.1 32.5 32.5 34.0 33.6   RDW 16.0 15.9 15.7 15.5 15.6   NEPRELIM 6.54 5.21  --   --   --    WBC 8.9 8.1 8.5 8.7 9.8   .0 158.0 167.0 168.0 175.0         Recent Labs   Lab 24  1142   TROPHS 113*       Diagnostics:   Telemetry: NSR    Review of Systems   Cardiovascular:  Negative for chest pain.   Respiratory:  Positive for shortness of breath.        Physical Exam:  General: Alert and oriented in no apparent  distress.  HEENT: Pupils equal. Mucous membranes moist.   Neck: mild JVD+  Cardiac:  Normal S1 S2, Regular. No murmur  Lungs: decreased breath sounds at the bases bilaterally  Abdomen: Soft, non-tender, ND  Extremities: minimal LE edema  Neurologic: No focal deficits. Normal affect.  Skin: Warm and dry,    Medications:   predniSONE  20 mg Oral Daily with breakfast    epoetin nadeem  10,000 Units Subcutaneous Weekly    carvedilol  25 mg Oral BID with meals    hydrALAZINE  50 mg Oral Q8H TRE    NIFEdipine ER  60 mg Oral Daily    ferrous sulfate  325 mg Oral Q48H    gabapentin  100 mg Oral TID    umeclidinium bromide  1 puff Inhalation Daily    levothyroxine  50 mcg Oral Before breakfast    pantoprazole  40 mg Oral Before breakfast    [Held by provider] heparin  5,000 Units Subcutaneous 2 times per day      bumetanide (Bumex) 12.5 mg in 50 mL infusion 1 mg/hr (09/06/24 0872)       Assessment:    Volume overload, cardiorenal syndrome, acute on chronic HFpEF  Secondary to worsening renal failure and diastolic dysfunction, nephrotic range proteinuria  Volume status per renal on bumex gtt  RENEA on CKD IV  Nephrology following  S/p renal bx 9/6, results pending  Hypertension  Coreg, nifedipine, hydralazine  Normotensive currently. Uncontrolled HTN on admission  ?Right renal artery stenosis on US doppler  Elevated troponin   Likely demand in the setting of RENEA and uncontrolled HTN on arrival  Acute on chronic anemia  DM2    Plan:    Volume status per renal. Appears mildly hypervolemic 9/7 clinically on exam. On bumex gtt. Down net -1.7 L overnight. BMP pending, creatinine up to 5.9 yesterday.   Renal biopsy results pending  BP is well controlled. Continue coreg, hydralazine, nifedipine      Plan of care discussed with patient, RN.    ROE Schuster  9/7/2024  7:44 AM    Patient seen and examined independently.  Note reviewed and labs reviewed.  Agree with above assessment and plan.  Continue diuresis with IV bumex.  Replete  celina per protocol.  BP controlled with oral hydralazine.    SILVIANO Briggs MD

## 2024-09-07 NOTE — PLAN OF CARE
Assumed care of pt at 1930. Primarily Kuwaiti speaking. Translation line.   Pt A&Ox 4. On 1L NC. NSR on tele; no c/o cardiac symptoms. Pt continent of B&B; external cath in place. Pt denies chest pain or discomfort; seems to be resting comfortably at this time. Pt up with SBA with walker, tolerating well.     POC Bumex gtt. Monitor Cr.     Plan of care reviewed with pt; all questions answered at this time. Bed in lowest position; call light within reach. High fall risk precautions are in place per unit protocol.     Problem: CARDIOVASCULAR - ADULT  Goal: Maintains optimal cardiac output and hemodynamic stability  Description: INTERVENTIONS:  - Monitor vital signs, rhythm, and trends  - Monitor for bleeding, hypotension and signs of decreased cardiac output  - Evaluate effectiveness of vasoactive medications to optimize hemodynamic stability  - Monitor arterial and/or venous puncture sites for bleeding and/or hematoma  - Assess quality of pulses, skin color and temperature  - Assess for signs of decreased coronary artery perfusion - ex. Angina  - Evaluate fluid balance, assess for edema, trend weights  Outcome: Progressing  Goal: Absence of cardiac arrhythmias or at baseline  Description: INTERVENTIONS:  - Continuous cardiac monitoring, monitor vital signs, obtain 12 lead EKG if indicated  - Evaluate effectiveness of antiarrhythmic and heart rate control medications as ordered  - Initiate emergency measures for life threatening arrhythmias  - Monitor electrolytes and administer replacement therapy as ordered  Outcome: Progressing     Problem: SAFETY ADULT - FALL  Goal: Free from fall injury  Description: INTERVENTIONS:  - Assess pt frequently for physical needs  - Identify cognitive and physical deficits and behaviors that affect risk of falls.  - Anderson fall precautions as indicated by assessment.  - Educate pt/family on patient safety including physical limitations  - Instruct pt to call for assistance with  activity based on assessment  - Modify environment to reduce risk of injury  - Provide assistive devices as appropriate  - Consider OT/PT consult to assist with strengthening/mobility  - Encourage toileting schedule  Outcome: Progressing     Problem: Patient/Family Goals  Goal: Patient/Family Long Term Goal  Description: Patient's Long Term Goal: Go home    Interventions:  - routine lab draws  - medication compliance   - MD rounding  - See additional Care Plan goals for specific interventions  Outcome: Progressing  Goal: Patient/Family Short Term Goal  Description: Patient's Short Term Goal: pain free    Interventions:   - ice pack  -pain medication  - See additional Care Plan goals for specific interventions  Outcome: Progressing     Problem: RESPIRATORY - ADULT  Goal: Achieves optimal ventilation and oxygenation  Description: INTERVENTIONS:  - Assess for changes in respiratory status  - Assess for changes in mentation and behavior  - Position to facilitate oxygenation and minimize respiratory effort  - Oxygen supplementation based on oxygen saturation or ABGs  - Provide Smoking Cessation handout, if applicable  - Encourage broncho-pulmonary hygiene including cough, deep breathe, Incentive Spirometry  - Assess the need for suctioning and perform as needed  - Assess and instruct to report SOB or any respiratory difficulty  - Respiratory Therapy support as indicated  - Manage/alleviate anxiety  - Monitor for signs/symptoms of CO2 retention  Outcome: Progressing

## 2024-09-07 NOTE — PROGRESS NOTES
[facility]   part of [facility]     Hospitalist Progress Note     Asmita Cunningham Patient Status:  Inpatient    1935 MRN [id number]   Location [facility] 2NE-A Attending Eli Adorno, DO   Hosp Day # 4 PCP Pola Sanchez, FREDP-SABRINA     Chief Complaint: SOB    Subjective:     Feeling well, breathing continues to improve. Pain in RLE improved     Objective:    Review of Systems:   A comprehensive review of systems was completed; pertinent positive and negatives stated in subjective.    Vital signs:  Temp:  [97.2 °F (36.2 °C)-98.4 °F (36.9 °C)] 98 °F (36.7 °C)  Pulse:  [52-66] 57  Resp:  [15-20] 18  BP: (117-159)/(46-68) 122/51  SpO2:  [90 %-95 %] 91 %    Physical Exam:    General: No acute distress  Respiratory: No wheezes, no rhonchi  Cardiovascular: S1, S2, regular rate and rhythm  Abdomen: Soft, Non-tender, non-distended, positive bowel sounds  Neuro: No new focal deficits.   Extremities: No edema    Diagnostic Data:    Labs:  Recent Labs   Lab 24  1142 24  0649 24  0624  0703   WBC 8.9 8.1 8.5 8.7   HGB 8.4* 7.4* 7.7* 8.3*   MCV 93.9 94.6 94.4 91.0   .0 158.0 167.0 168.0   INR  --   --   --  1.04       Recent Labs   Lab 24  1142 24  0649 24  0624  0703   * 88 92 94   BUN 62* 63* 71* 80*   CREATSERUM 4.87* 5.02* 5.49* 5.90*   CA 8.5* 8.2* 8.4* 8.1*   ALB 3.7  --   --   --     142 139 137   K 4.5 4.3 4.2 4.1    109 105 99   CO2 25.0 24.0 28.0 27.0   ALKPHO 77  --   --   --    AST 15  --   --   --    ALT 8*  --   --   --    BILT 0.3  --   --   --    TP 6.7  --   --   --        Estimated Creatinine Clearance: 5.8 mL/min (A) (based on SCr of 5.9 mg/dL (H)).    Recent Labs   Lab 24  1142   TROPHS 113*       Recent Labs   Lab 24  0703   PTP 13.6   INR 1.04        Microbiology    No results found for this visit on 24.      Imaging: Reviewed in Epic.    Medications:    predniSONE  20 mg Oral Daily  with breakfast    epoetin nadeem  10,000 Units Subcutaneous Weekly    carvedilol  25 mg Oral BID with meals    hydrALAZINE  50 mg Oral Q8H TRE    NIFEdipine ER  60 mg Oral Daily    ferrous sulfate  325 mg Oral Q48H    gabapentin  100 mg Oral TID    umeclidinium bromide  1 puff Inhalation Daily    levothyroxine  50 mcg Oral Before breakfast    pantoprazole  40 mg Oral Before breakfast    [Held by provider] heparin  5,000 Units Subcutaneous 2 times per day       Assessment & Plan:      #RENEA on CKD 4; likely multifactorial d/t cardiorenal syndrome/HFpEF decompensation, poorly controlled HTN, and possible underlying renal disease  - unable to complete renal biopsy 9/6 d/t high risk for complications/bleeding  - continue HTN medication management  - diuresis   - renal artery with dopplers showing renal disease   - nephrology following  - may need dilaysis    #HFpEF exacerbation  #Acute hypoxic respiratory failure  #Volume overload d/t above  - BIPAP/vapotherm weaned  - continue to wean supplemental O2 as tolerated   - recent echo with pEF 55-60% and G1DD  - diuresis with bumex drip, PRN diuril   - carvedilol   - cardiology following    #HTN emergency  - nitroglycerin drip weaned   - PTA hydralazine, carvedilol, nifedipine  - cardiology following    #Elevated troponin d/t poor renal clearance and demand  - telemetry  - echo reviewed  - cardiology following    #DM2 with hyperglycemia  - hyperglycemia protocol    #RLE pain > ?gout, suspect component of neuropathy   - uric acid WNL  - short course prednisone/colchicine    #acute on chronic AOCD  - BL Hgb appears to be in mid-9's  - epo, iron supplementation   - follow CBC     #GERD  - PPI    #Hypothyroidism  - levothyroxine         Eli Adorno DO    Supplementary Documentation:     Quality:  DVT Mechanical Prophylaxis:   SCDs,    DVT Pharmacologic Prophylaxis   Medication    [Held by provider] heparin (Porcine) 5000 UNIT/ML injection 5,000 Units                Code  Status: Full Code  Alberto: External urinary catheter in place  Alberto Duration (in days):   Central line:    VIRGIL: 9/11/2024    Discharge is dependent on: clinical state  At this point Ms. Cunningham is expected to be discharge to: home    The 21st Century Cures Act makes medical notes like these available to patients in the interest of transparency. Please be advised this is a medical document. Medical documents are intended to carry relevant information, facts as evident, and the clinical opinion of the practitioner. The medical note is intended as peer to peer communication and may appear blunt or direct. It is written in medical language and may contain abbreviations or verbiage that are unfamiliar.

## 2024-09-07 NOTE — PROGRESS NOTES
[facility]  Nephrology Progress Note    Asmita Xurosa Attending:  Patricia Soto MD         Subjective:  No acute events      Current Facility-Administered Medications:     predniSONE (Deltasone) tab 20 mg, 20 mg, Oral, Daily with breakfast    HYDROcodone-acetaminophen (Norco) 5-325 MG per tab 1 tablet, 1 tablet, Oral, Q6H PRN    glucose (Dex4) 15 GM/59ML oral liquid 15 g, 15 g, Oral, Q15 Min PRN **OR** glucose (Glutose) 40% oral gel 15 g, 15 g, Oral, Q15 Min PRN **OR** glucose-vitamin C (Dex-4) chewable tab 4 tablet, 4 tablet, Oral, Q15 Min PRN **OR** dextrose 50% injection 50 mL, 50 mL, Intravenous, Q15 Min PRN **OR** glucose (Dex4) 15 GM/59ML oral liquid 30 g, 30 g, Oral, Q15 Min PRN **OR** glucose (Glutose) 40% oral gel 30 g, 30 g, Oral, Q15 Min PRN **OR** glucose-vitamin C (Dex-4) chewable tab 8 tablet, 8 tablet, Oral, Q15 Min PRN    epoetin nadeem (Epogen, Procrit) 05914 UNIT/ML injection 10,000 Units, 10,000 Units, Subcutaneous, Weekly    carvedilol (Coreg) tab 25 mg, 25 mg, Oral, BID with meals    hydrALAZINE (Apresoline) tab 50 mg, 50 mg, Oral, Q8H TRE    NIFEdipine ER (Procardia-XL) 24 hr tab 60 mg, 60 mg, Oral, Daily    ferrous sulfate DR tab 325 mg, 325 mg, Oral, Q48H    gabapentin (Neurontin) cap 100 mg, 100 mg, Oral, TID    umeclidinium bromide (Incruse Ellipta) 62.5 MCG/ACT inhaler 1 puff, 1 puff, Inhalation, Daily    albuterol (Ventolin HFA) 108 (90 Base) MCG/ACT inhaler 2 puff, 2 puff, Inhalation, Q4H PRN    levothyroxine (Synthroid) tab 50 mcg, 50 mcg, Oral, Before breakfast    pantoprazole (Protonix) DR tab 40 mg, 40 mg, Oral, Before breakfast    [Held by provider] heparin (Porcine) 5000 UNIT/ML injection 5,000 Units, 5,000 Units, Subcutaneous, 2 times per day    acetaminophen (Tylenol Extra Strength) tab 500 mg, 500 mg, Oral, Q4H PRN    ondansetron (Zofran) 4 MG/2ML injection 4 mg, 4 mg, Intravenous, Q6H PRN    melatonin tab 3 mg, 3 mg, Oral, Nightly PRN    bumetanide (Bumex) 12.5 mg in 50  mL infusion, 1 mg/hr, Intravenous, Continuous      Physical Exam:   /55 (BP Location: Right arm)   Pulse 55   Temp 97.5 °F (36.4 °C) (Oral)   Resp 16   Ht 5' 4\" (1.626 m)   Wt 181 lb (82.1 kg)   SpO2 95%   BMI 31.07 kg/m²   Temp (24hrs), Av.8 °F (36.6 °C), Min:97.2 °F (36.2 °C), Max:98.4 °F (36.9 °C)       Intake/Output Summary (Last 24 hours) at 2024 1229  Last data filed at 2024 0905  Gross per 24 hour   Intake 240 ml   Output 1501 ml   Net -1261 ml     Wt Readings from Last 3 Encounters:   24 181 lb (82.1 kg)   24 178 lb 5.6 oz (80.9 kg)   22 209 lb (94.8 kg)     General: awake alert; NAD; on 2-3 L NC  HEENT: No scleral icterus, MMM  Neck: Supple, no AMANDEEP or thyromegaly  Cardiac: Regular rate and rhythm, S1, S2 normal, no murmur or tub  Lungs: Decreased BS at bases bilaterally   Abdomen: Soft, non-tender. + bowel sounds, no palpable organomegaly  Extremities: Without clubbing, cyanosis; minimal LE edema  Neurologic: Cranial nerves grossly intact, moving all extremities  Skin: Warm and dry, no rashes     Recent Labs     24  0724  0705   WBC 8.5 8.7 9.8   HGB 7.7* 8.3* 7.9*   MCV 94.4 91.0 91.8   .0 168.0 175.0   INR  --  1.04  --        Recent Labs     24  0624  0724  0705 24  1058    137 135* 135*   K 4.2 4.1 4.2 4.0    99 96* 98   CO2 28.0 27.0 27.0 25.0   BUN 71* 80* 92* 86*   CREATSERUM 5.49* 5.90* 6.40* 6.35*   CA 8.4* 8.1* 8.3* 8.3*       No results for input(s): \"ALT\", \"AST\", \"ALB\", \"AMYLASE\", \"LIPASE\", \"LDH\" in the last 72 hours.    Invalid input(s): \"ALPHOS\", \"TBIL\", \"DBIL\", \"TPROT\"    Assessment and Plan:    1.RENEA / CKD 4- due to uncontrolled HTN exac by cardiorenal syndrome / decompensated HF; this may be superimposed on an an underlying glomerulopathy given development of nephrotic range proteinuria since - ie FSGS / membranous GN, etc. Kidney biopsy attempted, but due to think  cortex/bilateral atrophy procedure was aborted due to high risk  - monitor labs; will discuss w/ pt's family- will likely need to start HD    2. Pulmonary edema / fluid overload- primarily due to worsening renal failure superimposed on diastolic dysfunction- continue bumex gtt + diuril     3. Uncontrolled HTN- exac by fluid overload. Continue usual coreg / VEGA / nifedipine. Renal artery doppler noted - ? R VALENTINE  ; CTA recommended, but given her renal insufficiency contrast is not safe.     4. Anemia- primarily due to CKD; Fe stores noted -> IV Fe / EPO; await monoclonal protein study     5. Mild type 2 DM    6. R ankle pain- relatively acute onset, suspect gout.  On steorids/colchcine- improved     Care reviewd w/ pt's daughter and son-in-law in detail.     Sunday Wisnton  9/7/2024

## 2024-09-07 NOTE — PROGRESS NOTES
[facility] Pulmonary Progress Note    Asmita Cunningham Patient Status:  Inpatient    1935 MRN [id number]   Location [facility] 2NE-A Attending Patricia Soto MD   Hosp Day # 4 PCP JOSÉ Crawley     Subjective:  Asmita Cunningham is a(n) 89 year old female who is admitted for respiratory failure.    Overnight: no acute events overnight, reports feeling better, now down to room air    Objective:  /51 (BP Location: Right arm)   Pulse 54   Temp 97.8 °F (36.6 °C) (Oral)   Resp 17   Ht 5' 4\" (1.626 m)   Wt 181 lb (82.1 kg)   SpO2 96%   BMI 31.07 kg/m²       Temp (24hrs), Av.9 °F (36.6 °C), Min:97.2 °F (36.2 °C), Max:98.4 °F (36.9 °C)      Intake/Output:    Intake/Output Summary (Last 24 hours) at 2024 1156  Last data filed at 2024 0905  Gross per 24 hour   Intake 480 ml   Output 1501 ml   Net -1021 ml       Physical Exam:   General: alert, cooperative, oriented.  No respiratory distress.   Head: Normocephalic, without obvious abnormality, atraumatic.   Throat: Lips, mucosa, and tongue normal.  No thrush noted.   Neck: trachea midline, no adenopathy, no thyromegaly. No JVD.   Lungs: diminished breath sounds bilaterally   Chest wall: No tenderness or deformity.   Heart: regular rate and rhythm, S1, S2 normal, no murmur, click, rub or gallop   Abdomen: soft, non-distended, no masses, no guarding, no     Rebound.   Extremity: No edema or cyanosis   Skin: No rashes or lesions.   Neurological: Alert, interactive, no focal deficits    Lab Data Review:  Recent Labs     24  1142 24  0649 24  0621   WBC 8.9 8.1 8.5   HGB 8.4* 7.4* 7.7*   .0 158.0 167.0     Recent Labs     24  1142 24  0649 24  0621    142 139   K 4.5 4.3 4.2    109 105   CO2 25.0 24.0 28.0   BUN 62* 63* 71*   CREATSERUM 4.87* 5.02* 5.49*     Recent Labs   Lab 24  0703   PTP 13.6   INR 1.04       Cultures:   No results found for this visit on  09/03/24.    Radiology:  CT ABDOMEN (CPT=74150)  Narrative: PROCEDURE:  CT ABDOMEN (CPT=74150)     COMPARISON:  [facility] , CT, CT ABDOMEN+PELVIS(CPT=74176), 1/03/2022, 12:02 PM.     INDICATIONS:  kdiney biuopsy     TECHNIQUE:  Noncontrast multislice scanning was performed from the dome of the diaphragm to the iliac crests.  Dose reduction techniques were used. Dose information is transmitted to the ACR (American College of Radiology) NRDR (National Radiology Data   Registry) which includes the Dose Index Registry.     PATIENT STATED HISTORY: (As transcribed by Technologist)  Patient was here for a renal biospy.          FINDINGS:    Limited CT through the abdomen was performed for planned renal biopsy procedure.  The bilateral kidneys are atrophic, left greater than right.  The right renal atrophy is greatest within the lower segment with small target for renal biopsy and increased   risk for complications/bleeding.  Findings were discussed with Dr. Santillan at the time of the procedure and decision was made to abort the biopsy.  Otherwise no acute abnormality in the visualized abdomen.  No hepatic steatosis.  No significant biliary duct dilation.  The visualized pancreas is within normal limits.  No retroperitoneal lymphadenopathy.  Atherosclerotic calcifications in the aorta without aneurysm.  No dilated bowel in the field of view.    Degenerative changes of the spine.  Bibasilar atelectasis.                   Impression: CONCLUSION:  Limited CT through the abdomen was performed for planned renal biopsy procedure.  The bilateral kidneys are atrophic, left greater than right.  The right renal atrophy is greatest within the lower pole with small target for renal biopsy and   increased risk for complications/bleeding.  Findings were discussed with Dr. Santillan at the time of the procedure and decision was made to abort the biopsy.           LOCATION:  [facility]        Dictated by (CST): Olvin Mcdermott MD on 9/06/2024 at 10:43 AM        Finalized by (CST): Olvin Mcdermott MD on 9/06/2024 at 10:49 AM         Medications reviewed     Assessment and Plan:   Patient Active Problem List   Diagnosis    Elevated serum immunoglobulin free light chain level    Anemia    Asterixis    RENEA (acute kidney injury) (HCC)    Stage 3 chronic kidney disease (HCC)    Renal insufficiency    Essential hypertension    Azotemia    Type 2 diabetes mellitus without complication, without long-term current use of insulin (HCC)    Diabetes mellitus type 2 in obese    Proteinuria    CKD (chronic kidney disease) stage 4, GFR 15-29 ml/min (HCC)    Wound dehiscence    Acute on chronic congestive heart failure (HCC)    Hypervolemia    Primary hypertension    Thrombocytopenia (HCC)    Acute on chronic heart failure with preserved ejection fraction (HCC)    Hypoxia    Acute respiratory failure with hypoxia and hypercapnia (HCC)    Acute encephalopathy    Hypercapnia    Metabolic alkalosis    Other pulmonary embolism without acute cor pulmonale (HCC)    Myoclonic jerking    Acute respiratory failure with hypoxia (HCC)    Uncontrolled hypertension    Hypervolemia, unspecified hypervolemia type    Frequent falls    Acute pulmonary edema (HCC)    Hypertensive emergency    Acute kidney injury superimposed on CKD (HCC)    Type 2 diabetes mellitus with stage 5 chronic kidney disease not on chronic dialysis, without long-term current use of insulin (HCC)    CKD (chronic kidney disease) stage 5, GFR less than 15 ml/min (HCC)    Elevated troponin    Acute hypoxemic respiratory failure ([facility])       Assessment:  Acute hypoxic respiratory failure likely secondary to CHF exacerbation versus renal failure, was as high as 15 L overnight, now on room air  Acute heart failure with preserved ejection fraction EF 55 to 60% from 8/14/24  Acute kidney injury on chronic kidney disease stage IV status post kidney biopsy   Uncontrolled hypertension in the setting of above  Shortness of breath in the  setting of above  Diabetes mellitus type 2        Plan:  Close monitoring of oxygenation status, on room air  Ongoing diuresis per cardiology in conjunction with nephrology  Follow-up renal biopsy results  Perhaps will require dialysis in the near future  Do not see any indication now for bronchodilators or antibiotics from a pulmonary perspective  I suspect that etiology of her shortness of breath is more cardiac/nephrology related  I will sign off as all respiratory issues have resolved and symptoms at this point appear to be more cardiac/renal  All questions answered        Bernadine Cha MD  [facility] Pulmonary Medicine  Office: [phone]

## 2024-09-08 PROBLEM — N18.9 CHRONIC KIDNEY DISEASE: Status: ACTIVE | Noted: 2024-09-08

## 2024-09-08 PROBLEM — N19 UREMIA: Status: ACTIVE | Noted: 2024-09-08

## 2024-09-08 LAB
ANION GAP SERPL CALC-SCNC: 13 MMOL/L (ref 0–18)
BUN BLD-MCNC: 110 MG/DL (ref 9–23)
CALCIUM BLD-MCNC: 8.1 MG/DL (ref 8.7–10.4)
CHLORIDE SERPL-SCNC: 96 MMOL/L (ref 98–112)
CO2 SERPL-SCNC: 26 MMOL/L (ref 21–32)
CREAT BLD-MCNC: 6.77 MG/DL
EGFRCR SERPLBLD CKD-EPI 2021: 5 ML/MIN/1.73M2 (ref 60–?)
GLUCOSE BLD-MCNC: 153 MG/DL (ref 70–99)
GLUCOSE BLD-MCNC: 95 MG/DL (ref 70–99)
OSMOLALITY SERPL CALC.SUM OF ELEC: 315 MOSM/KG (ref 275–295)
POTASSIUM SERPL-SCNC: 4.6 MMOL/L (ref 3.5–5.1)
SODIUM SERPL-SCNC: 135 MMOL/L (ref 136–145)

## 2024-09-08 PROCEDURE — 99233 SBSQ HOSP IP/OBS HIGH 50: CPT | Performed by: INTERNAL MEDICINE

## 2024-09-08 PROCEDURE — 99232 SBSQ HOSP IP/OBS MODERATE 35: CPT | Performed by: INTERNAL MEDICINE

## 2024-09-08 RX ORDER — HEPARIN SODIUM 1000 [USP'U]/ML
1.5 INJECTION, SOLUTION INTRAVENOUS; SUBCUTANEOUS
Status: COMPLETED | OUTPATIENT
Start: 2024-09-09 | End: 2024-09-09

## 2024-09-08 RX ORDER — ALBUMIN (HUMAN) 12.5 G/50ML
25 SOLUTION INTRAVENOUS
Status: ACTIVE | OUTPATIENT
Start: 2024-09-08 | End: 2024-09-10

## 2024-09-08 RX ORDER — ACETAMINOPHEN 500 MG
1000 TABLET ORAL 3 TIMES DAILY
Status: DISCONTINUED | OUTPATIENT
Start: 2024-09-08 | End: 2024-09-13

## 2024-09-08 NOTE — PROGRESS NOTES
[facility]   part of [facility]     Hospitalist Progress Note     Asmita Cunningham Patient Status:  Inpatient    1935 MRN [id number]   Location [facility] 2NE-A Attending Eli Adorno, DO   Hosp Day # 5 PCP Pola Sanchez, FREDP-SABRINA     Chief Complaint: SOB    Subjective:     Pain in RLE worse this morning, breathing is comfortable, no other complaints     Objective:    Review of Systems:   A comprehensive review of systems was completed; pertinent positive and negatives stated in subjective.    Vital signs:  Temp:  [97.5 °F (36.4 °C)-98.4 °F (36.9 °C)] 97.7 °F (36.5 °C)  Pulse:  [52-57] 57  Resp:  [16-19] 19  BP: (132-144)/(42-56) 132/55  SpO2:  [94 %-97 %] 96 %    Physical Exam:    General: No acute distress  Respiratory: No wheezes, no rhonchi  Cardiovascular: S1, S2, regular rate and rhythm  Abdomen: Soft, Non-tender, non-distended, positive bowel sounds  Neuro: No new focal deficits.   Extremities: No edema    Diagnostic Data:    Labs:  Recent Labs   Lab 24  1142 24  0649 24  0621 24  0703 24  0705   WBC 8.9 8.1 8.5 8.7 9.8   HGB 8.4* 7.4* 7.7* 8.3* 7.9*   MCV 93.9 94.6 94.4 91.0 91.8   .0 158.0 167.0 168.0 175.0   INR  --   --   --  1.04  --        Recent Labs   Lab 24  1142 24  0649 24  0705 24  1058 24  0733   *   < > 95 151* 95   BUN 62*   < > 92* 86* 110*   CREATSERUM 4.87*   < > 6.40* 6.35* 6.77*   CA 8.5*   < > 8.3* 8.3* 8.1*   ALB 3.7  --   --   --   --       < > 135* 135* 135*   K 4.5   < > 4.2 4.0 4.6      < > 96* 98 96*   CO2 25.0   < > 27.0 25.0 26.0   ALKPHO 77  --   --   --   --    AST 15  --   --   --   --    ALT 8*  --   --   --   --    BILT 0.3  --   --   --   --    TP 6.7  --   --   --   --     < > = values in this interval not displayed.       Estimated Creatinine Clearance: 5.1 mL/min (A) (based on SCr of 6.77 mg/dL (H)).    Recent Labs   Lab 24  1142   TROPHS 113*        Recent Labs   Lab 09/06/24  0703   PTP 13.6   INR 1.04        Microbiology    No results found for this visit on 09/03/24.      Imaging: Reviewed in Epic.    Medications:    acetaminophen  1,000 mg Oral TID    predniSONE  20 mg Oral Daily with breakfast    epoetin nadeem  10,000 Units Subcutaneous Weekly    carvedilol  25 mg Oral BID with meals    hydrALAZINE  50 mg Oral Q8H TRE    NIFEdipine ER  60 mg Oral Daily    ferrous sulfate  325 mg Oral Q48H    gabapentin  100 mg Oral TID    umeclidinium bromide  1 puff Inhalation Daily    levothyroxine  50 mcg Oral Before breakfast    pantoprazole  40 mg Oral Before breakfast    [Held by provider] heparin  5,000 Units Subcutaneous 2 times per day       Assessment & Plan:      #RENEA on CKD 4; likely multifactorial d/t cardiorenal syndrome/HFpEF decompensation, poorly controlled HTN, and possible underlying renal disease  - unable to complete renal biopsy 9/6 d/t high risk for complications/bleeding  - continue HTN medication management  - diuresis   - renal artery with dopplers showing renal disease   - nephrology following  - may need dialysis > family/patient considering    #HFpEF exacerbation  #Acute hypoxic respiratory failure  #Volume overload d/t above  - BIPAP/vapotherm weaned  - continue to wean supplemental O2 as tolerated   - recent echo with pEF 55-60% and G1DD  - diuresis with bumex drip, PRN diuril   - carvedilol   - cardiology following    #HTN emergency  - nitroglycerin drip weaned   - PTA hydralazine, carvedilol, nifedipine  - cardiology following    #Elevated troponin d/t poor renal clearance and demand  - telemetry  - echo reviewed  - cardiology following    #DM2 with hyperglycemia  - hyperglycemia protocol    #RLE pain > ?gout, suspect component of neuropathy   - uric acid WNL  - short course prednisone/colchicine  - scheduled tylenol    #acute on chronic AOCD  - BL Hgb appears to be in mid-9's  - epo, iron supplementation   - follow CBC     #GERD  -  PPI    #Hypothyroidism  - levothyroxine         Eli Adorno, DO    Supplementary Documentation:     Quality:  DVT Mechanical Prophylaxis:   SCDs,    DVT Pharmacologic Prophylaxis   Medication    [Held by provider] heparin (Porcine) 5000 UNIT/ML injection 5,000 Units                Code Status: Full Code  Alberto: External urinary catheter in place  Alberto Duration (in days):   Central line:    VIRGIL:     Discharge is dependent on: clinical state  At this point Ms. Cunningham is expected to be discharge to: home    The 21st Century Cures Act makes medical notes like these available to patients in the interest of transparency. Please be advised this is a medical document. Medical documents are intended to carry relevant information, facts as evident, and the clinical opinion of the practitioner. The medical note is intended as peer to peer communication and may appear blunt or direct. It is written in medical language and may contain abbreviations or verbiage that are unfamiliar.

## 2024-09-08 NOTE — PLAN OF CARE
Received pt at shift change. AxOx4. 2L O2 w stats maintaining >90%. Denies pain/SOB. Vitals stable. NSR/SB on tele. Bumex gtt infusing.  See flowsheets for additional assessments.   Pt updated on POC. Call light within reach. All needs met at this time.     Plan:  -Bumex gtt @ 1 mg/hr  -Wean O2 as able  -DW // I&O  -Monitor renal function    Problem: CARDIOVASCULAR - ADULT  Goal: Maintains optimal cardiac output and hemodynamic stability  Description: INTERVENTIONS:  - Monitor vital signs, rhythm, and trends  - Monitor for bleeding, hypotension and signs of decreased cardiac output  - Evaluate effectiveness of vasoactive medications to optimize hemodynamic stability  - Monitor arterial and/or venous puncture sites for bleeding and/or hematoma  - Assess quality of pulses, skin color and temperature  - Assess for signs of decreased coronary artery perfusion - ex. Angina  - Evaluate fluid balance, assess for edema, trend weights  Outcome: Progressing  Goal: Absence of cardiac arrhythmias or at baseline  Description: INTERVENTIONS:  - Continuous cardiac monitoring, monitor vital signs, obtain 12 lead EKG if indicated  - Evaluate effectiveness of antiarrhythmic and heart rate control medications as ordered  - Initiate emergency measures for life threatening arrhythmias  - Monitor electrolytes and administer replacement therapy as ordered  Outcome: Progressing     Problem: SAFETY ADULT - FALL  Goal: Free from fall injury  Description: INTERVENTIONS:  - Assess pt frequently for physical needs  - Identify cognitive and physical deficits and behaviors that affect risk of falls.  - [place] fall precautions as indicated by assessment.  - Educate pt/family on patient safety including physical limitations  - Instruct pt to call for assistance with activity based on assessment  - Modify environment to reduce risk of injury  - Provide assistive devices as appropriate  - Consider OT/PT consult to assist with  strengthening/mobility  - Encourage toileting schedule  Outcome: Progressing     Problem: Patient/Family Goals  Goal: Patient/Family Long Term Goal  Description: Patient's Long Term Goal: Go home    Interventions:  - routine lab draws  - medication compliance   - MD rounding  - See additional Care Plan goals for specific interventions  Outcome: Progressing  Goal: Patient/Family Short Term Goal  Description: Patient's Short Term Goal: pain free    Interventions:   - ice pack  -pain medication  - See additional Care Plan goals for specific interventions  Outcome: Progressing     Problem: RESPIRATORY - ADULT  Goal: Achieves optimal ventilation and oxygenation  Description: INTERVENTIONS:  - Assess for changes in respiratory status  - Assess for changes in mentation and behavior  - Position to facilitate oxygenation and minimize respiratory effort  - Oxygen supplementation based on oxygen saturation or ABGs  - Provide Smoking Cessation handout, if applicable  - Encourage broncho-pulmonary hygiene including cough, deep breathe, Incentive Spirometry  - Assess the need for suctioning and perform as needed  - Assess and instruct to report SOB or any respiratory difficulty  - Respiratory Therapy support as indicated  - Manage/alleviate anxiety  - Monitor for signs/symptoms of CO2 retention  Outcome: Progressing

## 2024-09-08 NOTE — PROGRESS NOTES
Progress Note  Asmita Cunningham Patient Status:  Inpatient    1935 MRN [id number]   [facility] 2NE-A Attending Eil Adorno, DO   Hosp Day # 5 PCP JOSÉ Crawley     Subjective:  No cardiac concerns from overnight. She continues to have some RLE pain. Used Finnish     Objective:  /49 (BP Location: Right arm)   Pulse 52   Temp 98.4 °F (36.9 °C) (Oral)   Resp 19   Ht 5' 4\" (1.626 m)   Wt 180 lb 1.9 oz (81.7 kg)   SpO2 96%   BMI 30.92 kg/m²     Intake/Output:    Intake/Output Summary (Last 24 hours) at 2024 0748  Last data filed at 2024 1639  Gross per 24 hour   Intake 358 ml   Output 300 ml   Net 58 ml       Last 3 Weights   24 0515 180 lb 1.9 oz (81.7 kg)   24 0442 181 lb (82.1 kg)   24 0500 187 lb 4.8 oz (85 kg)   24 0528 182 lb 3.2 oz (82.6 kg)   24 2246 184 lb 6.3 oz (83.6 kg)   24 2030 184 lb 6.4 oz (83.6 kg)   24 1134 179 lb (81.2 kg)   24 2315 178 lb 5.6 oz (80.9 kg)   22 1246 209 lb (94.8 kg)       Labs:  Recent Labs   Lab 24  0703 24  0705 24  1058   GLU 94 95 151*   BUN 80* 92* 86*   CREATSERUM 5.90* 6.40* 6.35*   EGFRCR 6* 6* 6*   CA 8.1* 8.3* 8.3*    135* 135*   K 4.1 4.2 4.0   CL 99 96* 98   CO2 27.0 27.0 25.0     Recent Labs   Lab 24  1142 24  0649 24  0621 24  0703 24  0705   RBC 2.79* 2.41* 2.51* 2.68* 2.56*   HGB 8.4* 7.4* 7.7* 8.3* 7.9*   HCT 26.2* 22.8* 23.7* 24.4* 23.5*   MCV 93.9 94.6 94.4 91.0 91.8   MCH 30.1 30.7 30.7 31.0 30.9   MCHC 32.1 32.5 32.5 34.0 33.6   RDW 16.0 15.9 15.7 15.5 15.6   NEPRELIM 6.54 5.21  --   --   --    WBC 8.9 8.1 8.5 8.7 9.8   .0 158.0 167.0 168.0 175.0         Recent Labs   Lab 24  1142   TROPHS 113*       Diagnostics:   Telemetry: NSR    Review of Systems   Cardiovascular:  Negative for chest pain.   Respiratory:  Positive for shortness of breath.        Physical Exam:  General:  Alert and oriented in no apparent distress.  HEENT: Pupils equal. Mucous membranes moist.   Neck: mild JVD+  Cardiac:  Normal S1 S2, Regular. No murmur  Lungs: decreased breath sounds at the bases bilaterally  Abdomen: Soft, non-tender, ND  Extremities: no LE edema  Neurologic: No focal deficits. Normal affect.  Skin: Warm and dry,    Medications:   predniSONE  20 mg Oral Daily with breakfast    epoetin nadeem  10,000 Units Subcutaneous Weekly    carvedilol  25 mg Oral BID with meals    hydrALAZINE  50 mg Oral Q8H TRE    NIFEdipine ER  60 mg Oral Daily    ferrous sulfate  325 mg Oral Q48H    gabapentin  100 mg Oral TID    umeclidinium bromide  1 puff Inhalation Daily    levothyroxine  50 mcg Oral Before breakfast    pantoprazole  40 mg Oral Before breakfast    [Held by provider] heparin  5,000 Units Subcutaneous 2 times per day      bumetanide (Bumex) 12.5 mg in 50 mL infusion 1 mg/hr (09/07/24 1444)       Assessment:    Volume overload, cardiorenal syndrome, acute on chronic HFpEF  Secondary to worsening renal failure, nephrotic range proteinuria, and diastolic dysfunction EF 55-60%  Volume status improved. Management per renal on bumex gtt  RENEA on CKD IV  Nephrology following  S/p attempted renal biopsy, procedure aborted due to high risk with thick cortex/bilateral atrophy  Will likely need to start HD  Hypertension  Coreg, nifedipine, hydralazine  Normotensive currently. Uncontrolled HTN on admission  ?Right renal artery stenosis on US doppler  Elevated troponin   Likely demand in the setting of RENEA and uncontrolled HTN on arrival  RLE pain, possible gout - prednisone/colchicine  Acute on chronic anemia  DM2    Plan:    Creatinine 6.35. Volume status per renal. May need HD. On bumex gtt. Inaccurate I&Os  BP is well controlled. Continue coreg, hydralazine, nifedipine      Plan of care discussed with patient, RN.    ROE Schuster  9/8/2024  8:02 AM    Patient seen and examined independently.  Note reviewed and  labs reviewed.  Agree with above assessment and plan.  Will likely need HD with serum creat rising.  Renal service dosing diuretic.  Continue current coreg, nifedipine and hydralazine dosing and adjust as needed    SILVIANO Briggs MD

## 2024-09-08 NOTE — PLAN OF CARE
Assumed care of pt at 1930. Primarily [place] speaking. Translation line.   Pt A&Ox 4. On 1L NC. NSR on tele; no c/o cardiac symptoms. Pt continent of B&B; external cath in place. Pt denies chest pain or discomfort; seems to be resting comfortably at this time. Pt up with SBA with walker, tolerating well.      POC Bumex gtt. Monitor Cr.      Plan of care reviewed with pt; all questions answered at this time. Bed in lowest position; call light within reach. High fall risk precautions are in place per unit protocol.      Problem: CARDIOVASCULAR - ADULT  Goal: Maintains optimal cardiac output and hemodynamic stability  Description: INTERVENTIONS:  - Monitor vital signs, rhythm, and trends  - Monitor for bleeding, hypotension and signs of decreased cardiac output  - Evaluate effectiveness of vasoactive medications to optimize hemodynamic stability  - Monitor arterial and/or venous puncture sites for bleeding and/or hematoma  - Assess quality of pulses, skin color and temperature  - Assess for signs of decreased coronary artery perfusion - ex. Angina  - Evaluate fluid balance, assess for edema, trend weights  Outcome: Progressing  Goal: Absence of cardiac arrhythmias or at baseline  Description: INTERVENTIONS:  - Continuous cardiac monitoring, monitor vital signs, obtain 12 lead EKG if indicated  - Evaluate effectiveness of antiarrhythmic and heart rate control medications as ordered  - Initiate emergency measures for life threatening arrhythmias  - Monitor electrolytes and administer replacement therapy as ordered  Outcome: Progressing     Problem: SAFETY ADULT - FALL  Goal: Free from fall injury  Description: INTERVENTIONS:  - Assess pt frequently for physical needs  - Identify cognitive and physical deficits and behaviors that affect risk of falls.  - [place] fall precautions as indicated by assessment.  - Educate pt/family on patient safety including physical limitations  - Instruct pt to call for assistance with  activity based on assessment  - Modify environment to reduce risk of injury  - Provide assistive devices as appropriate  - Consider OT/PT consult to assist with strengthening/mobility  - Encourage toileting schedule  Outcome: Progressing     Problem: Patient/Family Goals  Goal: Patient/Family Long Term Goal  Description: Patient's Long Term Goal: Go home    Interventions:  - routine lab draws  - medication compliance   - MD rounding  - See additional Care Plan goals for specific interventions  Outcome: Progressing  Goal: Patient/Family Short Term Goal  Description: Patient's Short Term Goal: pain free    Interventions:   - ice pack  -pain medication  - See additional Care Plan goals for specific interventions  Outcome: Progressing     Problem: RESPIRATORY - ADULT  Goal: Achieves optimal ventilation and oxygenation  Description: INTERVENTIONS:  - Assess for changes in respiratory status  - Assess for changes in mentation and behavior  - Position to facilitate oxygenation and minimize respiratory effort  - Oxygen supplementation based on oxygen saturation or ABGs  - Provide Smoking Cessation handout, if applicable  - Encourage broncho-pulmonary hygiene including cough, deep breathe, Incentive Spirometry  - Assess the need for suctioning and perform as needed  - Assess and instruct to report SOB or any respiratory difficulty  - Respiratory Therapy support as indicated  - Manage/alleviate anxiety  - Monitor for signs/symptoms of CO2 retention  Outcome: Progressing

## 2024-09-08 NOTE — PROGRESS NOTES
[facility]  Nephrology Progress Note    Asmita Xuangeliquesridevi Attending:  Patricia Soto MD         Subjective:  No acute events  Poor UOP despite bumex gtt  On supplmental O2      Current Facility-Administered Medications:     acetaminophen (Tylenol Extra Strength) tab 1,000 mg, 1,000 mg, Oral, TID    [START ON 9/9/2024] sodium chloride 0.9 % IV bolus 100 mL, 100 mL, Intravenous, Q30 Min PRN **AND** albumin human (Albumin) 25% injection 25 g, 25 g, Intravenous, PRN Dialysis    [START ON 9/9/2024] heparin (Porcine) 1000 UNIT/ML injection 1,500 Units, 1.5 mL, Intracatheter, Once    predniSONE (Deltasone) tab 20 mg, 20 mg, Oral, Daily with breakfast    HYDROcodone-acetaminophen (Norco) 5-325 MG per tab 1 tablet, 1 tablet, Oral, Q6H PRN    glucose (Dex4) 15 GM/59ML oral liquid 15 g, 15 g, Oral, Q15 Min PRN **OR** glucose (Glutose) 40% oral gel 15 g, 15 g, Oral, Q15 Min PRN **OR** glucose-vitamin C (Dex-4) chewable tab 4 tablet, 4 tablet, Oral, Q15 Min PRN **OR** dextrose 50% injection 50 mL, 50 mL, Intravenous, Q15 Min PRN **OR** glucose (Dex4) 15 GM/59ML oral liquid 30 g, 30 g, Oral, Q15 Min PRN **OR** glucose (Glutose) 40% oral gel 30 g, 30 g, Oral, Q15 Min PRN **OR** glucose-vitamin C (Dex-4) chewable tab 8 tablet, 8 tablet, Oral, Q15 Min PRN    epoetin nadeem (Epogen, Procrit) 93094 UNIT/ML injection 10,000 Units, 10,000 Units, Subcutaneous, Weekly    carvedilol (Coreg) tab 25 mg, 25 mg, Oral, BID with meals    hydrALAZINE (Apresoline) tab 50 mg, 50 mg, Oral, Q8H TRE    NIFEdipine ER (Procardia-XL) 24 hr tab 60 mg, 60 mg, Oral, Daily    ferrous sulfate DR tab 325 mg, 325 mg, Oral, Q48H    gabapentin (Neurontin) cap 100 mg, 100 mg, Oral, TID    umeclidinium bromide (Incruse Ellipta) 62.5 MCG/ACT inhaler 1 puff, 1 puff, Inhalation, Daily    albuterol (Ventolin HFA) 108 (90 Base) MCG/ACT inhaler 2 puff, 2 puff, Inhalation, Q4H PRN    levothyroxine (Synthroid) tab 50 mcg, 50 mcg, Oral, Before breakfast    pantoprazole  (Protonix) DR tab 40 mg, 40 mg, Oral, Before breakfast    heparin (Porcine) 5000 UNIT/ML injection 5,000 Units, 5,000 Units, Subcutaneous, 2 times per day    acetaminophen (Tylenol Extra Strength) tab 500 mg, 500 mg, Oral, Q4H PRN    ondansetron (Zofran) 4 MG/2ML injection 4 mg, 4 mg, Intravenous, Q6H PRN    melatonin tab 3 mg, 3 mg, Oral, Nightly PRN    bumetanide (Bumex) 12.5 mg in 50 mL infusion, 1 mg/hr, Intravenous, Continuous      Physical Exam:   /55 (BP Location: Right arm)   Pulse 57   Temp 97.7 °F (36.5 °C) (Oral)   Resp 19   Ht 5' 4\" (1.626 m)   Wt 180 lb 1.9 oz (81.7 kg)   SpO2 96%   BMI 30.92 kg/m²   Temp (24hrs), Av.1 °F (36.7 °C), Min:97.5 °F (36.4 °C), Max:98.4 °F (36.9 °C)       Intake/Output Summary (Last 24 hours) at 2024 1146  Last data filed at 2024 0912  Gross per 24 hour   Intake 478 ml   Output 400 ml   Net 78 ml     Wt Readings from Last 3 Encounters:   24 180 lb 1.9 oz (81.7 kg)   24 178 lb 5.6 oz (80.9 kg)   22 209 lb (94.8 kg)     General: awake alert; NAD; on 2-3 L NC  HEENT: No scleral icterus, MMM  Neck: Supple, no AMANDEEP or thyromegaly  Cardiac: Regular rate and rhythm, S1, S2 normal, no murmur or tub  Lungs: Decreased BS at bases bilaterally   Abdomen: Soft, non-tender. + bowel sounds, no palpable organomegaly  Extremities: Without clubbing, cyanosis; minimal LE edema  Neurologic: Cranial nerves grossly intact, moving all extremities  Skin: Warm and dry, no rashes     Recent Labs     24  0703 24  0705   WBC 8.7 9.8   HGB 8.3* 7.9*   MCV 91.0 91.8   .0 175.0   INR 1.04  --        Recent Labs     24  0703 24  0705 24  1058 24  0733    135* 135* 135*   K 4.1 4.2 4.0 4.6   CL 99 96* 98 96*   CO2 27.0 27.0 25.0 26.0   BUN 80* 92* 86* 110*   CREATSERUM 5.90* 6.40* 6.35* 6.77*   CA 8.1* 8.3* 8.3* 8.1*       No results for input(s): \"ALT\", \"AST\", \"ALB\", \"AMYLASE\", \"LIPASE\", \"LDH\" in the last 72  hours.    Invalid input(s): \"ALPHOS\", \"TBIL\", \"DBIL\", \"TPROT\"  Assessment and Plan:    1.RENEA / CKD 4- due to uncontrolled HTN exac by cardiorenal syndrome / decompensated HF; this may be superimposed on an an underlying glomerulopathy given development of nephrotic range proteinuria since 2021- ie FSGS / membranous GN, etc. Kidney biopsy attempted, but due to think cortex/bilateral atrophy procedure was aborted due to high risk  - plan to start on HD tomorrow after PC placment  - care reviwed w/ pt and daughter (Sowmya) at length; all in agreement and want to proceed- risk/benefits reviewed     2. Pulmonary edema / fluid overload- primarily due to worsening renal failure superimposed on diastolic dysfunction- continue bumex gtt       3. Uncontrolled HTN- exac by fluid overload. Continue usual coreg / VEGA / nifedipine. Renal artery doppler noted - ? R VALENTINE  ; CTA recommended, but given her renal insufficiency contrast is not safe.     4. Anemia- primarily due to CKD; Fe stores noted -> IV Fe / EPO; await monoclonal protein study     5. Mild type 2 DM    6. R ankle pain- relatively acute onset, suspect gout.  On steorids/colchcine- improved     Care reviewd w/ pt's daughter and son-in-law in detail.     Sunday Winston  9/8/2024

## 2024-09-09 ENCOUNTER — APPOINTMENT (OUTPATIENT)
Dept: INTERVENTIONAL RADIOLOGY/VASCULAR | Facility: HOSPITAL | Age: 89
End: 2024-09-09
Attending: INTERNAL MEDICINE
Payer: MEDICARE

## 2024-09-09 ENCOUNTER — APPOINTMENT (OUTPATIENT)
Dept: INTERVENTIONAL RADIOLOGY/VASCULAR | Facility: HOSPITAL | Age: 89
DRG: 682 | End: 2024-09-09
Attending: INTERNAL MEDICINE
Payer: MEDICARE

## 2024-09-09 LAB
ANION GAP SERPL CALC-SCNC: 11 MMOL/L (ref 0–18)
BUN BLD-MCNC: 121 MG/DL (ref 9–23)
CALCIUM BLD-MCNC: 8.2 MG/DL (ref 8.7–10.4)
CHLORIDE SERPL-SCNC: 96 MMOL/L (ref 98–112)
CO2 SERPL-SCNC: 28 MMOL/L (ref 21–32)
CREAT BLD-MCNC: 6.88 MG/DL
EGFRCR SERPLBLD CKD-EPI 2021: 5 ML/MIN/1.73M2 (ref 60–?)
GLUCOSE BLD-MCNC: 91 MG/DL (ref 70–99)
HBV SURFACE AG SER-ACNC: <0.1 [IU]/L
HBV SURFACE AG SERPL QL IA: NONREACTIVE
MAGNESIUM SERPL-MCNC: 2.6 MG/DL (ref 1.6–2.6)
OSMOLALITY SERPL CALC.SUM OF ELEC: 318 MOSM/KG (ref 275–295)
POTASSIUM SERPL-SCNC: 4.6 MMOL/L (ref 3.5–5.1)
SODIUM SERPL-SCNC: 135 MMOL/L (ref 136–145)

## 2024-09-09 PROCEDURE — 05HM33Z INSERTION OF INFUSION DEVICE INTO RIGHT INTERNAL JUGULAR VEIN, PERCUTANEOUS APPROACH: ICD-10-PCS | Performed by: STUDENT IN AN ORGANIZED HEALTH CARE EDUCATION/TRAINING PROGRAM

## 2024-09-09 PROCEDURE — 99233 SBSQ HOSP IP/OBS HIGH 50: CPT | Performed by: INTERNAL MEDICINE

## 2024-09-09 PROCEDURE — 99232 SBSQ HOSP IP/OBS MODERATE 35: CPT | Performed by: INTERNAL MEDICINE

## 2024-09-09 RX ORDER — MIDAZOLAM HYDROCHLORIDE 1 MG/ML
INJECTION INTRAMUSCULAR; INTRAVENOUS
Status: COMPLETED
Start: 2024-09-09 | End: 2024-09-09

## 2024-09-09 RX ORDER — BUMETANIDE 1 MG/1
1 TABLET ORAL
Status: DISCONTINUED | OUTPATIENT
Start: 2024-09-09 | End: 2024-09-12

## 2024-09-09 RX ORDER — LIDOCAINE HYDROCHLORIDE AND EPINEPHRINE BITARTRATE 20; .01 MG/ML; MG/ML
INJECTION, SOLUTION SUBCUTANEOUS
Status: COMPLETED
Start: 2024-09-09 | End: 2024-09-09

## 2024-09-09 RX ORDER — LIDOCAINE HYDROCHLORIDE 10 MG/ML
INJECTION, SOLUTION INFILTRATION; PERINEURAL
Status: COMPLETED
Start: 2024-09-09 | End: 2024-09-09

## 2024-09-09 RX ORDER — CEFAZOLIN SODIUM 1 G/3ML
INJECTION, POWDER, FOR SOLUTION INTRAMUSCULAR; INTRAVENOUS
Status: COMPLETED
Start: 2024-09-09 | End: 2024-09-09

## 2024-09-09 RX ORDER — HEPARIN SODIUM 5000 [USP'U]/ML
INJECTION, SOLUTION INTRAVENOUS; SUBCUTANEOUS
Status: COMPLETED
Start: 2024-09-09 | End: 2024-09-09

## 2024-09-09 RX ORDER — NIFEDIPINE 30 MG
30 TABLET, EXTENDED RELEASE ORAL DAILY
COMMUNITY
Start: 2024-08-26 | End: 2024-09-13

## 2024-09-09 NOTE — PROGRESS NOTES
Progress Note  Asmita Cunningham Patient Status:  Inpatient    1935 MRN [id number]   Location [facility] 2NE-A Attending Eli Adorno, DO   Hosp Day # 6 PCP JOSÉ Crawley     Subjective:  Sleeping comfortably.  Nursing reports no issues.  BP controlled, rhythm stable.  Plan for HD line today.    Objective:  /51 (BP Location: Right arm)   Pulse 52   Temp 97.6 °F (36.4 °C) (Oral)   Resp 18   Ht 162.6 cm (5' 4\")   Wt 180 lb 1.9 oz (81.7 kg)   SpO2 96%   BMI 30.92 kg/m²     Intake/Output:    Intake/Output Summary (Last 24 hours) at 2024 0755  Last data filed at 2024 0640  Gross per 24 hour   Intake 1558 ml   Output 920 ml   Net 638 ml       Last 3 Weights   24 0515 180 lb 1.9 oz (81.7 kg)   24 0442 181 lb (82.1 kg)   24 0500 187 lb 4.8 oz (85 kg)   24 0528 182 lb 3.2 oz (82.6 kg)   24 2246 184 lb 6.3 oz (83.6 kg)   24 2030 184 lb 6.4 oz (83.6 kg)   24 1134 179 lb (81.2 kg)   24 2315 178 lb 5.6 oz (80.9 kg)   22 1246 209 lb (94.8 kg)       Labs:  Recent Labs   Lab 24  1058 24  0733 24  0605   * 95 91   BUN 86* 110* 121*   CREATSERUM 6.35* 6.77* 6.88*   EGFRCR 6* 5* 5*   CA 8.3* 8.1* 8.2*   * 135* 135*   K 4.0 4.6 4.6   CL 98 96* 96*   CO2 25.0 26.0 28.0     Recent Labs   Lab 24  1142 24  0649 24  0621 24  0703 24  0705   RBC 2.79* 2.41* 2.51* 2.68* 2.56*   HGB 8.4* 7.4* 7.7* 8.3* 7.9*   HCT 26.2* 22.8* 23.7* 24.4* 23.5*   MCV 93.9 94.6 94.4 91.0 91.8   MCH 30.1 30.7 30.7 31.0 30.9   MCHC 32.1 32.5 32.5 34.0 33.6   RDW 16.0 15.9 15.7 15.5 15.6   NEPRELIM 6.54 5.21  --   --   --    WBC 8.9 8.1 8.5 8.7 9.8   .0 158.0 167.0 168.0 175.0         Recent Labs   Lab 24  1142   TROPHS 113*       Diagnostics:   Telemetry: NSR    Review of Systems   Cardiovascular:  Negative for chest pain.   Respiratory:  Positive for shortness of breath.        Physical  Exam:  General: Alert and oriented in no apparent distress.  HEENT: Pupils equal. Mucous membranes moist.   Neck: mild JVD+  Cardiac:  Normal S1 S2, Regular. No murmur  Lungs: decreased breath sounds at the bases bilaterally  Abdomen: Soft, non-tender, ND  Extremities: no LE edema  Neurologic: No focal deficits. Normal affect.  Skin: Warm and dry,    Medications:   acetaminophen  1,000 mg Oral TID    heparin  1.5 mL Intracatheter Once    predniSONE  20 mg Oral Daily with breakfast    epoetin nadeem  10,000 Units Subcutaneous Weekly    carvedilol  25 mg Oral BID with meals    hydrALAZINE  50 mg Oral Q8H TRE    NIFEdipine ER  60 mg Oral Daily    ferrous sulfate  325 mg Oral Q48H    gabapentin  100 mg Oral TID    umeclidinium bromide  1 puff Inhalation Daily    levothyroxine  50 mcg Oral Before breakfast    pantoprazole  40 mg Oral Before breakfast    heparin  5,000 Units Subcutaneous 2 times per day      bumetanide (Bumex) 12.5 mg in 50 mL infusion 1 mg/hr (09/08/24 2340)       Assessment:    Volume overload, cardiorenal syndrome, acute on chronic HFpEF  Secondary to worsening renal failure, nephrotic range proteinuria, and diastolic dysfunction EF 55-60%  RENEA on CKD IV  Nephrology following  S/p attempted renal biopsy, procedure aborted due to high risk with thick cortex/bilateral atrophy  Plan to initiate HD.  Hypertension  Coreg, nifedipine, hydralazine  ?Right renal artery stenosis on US doppler  Elevated troponin   Likely demand in the setting of RENEA and uncontrolled HTN on arrival  RLE pain, possible gout - prednisone/colchicine  Acute on chronic anemia  DM2    Plan:    Volume per nephrology, agree with initiation of HD.  BP's ok.  Lower HR's but stable.    Jose Juan  L3   No

## 2024-09-09 NOTE — PROGRESS NOTES
[facility]   part of [facility]     Hospitalist Progress Note     Asmita Cunningham Patient Status:  Inpatient    1935 MRN [id number]   Location [facility] 2NE-A Attending Eli Adorno, DO   Hosp Day # 6 PCP Pola Sanchez, JOSÉ     Chief Complaint: SOB    Subjective:     Resting comfortably in bed, no new complaints. Plan for HD catheter placement and HD later today    Objective:    Review of Systems:   A comprehensive review of systems was completed; pertinent positive and negatives stated in subjective.    Vital signs:  Temp:  [97.6 °F (36.4 °C)-97.8 °F (36.6 °C)] 97.6 °F (36.4 °C)  Pulse:  [50-57] 52  Resp:  [18-20] 18  BP: (110-148)/(48-91) 148/51  SpO2:  [92 %-100 %] 96 %    Physical Exam:    General: No acute distress  Respiratory: No wheezes, no rhonchi  Cardiovascular: S1, S2, regular rate and rhythm  Abdomen: Soft, Non-tender, non-distended, positive bowel sounds  Neuro: No new focal deficits.   Extremities: No edema    Diagnostic Data:    Labs:  Recent Labs   Lab 24  1142 24  0649 24  0621 24  0703 24  0705   WBC 8.9 8.1 8.5 8.7 9.8   HGB 8.4* 7.4* 7.7* 8.3* 7.9*   MCV 93.9 94.6 94.4 91.0 91.8   .0 158.0 167.0 168.0 175.0   INR  --   --   --  1.04  --        Recent Labs   Lab 24  1142 24  0649 24  1058 24  0733 24  0605   *   < > 151* 95 91   BUN 62*   < > 86* 110* 121*   CREATSERUM 4.87*   < > 6.35* 6.77* 6.88*   CA 8.5*   < > 8.3* 8.1* 8.2*   ALB 3.7  --   --   --   --       < > 135* 135* 135*   K 4.5   < > 4.0 4.6 4.6      < > 98 96* 96*   CO2 25.0   < > 25.0 26.0 28.0   ALKPHO 77  --   --   --   --    AST 15  --   --   --   --    ALT 8*  --   --   --   --    BILT 0.3  --   --   --   --    TP 6.7  --   --   --   --     < > = values in this interval not displayed.       Estimated Creatinine Clearance: 5 mL/min (A) (based on SCr of 6.88 mg/dL (H)).    Recent Labs   Lab 24  1149    TROPHS 113*       Recent Labs   Lab 09/06/24  0703   PTP 13.6   INR 1.04        Microbiology    No results found for this visit on 09/03/24.      Imaging: Reviewed in Epic.    Medications:    acetaminophen  1,000 mg Oral TID    heparin  1.5 mL Intracatheter Once    predniSONE  20 mg Oral Daily with breakfast    epoetin nadeem  10,000 Units Subcutaneous Weekly    carvedilol  25 mg Oral BID with meals    hydrALAZINE  50 mg Oral Q8H TRE    NIFEdipine ER  60 mg Oral Daily    ferrous sulfate  325 mg Oral Q48H    gabapentin  100 mg Oral TID    umeclidinium bromide  1 puff Inhalation Daily    levothyroxine  50 mcg Oral Before breakfast    pantoprazole  40 mg Oral Before breakfast    heparin  5,000 Units Subcutaneous 2 times per day       Assessment & Plan:      #RENEA on CKD 4; likely multifactorial d/t cardiorenal syndrome/HFpEF decompensation, poorly controlled HTN, and possible underlying renal disease  - renal artery with dopplers showing renal disease   - unable to complete renal biopsy 9/6 d/t high risk for complications/bleeding  - continue HTN medication management  - poor UO with bumex drip  - nephrology following  - plan for HD catheter placement and possible HD later today    #HFpEF exacerbation  #Acute hypoxic respiratory failure  #Volume overload d/t above  - BIPAP/vapotherm weaned  - continue to wean supplemental O2 as tolerated   - recent echo with pEF 55-60% and G1DD  - volume management as above   - carvedilol   - cardiology following    #HTN emergency  - BP's improved  - nitroglycerin drip weaned   - PTA hydralazine, carvedilol, nifedipine  - cardiology following    #Elevated troponin d/t poor renal clearance and demand  - telemetry  - echo reviewed  - cardiology following    #DM2 with hyperglycemia  - hyperglycemia protocol    #RLE pain > ?gout, suspect component of neuropathy   - uric acid WNL  - short course prednisone/colchicine > stop steroids tomorrow  - scheduled tylenol    #acute on chronic AOCD  -  BL Hgb appears to be in mid-9's  - epo, iron supplementation   - follow CBC     #GERD  - PPI    #Hypothyroidism  - levothyroxine         Eli Adorno, DO    Supplementary Documentation:     Quality:  DVT Mechanical Prophylaxis:   SCDs,    DVT Pharmacologic Prophylaxis   Medication    heparin (Porcine) 1000 UNIT/ML injection 1,500 Units    heparin (Porcine) 5000 UNIT/ML injection 5,000 Units                Code Status: Full Code  Alberto: External urinary catheter in place  Alberto Duration (in days):   Central line:    VIRGIL:     Discharge is dependent on: clinical state  At this point Ms. Cunningham is expected to be discharge to: home    The 21st Century Cures Act makes medical notes like these available to patients in the interest of transparency. Please be advised this is a medical document. Medical documents are intended to carry relevant information, facts as evident, and the clinical opinion of the practitioner. The medical note is intended as peer to peer communication and may appear blunt or direct. It is written in medical language and may contain abbreviations or verbiage that are unfamiliar.

## 2024-09-09 NOTE — INTERVAL H&P NOTE
The above referenced H&P was reviewed by Sharron Bah MD on 9/9/2024, the patient was examined and no significant changes have occurred in the patient's condition since the H&P was performed.  Risks, benefits, alternative treatments and consequences of no treatment were discussed.  We will proceed with procedure as planned.      Sharron Bah MD  9/9/2024  1:47 PM

## 2024-09-09 NOTE — PROCEDURES
[facility]   part of [facility]  Procedure Note    Asmita Cunningham Patient Status:  Inpatient    1935 MRN [id number]   Location [facility] 2NE-A Attending Eli Adorno, DO   Hosp Day # 6 PCP JOSÉ Crawley     Procedure: Tunneled dialysis catheter placement    Pre-Procedure Diagnosis:  RENEA on CKD    Post-Procedure Diagnosis: Same    Anesthesia:  Local and Sedation    Findings:  Successful placement of right chest tunneled dialysis catheter via patent internal jugular vein.  Catheter aspirates and flushes freely. Locked with Heparin.    Catheter is ready for use.      Specimens: None    Blood Loss:  <5ml    Tourniquet Time: 0  Complications:  None  Drains:  None    Secondary Diagnosis:  None      Sharron Bah MD  2024

## 2024-09-09 NOTE — PHYSICAL THERAPY NOTE
Attempted to see pt for PT treatment session. Pt refusing at this time due to lack of energy, procedure later today, and pain in BLE. Despite education and encouragement pt continuing to refuse at this time. Will follow and re-attempt as able and appropriate.

## 2024-09-09 NOTE — PROGRESS NOTES
[facility]  Nephrology Progress Note    Asmita Mcnairangeliquesridevi Attending:  Patricia Soto MD         Subjective:  No acute events         Current Facility-Administered Medications:     acetaminophen (Tylenol Extra Strength) tab 1,000 mg, 1,000 mg, Oral, TID    sodium chloride 0.9 % IV bolus 100 mL, 100 mL, Intravenous, Q30 Min PRN **AND** albumin human (Albumin) 25% injection 25 g, 25 g, Intravenous, PRN Dialysis    heparin (Porcine) 1000 UNIT/ML injection 1,500 Units, 1.5 mL, Intracatheter, Once    predniSONE (Deltasone) tab 20 mg, 20 mg, Oral, Daily with breakfast    HYDROcodone-acetaminophen (Norco) 5-325 MG per tab 1 tablet, 1 tablet, Oral, Q6H PRN    glucose (Dex4) 15 GM/59ML oral liquid 15 g, 15 g, Oral, Q15 Min PRN **OR** glucose (Glutose) 40% oral gel 15 g, 15 g, Oral, Q15 Min PRN **OR** glucose-vitamin C (Dex-4) chewable tab 4 tablet, 4 tablet, Oral, Q15 Min PRN **OR** dextrose 50% injection 50 mL, 50 mL, Intravenous, Q15 Min PRN **OR** glucose (Dex4) 15 GM/59ML oral liquid 30 g, 30 g, Oral, Q15 Min PRN **OR** glucose (Glutose) 40% oral gel 30 g, 30 g, Oral, Q15 Min PRN **OR** glucose-vitamin C (Dex-4) chewable tab 8 tablet, 8 tablet, Oral, Q15 Min PRN    epoetin nadeem (Epogen, Procrit) 40483 UNIT/ML injection 10,000 Units, 10,000 Units, Subcutaneous, Weekly    carvedilol (Coreg) tab 25 mg, 25 mg, Oral, BID with meals    hydrALAZINE (Apresoline) tab 50 mg, 50 mg, Oral, Q8H TRE    NIFEdipine ER (Procardia-XL) 24 hr tab 60 mg, 60 mg, Oral, Daily    ferrous sulfate DR tab 325 mg, 325 mg, Oral, Q48H    gabapentin (Neurontin) cap 100 mg, 100 mg, Oral, TID    umeclidinium bromide (Incruse Ellipta) 62.5 MCG/ACT inhaler 1 puff, 1 puff, Inhalation, Daily    albuterol (Ventolin HFA) 108 (90 Base) MCG/ACT inhaler 2 puff, 2 puff, Inhalation, Q4H PRN    levothyroxine (Synthroid) tab 50 mcg, 50 mcg, Oral, Before breakfast    pantoprazole (Protonix) DR tab 40 mg, 40 mg, Oral, Before breakfast    heparin (Porcine) 5000  UNIT/ML injection 5,000 Units, 5,000 Units, Subcutaneous, 2 times per day    acetaminophen (Tylenol Extra Strength) tab 500 mg, 500 mg, Oral, Q4H PRN    ondansetron (Zofran) 4 MG/2ML injection 4 mg, 4 mg, Intravenous, Q6H PRN    melatonin tab 3 mg, 3 mg, Oral, Nightly PRN    bumetanide (Bumex) 12.5 mg in 50 mL infusion, 1 mg/hr, Intravenous, Continuous      Physical Exam:   /45 (BP Location: Right arm)   Pulse 50   Temp 97.3 °F (36.3 °C) (Oral)   Resp 20   Ht 5' 4\" (1.626 m)   Wt 180 lb 1.9 oz (81.7 kg)   SpO2 97%   BMI 30.92 kg/m²   Temp (24hrs), Av.7 °F (36.5 °C), Min:97.3 °F (36.3 °C), Max:97.8 °F (36.6 °C)       Intake/Output Summary (Last 24 hours) at 2024 1040  Last data filed at 2024 0946  Gross per 24 hour   Intake 1438 ml   Output 1095 ml   Net 343 ml     Wt Readings from Last 3 Encounters:   24 180 lb 1.9 oz (81.7 kg)   24 178 lb 5.6 oz (80.9 kg)   22 209 lb (94.8 kg)     General: awake alert; NAD; on 2-3 L NC  HEENT: No scleral icterus, MMM  Neck: Supple, no AMANDEEP or thyromegaly  Cardiac: Regular rate and rhythm, S1, S2 normal, no murmur or tub  Lungs: Decreased BS at bases bilaterally   Abdomen: Soft, non-tender. + bowel sounds, no palpable organomegaly  Extremities: Without clubbing, cyanosis; minimal LE edema  Neurologic: Cranial nerves grossly intact, moving all extremities  Skin: Warm and dry, no rashes     Recent Labs     24  0705   WBC 9.8   HGB 7.9*   MCV 91.8   .0       Recent Labs     24  0705 24  1058 24  0733 24  0605   * 135* 135* 135*   K 4.2 4.0 4.6 4.6   CL 96* 98 96* 96*   CO2 27.0 25.0 26.0 28.0   BUN 92* 86* 110* 121*   CREATSERUM 6.40* 6.35* 6.77* 6.88*   CA 8.3* 8.3* 8.1* 8.2*   MG  --   --   --  2.6       No results for input(s): \"ALT\", \"AST\", \"ALB\", \"AMYLASE\", \"LIPASE\", \"LDH\" in the last 72 hours.    Invalid input(s): \"ALPHOS\", \"TBIL\", \"DBIL\", \"TPROT\"  Assessment and Plan:    1.RENEA / CKD 4- due to  uncontrolled HTN exac by cardiorenal syndrome / decompensated HF; this may be superimposed on an an underlying glomerulopathy given development of nephrotic range proteinuria since 2021- ie FSGS / membranous GN, etc. Kidney biopsy attempted, but due to think cortex/bilateral atrophy procedure was aborted due to high risk  - plan to start on HD today after PC placed       2. Pulmonary edema / fluid overload- primarily due to worsening renal failure superimposed on diastolic dysfunction-   - transition to oral diuretics      3. Uncontrolled HTN- exac by fluid overload. Continue usual coreg / VEGA / nifedipine. Renal artery doppler noted - ? R VALENTINE  ; CTA recommended, but given her renal insufficiency contrast is not safe; will reconsider after starting HD.      4. Anemia- primarily due to CKD; Fe stores noted -> IV Fe / EPO; await monoclonal protein study     5. Mild type 2 DM    6. R ankle pain- relatively acute onset, suspect gout.  On steorids/colchcine- improved     Care reviewd w/ pt's daughter and son-in-law in detail.     Sunday Winston  9/9/2024

## 2024-09-09 NOTE — PLAN OF CARE
Assumed care of patient at 1930.   Pt is Latvian speaking only.  Bumex gtt running per order.    Pt is Aox4 , resting comfortably in bed.   Patient is  on 2L NC .  Complains of no difficulty breathing.  Lung sounds clear.   Sinus yusra on tele.   Pt is on cardiac diet.   Using purewick for accurate I&Os.   Complains of some foot pain, medications given per order.   Pt is ambulating walker x 1 .   Skin in unremarkable.   Bed at lowest position, room cleared of clutter, bed alarm is on, and call light is within reach.   POC discussed, fall precautions reviewed, and questions answered.      Problem: CARDIOVASCULAR - ADULT  Goal: Maintains optimal cardiac output and hemodynamic stability  Description: INTERVENTIONS:  - Monitor vital signs, rhythm, and trends  - Monitor for bleeding, hypotension and signs of decreased cardiac output  - Evaluate effectiveness of vasoactive medications to optimize hemodynamic stability  - Monitor arterial and/or venous puncture sites for bleeding and/or hematoma  - Assess quality of pulses, skin color and temperature  - Assess for signs of decreased coronary artery perfusion - ex. Angina  - Evaluate fluid balance, assess for edema, trend weights  Outcome: Progressing  Goal: Absence of cardiac arrhythmias or at baseline  Description: INTERVENTIONS:  - Continuous cardiac monitoring, monitor vital signs, obtain 12 lead EKG if indicated  - Evaluate effectiveness of antiarrhythmic and heart rate control medications as ordered  - Initiate emergency measures for life threatening arrhythmias  - Monitor electrolytes and administer replacement therapy as ordered  Outcome: Progressing     Problem: SAFETY ADULT - FALL  Goal: Free from fall injury  Description: INTERVENTIONS:  - Assess pt frequently for physical needs  - Identify cognitive and physical deficits and behaviors that affect risk of falls.  - [place] fall precautions as indicated by assessment.  - Educate pt/family on patient safety  including physical limitations  - Instruct pt to call for assistance with activity based on assessment  - Modify environment to reduce risk of injury  - Provide assistive devices as appropriate  - Consider OT/PT consult to assist with strengthening/mobility  - Encourage toileting schedule  Outcome: Progressing     Problem: Patient/Family Goals  Goal: Patient/Family Long Term Goal  Description: Patient's Long Term Goal: Go home    Interventions:  - routine lab draws  - medication compliance   - MD rounding  - See additional Care Plan goals for specific interventions  Outcome: Progressing  Goal: Patient/Family Short Term Goal  Description: Patient's Short Term Goal: pain free    Interventions:   - ice pack  -pain medication  - See additional Care Plan goals for specific interventions  Outcome: Progressing     Problem: RESPIRATORY - ADULT  Goal: Achieves optimal ventilation and oxygenation  Description: INTERVENTIONS:  - Assess for changes in respiratory status  - Assess for changes in mentation and behavior  - Position to facilitate oxygenation and minimize respiratory effort  - Oxygen supplementation based on oxygen saturation or ABGs  - Provide Smoking Cessation handout, if applicable  - Encourage broncho-pulmonary hygiene including cough, deep breathe, Incentive Spirometry  - Assess the need for suctioning and perform as needed  - Assess and instruct to report SOB or any respiratory difficulty  - Respiratory Therapy support as indicated  - Manage/alleviate anxiety  - Monitor for signs/symptoms of CO2 retention  Outcome: Progressing

## 2024-09-10 PROBLEM — N18.6 END STAGE RENAL DISEASE (HCC): Status: ACTIVE | Noted: 2024-09-10

## 2024-09-10 LAB
ALBUMIN SERPL ELPH-MCNC: 2.63 G/DL (ref 3.75–5.21)
ALBUMIN/GLOB SERPL: 0.92 {RATIO} (ref 1–2)
ALPHA1 GLOB SERPL ELPH-MCNC: 0.34 G/DL (ref 0.19–0.46)
ALPHA2 GLOB SERPL ELPH-MCNC: 0.85 G/DL (ref 0.48–1.05)
ANION GAP SERPL CALC-SCNC: 7 MMOL/L (ref 0–18)
B-GLOBULIN SERPL ELPH-MCNC: 0.76 G/DL (ref 0.68–1.23)
BUN BLD-MCNC: 63 MG/DL (ref 9–23)
CALCIUM BLD-MCNC: 8.4 MG/DL (ref 8.7–10.4)
CHLORIDE SERPL-SCNC: 101 MMOL/L (ref 98–112)
CO2 SERPL-SCNC: 31 MMOL/L (ref 21–32)
CREAT BLD-MCNC: 4.1 MG/DL
EGFRCR SERPLBLD CKD-EPI 2021: 10 ML/MIN/1.73M2 (ref 60–?)
GAMMA GLOB SERPL ELPH-MCNC: 0.92 G/DL (ref 0.62–1.7)
GLUCOSE BLD-MCNC: 92 MG/DL (ref 70–99)
KAPPA LC FREE SER-MCNC: 12.86 MG/DL (ref 0.33–1.94)
KAPPA LC FREE/LAMBDA FREE SER NEPH: 1.41 {RATIO} (ref 0.26–1.65)
LAMBDA LC FREE SERPL-MCNC: 9.11 MG/DL (ref 0.57–2.63)
MAGNESIUM SERPL-MCNC: 2.3 MG/DL (ref 1.6–2.6)
OSMOLALITY SERPL CALC.SUM OF ELEC: 306 MOSM/KG (ref 275–295)
POTASSIUM SERPL-SCNC: 4.4 MMOL/L (ref 3.5–5.1)
PROT SERPL-MCNC: 5.5 G/DL (ref 5.7–8.2)
SODIUM SERPL-SCNC: 139 MMOL/L (ref 136–145)

## 2024-09-10 PROCEDURE — 99232 SBSQ HOSP IP/OBS MODERATE 35: CPT | Performed by: INTERNAL MEDICINE

## 2024-09-10 PROCEDURE — 5A1D70Z PERFORMANCE OF URINARY FILTRATION, INTERMITTENT, LESS THAN 6 HOURS PER DAY: ICD-10-PCS | Performed by: INTERNAL MEDICINE

## 2024-09-10 RX ORDER — ALBUMIN (HUMAN) 12.5 G/50ML
25 SOLUTION INTRAVENOUS
Status: DISCONTINUED | OUTPATIENT
Start: 2024-09-11 | End: 2024-09-12

## 2024-09-10 RX ORDER — HEPARIN SODIUM 1000 [USP'U]/ML
1.5 INJECTION, SOLUTION INTRAVENOUS; SUBCUTANEOUS
Status: COMPLETED | OUTPATIENT
Start: 2024-09-11 | End: 2024-09-11

## 2024-09-10 RX ORDER — CARVEDILOL 6.25 MG/1
6.25 TABLET ORAL 2 TIMES DAILY WITH MEALS
Status: DISCONTINUED | OUTPATIENT
Start: 2024-09-10 | End: 2024-09-13

## 2024-09-10 NOTE — PROGRESS NOTES
[facility]  Nephrology Progress Note    Asmita Cunningham Attending:  Patricia Soto MD         Subjective:  Reports that dialysis occurred without issues yesterday. No shortness of breath, reports that her edema has been improving         Current Facility-Administered Medications:     [START ON 2024] sodium chloride 0.9 % IV bolus 100 mL, 100 mL, Intravenous, Q30 Min PRN **AND** [START ON 2024] albumin human (Albumin) 25% injection 25 g, 25 g, Intravenous, PRN Dialysis    [START ON 2024] heparin (Porcine) 1000 UNIT/ML injection 1,500 Units, 1.5 mL, Intracatheter, Once    carvedilol (Coreg) tab 6.25 mg, 6.25 mg, Oral, BID with meals    bumetanide (Bumex) tab 1 mg, 1 mg, Oral, BID (Diuretic)    acetaminophen (Tylenol Extra Strength) tab 1,000 mg, 1,000 mg, Oral, TID    sodium chloride 0.9 % IV bolus 100 mL, 100 mL, Intravenous, Q30 Min PRN **AND** [] albumin human (Albumin) 25% injection 25 g, 25 g, Intravenous, PRN Dialysis    HYDROcodone-acetaminophen (Norco) 5-325 MG per tab 1 tablet, 1 tablet, Oral, Q6H PRN    glucose (Dex4) 15 GM/59ML oral liquid 15 g, 15 g, Oral, Q15 Min PRN **OR** glucose (Glutose) 40% oral gel 15 g, 15 g, Oral, Q15 Min PRN **OR** glucose-vitamin C (Dex-4) chewable tab 4 tablet, 4 tablet, Oral, Q15 Min PRN **OR** dextrose 50% injection 50 mL, 50 mL, Intravenous, Q15 Min PRN **OR** glucose (Dex4) 15 GM/59ML oral liquid 30 g, 30 g, Oral, Q15 Min PRN **OR** glucose (Glutose) 40% oral gel 30 g, 30 g, Oral, Q15 Min PRN **OR** glucose-vitamin C (Dex-4) chewable tab 8 tablet, 8 tablet, Oral, Q15 Min PRN    epoetin nadeem (Epogen, Procrit) 21131 UNIT/ML injection 10,000 Units, 10,000 Units, Subcutaneous, Weekly    hydrALAZINE (Apresoline) tab 50 mg, 50 mg, Oral, Q8H TRE    ferrous sulfate DR tab 325 mg, 325 mg, Oral, Q48H    gabapentin (Neurontin) cap 100 mg, 100 mg, Oral, TID    umeclidinium bromide (Incruse Ellipta) 62.5 MCG/ACT inhaler 1 puff, 1 puff, Inhalation, Daily     albuterol (Ventolin HFA) 108 (90 Base) MCG/ACT inhaler 2 puff, 2 puff, Inhalation, Q4H PRN    levothyroxine (Synthroid) tab 50 mcg, 50 mcg, Oral, Before breakfast    pantoprazole (Protonix) DR tab 40 mg, 40 mg, Oral, Before breakfast    heparin (Porcine) 5000 UNIT/ML injection 5,000 Units, 5,000 Units, Subcutaneous, 2 times per day    acetaminophen (Tylenol Extra Strength) tab 500 mg, 500 mg, Oral, Q4H PRN    ondansetron (Zofran) 4 MG/2ML injection 4 mg, 4 mg, Intravenous, Q6H PRN    melatonin tab 3 mg, 3 mg, Oral, Nightly PRN      Physical Exam:   /46 (BP Location: Left arm)   Pulse (!) 46   Temp 97.6 °F (36.4 °C) (Oral)   Resp 18   Ht 5' 4\" (1.626 m)   Wt 176 lb 8 oz (80.1 kg)   SpO2 95%   BMI 30.30 kg/m²   Temp (24hrs), Av.8 °F (36.6 °C), Min:97.4 °F (36.3 °C), Max:98 °F (36.7 °C)       Intake/Output Summary (Last 24 hours) at 9/10/2024 1206  Last data filed at 9/10/2024 1153  Gross per 24 hour   Intake 1080 ml   Output 1625 ml   Net -545 ml     Wt Readings from Last 3 Encounters:   09/10/24 176 lb 8 oz (80.1 kg)   24 178 lb 5.6 oz (80.9 kg)   22 209 lb (94.8 kg)     General: awake alert; NAD  HEENT: No scleral icterus, MMM  Neck: Supple, no AMANDEEP or thyromegaly  Cardiac: Regular rate and rhythm, S1, S2 normal  Lungs: Clear to auscultation  Extremities: minimal LE edema  Neurologic: Cranial nerves grossly intact, moving all extremities  Skin: Warm and dry, no rashes     Recent Labs     24  0733 24  0605 09/10/24  0756   * 135* 139   K 4.6 4.6 4.4   CL 96* 96* 101   CO2 26.0 28.0 31.0   * 121* 63*   CREATSERUM 6.77* 6.88* 4.10*   CA 8.1* 8.2* 8.4*   MG  --  2.6 2.3     Assessment and Plan:    New end stage renal disease: She likely has uncontrolled hypertension exacerbated by cardiorenal syndrome in setting of decompensated heart failure. Could be superimposed on an underlying glomerulopathy due to new nephrotic range proteinuria since . Kidney biopsy  attempted but aborted due to high risk and bilateral atrophic kidneys. Started HD 9/9 via tunneled HD catheter. Will need HD unit placement.   Pulmonary edema / fluid overload- primarily due to worsening renal failure superimposed on diastolic dysfunction. On PO Bumex 1mg BID  Uncontrolled HTN: Exac by fluid overload, now improved Continue coreg / VEGA / nifedipine. Renal artery doppler noted - ? R VALENTINE, will hold on CTA for now given that BP is better controlled  Anemia- primarily due to CKD; Fe stores noted -> IV Fe / EPO; await monoclonal protein study - drawn 9/5  5. Mild type 2 DM  R ankle pain- relatively acute onset, suspect gout.  On steorids/colchcine- improved     Thank you for allowing me to participate in this patient's care. Please feel free to call me with any questions or concerns.     Nemo Bah MD  09/10/24

## 2024-09-10 NOTE — PLAN OF CARE
Assumed care of patient at 1930.   Pt was resting in bed, getting dialysis with dialysis RN.   Pt is Aox4, [place]ican speaking only.    Patient is on 2L NC.  Complains of no difficulty breathing.  Lung sounds clear.   Sinus Say on tele.  Pt is on cardiac diet.    Purewick in place for accurate I&Os.   Complains of some mild pain to SIN feet heat packs and scheduled tylenol per order.    Pt is ambulating walker x1.   Skin in unremarkable.   Bed at lowest position, room cleared of clutter, bed alarm is on, and call light is within reach.   POC discussed, fall precautions reviewed, and questions answered.      Problem: CARDIOVASCULAR - ADULT  Goal: Maintains optimal cardiac output and hemodynamic stability  Description: INTERVENTIONS:  - Monitor vital signs, rhythm, and trends  - Monitor for bleeding, hypotension and signs of decreased cardiac output  - Evaluate effectiveness of vasoactive medications to optimize hemodynamic stability  - Monitor arterial and/or venous puncture sites for bleeding and/or hematoma  - Assess quality of pulses, skin color and temperature  - Assess for signs of decreased coronary artery perfusion - ex. Angina  - Evaluate fluid balance, assess for edema, trend weights  Outcome: Progressing  Goal: Absence of cardiac arrhythmias or at baseline  Description: INTERVENTIONS:  - Continuous cardiac monitoring, monitor vital signs, obtain 12 lead EKG if indicated  - Evaluate effectiveness of antiarrhythmic and heart rate control medications as ordered  - Initiate emergency measures for life threatening arrhythmias  - Monitor electrolytes and administer replacement therapy as ordered  Outcome: Progressing     Problem: SAFETY ADULT - FALL  Goal: Free from fall injury  Description: INTERVENTIONS:  - Assess pt frequently for physical needs  - Identify cognitive and physical deficits and behaviors that affect risk of falls.  - [place] fall precautions as indicated by assessment.  - Educate pt/family on  patient safety including physical limitations  - Instruct pt to call for assistance with activity based on assessment  - Modify environment to reduce risk of injury  - Provide assistive devices as appropriate  - Consider OT/PT consult to assist with strengthening/mobility  - Encourage toileting schedule  Outcome: Progressing     Problem: Patient/Family Goals  Goal: Patient/Family Long Term Goal  Description: Patient's Long Term Goal: Go home    Interventions:  - routine lab draws  - medication compliance   - MD rounding  - See additional Care Plan goals for specific interventions  Outcome: Progressing  Goal: Patient/Family Short Term Goal  Description: Patient's Short Term Goal: pain free    Interventions:   - ice pack  -pain medication  - See additional Care Plan goals for specific interventions  Outcome: Progressing     Problem: RESPIRATORY - ADULT  Goal: Achieves optimal ventilation and oxygenation  Description: INTERVENTIONS:  - Assess for changes in respiratory status  - Assess for changes in mentation and behavior  - Position to facilitate oxygenation and minimize respiratory effort  - Oxygen supplementation based on oxygen saturation or ABGs  - Provide Smoking Cessation handout, if applicable  - Encourage broncho-pulmonary hygiene including cough, deep breathe, Incentive Spirometry  - Assess the need for suctioning and perform as needed  - Assess and instruct to report SOB or any respiratory difficulty  - Respiratory Therapy support as indicated  - Manage/alleviate anxiety  - Monitor for signs/symptoms of CO2 retention  Outcome: Progressing

## 2024-09-10 NOTE — PLAN OF CARE
Assumed care for patient at 0700  Aox4. Czech speaking only.  used for assessment, medications and plan of care discussion. Asked patient if she would like to get out of bed and walk. Pt strongly declined. SB on cardiac monitor, Hr sustaining in the 40's. AM dose of carvedilol was held.  Lung sounds clear, diminished. Adequate saturation on RA.  Denies sob, chest discomfort, n/v. Voiding.  LBM today. Good appetite.    Czech  #[id number] at 0950                                   #[id number] at 1253                                   #[id number] at 1642    Problem: CARDIOVASCULAR - ADULT  Goal: Maintains optimal cardiac output and hemodynamic stability  Description: INTERVENTIONS:  - Monitor vital signs, rhythm, and trends  - Monitor for bleeding, hypotension and signs of decreased cardiac output  - Evaluate effectiveness of vasoactive medications to optimize hemodynamic stability  - Monitor arterial and/or venous puncture sites for bleeding and/or hematoma  - Assess quality of pulses, skin color and temperature  - Assess for signs of decreased coronary artery perfusion - ex. Angina  - Evaluate fluid balance, assess for edema, trend weights  Outcome: Progressing  Goal: Absence of cardiac arrhythmias or at baseline  Description: INTERVENTIONS:  - Continuous cardiac monitoring, monitor vital signs, obtain 12 lead EKG if indicated  - Evaluate effectiveness of antiarrhythmic and heart rate control medications as ordered  - Initiate emergency measures for life threatening arrhythmias  - Monitor electrolytes and administer replacement therapy as ordered  Outcome: Progressing     Problem: SAFETY ADULT - FALL  Goal: Free from fall injury  Description: INTERVENTIONS:  - Assess pt frequently for physical needs  - Identify cognitive and physical deficits and behaviors that affect risk of falls.  - [place] fall precautions as indicated by assessment.  - Educate pt/family on patient safety including physical  limitations  - Instruct pt to call for assistance with activity based on assessment  - Modify environment to reduce risk of injury  - Provide assistive devices as appropriate  - Consider OT/PT consult to assist with strengthening/mobility  - Encourage toileting schedule  Outcome: Progressing     Problem: Patient/Family Goals  Goal: Patient/Family Long Term Goal  Description: Patient's Long Term Goal: Go home    Interventions:  - routine lab draws  - medication compliance   - MD rounding  - See additional Care Plan goals for specific interventions  Outcome: Progressing  Goal: Patient/Family Short Term Goal  Description: Patient's Short Term Goal: pain free    Interventions:   - ice pack  -pain medication  - See additional Care Plan goals for specific interventions  Outcome: Progressing     Problem: RESPIRATORY - ADULT  Goal: Achieves optimal ventilation and oxygenation  Description: INTERVENTIONS:  - Assess for changes in respiratory status  - Assess for changes in mentation and behavior  - Position to facilitate oxygenation and minimize respiratory effort  - Oxygen supplementation based on oxygen saturation or ABGs  - Provide Smoking Cessation handout, if applicable  - Encourage broncho-pulmonary hygiene including cough, deep breathe, Incentive Spirometry  - Assess the need for suctioning and perform as needed  - Assess and instruct to report SOB or any respiratory difficulty  - Respiratory Therapy support as indicated  - Manage/alleviate anxiety  - Monitor for signs/symptoms of CO2 retention  Outcome: Progressing

## 2024-09-10 NOTE — PROGRESS NOTES
[facility]   part of [facility]     Hospitalist Progress Note     Asmita Cunningham Patient Status:  Inpatient    1935 MRN [id number]   Location [facility] 2NE-A Attending Eli Adorno, DO   Hosp Day # 7 PCP Pola Sanchez, FREDPSAMANTHA     Chief Complaint: SOB    Subjective:     Tolerated catheter placement and HD yesterday. No new complaints. Sitting up in bed    Objective:    Review of Systems:   A comprehensive review of systems was completed; pertinent positive and negatives stated in subjective.    Vital signs:  Temp:  [97.4 °F (36.3 °C)-98 °F (36.7 °C)] 98 °F (36.7 °C)  Pulse:  [44-55] 44  Resp:  [16-20] 20  BP: ()/(42-83) 126/52  SpO2:  [86 %-100 %] 96 %    Physical Exam:    General: No acute distress  Respiratory: No wheezes, no rhonchi  Cardiovascular: S1, S2, regular rate and rhythm  Abdomen: Soft, Non-tender, non-distended, positive bowel sounds  Neuro: No new focal deficits.   Extremities: No edema    Diagnostic Data:    Labs:  Recent Labs   Lab 24  1142 24  0649 24  0621 24  0703 24  0705   WBC 8.9 8.1 8.5 8.7 9.8   HGB 8.4* 7.4* 7.7* 8.3* 7.9*   MCV 93.9 94.6 94.4 91.0 91.8   .0 158.0 167.0 168.0 175.0   INR  --   --   --  1.04  --        Recent Labs   Lab 24  1142 24  0649 24  0733 24  0605 09/10/24  0756   *   < > 95 91 92   BUN 62*   < > 110* 121* 63*   CREATSERUM 4.87*   < > 6.77* 6.88* 4.10*   CA 8.5*   < > 8.1* 8.2* 8.4*   ALB 3.7  --   --   --   --       < > 135* 135* 139   K 4.5   < > 4.6 4.6 4.4      < > 96* 96* 101   CO2 25.0   < > 26.0 28.0 31.0   ALKPHO 77  --   --   --   --    AST 15  --   --   --   --    ALT 8*  --   --   --   --    BILT 0.3  --   --   --   --    TP 6.7  --   --   --   --     < > = values in this interval not displayed.       Estimated Creatinine Clearance: 8.3 mL/min (A) (based on SCr of 4.1 mg/dL (H)).    Recent Labs   Lab 24  1142   TROPHS 113*        Recent Labs   Lab 09/06/24  0703   PTP 13.6   INR 1.04        Microbiology    No results found for this visit on 09/03/24.      Imaging: Reviewed in Epic.    Medications:    [START ON 9/11/2024] heparin  1.5 mL Intracatheter Once    carvedilol  6.25 mg Oral BID with meals    bumetanide  1 mg Oral BID (Diuretic)    acetaminophen  1,000 mg Oral TID    epoetin nadeem  10,000 Units Subcutaneous Weekly    hydrALAZINE  50 mg Oral Q8H TRE    ferrous sulfate  325 mg Oral Q48H    gabapentin  100 mg Oral TID    umeclidinium bromide  1 puff Inhalation Daily    levothyroxine  50 mcg Oral Before breakfast    pantoprazole  40 mg Oral Before breakfast    heparin  5,000 Units Subcutaneous 2 times per day       Assessment & Plan:      #RENEA on CKD 4; likely multifactorial d/t cardiorenal syndrome/HFpEF decompensation, poorly controlled HTN, and possible underlying renal disease  - renal artery with dopplers showing renal disease   - unable to complete renal biopsy 9/6 d/t high risk for complications/bleeding  - progressed in spite of aggressive treatment/diuresis   - continue HTN medication management  - nephrology following  - HD catheter placed and HD initiated 9/9  - continue bumex 1mg PO BID    #HFpEF exacerbation  #Acute hypoxic respiratory failure  #Volume overload d/t above  - BIPAP/vapotherm weaned  - continue to wean supplemental O2 as tolerated   - recent echo with pEF 55-60% and G1DD  - volume management as above   - carvedilol   - cardiology following    #HTN emergency  - BP's improved  - nitroglycerin drip weaned   - PTA hydralazine, carvedilol, nifedipine  - cardiology following    #Elevated troponin d/t poor renal clearance and demand  - telemetry  - echo reviewed  - cardiology following    #DM2 with hyperglycemia  - hyperglycemia protocol    #RLE pain > ?gout, suspect component of neuropathy   - uric acid WNL  - short course prednisone/colchicine > stop prednisone   - scheduled tylenol    #acute on chronic AOCD  -  BL Hgb appears to be in mid-9's  - epo, iron supplementation   - follow CBC     #GERD  - PPI    #Hypothyroidism  - levothyroxine         Eli Adorno, DO    Supplementary Documentation:     Quality:  DVT Mechanical Prophylaxis:   SCDs,    DVT Pharmacologic Prophylaxis   Medication    [START ON 9/11/2024] heparin (Porcine) 1000 UNIT/ML injection 1,500 Units    heparin (Porcine) 5000 UNIT/ML injection 5,000 Units                Code Status: Full Code  Alberot: External urinary catheter in place  Alberto Duration (in days):   Central line:    VIRGIL:     Discharge is dependent on: clinical state  At this point Ms. Cunningham is expected to be discharge to: home    The 21st Century Cures Act makes medical notes like these available to patients in the interest of transparency. Please be advised this is a medical document. Medical documents are intended to carry relevant information, facts as evident, and the clinical opinion of the practitioner. The medical note is intended as peer to peer communication and may appear blunt or direct. It is written in medical language and may contain abbreviations or verbiage that are unfamiliar.

## 2024-09-10 NOTE — PROGRESS NOTES
Progress Note  Asmita Cunningham Patient Status:  Inpatient    1935 MRN [id number]   Location [facility] 2NE-A Attending Eli Adorno, DO   Hosp Day # 7 PCP JOSÉ Crawley     Subjective:  Tolerated HD.  Remains bradycardic.  BP stable.    Objective:  /52 (BP Location: Right arm)   Pulse (!) 44   Temp 98 °F (36.7 °C) (Oral)   Resp 20   Ht 162.6 cm (5' 4\")   Wt 176 lb 8 oz (80.1 kg)   SpO2 96%   BMI 30.30 kg/m²     Intake/Output:    Intake/Output Summary (Last 24 hours) at 9/10/2024 1031  Last data filed at 9/10/2024 1005  Gross per 24 hour   Intake 840 ml   Output 1625 ml   Net -785 ml       Last 3 Weights   09/10/24 0610 176 lb 8 oz (80.1 kg)   24 0515 180 lb 1.9 oz (81.7 kg)   24 0442 181 lb (82.1 kg)   24 0500 187 lb 4.8 oz (85 kg)   24 0528 182 lb 3.2 oz (82.6 kg)   24 2246 184 lb 6.3 oz (83.6 kg)   24 2030 184 lb 6.4 oz (83.6 kg)   24 1134 179 lb (81.2 kg)   24 2315 178 lb 5.6 oz (80.9 kg)   22 1246 209 lb (94.8 kg)       Labs:  Recent Labs   Lab 24  0733 24  0605 09/10/24  0756   GLU 95 91 92   * 121* 63*   CREATSERUM 6.77* 6.88* 4.10*   EGFRCR 5* 5* 10*   CA 8.1* 8.2* 8.4*   * 135* 139   K 4.6 4.6 4.4   CL 96* 96* 101   CO2 26.0 28.0 31.0     Recent Labs   Lab 24  1142 24  0649 24  0621 24  0703 24  0705   RBC 2.79* 2.41* 2.51* 2.68* 2.56*   HGB 8.4* 7.4* 7.7* 8.3* 7.9*   HCT 26.2* 22.8* 23.7* 24.4* 23.5*   MCV 93.9 94.6 94.4 91.0 91.8   MCH 30.1 30.7 30.7 31.0 30.9   MCHC 32.1 32.5 32.5 34.0 33.6   RDW 16.0 15.9 15.7 15.5 15.6   NEPRELIM 6.54 5.21  --   --   --    WBC 8.9 8.1 8.5 8.7 9.8   .0 158.0 167.0 168.0 175.0         Recent Labs   Lab 24  1142   TROPHS 113*       Diagnostics:   Telemetry: NSR    Review of Systems   Cardiovascular:  Negative for chest pain.   Respiratory:  Positive for shortness of breath.        Physical Exam:  General:  Alert and oriented in no apparent distress.  HEENT: Pupils equal. Mucous membranes moist.   Neck: mild JVD+  Cardiac:  Normal S1 S2, Regular. No murmur  Lungs: decreased breath sounds at the bases bilaterally  Abdomen: Soft, non-tender, ND  Extremities: no LE edema  Neurologic: No focal deficits. Normal affect.  Skin: Warm and dry,    Medications:   [START ON 9/11/2024] heparin  1.5 mL Intracatheter Once    bumetanide  1 mg Oral BID (Diuretic)    acetaminophen  1,000 mg Oral TID    predniSONE  20 mg Oral Daily with breakfast    epoetin nadeem  10,000 Units Subcutaneous Weekly    carvedilol  25 mg Oral BID with meals    hydrALAZINE  50 mg Oral Q8H TRE    NIFEdipine ER  60 mg Oral Daily    ferrous sulfate  325 mg Oral Q48H    gabapentin  100 mg Oral TID    umeclidinium bromide  1 puff Inhalation Daily    levothyroxine  50 mcg Oral Before breakfast    pantoprazole  40 mg Oral Before breakfast    heparin  5,000 Units Subcutaneous 2 times per day           Assessment:    Volume overload, cardiorenal syndrome, acute on chronic HFpEF  Secondary to worsening renal failure, nephrotic range proteinuria, and diastolic dysfunction EF 55-60%  RENEA on CKD IV  Nephrology following  HD initiated on 9/9/24.  Hypertension  Coreg, nifedipine, hydralazine  ?Right renal artery stenosis on US doppler  Elevated troponin   Likely demand in the setting of RENEA and uncontrolled HTN on arrival  RLE pain, possible gout - prednisone/colchicine  Acute on chronic anemia  DM2    Plan:    Volume per nephrology, HD as tolerated.  BP's lower, will stop nifedipine.  Decrease carvedilol to 6.25 mg BID, Hold HR<50.    Jose Juan  L3

## 2024-09-10 NOTE — CM/SW NOTE
NEFTALY received a message from Dr. Chester stating that pt will need a \"HD center on discharge and her granddaughter had a few places in mind that she would like referrals sent to\".    NEFTALY called pt's granddaughter, Anupama ([phone]), and discussed OP HD for pt at [facility]. Anupama informed NEFTALY that she has already been in contact with [facility] in [place] and would like pt to go there ar for OP HD. Anupama provided NEFTALY with the contact information for the RNCM, Tayla Purvis, and requested for NEFTALY to reach out to her to send referral and any other needed information. Phone number is [phone] and fax number is [phone]. Anupama thanked NEFTALY and denied having any further questions at this time.    NEFTALY will follow up with Tayla tomorrow to send all the needed information for OP HD.     to remain available for support and/or discharge planning.    Janelle Lopez, [facility]  Discharge Planner  [phone]

## 2024-09-10 NOTE — DIETARY NOTE
[facility]   part of [facility]   CLINICAL NUTRITION    [facility]     Admitting diagnosis:  Acute pulmonary edema (HCC) [J81.0]  Elevated troponin [R79.89]  Acute hypoxemic respiratory failure (HCC) [J96.01]  Chronic kidney disease, unspecified CKD stage [N18.9]    Ht: 162.6 cm (5' 4\")  Wt: 80.1 kg (176 lb 8 oz).   Body mass index is 30.3 kg/m².  IBW: 54.5 kg    Wt Readings from Last 6 Encounters:   09/10/24 80.1 kg (176 lb 8 oz)   08/13/24 80.9 kg (178 lb 5.6 oz)   03/25/22 94.8 kg (209 lb)   01/21/22 94.8 kg (209 lb)   01/06/22 84.4 kg (186 lb 1.1 oz)   12/28/21 88 kg (194 lb)        Labs/Meds reviewed  -Glu:92, BUN:63, Cr:4.10, GFR:10  -Bumex, Iron, Prednisone    Diet:       Procedures    Cardiac diet Cardiac; Sodium Restriction: 2 GM NA; Fluid Restriction: 2000 ml; Is Patient on Accuchecks? Yes     Percent Meals Eaten (last 3 days)       Date/Time Percent Meals Eaten (%)    09/07/24 0905 95 %    09/07/24 1310 100 %    09/08/24 0912 0 %    09/08/24 1212 100 %    09/08/24 1550 0 %    09/08/24 1558 100 %    09/09/24 0946 --     Percent Meals Eaten (%): NPO, crackers with meds at 09/09/24 0946    09/09/24 1551 100 %          9/10- Pt w/ good appetite and eating well per RN report. Recorded PO intakes:% w/ 100% most meals.   Pt w/ RENEA on CKD4 d/t uncontrolled HTN exacerbated by cardiorenal syndrome and decompensated HF per Nephrology note. HD initiated on 9/9. Plans for next HD tomorrow. Last BM:9/10. No pressure ulcers per RN documentation. Non-pitting edema to B/L UE and B/L LE.     9/4-Pt chart reviewed d/t HF consult. 89/F admitted d/t SOB from CHF. Pt has BLE. Pt received Heart Failure Nutrition Therapy that explains to limit Na to 2300 mg, check nutrition labels for Na content, what to do when eating out, and foods that are and are not recommended. Great grandson in room helped interpret and handout in Taiwanese was given to pt. Pt declined Taiwanese . Pt was eating lunch at time  of visit. Last BM 9/2.    Patient reports good appetite at this time.  Nursing notes reports Percent Meals Eaten (%): 100 % intake for last meal.  Tolerating po diet without diarrhea, emesis, or constipation.   No significant weight changes noted.     Patient is at low nutrition risk at this time.    Please consult if patient status changes or nutrition issues arise.    Sabrina Comer MS, RD, LDN  Clinical Dietitian  Ext:[phone]

## 2024-09-10 NOTE — PLAN OF CARE
Received report and assumed care of this Pt at 0750. Pt A&Ox4, calm and cooperative, asks appropriate questions.  Pt is in SB on Tele monitor, sats greater than 92% on 2L Oxygen per NC, attempted to wean off Oxygen.  Pt to IR for Dialysis catheter placement at 1255, returned to room 2624 at 1340, notified HD nurse.  Pt started HD at around 1800.  Pt's granddaughter Anupama visited, POC discussed with Pt and Anupama, their questions answered, they verbalized understanding. Bedside report given to night nurse at this time. Pt resting in bed, HD in progress, HD nurse at bedside, call light is in reach.     Problem: CARDIOVASCULAR - ADULT  Goal: Maintains optimal cardiac output and hemodynamic stability  Description: INTERVENTIONS:  - Monitor vital signs, rhythm, and trends  - Monitor for bleeding, hypotension and signs of decreased cardiac output  - Evaluate effectiveness of vasoactive medications to optimize hemodynamic stability  - Monitor arterial and/or venous puncture sites for bleeding and/or hematoma  - Assess quality of pulses, skin color and temperature  - Assess for signs of decreased coronary artery perfusion - ex. Angina  - Evaluate fluid balance, assess for edema, trend weights  Outcome: Progressing  Goal: Absence of cardiac arrhythmias or at baseline  Description: INTERVENTIONS:  - Continuous cardiac monitoring, monitor vital signs, obtain 12 lead EKG if indicated  - Evaluate effectiveness of antiarrhythmic and heart rate control medications as ordered  - Initiate emergency measures for life threatening arrhythmias  - Monitor electrolytes and administer replacement therapy as ordered  Outcome: Progressing     Problem: SAFETY ADULT - FALL  Goal: Free from fall injury  Description: INTERVENTIONS:  - Assess pt frequently for physical needs  - Identify cognitive and physical deficits and behaviors that affect risk of falls.  - [place] fall precautions as indicated by assessment.  - Educate pt/family on patient  safety including physical limitations  - Instruct pt to call for assistance with activity based on assessment  - Modify environment to reduce risk of injury  - Provide assistive devices as appropriate  - Consider OT/PT consult to assist with strengthening/mobility  - Encourage toileting schedule  Outcome: Progressing     Problem: Patient/Family Goals  Goal: Patient/Family Long Term Goal  Description: Patient's Long Term Goal: Go home    Interventions:  - routine lab draws  - medication compliance   - MD rounding  - See additional Care Plan goals for specific interventions  Outcome: Progressing  Goal: Patient/Family Short Term Goal  Description: Patient's Short Term Goal: pain free    Interventions:   - ice pack  -pain medication  - See additional Care Plan goals for specific interventions  Outcome: Progressing     Problem: RESPIRATORY - ADULT  Goal: Achieves optimal ventilation and oxygenation  Description: INTERVENTIONS:  - Assess for changes in respiratory status  - Assess for changes in mentation and behavior  - Position to facilitate oxygenation and minimize respiratory effort  - Oxygen supplementation based on oxygen saturation or ABGs  - Provide Smoking Cessation handout, if applicable  - Encourage broncho-pulmonary hygiene including cough, deep breathe, Incentive Spirometry  - Assess the need for suctioning and perform as needed  - Assess and instruct to report SOB or any respiratory difficulty  - Respiratory Therapy support as indicated  - Manage/alleviate anxiety  - Monitor for signs/symptoms of CO2 retention  Outcome: Progressing

## 2024-09-10 NOTE — PHYSICAL THERAPY NOTE
Attempted to see Pt this AM - MEÑO Calderon aware of attempt.  Pt again still refusing to participate in skilled PT session - Pt asking to get cleaned up with PCT, and said she will walk to the bathroom.     Pt with multiple refusals this admission - RN aware writer will sign off today.     Discussed with evaluating PT Alejandra 9/10/2024

## 2024-09-11 ENCOUNTER — TELEPHONE (OUTPATIENT)
Dept: NEPHROLOGY | Facility: CLINIC | Age: 89
End: 2024-09-11

## 2024-09-11 PROBLEM — N18.6 ESRD (END STAGE RENAL DISEASE) (HCC): Status: ACTIVE | Noted: 2024-09-10

## 2024-09-11 LAB
ANION GAP SERPL CALC-SCNC: 8 MMOL/L (ref 0–18)
BUN BLD-MCNC: 40 MG/DL (ref 9–23)
CALCIUM BLD-MCNC: 8.3 MG/DL (ref 8.7–10.4)
CHLORIDE SERPL-SCNC: 103 MMOL/L (ref 98–112)
CO2 SERPL-SCNC: 26 MMOL/L (ref 21–32)
CREAT BLD-MCNC: 2.79 MG/DL
EGFRCR SERPLBLD CKD-EPI 2021: 16 ML/MIN/1.73M2 (ref 60–?)
GLUCOSE BLD-MCNC: 98 MG/DL (ref 70–99)
NT-PROBNP SERPL-MCNC: ABNORMAL PG/ML (ref ?–450)
OSMOLALITY SERPL CALC.SUM OF ELEC: 294 MOSM/KG (ref 275–295)
POTASSIUM SERPL-SCNC: 4.6 MMOL/L (ref 3.5–5.1)
SODIUM SERPL-SCNC: 137 MMOL/L (ref 136–145)

## 2024-09-11 PROCEDURE — 99233 SBSQ HOSP IP/OBS HIGH 50: CPT | Performed by: INTERNAL MEDICINE

## 2024-09-11 PROCEDURE — 99232 SBSQ HOSP IP/OBS MODERATE 35: CPT | Performed by: INTERNAL MEDICINE

## 2024-09-11 RX ORDER — CARVEDILOL 6.25 MG/1
6.25 TABLET ORAL 2 TIMES DAILY WITH MEALS
Qty: 60 TABLET | Refills: 0 | Status: SHIPPED | OUTPATIENT
Start: 2024-09-11

## 2024-09-11 NOTE — PLAN OF CARE
Discussed with Dr. Oliver. We will follow peripherally from a cardiac perspective. Follow with Dr. Manzano and 7 day MCT has been ordered.

## 2024-09-11 NOTE — PROGRESS NOTES
09/10/24 2238 09/10/24 2239 09/10/24 2240   Oxygen Therapy   SpO2 (!) 87 % (!) 86 % (!) 86 %   O2 Device None (Room air) None (Room air) None (Room air)      09/10/24 2241 09/10/24 2242   Oxygen Therapy   SpO2 (!) 86 % (!) 85 %   O2 Device None (Room air) None (Room air)     2L nasal cannula applied.

## 2024-09-11 NOTE — PROGRESS NOTES
Progress Note  Asmita Cunningham Patient Status:  Inpatient    1935 MRN [id number]   Location [facility] 2NE-A Attending Eli Adorno, DO   Hosp Day # 8 PCP JOSÉ Crawley     Subjective:  Another round of HD today - denies acute CP, slept well overnight.     Objective:  /44 (BP Location: Right arm)   Pulse 52   Temp 98.3 °F (36.8 °C) (Oral)   Resp 20   Ht 5' 4\" (1.626 m)   Wt 176 lb 8 oz (80.1 kg)   SpO2 95%   BMI 30.30 kg/m²     Intake/Output:    Intake/Output Summary (Last 24 hours) at 2024 1107  Last data filed at 2024 0820  Gross per 24 hour   Intake 960 ml   Output 300 ml   Net 660 ml       Last 3 Weights   09/10/24 0610 176 lb 8 oz (80.1 kg)   24 0515 180 lb 1.9 oz (81.7 kg)   24 0442 181 lb (82.1 kg)   24 0500 187 lb 4.8 oz (85 kg)   24 0528 182 lb 3.2 oz (82.6 kg)   24 2246 184 lb 6.3 oz (83.6 kg)   24 2030 184 lb 6.4 oz (83.6 kg)   24 1134 179 lb (81.2 kg)   24 2315 178 lb 5.6 oz (80.9 kg)   22 1246 209 lb (94.8 kg)       Labs:  Recent Labs   Lab 24  0733 24  0605 09/10/24  0756   GLU 95 91 92   * 121* 63*   CREATSERUM 6.77* 6.88* 4.10*   EGFRCR 5* 5* 10*   CA 8.1* 8.2* 8.4*   * 135* 139   K 4.6 4.6 4.4   CL 96* 96* 101   CO2 26.0 28.0 31.0     Recent Labs   Lab 24  0621 24  0703 24  0705   RBC 2.51* 2.68* 2.56*   HGB 7.7* 8.3* 7.9*   HCT 23.7* 24.4* 23.5*   MCV 94.4 91.0 91.8   MCH 30.7 31.0 30.9   MCHC 32.5 34.0 33.6   RDW 15.7 15.5 15.6   WBC 8.5 8.7 9.8   .0 168.0 175.0         Diagnostics:   Telemetry: SR with rates 40-70 depending on circadian rhythm and activity with PACs    Review of Systems   Constitutional: Negative for night sweats and weight loss.   Eyes:  Negative for pain and visual disturbance.   Cardiovascular:  Negative for chest pain.   Respiratory:  Positive for shortness of breath.    Gastrointestinal:  Negative for nausea and  vomiting.       Physical Exam:  General: Alert and oriented in no apparent distress.  HEENT: Pupils equal. Mucous membranes moist.   Neck: mild JVD+  Cardiac:  Normal S1 S2, Regular. No rubs or gallops noted.   Lungs: decreased breath sounds at the bases bilaterally  Abdomen: Soft, non-tender, ND  Extremities: no LE edema, HD line RUE nearby - no acute erythema surrounding.   Neurologic: No focal deficits. Normal affect.  Skin: Warm and dry,    Medications:   epoetin nadeem  10,000 Units Intravenous Once    heparin  1.5 mL Intracatheter Once    carvedilol  6.25 mg Oral BID with meals    bumetanide  1 mg Oral BID (Diuretic)    acetaminophen  1,000 mg Oral TID    hydrALAZINE  50 mg Oral Q8H TRE    ferrous sulfate  325 mg Oral Q48H    gabapentin  100 mg Oral TID    umeclidinium bromide  1 puff Inhalation Daily    levothyroxine  50 mcg Oral Before breakfast    pantoprazole  40 mg Oral Before breakfast    heparin  5,000 Units Subcutaneous 2 times per day           Assessment:    Volume overload, cardiorenal syndrome, acute on chronic HFpEF  Secondary to worsening renal failure, nephrotic range proteinuria, and diastolic dysfunction EF 55-60%  Use HD for volume removal and control  Risk factor modification for HFpEF management - HTN control, obesity management, and other risk factors.  RENEA on CKD IV  Nephrology following  HD initiated on 9/9/24.  Nephrology to determine HD schedule and outpatient chair confirmation  Hypertension  Coreg,  hydralazine - tolerating well - can titrate doses to meet BP goals (does have low diastolic pressure which affects the mean)   ?Right renal artery stenosis on US doppler - consider additional imaging if continued uncontrolled HTN   Elevated troponin   Likely demand in the setting of RENEA and uncontrolled HTN on arrival  No active CP - monitor   RLE pain, possible gout - prednisone/colchicine  Acute on chronic anemia  DM2    Plan:    Volume per nephrology, HD as tolerated.  Blood pressure has  improved - continue current regimen of coreg 6.25 BID and hydralazine 50 TID - additional agents that can be utilized include norvasc or addition of ISDN to hydralazine   Risk factor modification  Consider MCT on discharge given bradycardia     Bertram Oliver MD   Advanced Heart Failure and Transplant Cardiology   [facility] ([facility])     L3

## 2024-09-11 NOTE — CONSULTS
Palliative Care Brief Note    Asmita Cunningham Patient Status:  Inpatient    1935 MRN [id number]   Location [facility] 2NE-A Attending Eli Adorno, DO   Hosp Day # 8 PCP JOSÉ Crawley     Date of Consult: 2024  [facility] Inpatient    Reason for Consultation:   for evaluation of Palliative Care needs and Goals of care discussion;Advance care planning / HPOA.    History of Present Illness: Asmita Cunningham is a 89 year old female with CHF, CKD, HTN, anemia who was admitted on 9/3/2024 for shortness of breath. Work up in our hospital revealed CHF exacerbation & renal failure.  History was obtained from Epic.  This is the 3rd hospitalization in the past 12 months.    Plan:   Consult received for GOC.  I called pt's daughter Sowmya, who is currently out of state. Sowmya & her  were not aware of palliative consult.  I educated them on palliative care & reason for the consult.  Sowmya stated she did not want anyone talking to her mother without her being present.  She [place] to [place] on , they will discuss with the PCP & determine if they wish to proceed with Palliative. Please call service if family wishes to proceed with Palliative care.     Discussed with Family, RN, and Hospitalist.    Elena Fowler, EMANUEL  2024 2:20 PM  Palliative Care Services  No charges entered for this encounter.    The  [employer] Cures Act makes medical notes like these available to patients in the interest of transparency. Please be advised this is a medical document. Medical documents are intended to carry relevant information, facts as evident, and the clinical opinion of the practitioner. The medical note is intended as peer to peer communication and may appear blunt or direct. It is written in medical language and may contain abbreviations or verbiage that are unfamiliar.

## 2024-09-11 NOTE — PHYSICAL THERAPY NOTE
PHYSICAL THERAPY EVALUATION - INPATIENT     Room Number: 2624/2624-A  Evaluation Date: 9/11/2024  Type of Evaluation: Re-evaluation  Physician Order: PT Eval and Treat    Presenting Problem: acute pulmonary edema  Co-Morbidities : DM, thrombocytopenia, HTN, CKD, stroke, neuropathy, TKA, macular degeneration  Reason for Therapy: Mobility Dysfunction and Discharge Planning    PHYSICAL THERAPY ASSESSMENT   Patient is currently functioning below baseline with bed mobility, transfers, and gait.  Prior to admission, patient's baseline is mod ind with RW.  Patient is requiring contact guard assist as a result of the following impairments: decreased functional strength, decreased endurance/aerobic capacity, impaired   balance, and decreased muscular endurance. Physical Therapy will continue to follow for duration of hospitalization.      Patient will benefit from continued skilled PT Services at discharge to promote prior level of function.  Anticipate patient will return home with home health PT.    PLAN  PT Treatment Plan: Bed mobility;Endurance;Energy conservation;Patient education;Family education;Gait training;Strengthening;Transfer training;Balance training  Rehab Potential : Good  Frequency (Obs): 3-5x/week  Number of Visits to Meet Established Goals: 3      CURRENT GOALS    Goal #1 Patient is able to demonstrate supine - sit EOB @ level: supervision     Goal #2 Patient is able to demonstrate transfers Sit to/from Stand at assistance level: supervision     Goal #3 Patient is able to ambulate 150 feet with assist device: walker - rolling at assistance level: supervision     Goal #4    Goal #5    Goal #6    Goal Comments: Goals established on 9/11/2024      PHYSICAL THERAPY MEDICAL/SOCIAL HISTORY  History related to current admission: Patient is a 89 year old female admitted on 9/3/2024 from home for shortness of breath, hypoxia.  Pt diagnosed with hypoxic respiratory failure.       HOME SITUATION  Type of Home:  Apartment   Home Layout: One level;Elevator  Stairs to Enter : 0     Stairs to Bedroom: 0       Lives With: Alone;Caregiver part-time     Patient Owned Equipment: Rolling walker       Prior Level of [place]:  Pt reports she is typically mod ind with ADLs except CG assist with showers and groceries. Pt ambulates with a RW.     SUBJECTIVE  \"Thank you very much\"       OBJECTIVE  Precautions: Bed/chair alarm; needed;Low vision  Fall Risk: High fall risk    WEIGHT BEARING RESTRICTION  Weight Bearing Restriction: None                PAIN ASSESSMENT  Ratin          COGNITION  Overall Cognitive Status:  WFL - within functional limits    RANGE OF MOTION AND STRENGTH ASSESSMENT  Upper extremity ROM and strength are within functional limits     Lower extremity ROM is within functional limits     Lower extremity strength is within functional limits, except mild deconditioning       BALANCE  Static Sitting: Good  Dynamic Sitting: Good  Static Standing: Fair -  Dynamic Standing: Poor +    ADDITIONAL TESTS                                    ACTIVITY TOLERANCE   SPO2 greater than 90% on RA                      O2 WALK       NEUROLOGICAL FINDINGS                        AM-PAC '6-Clicks' INPATIENT SHORT FORM - BASIC MOBILITY  How much difficulty does the patient currently have...  Patient Difficulty: Turning over in bed (including adjusting bedclothes, sheets and blankets)?: A Little   Patient Difficulty: Sitting down on and standing up from a chair with arms (e.g., wheelchair, bedside commode, etc.): A Little   Patient Difficulty: Moving from lying on back to sitting on the side of the bed?: A Little   How much help from another person does the patient currently need...   Help from Another: Moving to and from a bed to a chair (including a wheelchair)?: A Little   Help from Another: Need to walk in hospital room?: A Little   Help from Another: Climbing 3-5 steps with a railing?: A Little       AM-PAC Score:  Raw  Score: 18   Approx Degree of Impairment: 46.58%   Standardized Score (AM-PAC Scale): 43.63   CMS Modifier (G-Code): CK    FUNCTIONAL ABILITY STATUS  Gait Assessment   Functional Mobility/Gait Assessment  Gait Assistance: Contact guard assist  Distance (ft): 50  Assistive Device: Rolling walker  Pattern: Within Functional Limits    Skilled Therapy Provided: Per RN okay to work with pt. Pt received in chair and was agreeable to PT session.     Bed Mobility:  Rolling: NT  Supine to sit: NT   Sit to supine: NT     Transfer Mobility:  Sit to stand: CGA   Stand to sit: min A  Gait = pt ambulated with RW and CGA    Therapist's Comments: Pt educated on role of therapy, goals for session, safety, fall prevention, and activity recommendations.     Exercise/Education Provided:  Bed mobility  Energy conservation  Functional activity tolerated  Gait training  Posture  Strengthening  Transfer training    Patient End of Session: Up in chair;Needs met;Call light within reach;RN aware of session/findings;All patient questions and concerns addressed;Alarm set      Patient Evaluation Complexity Level:  History Moderate - 1 or 2 personal factors and/or co-morbidities   Examination of body systems Low -  addressing 1-2 elements   Clinical Presentation Low- Stable   Clinical Decision Making Low Complexity       PT Session Time: 25 minutes  Therapeutic Activity: 8 minutes

## 2024-09-11 NOTE — PROGRESS NOTES
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 Tolerating HD well -> will d/w social work re: outpt HD    2) Pulmonary edema / fluid overload- primarily due to worsening renal failure superimposed on diastolic dysfunction- resolved with diuretics / UF     3) Uncontrolled HTN- exac by fluid overload. Improving with diuresis / UF -> on coreg / VEGA     4) Anemia- primarily due to CKD; Fe stores noted -> IV Fe / EPO; SPEP neg     5) Mild type 2 DM    6) R ankle pain- relatively acute onset, suspect gout. Off steroids / colchicine      Subjective:  Awake alert doing OK overall     Physical Exam:   /44 (BP Location: Right arm)   Pulse 52   Temp 98.3 °F (36.8 °C) (Oral)   Resp 20   Ht 5' 4\" (1.626 m)   Wt 176 lb 8 oz (80.1 kg)   SpO2 95%   BMI 30.30 kg/m²   Temp (24hrs), Av.1 °F (36.7 °C), Min:97.6 °F (36.4 °C), Max:98.5 °F (36.9 °C)       Intake/Output Summary (Last 24 hours) at 2024 1007  Last data filed at 2024 0820  Gross per 24 hour   Intake 1200 ml   Output 300 ml   Net 900 ml     Wt Readings from Last 3 Encounters:   09/10/24 176 lb 8 oz (80.1 kg)   24 178 lb 5.6 oz (80.9 kg)   22 209 lb (94.8 kg)     General: awake aelrt  HEENT: No scleral icterus, MMM  Neck: Supple, no AMANDEEP or thyromegaly  Cardiac: Regular rate and rhythm, S1, S2 normal, no murmur or tub  Lungs: Decreased BS at bases bilaterally   Abdomen: Soft, non-tender. + bowel sounds, no palpable organomegaly  Extremities: Without clubbing, cyanosis; minimal LE edema  Neurologic: Cranial nerves grossly intact, moving all extremities  Skin: Warm and dry, no rashes       Labs:          Imaging:  All imaging  studies reviewed.    Meds:   Current Facility-Administered Medications   Medication Dose Route Frequency    sodium chloride 0.9 % IV bolus 100 mL  100 mL Intravenous Q30 Min PRN    And    albumin human (Albumin) 25% injection 25 g  25 g Intravenous PRN Dialysis    heparin (Porcine) 1000 UNIT/ML injection 1,500 Units  1.5 mL Intracatheter Once    carvedilol (Coreg) tab 6.25 mg  6.25 mg Oral BID with meals    bumetanide (Bumex) tab 1 mg  1 mg Oral BID (Diuretic)    acetaminophen (Tylenol Extra Strength) tab 1,000 mg  1,000 mg Oral TID    HYDROcodone-acetaminophen (Norco) 5-325 MG per tab 1 tablet  1 tablet Oral Q6H PRN    glucose (Dex4) 15 GM/59ML oral liquid 15 g  15 g Oral Q15 Min PRN    Or    glucose (Glutose) 40% oral gel 15 g  15 g Oral Q15 Min PRN    Or    glucose-vitamin C (Dex-4) chewable tab 4 tablet  4 tablet Oral Q15 Min PRN    Or    dextrose 50% injection 50 mL  50 mL Intravenous Q15 Min PRN    Or    glucose (Dex4) 15 GM/59ML oral liquid 30 g  30 g Oral Q15 Min PRN    Or    glucose (Glutose) 40% oral gel 30 g  30 g Oral Q15 Min PRN    Or    glucose-vitamin C (Dex-4) chewable tab 8 tablet  8 tablet Oral Q15 Min PRN    epoetin nadeem (Epogen, Procrit) 52692 UNIT/ML injection 10,000 Units  10,000 Units Subcutaneous Weekly    hydrALAZINE (Apresoline) tab 50 mg  50 mg Oral Q8H TRE    ferrous sulfate DR tab 325 mg  325 mg Oral Q48H    gabapentin (Neurontin) cap 100 mg  100 mg Oral TID    umeclidinium bromide (Incruse Ellipta) 62.5 MCG/ACT inhaler 1 puff  1 puff Inhalation Daily    albuterol (Ventolin HFA) 108 (90 Base) MCG/ACT inhaler 2 puff  2 puff Inhalation Q4H PRN    levothyroxine (Synthroid) tab 50 mcg  50 mcg Oral Before breakfast    pantoprazole (Protonix) DR tab 40 mg  40 mg Oral Before breakfast    heparin (Porcine) 5000 UNIT/ML injection 5,000 Units  5,000 Units Subcutaneous 2 times per day    acetaminophen (Tylenol Extra Strength) tab 500 mg  500 mg Oral Q4H PRN    ondansetron (Zofran) 4 MG/2ML  injection 4 mg  4 mg Intravenous Q6H PRN    melatonin tab 3 mg  3 mg Oral Nightly PRN         Questions/concerns were discussed with patient and/or family by bedside.          Linn Santillan MD  9/11/2024  1007 AM

## 2024-09-11 NOTE — PROGRESS NOTES
[facility]   part of [facility]     Hospitalist Progress Note     Asmita Cunningham Patient Status:  Inpatient    1935 MRN [id number]   Location [facility] 2NE-A Attending Eli Adorno, DO   Hosp Day # 8 PCP JOSÉ Crawley     Chief Complaint: SOB    Subjective:     Resting comfortably, off O2. RLE pain is resolved     Objective:    Review of Systems:   A comprehensive review of systems was completed; pertinent positive and negatives stated in subjective.    Vital signs:  Temp:  [97.6 °F (36.4 °C)-98.5 °F (36.9 °C)] 98.3 °F (36.8 °C)  Pulse:  [45-57] 45  Resp:  [18-23] 23  BP: (113-148)/(44-51) 113/46  SpO2:  [85 %-98 %] 95 %    Physical Exam:    General: No acute distress  Respiratory: No wheezes, no rhonchi  Cardiovascular: S1, S2, regular rate and rhythm  Abdomen: Soft, Non-tender, non-distended, positive bowel sounds  Neuro: No new focal deficits.   Extremities: No edema    Diagnostic Data:    Labs:  Recent Labs   Lab 24  0621 24  0703 24  0705   WBC 8.5 8.7 9.8   HGB 7.7* 8.3* 7.9*   MCV 94.4 91.0 91.8   .0 168.0 175.0   INR  --  1.04  --        Recent Labs   Lab 24  1013 24  0703 24  0733 24  0605 09/10/24  0756   GLU  --    < > 95 91 92   BUN  --    < > 110* 121* 63*   CREATSERUM  --    < > 6.77* 6.88* 4.10*   CA  --    < > 8.1* 8.2* 8.4*   ALB 2.63*  --   --   --   --    NA  --    < > 135* 135* 139   K  --    < > 4.6 4.6 4.4   CL  --    < > 96* 96* 101   CO2  --    < > 26.0 28.0 31.0   TP 5.5*  --   --   --   --     < > = values in this interval not displayed.       Estimated Creatinine Clearance: 8.3 mL/min (A) (based on SCr of 4.1 mg/dL (H)).    No results for input(s): \"TROP\", \"TROPHS\", \"CK\" in the last 168 hours.      Recent Labs   Lab 24  0703   PTP 13.6   INR 1.04        Microbiology    No results found for this visit on 24.      Imaging: Reviewed in Epic.    Medications:    epoetin nadeem  10,000 Units  Intravenous Once    heparin  1.5 mL Intracatheter Once    carvedilol  6.25 mg Oral BID with meals    bumetanide  1 mg Oral BID (Diuretic)    acetaminophen  1,000 mg Oral TID    hydrALAZINE  50 mg Oral Q8H TRE    ferrous sulfate  325 mg Oral Q48H    gabapentin  100 mg Oral TID    umeclidinium bromide  1 puff Inhalation Daily    levothyroxine  50 mcg Oral Before breakfast    pantoprazole  40 mg Oral Before breakfast    heparin  5,000 Units Subcutaneous 2 times per day       Assessment & Plan:      #RENEA on CKD 4; likely multifactorial d/t cardiorenal syndrome/HFpEF decompensation, poorly controlled HTN, and possible underlying renal disease  - renal artery with dopplers showing renal disease   - unable to complete renal biopsy 9/6 d/t high risk for complications/bleeding  - progressed in spite of aggressive treatment/diuresis   - continue HTN medication management  - nephrology following  - HD catheter placed and HD initiated 9/9 > tolerating without complications  - continue bumex 1mg PO BID  - discharge when dispo arranged and cleared by nephrology    #HFpEF exacerbation  #Acute hypoxic respiratory failure  #Volume overload d/t above  - weaned to RA this morning  - recent echo with pEF 55-60% and G1DD  - volume management as above   - carvedilol   - cardiology following    #HTN emergency  - BP's improved  - nitroglycerin drip weaned   - PTA hydralazine, carvedilol  - cardiology following    #Elevated troponin d/t poor renal clearance and demand  - telemetry  - echo reviewed  - cardiology following    #DM2 with hyperglycemia  - hyperglycemia protocol    #RLE pain > ?gout, suspect component of neuropathy   - uric acid WNL  - completed course prednisone/colchicine  - scheduled tylenol    #acute on chronic AOCD  - BL Hgb appears to be in mid-9's  - epo, iron supplementation   - follow CBC     #GERD  - PPI    #Hypothyroidism  - levothyroxine         Eli Adorno,     Supplementary Documentation:     Quality:  DVT  Mechanical Prophylaxis:   SCDs,    DVT Pharmacologic Prophylaxis   Medication    heparin (Porcine) 1000 UNIT/ML injection 1,500 Units    heparin (Porcine) 5000 UNIT/ML injection 5,000 Units                Code Status: Full Code  Alberto: External urinary catheter in place  Alberto Duration (in days):   Central line:    VIRGIL: 9/11/2024    Discharge is dependent on: clinical state  At this point Ms. Cunningham is expected to be discharge to: home    The 21st Century Cures Act makes medical notes like these available to patients in the interest of transparency. Please be advised this is a medical document. Medical documents are intended to carry relevant information, facts as evident, and the clinical opinion of the practitioner. The medical note is intended as peer to peer communication and may appear blunt or direct. It is written in medical language and may contain abbreviations or verbiage that are unfamiliar.

## 2024-09-11 NOTE — PLAN OF CARE
Pt is A&O x4. Irish speaking,  utilized. Reports no pain.  Maintaining O2 on RA. 2L while sleeping. SB on tele, S1&S2 present. Denies SOB & cardiac symptoms.  Bowel sounds active. Incontinent of bladder. Purewick and brief in place.  Pt updated on plan of care. Bed in lowest position. Bed alarm on. Call light within reach.     Problem: CARDIOVASCULAR - ADULT  Goal: Maintains optimal cardiac output and hemodynamic stability  Description: INTERVENTIONS:  - Monitor vital signs, rhythm, and trends  - Monitor for bleeding, hypotension and signs of decreased cardiac output  - Evaluate effectiveness of vasoactive medications to optimize hemodynamic stability  - Monitor arterial and/or venous puncture sites for bleeding and/or hematoma  - Assess quality of pulses, skin color and temperature  - Assess for signs of decreased coronary artery perfusion - ex. Angina  - Evaluate fluid balance, assess for edema, trend weights  Outcome: Progressing  Goal: Absence of cardiac arrhythmias or at baseline  Description: INTERVENTIONS:  - Continuous cardiac monitoring, monitor vital signs, obtain 12 lead EKG if indicated  - Evaluate effectiveness of antiarrhythmic and heart rate control medications as ordered  - Initiate emergency measures for life threatening arrhythmias  - Monitor electrolytes and administer replacement therapy as ordered  Outcome: Progressing     Problem: SAFETY ADULT - FALL  Goal: Free from fall injury  Description: INTERVENTIONS:  - Assess pt frequently for physical needs  - Identify cognitive and physical deficits and behaviors that affect risk of falls.  - [place] fall precautions as indicated by assessment.  - Educate pt/family on patient safety including physical limitations  - Instruct pt to call for assistance with activity based on assessment  - Modify environment to reduce risk of injury  - Provide assistive devices as appropriate  - Consider OT/PT consult to assist with  strengthening/mobility  - Encourage toileting schedule  Outcome: Progressing     Problem: Patient/Family Goals  Goal: Patient/Family Long Term Goal  Description: Patient's Long Term Goal: Go home    Interventions:  - routine lab draws  - medication compliance   - MD rounding  - See additional Care Plan goals for specific interventions  Outcome: Progressing  Goal: Patient/Family Short Term Goal  Description: Patient's Short Term Goal: pain free    Interventions:   - ice pack  -pain medication  - See additional Care Plan goals for specific interventions  Outcome: Progressing     Problem: RESPIRATORY - ADULT  Goal: Achieves optimal ventilation and oxygenation  Description: INTERVENTIONS:  - Assess for changes in respiratory status  - Assess for changes in mentation and behavior  - Position to facilitate oxygenation and minimize respiratory effort  - Oxygen supplementation based on oxygen saturation or ABGs  - Provide Smoking Cessation handout, if applicable  - Encourage broncho-pulmonary hygiene including cough, deep breathe, Incentive Spirometry  - Assess the need for suctioning and perform as needed  - Assess and instruct to report SOB or any respiratory difficulty  - Respiratory Therapy support as indicated  - Manage/alleviate anxiety  - Monitor for signs/symptoms of CO2 retention  Outcome: Progressing

## 2024-09-11 NOTE — PLAN OF CARE
Pt. Is alert and oriented times 4. Uruguayan  used. Pt. Is on RA. O2 in the high 90's. Pt. Is incontinent of bladder.   HD this AM and tolerated well. Pt. Denies chest pain or shortness of breath.   Pt. Has agreed to work with PT and OT. PT notified. PT .will see this afternoon.   Spoke with family on phone. They would like to take home and are arranging for patient to have more help at home and rides to dialysis.   Pt. States she feels much better today. Up in chair.   Pt. Updated on plan of care. Call light within reach  Problem: CARDIOVASCULAR - ADULT  Goal: Maintains optimal cardiac output and hemodynamic stability  Description: INTERVENTIONS:  - Monitor vital signs, rhythm, and trends  - Monitor for bleeding, hypotension and signs of decreased cardiac output  - Evaluate effectiveness of vasoactive medications to optimize hemodynamic stability  - Monitor arterial and/or venous puncture sites for bleeding and/or hematoma  - Assess quality of pulses, skin color and temperature  - Assess for signs of decreased coronary artery perfusion - ex. Angina  - Evaluate fluid balance, assess for edema, trend weights  Outcome: Progressing  Goal: Absence of cardiac arrhythmias or at baseline  Description: INTERVENTIONS:  - Continuous cardiac monitoring, monitor vital signs, obtain 12 lead EKG if indicated  - Evaluate effectiveness of antiarrhythmic and heart rate control medications as ordered  - Initiate emergency measures for life threatening arrhythmias  - Monitor electrolytes and administer replacement therapy as ordered  Outcome: Progressing     Problem: SAFETY ADULT - FALL  Goal: Free from fall injury  Description: INTERVENTIONS:  - Assess pt frequently for physical needs  - Identify cognitive and physical deficits and behaviors that affect risk of falls.  - [place] fall precautions as indicated by assessment.  - Educate pt/family on patient safety including physical limitations  - Instruct pt to call for  assistance with activity based on assessment  - Modify environment to reduce risk of injury  - Provide assistive devices as appropriate  - Consider OT/PT consult to assist with strengthening/mobility  - Encourage toileting schedule  Outcome: Progressing     Problem: Patient/Family Goals  Goal: Patient/Family Long Term Goal  Description: Patient's Long Term Goal: Go home    Interventions:  - routine lab draws  - medication compliance   - MD rounding  - See additional Care Plan goals for specific interventions  Outcome: Progressing  Goal: Patient/Family Short Term Goal  Description: Patient's Short Term Goal: pain free    Interventions:   - ice pack  -pain medication  - See additional Care Plan goals for specific interventions  Outcome: Progressing     Problem: RESPIRATORY - ADULT  Goal: Achieves optimal ventilation and oxygenation  Description: INTERVENTIONS:  - Assess for changes in respiratory status  - Assess for changes in mentation and behavior  - Position to facilitate oxygenation and minimize respiratory effort  - Oxygen supplementation based on oxygen saturation or ABGs  - Provide Smoking Cessation handout, if applicable  - Encourage broncho-pulmonary hygiene including cough, deep breathe, Incentive Spirometry  - Assess the need for suctioning and perform as needed  - Assess and instruct to report SOB or any respiratory difficulty  - Respiratory Therapy support as indicated  - Manage/alleviate anxiety  - Monitor for signs/symptoms of CO2 retention  Outcome: Progressing

## 2024-09-11 NOTE — CM/SW NOTE
NEFTALY called Tayla Purvis at [facility] ([phone]) and discussed outpatient HD for pt. Lylarolando confirmed she had spoken to pt's granddaughter, Anupama, yesterday regarding pt going to their facility for HD. Tayla informed NEFTALY of all the needed clinical information she would need faxed over.     NEFTALY faxed the requested clinical information (F: [phone]). NEFTALY will follow up with Tayla to confirm HD schedule.     to remain available for support and/or discharge planning.    Janelle Lopez AARTI  Discharge Planner  [phone]

## 2024-09-12 LAB
ANION GAP SERPL CALC-SCNC: 6 MMOL/L (ref 0–18)
BUN BLD-MCNC: 51 MG/DL (ref 9–23)
CALCIUM BLD-MCNC: 8.2 MG/DL (ref 8.7–10.4)
CHLORIDE SERPL-SCNC: 101 MMOL/L (ref 98–112)
CO2 SERPL-SCNC: 28 MMOL/L (ref 21–32)
CREAT BLD-MCNC: 3.42 MG/DL
EGFRCR SERPLBLD CKD-EPI 2021: 12 ML/MIN/1.73M2 (ref 60–?)
ERYTHROCYTE [DISTWIDTH] IN BLOOD BY AUTOMATED COUNT: 16 %
GLUCOSE BLD-MCNC: 123 MG/DL (ref 70–99)
GLUCOSE BLD-MCNC: 91 MG/DL (ref 70–99)
HCT VFR BLD AUTO: 24.6 %
HGB BLD-MCNC: 8 G/DL
MCH RBC QN AUTO: 31.3 PG (ref 26–34)
MCHC RBC AUTO-ENTMCNC: 32.5 G/DL (ref 31–37)
MCV RBC AUTO: 96.1 FL
NT-PROBNP SERPL-MCNC: ABNORMAL PG/ML (ref ?–450)
OSMOLALITY SERPL CALC.SUM OF ELEC: 293 MOSM/KG (ref 275–295)
PLATELET # BLD AUTO: 154 10(3)UL (ref 150–450)
POTASSIUM SERPL-SCNC: 4.9 MMOL/L (ref 3.5–5.1)
RBC # BLD AUTO: 2.56 X10(6)UL
SODIUM SERPL-SCNC: 135 MMOL/L (ref 136–145)
WBC # BLD AUTO: 10.4 X10(3) UL (ref 4–11)

## 2024-09-12 PROCEDURE — 99233 SBSQ HOSP IP/OBS HIGH 50: CPT | Performed by: INTERNAL MEDICINE

## 2024-09-12 PROCEDURE — 99232 SBSQ HOSP IP/OBS MODERATE 35: CPT | Performed by: INTERNAL MEDICINE

## 2024-09-12 RX ORDER — BUMETANIDE 1 MG/1
1 TABLET ORAL DAILY
Status: DISCONTINUED | OUTPATIENT
Start: 2024-09-13 | End: 2024-09-13

## 2024-09-12 NOTE — CM/SW NOTE
09/12/24 1200      Reason for  Pt. is Limited English Proficient   Limited English- Service Selected Video    Name/ID Rere / [id number]    utilized for: Discharge planning     NEFTALY called Tayla Purvis at [facility] ([phone]) and discussed outpatient HD for pt. Tayla informed SW that she spoke with Dr. Santillan this morning and stated that Dr. Santillan will placing an order for a chest Xray to check permacath was placement as they are needing this Xray in order for pt to start her outpatient HD with them. SW stated she would fax Xray results as soon as they are available.    SW inquired if chair time was already set up for pt. Tayla confirmed that pt will have a chair time for MWF at 11:00am and it will be at their [place] location ([address], [place]). Tayla was unable to give me a start date for pt as she reportedly needs a DC summary faxed over before she can provide a start date.    NEFTALY met with pt at bedside to discuss outpatient HD plan.  was used for the conversation. NEFTALY informed pt that chair time was set for her for MWF at 11:00am and that pt would be going to the clinic requested by her granddaughter Anupama. Pt agreeable with plan. Pt expressed some concern with transportation to her HD appointments. SW stated she would reach out to Anupama and have her speak with pt regarding transportation. Pt thanked SW and denied having any further questions.    NEFTALY called Anupama ([phone]) to discuss outpatient HD plan. Anupama added her mom/pt's daughter, Sowmya, to the call. SW updated them on the HD chair time and stated that SW will be sending over the final needed documents when they are available. SW also informed them of pt's concerns with transportation. Anupama stated they would be taking care of pt's transportation and would speak with pt about it. Anupama and Sowmya thanked NEFTALY and denied having any further questions at this  time.     to remain available for support and/or discharge planning.    RADHA Romero  Discharge Planner  [phone]

## 2024-09-12 NOTE — DISCHARGE SUMMARY
[facility]     Asmita Cunningham Patient Status:  Inpatient    1935 MRN [id number]   Location [facility] 2NE-A Attending Eli Adorno, DO   Hosp Day # 10 PCP JOSÉ Crawley     Date of Admission: 9/3/2024  Date of Discharge:   24  Discharge Disposition: Home Health Care Services    Discharge Diagnosis:  #RENEA on CKD 4; likely multifactorial d/t cardiorenal syndrome/HFpEF decompensation, poorly controlled HTN, and possible underlying renal disease  #HFpEF exacerbation  #Acute hypoxic respiratory failure  #Volume overload d/t above  #HTN emergency  #Elevated troponin d/t poor renal clearance and demand  #DM2 with hyperglycemia  #RLE pain > ?gout, suspect component of neuropathy   #acute on chronic AOCD  #GERD  #Hypothyroidism    History of Present Illness: (per Dr. Gomez), Asmita Cunningham is a 89 year old female admitted with shortness of breath and pedal edema.  Patient seen with help of video telemetry ID number [id number] Kiryl translating Central African for patient.  Patient seen with patient's nephew at bedside.Patient states shortness of breath started 1 week back and has been grossly worsening.  Has history of hypertension.   On arrival to the emergency room ED triage RN, patient hypoxic 85% on room air and found to be tachypneic and in respiratory distress and started on BiPAP initially in emergency room and then transition down to Vapotherm with 50% FiO2 when I saw the patient while still in the emergency room.  Patient also found to have uncontrolled hypertension with blood pressure up to 210/105 while in emergency room with tachypnea and chest x-ray with moderate to severe pulmonary edema and was given IV Lasix and started on nitroglycerin drip and Bumex drip from the emergency room.    Brief Synopsis: admitted with worsening renal failure on CKD4 and acute, decompensated HFpEF exacerbation. Cardiology, nephrology, and pulm consulted. Pt was treated  supportively with IV diuretics, supplemental O2 and optimization of her medication regimen. Her renal failure continued to worsen in spite of aggressive medical treatment. She was unable to complete renal biopsy d/t high risk for bleeding and atrophic kidneys. Tunneled HD catheter was placed and she initiated dialysis without complication. Palliative care was consulted d/t high risk for readmissions. Her clinical condition improved, her O2 was weaned, her medication regimen was optimized. She was discharged to home with  services and recommendation to f/u closely with PCP, nephrology, cardiology in outpt setting.     Lace+ Score: 75  59-90 High Risk  29-58 Medium Risk  0-28   Low Risk       TCM Follow-Up Recommendation:  LACE > 58: High Risk of readmission after discharge from the hospital.      Procedures during hospitalization:   HD tunneled catheter placement    Incidental or significant findings and recommendations (brief descriptions):  As above    Lab/Test results pending at Discharge:   none    Consultants:  Nephro  Cardiology   IR   Palliative   Pulm     Discharge Medication List:     Discharge Medications        START taking these medications        Instructions Prescription details   bumetanide 1 MG Tabs  Commonly known as: Bumex  Start taking on: September 14, 2024      Take 1 tablet (1 mg total) by mouth daily.   Quantity: 30 tablet  Refills: 11     losartan 50 MG Tabs  Commonly known as: Cozaar      Take 1 tablet (50 mg total) by mouth daily.   Quantity: 30 tablet  Refills: 11            CHANGE how you take these medications        Instructions Prescription details   carvedilol 6.25 MG Tabs  Commonly known as: Coreg  What changed:   medication strength  how much to take      Take 1 tablet (6.25 mg total) by mouth 2 (two) times daily with meals.   Quantity: 60 tablet  Refills: 0            CONTINUE taking these medications        Instructions Prescription details   ARTIFICIAL TEARS OP      2 drops by  Each eye route every 4 to 6 hours as needed (dry eyes).   Refills: 0     B Complex-C-Folic Acid Tabs      Take 1 tablet by mouth daily.   Refills: 0     cholecalciferol 1000 UNITS Caps  Commonly known as: Vitamin D3      Take 1 capsule (1,000 Units total) by mouth 2 (two) times daily.   Refills: 0     gabapentin 100 MG Caps  Commonly known as: Neurontin      Take 1 capsule (100 mg total) by mouth 3 (three) times daily.   Stop taking on: November 14, 2024  Quantity: 270 capsule  Refills: 0     levothyroxine 50 MCG Tabs  Commonly known as: Synthroid      Take 1 tablet (50 mcg total) by mouth before breakfast.   Refills: 0     MAGNESIUM OR      Take 1 tablet by mouth at bedtime.   Refills: 0     MELATONIN OR      Take 1 tablet by mouth nightly as needed (sleep). Gummies   Refills: 0     pantoprazole 40 MG Tbec  Commonly known as: Protonix      Take 1 tablet (40 mg total) by mouth before breakfast.   Stop taking on: November 15, 2024  Quantity: 90 tablet  Refills: 0     VITAMIN C OR      Take 1 tablet by mouth every morning.   Refills: 0     ZINC OR      Take 1 capsule by mouth every morning.   Refills: 0            STOP taking these medications      hydrALAZINE 50 MG Tabs  Commonly known as: Apresoline        NIFEdipine ER 30 MG Tb24  Commonly known as: Adalat CC        NIFEdipine ER 60 MG Tb24  Commonly known as: ADALAT CC        traMADol 50 MG Tabs  Commonly known as: Ultram                  Where to Get Your Medications        These medications were sent to [id number] - [facility] [address]  [address], [phone], [phone] [address] [address]      Phone: [phone]   bumetanide 1 MG Tabs  carvedilol 6.25 MG Tabs  losartan 50 MG Tabs         [place]    Follow-up appointment:   Olvin Manzano MD  [facility] [address] [address] [phone]    Go on 9/18/2024  2:15pm    Pola Sanchez  FNP-C   [facility] [email or web address] [address]  [phone]    Schedule an appointment as soon as possible for a visit in 1 week(s)      Appointments for Next 30 Days 2024 - 10/13/2024      None            Vital signs:  Temp:  [97.6 °F (36.4 °C)-97.9 °F (36.6 °C)] 97.6 °F (36.4 °C)  Pulse:  [49-58] 58  Resp:  [18] 18  BP: (102-166)/(43-54) 143/49  SpO2:  [91 %-98 %] 98 %    Physical Exam:    General: No acute distress   Lungs: clear to auscultation  Cardiovascular: S1, S2  Abdomen: Soft    -----------------------------------------------------------------------------------------------  PATIENT DISCHARGE INSTRUCTIONS: See electronic chart    Eli Adorno DO    Total time spent on discharge plannin minutes     The  [place] Cures Act makes medical notes like these available to patients in the interest of transparency. Please be advised this is a medical document. Medical documents are intended to carry relevant information, facts as evident, and the clinical opinion of the practitioner. The medical note is intended as peer to peer communication and may appear blunt or direct. It is written in medical language and may contain abbreviations or verbiage that are unfamiliar.

## 2024-09-12 NOTE — PLAN OF CARE
Pt is A&O x4. Burmese speaking,  utilized. Reports no pain.  Maintaining O2 on RA. 2L while sleeping. SB on tele, S1&S2 present. Denies SOB & cardiac symptoms.  Bowel sounds active. Incontinent of bladder. Purewick and brief in place.  Pt updated on plan of care. Bed in lowest position. Bed alarm on. Call light within reach.      Problem: CARDIOVASCULAR - ADULT  Goal: Maintains optimal cardiac output and hemodynamic stability  Description: INTERVENTIONS:  - Monitor vital signs, rhythm, and trends  - Monitor for bleeding, hypotension and signs of decreased cardiac output  - Evaluate effectiveness of vasoactive medications to optimize hemodynamic stability  - Monitor arterial and/or venous puncture sites for bleeding and/or hematoma  - Assess quality of pulses, skin color and temperature  - Assess for signs of decreased coronary artery perfusion - ex. Angina  - Evaluate fluid balance, assess for edema, trend weights  Outcome: Progressing  Goal: Absence of cardiac arrhythmias or at baseline  Description: INTERVENTIONS:  - Continuous cardiac monitoring, monitor vital signs, obtain 12 lead EKG if indicated  - Evaluate effectiveness of antiarrhythmic and heart rate control medications as ordered  - Initiate emergency measures for life threatening arrhythmias  - Monitor electrolytes and administer replacement therapy as ordered  Outcome: Progressing     Problem: SAFETY ADULT - FALL  Goal: Free from fall injury  Description: INTERVENTIONS:  - Assess pt frequently for physical needs  - Identify cognitive and physical deficits and behaviors that affect risk of falls.  - [place] fall precautions as indicated by assessment.  - Educate pt/family on patient safety including physical limitations  - Instruct pt to call for assistance with activity based on assessment  - Modify environment to reduce risk of injury  - Provide assistive devices as appropriate  - Consider OT/PT consult to assist with  strengthening/mobility  - Encourage toileting schedule  Outcome: Progressing     Problem: Patient/Family Goals  Goal: Patient/Family Long Term Goal  Description: Patient's Long Term Goal: Go home    Interventions:  - routine lab draws  - medication compliance   - MD rounding  - See additional Care Plan goals for specific interventions  Outcome: Progressing  Goal: Patient/Family Short Term Goal  Description: Patient's Short Term Goal: pain free    Interventions:   - ice pack  -pain medication  - See additional Care Plan goals for specific interventions  Outcome: Progressing     Problem: RESPIRATORY - ADULT  Goal: Achieves optimal ventilation and oxygenation  Description: INTERVENTIONS:  - Assess for changes in respiratory status  - Assess for changes in mentation and behavior  - Position to facilitate oxygenation and minimize respiratory effort  - Oxygen supplementation based on oxygen saturation or ABGs  - Provide Smoking Cessation handout, if applicable  - Encourage broncho-pulmonary hygiene including cough, deep breathe, Incentive Spirometry  - Assess the need for suctioning and perform as needed  - Assess and instruct to report SOB or any respiratory difficulty  - Respiratory Therapy support as indicated  - Manage/alleviate anxiety  - Monitor for signs/symptoms of CO2 retention  Outcome: Progressing

## 2024-09-13 ENCOUNTER — APPOINTMENT (OUTPATIENT)
Dept: GENERAL RADIOLOGY | Facility: HOSPITAL | Age: 89
DRG: 682 | End: 2024-09-13
Attending: INTERNAL MEDICINE
Payer: MEDICARE

## 2024-09-13 ENCOUNTER — APPOINTMENT (OUTPATIENT)
Dept: GENERAL RADIOLOGY | Facility: HOSPITAL | Age: 89
End: 2024-09-13
Attending: INTERNAL MEDICINE
Payer: MEDICARE

## 2024-09-13 VITALS
DIASTOLIC BLOOD PRESSURE: 49 MMHG | TEMPERATURE: 98 F | SYSTOLIC BLOOD PRESSURE: 143 MMHG | HEIGHT: 64 IN | BODY MASS INDEX: 30.13 KG/M2 | HEART RATE: 58 BPM | WEIGHT: 176.5 LBS | OXYGEN SATURATION: 98 % | RESPIRATION RATE: 18 BRPM

## 2024-09-13 LAB
ANION GAP SERPL CALC-SCNC: 10 MMOL/L (ref 0–18)
BUN BLD-MCNC: 43 MG/DL (ref 9–23)
CALCIUM BLD-MCNC: 8.5 MG/DL (ref 8.7–10.4)
CHLORIDE SERPL-SCNC: 102 MMOL/L (ref 98–112)
CO2 SERPL-SCNC: 19 MMOL/L (ref 21–32)
CREAT BLD-MCNC: 4.53 MG/DL
EGFRCR SERPLBLD CKD-EPI 2021: 9 ML/MIN/1.73M2 (ref 60–?)
ERYTHROCYTE [DISTWIDTH] IN BLOOD BY AUTOMATED COUNT: 16.4 %
GLUCOSE BLD-MCNC: 106 MG/DL (ref 70–99)
HCT VFR BLD AUTO: 26.7 %
HGB BLD-MCNC: 8.3 G/DL
MCH RBC QN AUTO: 30.9 PG (ref 26–34)
MCHC RBC AUTO-ENTMCNC: 31.1 G/DL (ref 31–37)
MCV RBC AUTO: 99.3 FL
OSMOLALITY SERPL CALC.SUM OF ELEC: 283 MOSM/KG (ref 275–295)
PLATELET # BLD AUTO: 151 10(3)UL (ref 150–450)
POTASSIUM SERPL-SCNC: 5.3 MMOL/L (ref 3.5–5.1)
RBC # BLD AUTO: 2.69 X10(6)UL
SODIUM SERPL-SCNC: 131 MMOL/L (ref 136–145)
WBC # BLD AUTO: 9.8 X10(3) UL (ref 4–11)

## 2024-09-13 PROCEDURE — 99233 SBSQ HOSP IP/OBS HIGH 50: CPT | Performed by: INTERNAL MEDICINE

## 2024-09-13 PROCEDURE — 71045 X-RAY EXAM CHEST 1 VIEW: CPT | Performed by: INTERNAL MEDICINE

## 2024-09-13 PROCEDURE — 99239 HOSP IP/OBS DSCHRG MGMT >30: CPT | Performed by: INTERNAL MEDICINE

## 2024-09-13 RX ORDER — BUMETANIDE 1 MG/1
1 TABLET ORAL DAILY
Qty: 30 TABLET | Refills: 11 | Status: SHIPPED | OUTPATIENT
Start: 2024-09-14

## 2024-09-13 RX ORDER — LOSARTAN POTASSIUM 50 MG/1
50 TABLET ORAL DAILY
Qty: 30 TABLET | Refills: 11 | Status: SHIPPED | OUTPATIENT
Start: 2024-09-13

## 2024-09-13 RX ORDER — HEPARIN SODIUM 1000 [USP'U]/ML
3000 INJECTION, SOLUTION INTRAVENOUS; SUBCUTANEOUS
Status: DISCONTINUED | OUTPATIENT
Start: 2024-09-13 | End: 2024-09-13

## 2024-09-13 RX ORDER — LOSARTAN POTASSIUM 25 MG/1
50 TABLET ORAL DAILY
Status: DISCONTINUED | OUTPATIENT
Start: 2024-09-13 | End: 2024-09-13

## 2024-09-13 NOTE — CM/SW NOTE
Pt discussed in rounds and chart was reviewed. RN informed NEFTALY that pt will DC today after HD.    NEFTALY noted chest X-ray was completed. NEFTALY faxed X-ray results to Tayla Purvis at [facility] (F: [phone]).    NEFTALY called Tayla ([phone]) and notified her that X-Ray results were faxed and that pt will DC today after HD. NEFTALY requested for a start date of Monday 9/16 for pt at their [facility]. NEFTALY stated she would fax DC summary note and AVS to Tayla when available.     to remain available for support and/or discharge planning.    Addendum: NEFTALY faxed pt's AVS to Tayla Purvis at [facility] (F: [phone]). NEFTALY called Lyla ([phone]) to notify her of the fax. Tayla confirmed she has pt down to start on Monday at 11am.    Janelle Lopez AARTI  Discharge Planner  [phone]

## 2024-09-13 NOTE — PLAN OF CARE
Pt is A&O x4. Bruneian speaking,  utilized. Reports no pain.  Maintaining O2 on RA. 2L while sleeping. SB on tele, S1&S2 present. Denies SOB & cardiac symptoms.  Bowel sounds active. Incontinent of bladder. Purewick and brief in place.  Pt updated on plan of care. Bed in lowest position. Bed alarm on. Call light within reach.     Problem: CARDIOVASCULAR - ADULT  Goal: Maintains optimal cardiac output and hemodynamic stability  Description: INTERVENTIONS:  - Monitor vital signs, rhythm, and trends  - Monitor for bleeding, hypotension and signs of decreased cardiac output  - Evaluate effectiveness of vasoactive medications to optimize hemodynamic stability  - Monitor arterial and/or venous puncture sites for bleeding and/or hematoma  - Assess quality of pulses, skin color and temperature  - Assess for signs of decreased coronary artery perfusion - ex. Angina  - Evaluate fluid balance, assess for edema, trend weights  Outcome: Progressing  Goal: Absence of cardiac arrhythmias or at baseline  Description: INTERVENTIONS:  - Continuous cardiac monitoring, monitor vital signs, obtain 12 lead EKG if indicated  - Evaluate effectiveness of antiarrhythmic and heart rate control medications as ordered  - Initiate emergency measures for life threatening arrhythmias  - Monitor electrolytes and administer replacement therapy as ordered  Outcome: Progressing     Problem: SAFETY ADULT - FALL  Goal: Free from fall injury  Description: INTERVENTIONS:  - Assess pt frequently for physical needs  - Identify cognitive and physical deficits and behaviors that affect risk of falls.  - [place] fall precautions as indicated by assessment.  - Educate pt/family on patient safety including physical limitations  - Instruct pt to call for assistance with activity based on assessment  - Modify environment to reduce risk of injury  - Provide assistive devices as appropriate  - Consider OT/PT consult to assist with  strengthening/mobility  - Encourage toileting schedule  Outcome: Progressing     Problem: Patient/Family Goals  Goal: Patient/Family Long Term Goal  Description: Patient's Long Term Goal: Go home    Interventions:  - routine lab draws  - medication compliance   - MD rounding  - See additional Care Plan goals for specific interventions  Outcome: Progressing  Goal: Patient/Family Short Term Goal  Description: Patient's Short Term Goal: pain free    Interventions:   - ice pack  -pain medication  - See additional Care Plan goals for specific interventions  Outcome: Progressing     Problem: RESPIRATORY - ADULT  Goal: Achieves optimal ventilation and oxygenation  Description: INTERVENTIONS:  - Assess for changes in respiratory status  - Assess for changes in mentation and behavior  - Position to facilitate oxygenation and minimize respiratory effort  - Oxygen supplementation based on oxygen saturation or ABGs  - Provide Smoking Cessation handout, if applicable  - Encourage broncho-pulmonary hygiene including cough, deep breathe, Incentive Spirometry  - Assess the need for suctioning and perform as needed  - Assess and instruct to report SOB or any respiratory difficulty  - Respiratory Therapy support as indicated  - Manage/alleviate anxiety  - Monitor for signs/symptoms of CO2 retention  Outcome: Progressing

## 2024-09-13 NOTE — PLAN OF CARE
Patient tele discontinued. IV removed with catheter intact. Patient denies CP, SOB, dizziness, palpations, and calf tenderness. Discharge instructions reviewed with patient and family, verbalized understanding. Medications and side effects reviewed with patient and sent to pharmacy of choice. Patient escorted via wheelchair to lobby.

## 2024-09-13 NOTE — OCCUPATIONAL THERAPY NOTE
OCCUPATIONAL THERAPY EVALUATION - INPATIENT    Room Number: 2624/2624-A  Evaluation Date: 9/13/2024     Type of Evaluation: Initial  Presenting Problem: pulm edema    Physician Order: IP Consult to Occupational Therapy  Reason for Therapy:  ADL/IADL Dysfunction and Discharge Planning      OCCUPATIONAL THERAPY ASSESSMENT   Patient is a 89 year old female admitted on 9/3/2024 with Presenting Problem: pulm edema. Co-Morbidities : DM, thrombocytopenia, HTN, CKD, stroke, neuropathy, TKA, macular degeneration  Patient is currently functioning near baseline with toileting, upper body dressing, lower body dressing, grooming, bed mobility, transfers, static sitting balance, dynamic sitting balance, static standing balance, dynamic standing balance, maintaining seated position, and functional standing tolerance.  Prior to admission, patient's baseline is Mod I.  Patient met all OT goals at Sup level.  Patient reports no further questions/concerns at this time.         WEIGHT BEARING RESTRICTION  Weight Bearing Restriction: None                Recommendations for nursing staff:   Transfers: Sup  Toileting location: Toilet    EVALUATION SESSION:  Patient at start of session: sitting up in chair  FUNCTIONAL TRANSFER ASSESSMENT  Sit to Stand: Chair  Chair: Supervision  Toilet Transfer: Supervision    BED MOBILITY  Rolling: Supervision  Sit to Supine (OT): Supervision  Scooting: Sup into bed    BALANCE ASSESSMENT  Static Sitting: Supervision  Sitting Bilateral: Supervision  Static Standing: Supervision  Standing Bilateral: Supervision (to amb in room and hallway and bathroom)    FUNCTIONAL ADL ASSESSMENT  Grooming Standing: Supervision (to wash up at sink)  LB Dressing Seated: Supervision (for shoes)  Toileting Seated: Supervision (at [facility] toilet for jesus-care and clothing managementsup)      ACTIVITY TOLERANCE: vitals stable                         O2 SATURATIONS       COGNITION  Overall Cognitive Status:  WFL - within functional  limits  COGNITION ASSESSMENTS       Upper Extremity:   ROM: within functional limits   Strength: is within functional limits   Coordination:  Gross motor: WNl  Fine motor: WNL  Sensation: Light touch:  intact    EDUCATION PROVIDED  Patient: Role of Occupational Therapy; Plan of Care  Patient's Response to Education: Verbalized Understanding    Equipment used: RW  Demonstrates functional use    Therapist comments: Pt reported fatigue at home, pt educated on work simplification and energy conservation for at home to assist with independence with fatigue at home.      Patient End of Session: In bed;Needs met;Call light within reach;All patient questions and concerns addressed;SCDs in place;Alarm set;Family present    OCCUPATIONAL PROFILE    HOME SITUATION  Type of Home: Apartment  Home Layout: One level;Elevator  Lives With: Alone;Caregiver part-time    Toilet and Equipment: Toilet riser with arms  Shower/Tub and Equipment: Walk-in shower  Other Equipment: None    Occupation/Status: retired  Hand Dominance: Right          Prior Level of Function: Pt typically independent with ADLs and mobility. Pt does not use AD.    SUBJECTIVE  Pt stated, \"I am okay.\"    PAIN ASSESSMENT  Ratin  Location: no pain at this time       OBJECTIVE  Precautions: Bed/chair alarm; needed;Low vision  Fall Risk: High fall risk    WEIGHT BEARING RESTRICTION  Weight Bearing Restriction: None                AM-PAC ‘6-Clicks’ Inpatient Daily Activity Short Form  -   Putting on and taking off regular lower body clothing?: A Little  -   Bathing (including washing, rinsing, drying)?: A Little  -   Toileting, which includes using toilet, bedpan or urinal? : A Little  -   Putting on and taking off regular upper body clothing?: A Little  -   Taking care of personal grooming such as brushing teeth?: A Little  -   Eating meals?: A Little    AM-PAC Score:  Score: 18  Approx Degree of Impairment: 46.65%  Standardized Score (AM-PAC Scale):  38.66      ADDITIONAL TESTS     NEUROLOGICAL FINDINGS        PLAN   Patient has been evaluated and presents with no skilled Occupational Therapy needs at this time.  Patient discharged from Occupational Therapy services.  Please re-order if a new functional limitation presents during this admission.      Patient Evaluation Complexity Level:   Occupational Profile/Medical History LOW - Brief history including review of medical or therapy records    Specific performance deficits impacting engagement in ADL/IADL LOW  1 - 3 performance deficits    Client Assessment/Performance Deficits LOW - No comorbidities nor modifications of tasks    Clinical Decision Making LOW - Analysis of occupational profile, problem-focused assessments, limited treatment options    Overall Complexity LOW     OT Session Time: 25 minutes  Self-Care Home Management: 15 minutes  Therapeutic Activity: 0 minutes  Neuromuscular Re-education: 0 minutes  Therapeutic Exercise: 0 minutes  Cognitive Skills: 0 minutes  Sensory Integrative: 0 minutes  Orthotic Management and Trainin minutes  Can add/delete any of these      Walk in no

## 2024-09-13 NOTE — PROGRESS NOTES
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 Tolerating HD well- HD #3 today    2) Pulmonary edema / fluid overload- primarily due to worsening renal failure superimposed on diastolic dysfunction- resolved with diuretics / UF     3) Uncontrolled HTN- exac by fluid overload. Improving with diuresis / UF -> on coreg; add ARB (in lieu of VEGA)     4) Anemia- primarily due to CKD; Fe stores noted -> IV Fe / EPO; SPEP neg     5) Mild type 2 DM    6) R ankle pain- relatively acute onset, suspect gout. Off steroids / colchicine    Outpt dialysis arranged. Anticipate dc home today after HD. D/W granddaughter by phone daily. Eventual transition to home hemodialysis       Subjective:  Awake alert doing OK overall     Physical Exam:   BP (!) 166/52 (BP Location: Left arm)   Pulse 52   Temp 97.9 °F (36.6 °C) (Oral)   Resp 18   Ht 5' 4\" (1.626 m)   Wt 176 lb 8 oz (80.1 kg)   SpO2 94%   BMI 30.30 kg/m²   Temp (24hrs), Av.2 °F (36.8 °C), Min:97.8 °F (36.6 °C), Max:98.7 °F (37.1 °C)       Intake/Output Summary (Last 24 hours) at 2024 1501  Last data filed at 2024 1300  Gross per 24 hour   Intake 352 ml   Output 700 ml   Net -348 ml     Wt Readings from Last 3 Encounters:   09/10/24 176 lb 8 oz (80.1 kg)   24 178 lb 5.6 oz (80.9 kg)   22 209 lb (94.8 kg)     General: awake aelrt  HEENT: No scleral icterus, MMM  Neck: Supple, no AMANDEEP or thyromegaly  Cardiac: Regular rate and rhythm, S1, S2 normal, no murmur or tub  Lungs: Decreased BS at bases bilaterally   Abdomen: Soft, non-tender. + bowel sounds, no palpable organomegaly  Extremities: Without clubbing, cyanosis; minimal LE  edema  Neurologic: Cranial nerves grossly intact, moving all extremities  Skin: Warm and dry, no rashes       Labs:   Lab Results   Component Value Date    WBC 9.8 09/13/2024    HGB 8.3 09/13/2024    HCT 26.7 09/13/2024    .0 09/13/2024    CREATSERUM 4.53 09/13/2024    BUN 43 09/13/2024     09/13/2024    K 5.3 09/13/2024     09/13/2024    CO2 19.0 09/13/2024     09/13/2024    CA 8.5 09/13/2024    PGLU 123 09/12/2024         Imaging:  All imaging studies reviewed.    Meds:   Current Facility-Administered Medications   Medication Dose Route Frequency    bumetanide (Bumex) tab 1 mg  1 mg Oral Daily    epoetin nadeem (Epogen, Procrit) 67549 UNIT/ML injection 10,000 Units  10,000 Units Intravenous Once    carvedilol (Coreg) tab 6.25 mg  6.25 mg Oral BID with meals    acetaminophen (Tylenol Extra Strength) tab 1,000 mg  1,000 mg Oral TID    HYDROcodone-acetaminophen (Norco) 5-325 MG per tab 1 tablet  1 tablet Oral Q6H PRN    glucose (Dex4) 15 GM/59ML oral liquid 15 g  15 g Oral Q15 Min PRN    Or    glucose (Glutose) 40% oral gel 15 g  15 g Oral Q15 Min PRN    Or    glucose-vitamin C (Dex-4) chewable tab 4 tablet  4 tablet Oral Q15 Min PRN    Or    dextrose 50% injection 50 mL  50 mL Intravenous Q15 Min PRN    Or    glucose (Dex4) 15 GM/59ML oral liquid 30 g  30 g Oral Q15 Min PRN    Or    glucose (Glutose) 40% oral gel 30 g  30 g Oral Q15 Min PRN    Or    glucose-vitamin C (Dex-4) chewable tab 8 tablet  8 tablet Oral Q15 Min PRN    gabapentin (Neurontin) cap 100 mg  100 mg Oral TID    umeclidinium bromide (Incruse Ellipta) 62.5 MCG/ACT inhaler 1 puff  1 puff Inhalation Daily    albuterol (Ventolin HFA) 108 (90 Base) MCG/ACT inhaler 2 puff  2 puff Inhalation Q4H PRN    levothyroxine (Synthroid) tab 50 mcg  50 mcg Oral Before breakfast    pantoprazole (Protonix) DR tab 40 mg  40 mg Oral Before breakfast    heparin (Porcine) 5000 UNIT/ML injection 5,000 Units  5,000 Units Subcutaneous 2 times per day     acetaminophen (Tylenol Extra Strength) tab 500 mg  500 mg Oral Q4H PRN    ondansetron (Zofran) 4 MG/2ML injection 4 mg  4 mg Intravenous Q6H PRN    melatonin tab 3 mg  3 mg Oral Nightly PRN         Questions/concerns were discussed with patient and/or family by bedside.          Linn Santillan MD  9/13/2024  301 PM

## 2024-09-13 NOTE — PROGRESS NOTES
[facility]   part of [facility]     Hospitalist Progress Note     Asmita Cunningham Patient Status:  Inpatient    1935 MRN [id number]   Location [facility] 2NE-A Attending Eli Adorno, DO   Hosp Day # 10 PCP JOSÉ Crawley     Chief Complaint: SOB    Subjective:     Feeling well, no new complaints    Objective:    Review of Systems:   A comprehensive review of systems was completed; pertinent positive and negatives stated in subjective.    Vital signs:  Temp:  [97.8 °F (36.6 °C)-98.7 °F (37.1 °C)] 97.8 °F (36.6 °C)  Pulse:  [53-63] 56  Resp:  [18-20] 18  BP: (125-155)/(43-54) 146/54  SpO2:  [90 %-98 %] 97 %    Physical Exam:    General: No acute distress  Respiratory: No wheezes, no rhonchi  Cardiovascular: S1, S2, regular rate and rhythm  Abdomen: Soft, Non-tender, non-distended, positive bowel sounds  Neuro: No new focal deficits.   Extremities: No edema    Diagnostic Data:    Labs:  Recent Labs   Lab 24  0705 24  0628   WBC 9.8 10.4   HGB 7.9* 8.0*   MCV 91.8 96.1   .0 154.0       Recent Labs   Lab 09/10/24  0756 24  1707 24  0628   GLU 92 98 91   BUN 63* 40* 51*   CREATSERUM 4.10* 2.79* 3.42*   CA 8.4* 8.3* 8.2*    137 135*   K 4.4 4.6 4.9    103 101   CO2 31.0 26.0 28.0       Estimated Creatinine Clearance: 10 mL/min (A) (based on SCr of 3.42 mg/dL (H)).    No results for input(s): \"TROP\", \"TROPHS\", \"CK\" in the last 168 hours.      No results for input(s): \"PTP\", \"INR\" in the last 168 hours.       Microbiology    No results found for this visit on 24.      Imaging: Reviewed in Epic.    Medications:    bumetanide  1 mg Oral Daily    epoetin nadeem  10,000 Units Intravenous Once    carvedilol  6.25 mg Oral BID with meals    acetaminophen  1,000 mg Oral TID    gabapentin  100 mg Oral TID    umeclidinium bromide  1 puff Inhalation Daily    levothyroxine  50 mcg Oral Before breakfast    pantoprazole  40 mg Oral Before breakfast     heparin  5,000 Units Subcutaneous 2 times per day       Assessment & Plan:      #RENEA on CKD 4; likely multifactorial d/t cardiorenal syndrome/HFpEF decompensation, poorly controlled HTN, and possible underlying renal disease  - renal artery with dopplers showing renal disease   - unable to complete renal biopsy 9/6 d/t high risk for complications/bleeding  - progressed in spite of aggressive treatment/diuresis   - continue HTN medication management  - nephrology following  - HD catheter placed and HD initiated 9/9 > tolerating without complications  - continue bumex 1mg PO BID  - discharge when dispo arranged and cleared by nephrology    #HFpEF exacerbation  #Acute hypoxic respiratory failure  #Volume overload d/t above  - weaned to RA this morning  - recent echo with pEF 55-60% and G1DD  - volume management as above   - carvedilol   - cardiology following    #HTN emergency  - BP's improved  - nitroglycerin drip weaned   - PTA hydralazine, carvedilol  - cardiology following    #Elevated troponin d/t poor renal clearance and demand  - telemetry  - echo reviewed  - cardiology following    #DM2 with hyperglycemia  - hyperglycemia protocol    #RLE pain > ?gout, suspect component of neuropathy   - uric acid WNL  - completed course prednisone/colchicine  - scheduled tylenol    #acute on chronic AOCD  - BL Hgb appears to be in mid-9's  - epo, iron supplementation   - follow CBC     #GERD  - PPI    #Hypothyroidism  - levothyroxine         Eli Adorno,     Supplementary Documentation:     Quality:  DVT Mechanical Prophylaxis:   SCDs,    DVT Pharmacologic Prophylaxis   Medication    heparin (Porcine) 5000 UNIT/ML injection 5,000 Units                Code Status: Full Code  Alberto: External urinary catheter in place  Alberto Duration (in days):   Central line:    VIRGIL: 9/12/2024    Discharge is dependent on: clinical state  At this point Ms. Cunningham is expected to be discharge to: home    The 21st Century Cures Act  makes medical notes like these available to patients in the interest of transparency. Please be advised this is a medical document. Medical documents are intended to carry relevant information, facts as evident, and the clinical opinion of the practitioner. The medical note is intended as peer to peer communication and may appear blunt or direct. It is written in medical language and may contain abbreviations or verbiage that are unfamiliar.

## 2024-09-13 NOTE — PROGRESS NOTES
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 Tolerating HD well- HD #3 Fri    2) Pulmonary edema / fluid overload- primarily due to worsening renal failure superimposed on diastolic dysfunction- resolved with diuretics / UF     3) Uncontrolled HTN- exac by fluid overload. Improving with diuresis / UF -> on coreg / VEGA     4) Anemia- primarily due to CKD; Fe stores noted -> IV Fe / EPO; SPEP neg     5) Mild type 2 DM    6) R ankle pain- relatively acute onset, suspect gout. Off steroids / colchicine    Outpt dialysis arranged. Anticipate dc home Fri after HD. D/W granddaughter by phone.       Subjective:  Awake alert doing OK overall     Physical Exam:   /53 (BP Location: Right arm)   Pulse 60   Temp 98.7 °F (37.1 °C) (Oral)   Resp 20   Ht 5' 4\" (1.626 m)   Wt 176 lb 8 oz (80.1 kg)   SpO2 92%   BMI 30.30 kg/m²   Temp (24hrs), Av.3 °F (36.8 °C), Min:98.1 °F (36.7 °C), Max:98.7 °F (37.1 °C)       Intake/Output Summary (Last 24 hours) at 2024  Last data filed at 2024 1755  Gross per 24 hour   Intake 720 ml   Output 200 ml   Net 520 ml     Wt Readings from Last 3 Encounters:   09/10/24 176 lb 8 oz (80.1 kg)   24 178 lb 5.6 oz (80.9 kg)   22 209 lb (94.8 kg)     General: awake aelrt  HEENT: No scleral icterus, MMM  Neck: Supple, no AMANDEEP or thyromegaly  Cardiac: Regular rate and rhythm, S1, S2 normal, no murmur or tub  Lungs: Decreased BS at bases bilaterally   Abdomen: Soft, non-tender. + bowel sounds, no palpable organomegaly  Extremities: Without clubbing, cyanosis; minimal LE edema  Neurologic: Cranial nerves grossly intact, moving all extremities  Skin: Warm  and dry, no rashes       Labs:   Lab Results   Component Value Date    WBC 10.4 09/12/2024    HGB 8.0 09/12/2024    HCT 24.6 09/12/2024    .0 09/12/2024    CREATSERUM 3.42 09/12/2024    BUN 51 09/12/2024     09/12/2024    K 4.9 09/12/2024     09/12/2024    CO2 28.0 09/12/2024    GLU 91 09/12/2024    CA 8.2 09/12/2024    PGLU 123 09/12/2024         Imaging:  All imaging studies reviewed.    Meds:   Current Facility-Administered Medications   Medication Dose Route Frequency    sodium chloride 0.9 % IV bolus 100 mL  100 mL Intravenous Q30 Min PRN    And    albumin human (Albumin) 25% injection 25 g  25 g Intravenous PRN Dialysis    carvedilol (Coreg) tab 6.25 mg  6.25 mg Oral BID with meals    bumetanide (Bumex) tab 1 mg  1 mg Oral BID (Diuretic)    acetaminophen (Tylenol Extra Strength) tab 1,000 mg  1,000 mg Oral TID    HYDROcodone-acetaminophen (Norco) 5-325 MG per tab 1 tablet  1 tablet Oral Q6H PRN    glucose (Dex4) 15 GM/59ML oral liquid 15 g  15 g Oral Q15 Min PRN    Or    glucose (Glutose) 40% oral gel 15 g  15 g Oral Q15 Min PRN    Or    glucose-vitamin C (Dex-4) chewable tab 4 tablet  4 tablet Oral Q15 Min PRN    Or    dextrose 50% injection 50 mL  50 mL Intravenous Q15 Min PRN    Or    glucose (Dex4) 15 GM/59ML oral liquid 30 g  30 g Oral Q15 Min PRN    Or    glucose (Glutose) 40% oral gel 30 g  30 g Oral Q15 Min PRN    Or    glucose-vitamin C (Dex-4) chewable tab 8 tablet  8 tablet Oral Q15 Min PRN    hydrALAZINE (Apresoline) tab 50 mg  50 mg Oral Q8H TRE    ferrous sulfate DR tab 325 mg  325 mg Oral Q48H    gabapentin (Neurontin) cap 100 mg  100 mg Oral TID    umeclidinium bromide (Incruse Ellipta) 62.5 MCG/ACT inhaler 1 puff  1 puff Inhalation Daily    albuterol (Ventolin HFA) 108 (90 Base) MCG/ACT inhaler 2 puff  2 puff Inhalation Q4H PRN    levothyroxine (Synthroid) tab 50 mcg  50 mcg Oral Before breakfast    pantoprazole (Protonix) DR tab 40 mg  40 mg Oral Before breakfast    heparin  (Porcine) 5000 UNIT/ML injection 5,000 Units  5,000 Units Subcutaneous 2 times per day    acetaminophen (Tylenol Extra Strength) tab 500 mg  500 mg Oral Q4H PRN    ondansetron (Zofran) 4 MG/2ML injection 4 mg  4 mg Intravenous Q6H PRN    melatonin tab 3 mg  3 mg Oral Nightly PRN         Questions/concerns were discussed with patient and/or family by bedside.          Linn Santillan MD  9/12/2024  1007 AM

## 2024-09-19 ENCOUNTER — TELEPHONE (OUTPATIENT)
Dept: NEPHROLOGY | Facility: CLINIC | Age: 89
End: 2024-09-19

## 2024-09-19 RX ORDER — AMLODIPINE BESYLATE 5 MG/1
5 TABLET ORAL DAILY
Qty: 30 TABLET | Refills: 11 | Status: SHIPPED | OUTPATIENT
Start: 2024-09-19

## 2024-09-19 RX ORDER — LOSARTAN POTASSIUM 50 MG/1
50 TABLET ORAL 2 TIMES DAILY
Qty: 60 TABLET | Refills: 11 | Status: SHIPPED | OUTPATIENT
Start: 2024-09-19

## 2024-10-28 NOTE — PAT NURSING NOTE
PAT call with daughter, Sowmya. She is out of the country and will be back 11/1/24, asked for a call back then. Called the caregiver, Cici Landaverde to inform today is to be last dose of Vitamins, Supplements, NSAID's. She verbalized understanding.

## 2024-11-04 ENCOUNTER — ANESTHESIA EVENT (OUTPATIENT)
Dept: CARDIAC SURGERY | Facility: HOSPITAL | Age: 89
End: 2024-11-04
Payer: MEDICARE

## 2024-11-04 NOTE — PAT NURSING NOTE
PAT nursing note: spoke with daughter Sowmya; wash with antibacterial soap; strict npo after midnight; continue to take all prescribed medications as directed except: the morning of surgery only take Amlodipine, Carvedilol, Gabapentin with a sip of water; last dose of vitamins, supplements, herbal products and NSAIDs 11/4 am, Bumetinide 11/4 am, Losartan 11/4 am; denies taking ASA, blood thinners, diabetic or weight loss medications; no PPM, ICD, no nerve or spinal cord stimulator; admit; 2 visitors welcome; [facility]; register at the [facility] reception desk in the lobby at 11:00 am; keep personal possessions to a minimum: bring insurance card(s)/picture ID, wear comfortable clothing, bring glasses/eye glass case, bring denture cup/supplies; leave all valuables at home, including jewelry; discussed intra-op and post-op instructions with daughter; all questions answered; fall risk, LUE limb restriction; EKG on admit;

## 2024-11-05 ENCOUNTER — HOSPITAL ENCOUNTER (OUTPATIENT)
Facility: HOSPITAL | Age: 89
Setting detail: HOSPITAL OUTPATIENT SURGERY
Discharge: HOME | End: 2024-11-05
Attending: SURGERY | Admitting: SURGERY
Payer: MEDICARE

## 2024-11-05 ENCOUNTER — ANESTHESIA (OUTPATIENT)
Dept: CARDIAC SURGERY | Facility: HOSPITAL | Age: 89
End: 2024-11-05
Payer: MEDICARE

## 2024-11-05 VITALS
HEIGHT: 64 IN | DIASTOLIC BLOOD PRESSURE: 61 MMHG | TEMPERATURE: 98 F | HEART RATE: 68 BPM | BODY MASS INDEX: 29.37 KG/M2 | OXYGEN SATURATION: 97 % | WEIGHT: 172 LBS | SYSTOLIC BLOOD PRESSURE: 161 MMHG | RESPIRATION RATE: 18 BRPM

## 2024-11-05 DIAGNOSIS — N18.6 ESRD (END STAGE RENAL DISEASE) (HCC): ICD-10-CM

## 2024-11-05 DIAGNOSIS — Z91.89 AT RISK FOR BLEEDING: ICD-10-CM

## 2024-11-05 DIAGNOSIS — N19 UREMIA: Primary | ICD-10-CM

## 2024-11-05 DIAGNOSIS — I10 PRIMARY HYPERTENSION: ICD-10-CM

## 2024-11-05 DIAGNOSIS — Z01.818 PRE-OP TESTING: ICD-10-CM

## 2024-11-05 DIAGNOSIS — G89.18 POST-OP PAIN: ICD-10-CM

## 2024-11-05 LAB
ALBUMIN SERPL-MCNC: 3.7 G/DL (ref 3.2–4.8)
ALBUMIN/GLOB SERPL: 1.2 {RATIO} (ref 1–2)
ALP LIVER SERPL-CCNC: 83 U/L
ALT SERPL-CCNC: 7 U/L
ANION GAP SERPL CALC-SCNC: 6 MMOL/L (ref 0–18)
APTT PPP: 26 SECONDS (ref 23–36)
AST SERPL-CCNC: 19 U/L (ref ?–34)
ATRIAL RATE: 55 BPM
BASOPHILS # BLD AUTO: 0.05 X10(3) UL (ref 0–0.2)
BASOPHILS NFR BLD AUTO: 0.6 %
BILIRUB SERPL-MCNC: 0.4 MG/DL (ref 0.2–1.1)
BUN BLD-MCNC: 34 MG/DL (ref 9–23)
CALCIUM BLD-MCNC: 8.8 MG/DL (ref 8.7–10.4)
CHLORIDE SERPL-SCNC: 104 MMOL/L (ref 98–112)
CO2 SERPL-SCNC: 32 MMOL/L (ref 21–32)
CREAT BLD-MCNC: 3.75 MG/DL
EGFRCR SERPLBLD CKD-EPI 2021: 11 ML/MIN/1.73M2 (ref 60–?)
EOSINOPHIL # BLD AUTO: 0.42 X10(3) UL (ref 0–0.7)
EOSINOPHIL NFR BLD AUTO: 4.7 %
ERYTHROCYTE [DISTWIDTH] IN BLOOD BY AUTOMATED COUNT: 15.1 %
GLOBULIN PLAS-MCNC: 3.2 G/DL (ref 2–3.5)
GLUCOSE BLD-MCNC: 107 MG/DL (ref 70–99)
GLUCOSE BLD-MCNC: 92 MG/DL (ref 70–99)
HCT VFR BLD AUTO: 31.1 %
HGB BLD-MCNC: 9.9 G/DL
IMM GRANULOCYTES # BLD AUTO: 0.05 X10(3) UL (ref 0–1)
IMM GRANULOCYTES NFR BLD: 0.6 %
INR BLD: 0.99 (ref 0.8–1.2)
LYMPHOCYTES # BLD AUTO: 2.75 X10(3) UL (ref 1–4)
LYMPHOCYTES NFR BLD AUTO: 30.6 %
MCH RBC QN AUTO: 31.9 PG (ref 26–34)
MCHC RBC AUTO-ENTMCNC: 31.8 G/DL (ref 31–37)
MCV RBC AUTO: 100.3 FL
MONOCYTES # BLD AUTO: 0.79 X10(3) UL (ref 0.1–1)
MONOCYTES NFR BLD AUTO: 8.8 %
NEUTROPHILS # BLD AUTO: 4.94 X10 (3) UL (ref 1.5–7.7)
NEUTROPHILS # BLD AUTO: 4.94 X10(3) UL (ref 1.5–7.7)
NEUTROPHILS NFR BLD AUTO: 54.7 %
OSMOLALITY SERPL CALC.SUM OF ELEC: 301 MOSM/KG (ref 275–295)
P AXIS: 14 DEGREES
P-R INTERVAL: 190 MS
PLATELET # BLD AUTO: 151 10(3)UL (ref 150–450)
POTASSIUM SERPL-SCNC: 4.8 MMOL/L (ref 3.5–5.1)
PROT SERPL-MCNC: 6.9 G/DL (ref 5.7–8.2)
PROTHROMBIN TIME: 13.2 SECONDS (ref 11.6–14.8)
Q-T INTERVAL: 446 MS
QRS DURATION: 90 MS
QTC CALCULATION (BEZET): 426 MS
R AXIS: -10 DEGREES
RBC # BLD AUTO: 3.1 X10(6)UL
SODIUM SERPL-SCNC: 142 MMOL/L (ref 136–145)
T AXIS: 39 DEGREES
VENTRICULAR RATE: 55 BPM
WBC # BLD AUTO: 9 X10(3) UL (ref 4–11)

## 2024-11-05 PROCEDURE — 93005 ELECTROCARDIOGRAM TRACING: CPT

## 2024-11-05 PROCEDURE — 82962 GLUCOSE BLOOD TEST: CPT

## 2024-11-05 PROCEDURE — 93010 ELECTROCARDIOGRAM REPORT: CPT | Performed by: INTERNAL MEDICINE

## 2024-11-05 PROCEDURE — 03180JD BYPASS LEFT BRACHIAL ARTERY TO UPPER ARM VEIN WITH SYNTHETIC SUBSTITUTE, OPEN APPROACH: ICD-10-PCS | Performed by: SURGERY

## 2024-11-05 PROCEDURE — 85025 COMPLETE CBC W/AUTO DIFF WBC: CPT | Performed by: SURGERY

## 2024-11-05 PROCEDURE — 80053 COMPREHEN METABOLIC PANEL: CPT | Performed by: SURGERY

## 2024-11-05 PROCEDURE — 85730 THROMBOPLASTIN TIME PARTIAL: CPT | Performed by: SURGERY

## 2024-11-05 PROCEDURE — 85610 PROTHROMBIN TIME: CPT | Performed by: SURGERY

## 2024-11-05 DEVICE — PROPATEN VASCULAR GRAFT SW 4-6MMX45CM TAPERED HEPARIN
Type: IMPLANTABLE DEVICE | Status: FUNCTIONAL
Brand: GORE PROPATEN VASCULAR GRAFT

## 2024-11-05 RX ORDER — HYDROCODONE BITARTRATE AND ACETAMINOPHEN 5; 325 MG/1; MG/1
2 TABLET ORAL EVERY 4 HOURS PRN
Status: DISCONTINUED | OUTPATIENT
Start: 2024-11-05 | End: 2024-11-05

## 2024-11-05 RX ORDER — DIPHENHYDRAMINE HYDROCHLORIDE 50 MG/ML
12.5 INJECTION INTRAMUSCULAR; INTRAVENOUS AS NEEDED
Status: DISCONTINUED | OUTPATIENT
Start: 2024-11-05 | End: 2024-11-05

## 2024-11-05 RX ORDER — NICOTINE POLACRILEX 4 MG
30 LOZENGE BUCCAL
Status: DISCONTINUED | OUTPATIENT
Start: 2024-11-05 | End: 2024-11-05

## 2024-11-05 RX ORDER — HYDROCODONE BITARTRATE AND ACETAMINOPHEN 5; 325 MG/1; MG/1
1 TABLET ORAL EVERY 6 HOURS PRN
Status: DISCONTINUED | OUTPATIENT
Start: 2024-11-05 | End: 2024-11-05

## 2024-11-05 RX ORDER — SODIUM CHLORIDE, SODIUM LACTATE, POTASSIUM CHLORIDE, CALCIUM CHLORIDE 600; 310; 30; 20 MG/100ML; MG/100ML; MG/100ML; MG/100ML
INJECTION, SOLUTION INTRAVENOUS CONTINUOUS
Status: DISCONTINUED | OUTPATIENT
Start: 2024-11-05 | End: 2024-11-05

## 2024-11-05 RX ORDER — MIDAZOLAM HYDROCHLORIDE 1 MG/ML
1 INJECTION INTRAMUSCULAR; INTRAVENOUS EVERY 5 MIN PRN
Status: DISCONTINUED | OUTPATIENT
Start: 2024-11-05 | End: 2024-11-05

## 2024-11-05 RX ORDER — ONDANSETRON 2 MG/ML
4 INJECTION INTRAMUSCULAR; INTRAVENOUS EVERY 6 HOURS PRN
Status: DISCONTINUED | OUTPATIENT
Start: 2024-11-05 | End: 2024-11-05

## 2024-11-05 RX ORDER — HYDROMORPHONE HYDROCHLORIDE 1 MG/ML
0.6 INJECTION, SOLUTION INTRAMUSCULAR; INTRAVENOUS; SUBCUTANEOUS EVERY 5 MIN PRN
Status: DISCONTINUED | OUTPATIENT
Start: 2024-11-05 | End: 2024-11-05

## 2024-11-05 RX ORDER — METOCLOPRAMIDE HYDROCHLORIDE 5 MG/ML
5 INJECTION INTRAMUSCULAR; INTRAVENOUS EVERY 8 HOURS PRN
Status: DISCONTINUED | OUTPATIENT
Start: 2024-11-05 | End: 2024-11-05

## 2024-11-05 RX ORDER — NALOXONE HYDROCHLORIDE 0.4 MG/ML
80 INJECTION, SOLUTION INTRAMUSCULAR; INTRAVENOUS; SUBCUTANEOUS AS NEEDED
Status: DISCONTINUED | OUTPATIENT
Start: 2024-11-05 | End: 2024-11-05

## 2024-11-05 RX ORDER — HYDROCODONE BITARTRATE AND ACETAMINOPHEN 5; 325 MG/1; MG/1
2 TABLET ORAL ONCE AS NEEDED
Status: DISCONTINUED | OUTPATIENT
Start: 2024-11-05 | End: 2024-11-05

## 2024-11-05 RX ORDER — BUPIVACAINE HYDROCHLORIDE 5 MG/ML
INJECTION, SOLUTION EPIDURAL; INTRACAUDAL AS NEEDED
Status: DISCONTINUED | OUTPATIENT
Start: 2024-11-05 | End: 2024-11-05

## 2024-11-05 RX ORDER — HYDROMORPHONE HYDROCHLORIDE 1 MG/ML
0.4 INJECTION, SOLUTION INTRAMUSCULAR; INTRAVENOUS; SUBCUTANEOUS EVERY 5 MIN PRN
Status: DISCONTINUED | OUTPATIENT
Start: 2024-11-05 | End: 2024-11-05

## 2024-11-05 RX ORDER — HYDROCODONE BITARTRATE AND ACETAMINOPHEN 5; 325 MG/1; MG/1
1 TABLET ORAL ONCE AS NEEDED
Status: DISCONTINUED | OUTPATIENT
Start: 2024-11-05 | End: 2024-11-05

## 2024-11-05 RX ORDER — HEPARIN SODIUM 1000 [USP'U]/ML
INJECTION, SOLUTION INTRAVENOUS; SUBCUTANEOUS AS NEEDED
Status: DISCONTINUED | OUTPATIENT
Start: 2024-11-05 | End: 2024-11-05 | Stop reason: SURG

## 2024-11-05 RX ORDER — PROTAMINE SULFATE 10 MG/ML
INJECTION, SOLUTION INTRAVENOUS AS NEEDED
Status: DISCONTINUED | OUTPATIENT
Start: 2024-11-05 | End: 2024-11-05 | Stop reason: SURG

## 2024-11-05 RX ORDER — NICOTINE POLACRILEX 4 MG
15 LOZENGE BUCCAL
Status: DISCONTINUED | OUTPATIENT
Start: 2024-11-05 | End: 2024-11-05

## 2024-11-05 RX ORDER — LIDOCAINE HYDROCHLORIDE 10 MG/ML
INJECTION, SOLUTION EPIDURAL; INFILTRATION; INTRACAUDAL; PERINEURAL AS NEEDED
Status: DISCONTINUED | OUTPATIENT
Start: 2024-11-05 | End: 2024-11-05 | Stop reason: SURG

## 2024-11-05 RX ORDER — DEXAMETHASONE SODIUM PHOSPHATE 4 MG/ML
VIAL (ML) INJECTION AS NEEDED
Status: DISCONTINUED | OUTPATIENT
Start: 2024-11-05 | End: 2024-11-05 | Stop reason: SURG

## 2024-11-05 RX ORDER — DEXTROSE MONOHYDRATE 25 G/50ML
50 INJECTION, SOLUTION INTRAVENOUS
Status: DISCONTINUED | OUTPATIENT
Start: 2024-11-05 | End: 2024-11-05

## 2024-11-05 RX ORDER — EPHEDRINE SULFATE 50 MG/ML
INJECTION INTRAVENOUS AS NEEDED
Status: DISCONTINUED | OUTPATIENT
Start: 2024-11-05 | End: 2024-11-05 | Stop reason: SURG

## 2024-11-05 RX ORDER — ONDANSETRON 2 MG/ML
INJECTION INTRAMUSCULAR; INTRAVENOUS AS NEEDED
Status: DISCONTINUED | OUTPATIENT
Start: 2024-11-05 | End: 2024-11-05 | Stop reason: SURG

## 2024-11-05 RX ORDER — ACETAMINOPHEN 500 MG
1000 TABLET ORAL ONCE AS NEEDED
Status: DISCONTINUED | OUTPATIENT
Start: 2024-11-05 | End: 2024-11-05

## 2024-11-05 RX ORDER — HYDROMORPHONE HYDROCHLORIDE 1 MG/ML
0.2 INJECTION, SOLUTION INTRAMUSCULAR; INTRAVENOUS; SUBCUTANEOUS EVERY 5 MIN PRN
Status: DISCONTINUED | OUTPATIENT
Start: 2024-11-05 | End: 2024-11-05

## 2024-11-05 RX ORDER — METOPROLOL TARTRATE 1 MG/ML
2.5 INJECTION, SOLUTION INTRAVENOUS ONCE
Status: DISCONTINUED | OUTPATIENT
Start: 2024-11-05 | End: 2024-11-05

## 2024-11-05 RX ORDER — HEPARIN SODIUM 5000 [USP'U]/ML
5000 INJECTION, SOLUTION INTRAVENOUS; SUBCUTANEOUS ONCE
OUTPATIENT
Start: 2024-11-05 | End: 2024-11-05

## 2024-11-05 RX ORDER — SODIUM CHLORIDE 9 MG/ML
INJECTION, SOLUTION INTRAVENOUS CONTINUOUS PRN
Status: DISCONTINUED | OUTPATIENT
Start: 2024-11-05 | End: 2024-11-05 | Stop reason: SURG

## 2024-11-05 RX ORDER — HYDROCODONE BITARTRATE AND ACETAMINOPHEN 5; 325 MG/1; MG/1
2 TABLET ORAL EVERY 4 HOURS PRN
Qty: 32 TABLET | Refills: 0 | Status: SHIPPED | OUTPATIENT
Start: 2024-11-05

## 2024-11-05 RX ADMIN — PROTAMINE SULFATE 35 MG: 10 INJECTION, SOLUTION INTRAVENOUS at 13:31:00

## 2024-11-05 RX ADMIN — HEPARIN SODIUM 10000 UNITS: 1000 INJECTION, SOLUTION INTRAVENOUS; SUBCUTANEOUS at 12:44:00

## 2024-11-05 RX ADMIN — EPHEDRINE SULFATE 5 MG: 50 INJECTION INTRAVENOUS at 12:48:00

## 2024-11-05 RX ADMIN — EPHEDRINE SULFATE 5 MG: 50 INJECTION INTRAVENOUS at 12:59:00

## 2024-11-05 RX ADMIN — DEXAMETHASONE SODIUM PHOSPHATE 4 MG: 4 MG/ML VIAL (ML) INJECTION at 12:24:00

## 2024-11-05 RX ADMIN — SODIUM CHLORIDE: 9 INJECTION, SOLUTION INTRAVENOUS at 12:11:00

## 2024-11-05 RX ADMIN — EPHEDRINE SULFATE 5 MG: 50 INJECTION INTRAVENOUS at 13:07:00

## 2024-11-05 RX ADMIN — EPHEDRINE SULFATE 5 MG: 50 INJECTION INTRAVENOUS at 13:23:00

## 2024-11-05 RX ADMIN — ONDANSETRON 4 MG: 2 INJECTION INTRAMUSCULAR; INTRAVENOUS at 13:33:00

## 2024-11-05 RX ADMIN — LIDOCAINE HYDROCHLORIDE 50 MG: 10 INJECTION, SOLUTION EPIDURAL; INFILTRATION; INTRACAUDAL; PERINEURAL at 12:21:00

## 2024-11-05 NOTE — ANESTHESIA POSTPROCEDURE EVALUATION
[facility] Patient Status:  Hospital Outpatient Surgery   Age/Gender 89 year old female MRN [id number]   Location [facility] CARDIOVASCULAR SURGERY Attending Francisco Sandoval MD   Hosp Day # 0 PCP JOSÉ Crawley       Anesthesia Post-op Note    CREATION OF LEFT ARTERIOVENOUS FISTULA - GORE PROPATEN VASCULAR GRAFT 4-6MM, 45CM    Procedure Summary       Date: 11/05/24 Room / Location: [facility] 02 / [facility]    Anesthesia Start: 1211 Anesthesia Stop: 1406    Procedure: CREATION OF LEFT ARTERIOVENOUS FISTULA - GORE PROPATEN VASCULAR GRAFT 4-6MM, 45CM (Left: Hand) Diagnosis: (END STAGE RENAL DISEASE)    Surgeons: Francisco Sandoval MD Anesthesiologist: Robert Batres MD    Anesthesia Type: general ASA Status: 3            Anesthesia Type: general    Vitals Value Taken Time   /65 11/05/24 1406   Temp 98.3 11/05/24 1406   Pulse 70 11/05/24 1406   Resp 16 11/05/24 1406   SpO2 93 11/05/24 1406       Patient Location: PACU    Anesthesia Type: general    Airway Patency: patent    Postop Pain Control: adequate    Mental Status: mildly sedated but able to meaningfully participate in the post-anesthesia evaluation    Nausea/Vomiting: none    Cardiopulmonary/Hydration status: stable euvolemic    Complications: no apparent anesthesia related complications    Postop vital signs: stable    Dental Exam: Unchanged from Preop    Patient to be discharged from PACU when criteria met.

## 2024-11-05 NOTE — DISCHARGE INSTRUCTIONS
No shower one week.    Can paint incisions with betadine every day- starting in 2 days/ cover dry gauze. Notify Dr. Sandoval for Temperature over 100.5 or wound drainage.   No driving 2 weeks.      No lifting more than 4 pounds left arm for 4 weeks

## 2024-11-05 NOTE — ANESTHESIA PROCEDURE NOTES
Airway  Date/Time: 11/5/2024 12:23 PM  Urgency: elective    Airway not difficult    General Information and Staff    Patient location during procedure: OR  Anesthesiologist: Robert Batres MD  Performed: anesthesiologist   Performed by: Robert Batres MD  Authorized by: Robert Batres MD      Indications and Patient Condition  Indications for airway management: anesthesia  Sedation level: deep  Preoxygenated: yes  Patient position: sniffing  Mask difficulty assessment: 1 - vent by mask    Final Airway Details  Final airway type: supraglottic airway      Successful airway: ProSeal  Size 3       Number of attempts at approach: 1

## 2024-11-05 NOTE — H&P
[facility]    PATIENT'S NAME: ALLEN RAMIREZ   ATTENDING PHYSICIAN: Francisco Sandoval M.D.   PATIENT ACCOUNT#:   [id number]    LOCATION:  [address] [place] [address]  MEDICAL RECORD #:   [id number]       YOB: 1935  ADMISSION DATE:       11/05/2024    HISTORY AND PHYSICAL EXAMINATION    HISTORY OF PRESENT ILLNESS:  An 89-year-old white female from [facility] referred by Dr. Santillan for creation of a left arm fistula.  The patient is right-handed.  She has a right jugular permacath and has had problems dealing with this, has had complication.  She dialyzes on Monday, Wednesday, and Friday.  The patient was seen in the office with her daughter Sowmya, and Cici, who is a health provider.      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  She has a history of hypertension.  She also has a past history of gallstone pancreatitis with choledocholithiasis and had procedure to treat this by Dr. Suleiman Grijalva.  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  She has had ERCP also for a similar problem by Dr. Stephens with sphincterotomy and placement of biliary stent.  So far, no other GI symptoms.  She has a tunneled right jugular permacath by Dr. Sharron Bah here at [facility].  She has had an episode of pneumonia in the past.  She has had previous appendectomy.      MEDICATIONS:  She is on amlodipine, losartan, Bumex, carvedilol, Protonix, gabapentin, and levothyroxine.     ALLERGIES:  She is allergic to ibuprofen.      SOCIAL HISTORY:  She is single at this time.  Worked as a  when she was in [facility], retired.  Two children.  Son passed away.  Sowmya, her daughter, is taking care of her here.      REVIEW OF SYSTEMS:  No hematuria, dysuria, hemoptysis, cough, sputum production.  No weight loss, fever, chills.  No  hematemesis.  No bright red blood per rectum.  No MI.  No CVA, TIA, visual field defect, or aphasia.  No gangrene or tissue loss in lower legs.      PHYSICAL EXAMINATION:    GENERAL:  She is awake, alert.  She is appropriate, in no acute distress.  VITAL SIGNS:  Blood pressure was 136/70, heart rate 72, respirations 20.  HEENT:  Pupils equal, round.  Sclerae clear.  Mouth without lesions.  NECK:  No cervical bruit.  No JVD.  Right jugular vein permacath seen.  LUNGS:  Clear to auscultation.  No rales or rhonchi.  HEART:  Unable to appreciate a murmur.  ABDOMEN:  Soft, obese.  No tenderness or masses.  EXTREMITIES:  Pulses in lower legs seem to be diminished slightly as they go distally.  She is moving all 4 extremities.  Upper extremity pulses are palpable.  Normal Todd's test.  No gross veins seen.     IMPRESSION:  Patient with end-stage renal disease.  Discussed the possibility for placement of a left arm fistula, probably prosthetic due to her age or brachial artery/axillary vein prosthetic graft fistula with the risks of death, cardiac complications, bleeding, infection, limb loss, arterial steal, venous hypertension with swelling of the arm, congestive heart failure, future thrombosis, future infection, cardiac arrest.  Described the surgery, incisions, hospital stay, and followup.  All questions answered.    Dictated By Francisco Sandoval M.D.  d: 11/04/2024 16:18:36  t: 11/04/2024 17:22:31  Job [id number]  JJW/

## 2024-11-05 NOTE — ANESTHESIA PREPROCEDURE EVALUATION
PRE-OP EVALUATION    Patient Name: Asmita Cunningham    Admit Diagnosis: END STAGE RENAL DISEASE    Pre-op Diagnosis: END STAGE RENAL DISEASE    CREATION OF LEFT ARTERIOVENOUS FISTULALA    Anesthesia Procedure: CREATION OF LEFT ARTERIOVENOUS FISTULALA (Left)    Surgeons and Role:     * Francisco Sandoval MD - Primary    Pre-op vitals reviewed.        Body mass index is 29.35 kg/m².    Current medications reviewed.  Hospital Medications:  No current facility-administered medications on file as of .       Outpatient Medications:   Prescriptions Prior to Admission[1]    Allergies: Ibuprofen      Anesthesia Evaluation    Patient summary reviewed.    Anesthetic Complications  (-) history of anesthetic complications         GI/Hepatic/Renal             (+) chronic renal disease and ESRD  (+) liver disease                 Cardiovascular                  (+) hypertension                     (+) CHF                Endo/Other      (+) diabetes     (+) hypothyroidism                       Pulmonary                           Neuro/Psych          (+) CVA                            Past Surgical History:   Procedure Laterality Date    Appendectomy      Appendectomy      Knee replacement surgery  02/2016    right    Tonsillectomy       Social History     Socioeconomic History    Marital status:    Tobacco Use    Smoking status: Never    Smokeless tobacco: Never   Vaping Use    Vaping status: Never Used   Substance and Sexual Activity    Alcohol use: No    Drug use: No     History   Drug Use No     Available pre-op labs reviewed.  Lab Results   Component Value Date    WBC 9.8 09/13/2024    RBC 2.69 (L) 09/13/2024    HGB 8.3 (L) 09/13/2024    HCT 26.7 (L) 09/13/2024    MCV 99.3 09/13/2024    MCH 30.9 09/13/2024    MCHC 31.1 09/13/2024    RDW 16.4 09/13/2024    .0 09/13/2024     Lab Results   Component Value Date     (L) 09/13/2024    K 5.3 (H) 09/13/2024     09/13/2024    CO2 19.0 (L) 09/13/2024    BUN 43 (H)  09/13/2024    CREATSERUM 4.53 (H) 09/13/2024     (H) 09/13/2024    CA 8.5 (L) 09/13/2024            Airway      Mallampati: II  Mouth opening: 3 FB  TM distance: 4 - 6 cm  Neck ROM: full Cardiovascular      Rhythm: regular  Rate: normal     Dental  Comment: No loose teeth reported by patient           Pulmonary      Breath sounds clear to auscultation bilaterally.               Other findings              ASA: 3   Plan: general  NPO status verified and patient meets guidelines.        Comment: Discussed general anesthesia with endotracheal tube/supraglottic airway with patient. Discussed risk of oral/dental trauma, nausea, rare allergic reactions. Patient understands the risks, benefits, and requirement for anesthesia and willing to proceed. Consent signed.       Plan/risks discussed with: patient,  and child/children                Present on Admission:  **None**             [1]   No medications prior to admission.

## 2024-11-06 NOTE — OPERATIVE REPORT
[facility]    PATIENT'S NAME: ALLEN RAMIREZ   ATTENDING PHYSICIAN: Francisco Sandoval M.D.   OPERATING PHYSICIAN: Francisco Sandoval M.D.   PATIENT ACCOUNT#:   [id number]    LOCATION:  [facility] 5 EDWP 10  MEDICAL RECORD #:   [id number]       YOB: 1935  ADMISSION DATE:       11/05/2024      OPERATION DATE:  11/05/2024    OPERATIVE REPORT      PREOPERATIVE DIAGNOSIS:  End-stage renal disease, 89-year-old lady.  POSTOPERATIVE DIAGNOSIS:  End-stage renal disease, 89-year-old lady.  PROCEDURE:  Creation of left brachial artery, axillary vein 4 x 6 mm tapered PTFE graft.     ASSISTANT:  STEVENSON Carbone.    INDICATIONS:  An 89-year-old white female, referred for end-stage renal disease, currently on permacath.  Given the options of permacath continuing versus a fistula in the left arm, probably PTFE due to her age or peritoneal dialysis.  She agreed to have hemodialysis done.  The risks and complications of death, myocardial infarction, bleeding, infection, arterial steal with hand loss, venous hypertension, congestive heart failure, future thrombosis, future infection, bleeding, cardiac arrest, death versus autogenous fistula were also discussed and were explained.  The patient understood and agreed.  All questions answered.  The patient has interpretation through her daughter.    OPERATIVE TECHNIQUE:  Patient was placed supine on procedure table, underwent general anesthesia, received preoperative antibiotics.  Left arm was imaged prior to procedure, had a very good brachial vein proximally.  The brachial artery and had normal Todd test.  The patient had the left arm prepped and draped in usual sterile technique.  After general anesthesia was done, had SCDs on both lower legs, received preoperative antibiotics.  IV started in the right arm per Anesthesia.  She had the time-out done and then incision was made after the abdomen prepped and draped with the [place] Vi-Drape on the field with  incision of the brachial artery and the axillary vein in the upper arm.  Dissection brought down to expose both vessels.  The patient had a subcutaneous tunnel created with a Aliyah-Wick tunneler and after being fully anticoagulated, during the same time, the patient had a 4 x 6 mm tapered [place]-Jan graft placed in the subcutaneous tunnel.  The graft mobilized.  The proximal anastomosis was then anastomosed to the brachial artery.  Artery was controlled proximally with a Adams clamp, distally with a profunda clamp.  Arteriotomy was performed, extended with Ying scissors about 4 to 4.5 mm.  Arteriotomy was done.  The patient's artery was readily able to accept a 4 mm dilator proximally and 3 to 3.5 mm distally.  At the completion of the anastomosis, all vessels were flushed and flow established down the arm and through the fistula with care being taken to prevent any embolization of air or debris.  Hemostasis was obtained with 6-0 Prolene suture.     The graft was distended in the area of the upper arm.  Dissection had been brought down to the upper brachial, distal axillary vein/basilic vein.  The vein was controlled proximally with a Adams clamp, distally with a profunda clamp.  Venotomy was performed, extended with the Ying scissors to approximately 6 mm.  The graft was anastomosed to this with 6-0 Prolene suture.  Prior to completion of this, a 4 mm dilator could be passed through the area of the repair without any difficulty.  After everything was flushed, the anastomosis was completed.  Flow was established through the fistula with care being taken to prevent any proximal embolization of air or debris.  Hemostasis was obtained with one 6-0 Prolene suture.  The patient then had Gelfoam and thrombin, FloSeal, hemoclips, Bovie coagulation, and protamine to reverse the anticoagulation.  Once hemostasis was obtained, good thrill through the area of the fistula.  Excellent flow by Doppler ultrasound of the upper  proximal vein and Doppler flow across the area of the anastomosis with good Doppler flow in the radial, ulnar, and palmar arch.  Vital signs remained stable.  Wounds were then closed in multiple layers with 2-0 Vicryl and a 3-0 Vicryl subcuticular.  The area was anesthetized with 30 mL of 0.25% Marcaine plain.  Sponge and instrument counts were correct.  Neurologically, she was intact at the end the procedure.  Vital signs were stable.    Dictated By Frnacisco Sandoval M.D.  d: 11/05/2024 13:49:10  t: 11/06/2024 00:22:17  [facility] [id number]  JJW/    cc: SUKI Willis M.D. David J Schlieben, M.D. Kevin K Pandya, M.D.

## 2024-11-06 NOTE — DISCHARGE SUMMARY
[facility]    PATIENT'S NAME: ALLEN RAMIREZ   ATTENDING PHYSICIAN: Francisco Sandoval M.D.   PATIENT ACCOUNT#:   [id number]    LOCATION:  [facility] 10  MEDICAL RECORD #:   [id number]       YOB: 1935  ADMISSION DATE:       11/05/2024      DISCHARGE DATE:  11/05/2024    DISCHARGE SUMMARY      HISTORY AND HOSPITAL COURSE:  This is an 89-year-old white female admitted to the hospital with end-stage renal disease; had a left brachial artery-axillary vein fistula placed, a 4 x 6 mm PTFE.  Tolerated the procedure well.  Neurologically, she was intact.  Good thrill in the fistula and excellent flow going down her hand.  Vital signs remained stable.  She is instructed on wound care and activity.  Discussed with her daughter.    Dictated By Francisco Sandoval M.D.  d: 11/05/2024 13:50:49  t: 11/06/2024 04:30:40  Job [id number]  JJW/

## 2025-01-30 ENCOUNTER — HOSPITAL ENCOUNTER (OUTPATIENT)
Dept: ULTRASOUND IMAGING | Facility: HOSPITAL | Age: 89
Discharge: HOME | End: 2025-01-30
Attending: SURGERY
Payer: MEDICARE

## 2025-01-30 DIAGNOSIS — T82.848A: ICD-10-CM

## 2025-01-30 DIAGNOSIS — N18.6 END STAGE RENAL DISEASE (HCC): ICD-10-CM

## 2025-01-30 PROCEDURE — 93990 DOPPLER FLOW TESTING: CPT | Performed by: SURGERY

## 2025-02-24 NOTE — PLAN OF CARE
Pt a&ox4, afebrile. Scheduled meds for elevated BP, will monitor. O2 sat stable on 3L NC, SOB with exertion. Primarily [place] speaking. NSR on telemetry. Pt reports passing flatus. Voiding well. Denies nausea. Denies pain tonight. Tolerating diet.  Midline continue rx   hypotension and signs of decreased cardiac output  - Evaluate effectiveness of vasoactive medications to optimize hemodynamic stability  - Monitor arterial and/or venous puncture sites for bleeding and/or hematoma  - Assess quality of pulses, skin color an

## (undated) DEVICE — SUT PROL 7-0 24IN BV-1 NABSRB BLU 9.3MM 3/8 C

## (undated) DEVICE — DRAIN CHANNEL 19FR BLAKE

## (undated) DEVICE — MEDI-VAC SUCTION HANDLE REGULAR CAPACITY: Brand: CARDINAL HEALTH

## (undated) DEVICE — SCD SLEEVE KNEE HI BLEND

## (undated) DEVICE — SUTURE PDS II 1 TP-1

## (undated) DEVICE — DRAIN RELIAVAC 100CC

## (undated) DEVICE — SUT COAT VCRL 3-0 27IN CT-1 ABSRB UD 36MM 1/2

## (undated) DEVICE — MEDI-VAC NON-CONDUCTIVE SUCTION TUBING: Brand: CARDINAL HEALTH

## (undated) DEVICE — FILTERLINE NASAL ADULT O2/CO2

## (undated) DEVICE — SUT PROL 6-0 24IN C-1 NABSRB BLU 13MM 3/8 CIR

## (undated) DEVICE — SURGICEL SNOW ABS 4X4

## (undated) DEVICE — DRESSING AQUACEL AG 3.5X12

## (undated) DEVICE — STERILE POLYISOPRENE POWDER-FREE SURGICAL GLOVES: Brand: PROTEXIS

## (undated) DEVICE — RETRIEVAL BALLOON CATHETER: Brand: EXTRACTOR™ PRO RX

## (undated) DEVICE — GOWN SUR 2XL LEV 4 BLU W/ WHT V NK BND AERO

## (undated) DEVICE — SOL  .9 1000ML BTL

## (undated) DEVICE — HOWELL D.A.S.H. SPHINCTEROTOME: Brand: D.A.S.H.

## (undated) DEVICE — PROXIMATE SKIN STAPLERS (35 WIDE) CONTAINS 35 STAINLESS STEEL STAPLES (FIXED HEAD): Brand: PROXIMATE

## (undated) DEVICE — SLEEVE COMPR MD KNEE LEN SGL USE KENDALL SCD

## (undated) DEVICE — ENSEAL 20 CM SHAFT, LARGE JAW: Brand: ENSEAL X1

## (undated) DEVICE — SNARE CAPTIFLEX MICRO-OVL OLY

## (undated) DEVICE — HYDRA JAGWIRE

## (undated) DEVICE — LIGHT HANDLE

## (undated) DEVICE — DRAPE,EXTREMITY,89X128,STERILE: Brand: MEDLINE

## (undated) DEVICE — LAPAROTOMY CDS: Brand: MEDLINE INDUSTRIES, INC.

## (undated) DEVICE — CHLORAPREP 26ML APPLICATOR

## (undated) DEVICE — Device: Brand: INTELLICART™

## (undated) DEVICE — AV FISTULA PACK: Brand: MEDLINE INDUSTRIES, INC.

## (undated) DEVICE — 3M™ BAIR HUGGER® UNDERBODY BLANKET, FULL ACCESS, 10 PER CASE 63500: Brand: BAIR HUGGER™

## (undated) DEVICE — 3M(TM) MICROPORE TAPE DISPENSER 1535-2: Brand: 3M™ MICROPORE™

## (undated) DEVICE — REM POLYHESIVE ADULT PATIENT RETURN ELECTRODE: Brand: VALLEYLAB

## (undated) DEVICE — OMNIPAQUE 300 POLYB 50ML

## (undated) DEVICE — STANDARD HYPODERMIC NEEDLE,POLYPROPYLENE HUB: Brand: MONOJECT

## (undated) DEVICE — VIOLET BRAIDED (POLYGLACTIN 910), SYNTHETIC ABSORBABLE SUTURE: Brand: COATED VICRYL

## (undated) DEVICE — SOLUTION IRRIG 1000ML 0.9% NACL USP BTL

## (undated) DEVICE — SPHINCTEROTOME: Brand: HYDRATOME RX 44

## (undated) DEVICE — FORCEP RADIAL JAW 4

## (undated) DEVICE — STRL PENROSE DRAIN 18" X 1/4": Brand: CARDINAL HEALTH

## (undated) DEVICE — 3M™ TEGADERM™ TRANSPARENT FILM DRESSING FRAME STYLE, 1626W, 4 IN X 4-3/4 IN (10 CM X 12 CM), 50/CT 4CT/CASE: Brand: 3M™ TEGADERM™

## (undated) DEVICE — GEL US 20 GM PKT ST AQSNC 100

## (undated) DEVICE — OASIS, ONE ACTION STENT INTRODUCTION SYSTEM: Brand: OASIS

## (undated) DEVICE — SPONGE 4X4 10PK

## (undated) DEVICE — ENDOSCOPY PACK UPPER: Brand: MEDLINE INDUSTRIES, INC.

## (undated) DEVICE — 1200CC GUARDIAN II: Brand: GUARDIAN

## (undated) DEVICE — BLADE ELECTRODE: Brand: EDGE

## (undated) DEVICE — DRESSING BIOPATCH 1X4 CNTR

## (undated) DEVICE — LAPAROTOMY SPONGE - RF AND X-RAY DETECTABLE PRE-WASHED: Brand: SITUATE

## (undated) DEVICE — LOCKING DEVICE RX & BIOPSY CAP

## (undated) DEVICE — SOLUTION IV 500ML 0.9% NACL FLX CONT

## (undated) DEVICE — SUTURE VICRYL 2-0

## (undated) DEVICE — GOWN,SIRUS,FABRIC-REINFORCED,X-LARGE: Brand: MEDLINE

## (undated) DEVICE — DRESSING AQUACEL AG 3.5X3.75

## (undated) DEVICE — 3M™ RED DOT™ MONITORING ELECTRODE WITH FOAM TAPE AND STICKY GEL, 50/BAG, 20/CASE, 72/PLT 2570: Brand: RED DOT™

## (undated) DEVICE — 3M™ IOBAN™ 2 ANTIMICROBIAL INCISE DRAPE 6650EZ: Brand: IOBAN™ 2

## (undated) DEVICE — STOCKINETTE,IMPERVIOUS,12X48,STERILE: Brand: MEDLINE

## (undated) DEVICE — DRAPE EQUIPMENT INTRATEMP THER

## (undated) DEVICE — KENDALL SCD EXPRESS SLEEVES, KNEE LENGTH, MEDIUM: Brand: KENDALL SCD

## (undated) DEVICE — SPONGE RAYTEC 4X4 RF DETECT

## (undated) DEVICE — CLOSING BUNDLE: Brand: MEDLINE INDUSTRIES, INC.

## (undated) DEVICE — SUTURE ETHILON 2-0 FS

## (undated) DEVICE — COVER,MAYO STAND,STERILE: Brand: MEDLINE

## (undated) DEVICE — SUT VCRL 2-0 36IN CT-1 ABSRB UD L36MM 1/2 CIR

## (undated) DEVICE — ANTIBACTERIAL UNDYED BRAIDED (POLYGLACTIN 910), SYNTHETIC ABSORBABLE SUTURE: Brand: COATED VICRYL

## (undated) DEVICE — ADHESIVE SKIN TOP FOR WND CLSR DERMBND ADV

## (undated) DEVICE — KIT HEMSTAT MTRX 8ML PORCINE GEL HUM THROM

## (undated) DEVICE — SUT DEKNATEL POLYDEK 4-0 12XL30IN GRN POLY

## (undated) DEVICE — STERILE POLYISOPRENE POWDER-FREE SURGICAL GLOVES WITH EMOLLIENT COATING: Brand: PROTEXIS

## (undated) NOTE — IP AVS SNAPSHOT
Patient Demographics     Address  [place] [address] Phone  [phone] [facility])  [phone] (Mobile) *Preferred* E-mail Address  [email or web address]      Emergency Contact(s)     Name Relation Home Work Mobile    Eren MANLEY Take 40 mg by mouth nightly. pantoprazole 40 MG Tbec  Commonly known as: PROTONIX  Next dose due: Tomorrow morning      Take 1 tablet (40 mg total) by mouth daily.    Nella Joya MD         pregabalin 75 MG Caps  Commonly known as: LYRICA  Ne (XYLOCAINE) 1% injection 11/30/21 1213 Given      [id number] melatonin cap/tab 5 mg 11/29/21 2136 Given      [id number] pantoprazole (PROTONIX) EC tab 40 mg 11/29/21 1737 Given      [id number] pantoprazole (PROTONIX) EC tab 40 mg 11/30/21 0615 Given      6495 LAB ([facility])    Specimen Information    Type Source Collected On   Blood — 11/30/21 5197          Components    Component Value Reference Range Flag Lab   Glucose 107 70 - 99 mg/dL H [facility])   Sodium 142 136 - 145 mmol/L — Edjoshua L % — Dm Lab [facility])            Testing Performed By     Lab - Abbreviation Name Director Address Valid Date Range    7137 [facility]) [address] [facility]) Herbert Garcia  [address] [address] 03/ Peritoneal fluid      AFB Culture No Acid Fast Bacilli Isolated at 1 week    AFB Culture(P) [[id number]] Collected: 11/18/21 2000    Order Status: Completed Lab Status: Preliminary result Updated: 11/26/21 0701    Specimen: Other from Peritoneum      AFB Cu Final result Updated: 11/21/21 0736    Specimen: Tissue from Peritoneum      Tissue Culture Result 4+ growth Escherichia coli     Tissue Smear 2+ WBCs seen      2+ Gram Negative Rods      1+ Gram Positive Rods    Susceptibility      Escherichia coli      N individual orders.     Fungus Stain [[id number]] Collected: 11/18/21 2000    Order Status: Completed Lab Status: Final result Updated: 11/19/21 1859    Specimen: Other from Peritoneum      Fungus Smear No Yeast or Fungal elements observed    Fungus Stain [40 Final result                 Please view results for these tests on the individual orders.     AFB Smear [[id number]] Collected: 11/18/21 1823    Order Status: Completed Lab Status: Final result Updated: 11/19/21 1257    Speci started yesterday evening. She denies feeling ill prior to this. She denies chest pain. She had some shortness of breath that developed today. She denies any fevers.   She has a history of pancreatitis several years ago and etiology was presumed to be b cloNIDine HCl 0.1 MG Oral Tab, Take 0.1 mg by mouth as needed. , Disp: , Rfl:   pregabalin 75 MG Oral Cap, Take 1 capsule (75 mg total) by mouth 2 (two) times daily. , Disp: 30 capsule, Rfl: 0  ergocalciferol 83947 units Oral Cap, Take 50,000 Units by mout 18*   GFRNAA 16*   CA 8.6   ALB 2.8*      K 4.3      CO2 24.0   ALKPHO 153*   *   *   BILT 2.2*   TP 6.9       CrCl cannot be calculated (Unknown ideal weight. ).     Recent Labs   Lab 11/18/21  1147   PTP 15.9*   INR 1.24*       CO Orders    1.  Consult to Infectious Disease [[id number]] ordered by Quiana Hampton DO at 21 [phone]                                                                     INFECTIOUS DISEASE CONSULT NOTE    Katia Cisneros Patient Status:  Inpatient    4 Intravenous, Q2H PRN **OR** HYDROmorphone HCl (DILAUDID) 1 MG/ML injection 0.4 mg, 0.4 mg, Intravenous, Q2H PRN  •  lactated ringers infusion, , Intravenous, Continuous  •  lactated ringers IV bolus 500 mL, 500 mL, Intravenous, Once PRN  •  HYDROcodone-ace NaCl infusion, , Intravenous, Continuous  •  phenol (CHLORASEPTIC) 1.4 % oral liquid spray 1 mL, 1 spray, Mouth/Throat, PRN  •  Heparin Sodium (Porcine) 5000 UNIT/ML injection 5,000 Units, 5,000 Units, Subcutaneous, Q8H [facility]  •  acetaminophen (OFIRMEV) infu bowel sounds. Incision with c/d/d. Drain with bilious output  Musculoskeletal: Full range of motion of all extremities. No arthritis or deformity  Integument: No rash. No open wounds.     Laboratory Data:    Recent Labs   Lab 11/18/21  105 11/18/21  6531 Radiology: XR ENDO BILE DUCT (HOR=72847)    Result Date: 11/19/2021  PROCEDURE:  XR ENDO BILE DUCT (YED=74192)  COMPARISON:  [facility] , CT, CT ABDOMEN PELVIS IV CONTRAST, NO ORAL (ER), 11/18/2021, 11:17 AM.  INDICATIONS:  ERCP  FLUOROSCOPY IMAGES OBT TECHNIQUE:  AP chest radiograph was obtained.   COMPARISON:  [facility] , XR, XR CHEST AP PORTABLE  (CPT=71045), 5/07/2019, 12:24 PM.  [facility] , XR, XR CHEST AP PORTABLE  (CPT=71045), 5/16/2018, 5:50 AM.  INDICATIONS:  Verify correct tube placement  PATIENT STAT renal cortical scarring with perinephric stranding about both kidneys. Parapelvic cysts largest on the right measuring 2.4 cm. ADRENALS:  Mild thickening of the lateral limb of the left adrenal gland. . AORTA/VASCULAR:  Tortuous abdominal aorta 0 with calc in the pelvis. Dictated by (CST): Giorgi Smith MD on 11/18/2021 at 11:43 AM     Finalized by (CST): Giorgi Smith MD on 11/18/2021 at 11:58 AM            ASSESSMENT:  1.  Liver abscess- s/p OR drainage and wedge resection- assume biliary origin as pt wi agent 11/18/21. Pt s/p ERCP with sphincterotomy, stone removal, and stent placement 11/19/21.       Presenting Problem: perforated viscus s/p repair 11/18/21  Co-Morbidities : HTN, DM    Problem List  Principal Problem:    Perforated viscus  Active Problems from Another: Moving to and from a bed to a chair (including a wheelchair)?: A Little   Help from Another: Need to walk in hospital room?: A Little   Help from Another: Climbing 3-5 steps with a railing?: Total     AM-PAC Score:  Raw Score: 16   Approx Deg Recommendations: Sub-acute rehabilitation     PLAN  PT Treatment Plan: Bed mobility; Endurance; Energy conservation;Patient education;Gait training;Strengthening;Transfer training;Balance training  Rehab Potential : Good  Frequency (Obs): 3-5x/week    Pinky Problem: Patient/Family Goals    Goal Priority Disciplines Outcome Interventions   Patient/Family Long Term Goal   (Resolved)     Interdisciplinary Completed    Description: Patient's Long Term Goal: go home    Interventions:  -   - See additional Care Dagoberto

## (undated) NOTE — LETTER
[facility]  Phone: [phone]  Right Fax: [phone] By:  Louis Butcher Rn  Date: 1/20/22    Patient Name: Clau Rock  Surgery Date: 1/21/2022    CSN: [id number]  Medical Record: [facility] Rev 2/12/14

## (undated) NOTE — LETTER
Karen Restrepo [address]  [address] [facility] [address], 209 [facility]  Authorization for Surgical Operation and Procedure     Date:___________                                                                                                         Time:__________ occur: fever and allergic reactions, hemolytic reactions, transmission of diseases such as Hepatitis, AIDS and Cytomegalovirus (CMV) and fluid overload.   In the event that I wish to have an autologous transfusion of my own blood, or a directed donor transf applicable recovery period ends for purposes of reinstating the DNAR order.   10. Patients having a sterilization procedure: I understand that if the procedure is successful the results will be permanent and it will therefore be impossible for me to insemin give me medicine and do additional procedures as necessary. Some examples are: Starting or using an “IV” to give me medicine, fluids or blood during my procedure, and having a breathing tube placed to help me breathe when I’m asleep (intubation).  In the ev rare but potential complications include headache, bleeding, infection, seizure, irregular heart rhythms, and nerve injury.     I can change my mind about having anesthesia services at any time before I get the medicine.    _________________________________

## (undated) NOTE — LETTER
Thomas Paniagau M.D., F.A.C.S.  Anthony Sadler M.D., F.A.C.S.  Alo Oliveira M.D.   Av Silva M.D., F.A.C.S.  SUKI Sanchez M.D., F.A.C.S.   Braulio Pendleton M.D., F.A.C.S.  Joselo Wilkins M.D.    SUKI De Jesus M.D.   SUKI Royal M.D., M.B.A.  Mateo Kay M.D.  SUKI Mcneill M.D., F.A.C.S.  Jonathan Samuels M.D., F.A.C.S.   SUKI Gonsalves M.D., F.A.C.S., F.A.C.C. Abel Shepherd M.D.  Abdifatah Martinez M.D.    CINDY Stovall D.O., F.A.C.S.  Sunday Villatoro M.D.   Geoff Levy M.D.  Keon Valles M.D.    Francisco Sandoval M.D., F.A.C.S.        Welcome to [facility]    As you contemplate possible surgical treatment, it is very important to us that you understand fully what is being discussed, that all of your questions have been answered, and that your options for treatment have been fully explained.    To that end, on the following page we will ask you some questions to make certain that you understand everything which has been explained to you. Included in this understanding is that there are both surgical and nonsurgical treatments available for you, that you have options regarding where your care is given, and what doctors are involved in your care. Included in these options would, of course, be the option to elect for no treatment whatsoever. We especially want to be sure that you have had a chance to have all of your questions and concerns answered. If there are any issues which have not been adequately addressed, we ask you to bring them forward so that we can thoroughly address them.    A patient who is fully informed and understands their condition and options for treatment, as well as potential adverse effects of treatment, is going to be a patient who receives the most benefit out of care rendered.  Our goal in addition to providing excellent surgical care is to provide the necessary information to you and your family in order to make decisions which are appropriate relative to your own care.    Please take the time necessary to read and answer the questions on the next page. Again, if you have any questions, bring them forward and we will certainly address them.    Sincerely,    [facility]    Date:___________________ _______________________ _______________________       Printed Patient Name  Signature of Patient    Date:___________________ _______________________ _______________________       Printed Witness Name  Signature of Witness    _______________________       Relationship of Witness to Patient        Consent Form Page: 1 of 2  [address] [facility] * [facility] [address] * [place] [address] * [phone] / [phone] *  Fax [phone] * Billing Fax [phone] Version: 9/9/2024    [facility]  Supplemental Consent Form    A [facility] ([facility]ZE[facility]) surgeon has met with me and explained the matter of my illness, and what treatments might be available to improve my condition. As a result of that conversation, I understand the following:    A [facility] surgeon met with me and explained, in detail, the nature of my condition for which surgery is being contemplated. The procedure being performed is:  CREATION OF LEFT ARTERIOVENOUS FISTULALA     Yes _____ No _____    A [facility] surgeon met with me and explained to me that there are alternatives to surgery which may include no surgery, medical therapy, or interventional treatment, among other options and the risks and benefits of the different treatment options:Yes _____ No _____    A [facility] surgeon has explained to me that if I should desire, he/she is willing to explain my case and the surgical and non-surgical options to family members:            Yes _____ No _____    A [facility] surgeon has answered all of my  questions regarding the topics we have discussed. I have been invited to ask more questions:  Yes _____ No _____    A [facility] surgeon has explained to me that if I seek other options or wish treatment at elsewhere, that his/her office will assist me in making such recommendations:  Yes _____ No _____    A [facility] surgeon has explained to me that death, risk of bleeding, stroke, multi-organ failure, heart attack and/or other complications are risks for the proposed surgical procedure:        Yes _____ No _____    A [facility] surgeon has explained to me that I have the right to cancel or postpone the surgery at any time prior to the start of surgery:    Yes _____ No _____    The nature and options for treatment for my condition has been explained to me, in detail, by a [facility] surgeon and all questions have been answered to my satisfaction. I understand that I am not required to undergo surgery, and further, that if I so desire, I could have surgery accomplished by another surgeon or at another institution. I understand and accept that which has been explained to me. I am able to make my decisions knowingly and willingly based on the data.    Date:___________________ _______________________ _______________________       Printed Patient Name  Signature of Patient    Date:___________________ _______________________ _______________________       Printed Witness Name  Signature of Witness    _______________________   Relationship of Witness to Patient          Consent Form Page: 3 of  2  [address] [facility] * [address] * [place] [address] * [phone] / [phone] *  Fax [phone] * Jace Fax [phone] Version: 9/9/2024

## (undated) NOTE — IP AVS SNAPSHOT
[address]            (For Outpatient Use Only) Initial Admit Date: 12/29/2021   Inpt/Obs Admit Date: Inpt: 12/29/21 / Obs: N/A   Discharge Date:    Kristie Cline:  [de-identified]   MRN: [de-identified]   CSN: [id number]   CEID: [id number] Number:  Insurance Type:    Subscriber Name:  Subscriber :    Subscriber ID:  Pt Rel to Subscriber:    Hospital Account Financial Class: Medicare    2022

## (undated) NOTE — LETTER
12/17/21    To whom it may concern,    I am seeing Aditi Yeimi in the office today for evaluation of her wound after abdominal surgery . At this point I believe she has recovered from her infection and no longer needs further antibiotics.   She does,

## (undated) NOTE — IP AVS SNAPSHOT
[address]            (For Outpatient Use Only) Initial Admit Date: 11/18/2021   Inpt/Obs Admit Date: Inpt: 11/18/21 / Obs: N/A   Discharge Date:    Jesus Olivo:  [de-identified]   MRN: [de-identified]   CSN: [id number]   CEID: [id number] Insurance Type:    Subscriber Name:  Subscriber :    Subscriber ID:  Pt Rel to Subscriber:    Hospital Account Financial Class: Medicare    2021

## (undated) NOTE — Clinical Note
Patient's wound has significantly improved, still requires packing. Patient to return to clinic in 1 week for drain removal and wound check. No further signs of infection and antibiotics stopped.

## (undated) NOTE — LETTER
Karen Restrepo 182  [address] [facility] [address], 209 [facility]  Authorization for Surgical Operation and Procedure     Date:___________                                                                                                         Time:__________ the potential risks that can occur: fever and allergic reactions, hemolytic reactions, transmission of diseases such as Hepatitis, AIDS and Cytomegalovirus (CMV) and fluid overload.   In the event that I wish to have an autologous transfusion of my own bloo physician will determine when the applicable recovery period ends for purposes of reinstating the DNAR order.   10. Patients having a sterilization procedure: I understand that if the procedure is successful the results will be permanent and it will therefo anesthesiologist (anesthesia doctor) to give me medicine and do additional procedures as necessary.  Some examples are: Starting or using an “IV” to give me medicine, fluids or blood during my procedure, and having a breathing tube placed to help me breathe “epidural”, & “nerve blocks”): I understand that rare but potential complications include headache, bleeding, infection, seizure, irregular heart rhythms, and nerve injury.     I can change my mind about having anesthesia services at any time before I get

## (undated) NOTE — IP AVS SNAPSHOT
Patient Demographics     Address  [address] [place], [place] [address]radha  Phone  [phone] [facility])  [phone] (Mobile) *Preferred* E-mail Address  [email or web address]      Emergency Contact(s)     Name Relation Home Work Mobile    Phuong Phelps Instructions Authorizing Provider Morning Afternoon Evening As Needed   amLODIPine 10 MG Tabs  Commonly known as: NORVASC  Start taking on: January 8, 2022      Take 1 tablet (10 mg total) by mouth daily.    Yobani Ribeiro MD         apixaban 5 MG Tabs  Com MD Gilliam HandiHaler 18 MCG Caps  Generic drug: Tiotropium Bromide Monohydrate      Inhale 1 capsule (18 mcg total) into the lungs daily.   Stop taking on: February 6, 2022   Ghulam Harris MD               Where to Get Your Medications      These (SYNTHROID) tab 50 mcg 01/07/22 0524 Given      [id number] pantoprazole (PROTONIX) EC tab 40 mg 01/07/22 0524 Given      [id number] polyethylene glycol (PEG 3350) (MIRALAX) powder packet 17 g 01/07/22 1007 Given            LEFT UPPER ARM     Order ID Jonh Burrsi Carboxyhemoglobin 1.6 0.0 - 3.0 % SAT — [facility] ([facility])   Methemoglobin 0.5 0.4 - 1.5 % SAT — [facility] ([facility])   Ionized Calcium 1.26 1.12 - 1.32 mmol/L — [facility])   Yony Test Positive — Griselda Izquierdo [facility] ([facility]) [facility] ([facility]) Kaylen Bales M.D. [address] [address] 02/02/21 1242 - Present            Microbiology Results (All)     Procedure Component Value Units Date/Pawan 1500 [address], [place], [address] [place] [address]. This test is being used under the Food and Drug Administration's Emergency Use Authorization. The authorized Fact Sheet for Healthcare Providers for this assay is available upon request from the laboratory. Medical History:  Past Medical History:   Diagnosis Date   • Acute pancreatitis, unspecified complication status, unspecified pancreatitis type 11/15/2018   • Arthritis    • Back disorder    • Back pain    • Cholangitis    • [facility] ([facility]    • Essential h 1  guaiFENesin-codeine 100-10 MG/5ML Oral Solution, Take 10 mL by mouth every 6 (six) hours as needed for cough or congestion. , Disp: 240 mL, Rfl: 0        Review of Systems:   A comprehensive 14 point review of systems was completed.     Pertinent positive 12/29/21  1027   PCT 0.20*       Cardiac  Recent Labs   Lab 12/29/21  1027   PBNP 9,184*       Creatinine Kinase  No results for input(s): CK in the last 168 hours.     Inflammatory Markers  No results for input(s): CRP, KP, LDH, DDIMER in the last 168 ayaz cholangitis and perforated viscus, pancreatitis complicated by bile leak and RENEA, anemia of chronic disease, hypertension, diabetes and hypothyroidism presented from nursing home with increased shortness of breath since last night.   Patient only speaks Green Noun MG Oral Tab, Take 5 mg by mouth daily.   , Disp: , Rfl:   Levothyroxine Sodium 50 MCG Oral Tab, Take 50 mcg by mouth before breakfast., Disp: , Rfl:   Lutein 40 MG Oral Cap, Take 40 mg by mouth nightly.  , Disp: , Rfl:   zolpidem 5 MG Oral Tab, Take 1 table * 143*   BUN 28* 25*   CREATSERUM 1.98* 1.76*   GFRAA 26* 30*   GFRNAA 23* 26*   CA 8.8 8.6   ALB 2.2* 2.7*    141   K 4.2 4.2    105   CO2 30.0 30.0   ALKPHO 69 83   AST 16 29   ALT 11* 17   BILT 0.2 0.3   TP 6.5 6.9       Estimated C 1:23 PM Status: Signed    : Scott Keyes DO (Physician)     Consult Orders    1.  Consult to Neurology [[id number]] ordered by Meryl Daniel MD at [facility]6             Neurology H&P    [facility] Patient Status:  Inpatient     hypercapnia (HCC)     Acute encephalopathy     Hypercapnia     Metabolic alkalosis     Other pulmonary embolism without acute cor pulmonale (HCC)      PMHx:  Past Medical History:   Diagnosis Date   • Acute pancreatitis, unspecified complication status, un ankles    COORDINATION:  + asterixis/negative myoclonus in the BUE    REFLEXES: 2+ at biceps, 1+ brachioradialis, absent at patella     GAIT: deferred        Labs:  Lab Results   Component Value Date    CREATSERUM 2.70 01/04/2022    BUN 70 01/04/2022    NA radiographs were obtained. PATIENT STATED HISTORY: (As transcribed by Technologist)  Shortness of breath and weak fro several days. No cough. CONCLUSION:  The heart size is within normal limits.   There is slight vascular redistribution witho performed. Additional evaluation included M-mode, complete spectral Doppler, and color Doppler. Inpatient. CCU Comparison was made to the study of 05/14/2018. This was a routine echocardiographic study.  Transthoracic echocardiography for ventricular functi Doppler:  Transvalvular velocity was within the normal range. There was no stenosis. No significant regurgitation. VTI ratio of LVOT to aortic valve: 0.49. Valve area (VTI): 1.85cm^2. Indexed valve area (VTI): 0.94cm^2/m^2.  Peak velocity ratio of LVOT 44    ml/m^2   ---------  LV E/e', lateral                                23             ---------  LV E/e', medial                                 26             ---------   Ventricular septum                              Value          Refe Reference  Aortic root ID, ED                              3.1   cm       <4.1  Ascending aorta ID, A-P, ED                     2.9   cm       ---------   Left atrium                                     Value          Reference  LA ID, A-P, ES and  (H)  joseph values outside specified reference range. ---------------------------------------------------------------------------- Prepared and electronically signed by Anu Carlisle 12/30/2021 16:17         Cath Angiogram Results (HF Patients only impairment in mobility. Research supports that patients with this level of impairment may benefit from MARK. DISCHARGE RECOMMENDATIONS  PT Discharge Recommendations: Sub-acute rehabilitation     PLAN  PT Treatment Plan: Bed mobility; Body mechanics; Endura from Another: Moving to and from a bed to a chair (including a wheelchair)?: A Little   Help from Another: Need to walk in hospital room?: A Little   Help from Another: Climbing 3-5 steps with a railing?: Total       AM-PAC Score:  Raw Score: 16   Approx D Immunizations     Name Date      Covid-19 Joanne Ortegaeyal (J&J) defer-11/30/21     Deferral: Patient Refused     INFLUENZA defer-11/30/21     Deferral: Patient Refused       Future Appointments        Provider Flakito Duarte    1/10/2022 3:00 PM Laura Smith

## (undated) NOTE — LETTER
Patient Name: Evelyne Lewis  Surgery Date: 1/21/2022  CSN: [id number]  Medical Record: [id number]     Surgeon(s):  Bobby Hassan, DO  Consent Procedure: ENDOSCOPIC RETROGRADE CHOLANGIOPANCREATOGRAPHY (ERCP) with stent removal  Anesthesia Type: MAC Carefully monitor your blood sugar while you are holding these medications and contact your prescribing physician with any abnormal blood sugar levels. If you are taking oral medications for your diabetes, you will need to hold the morning dose the day of y

## (undated) NOTE — LETTER
[address]  [address]  Consent for Procedure/Sedation  Date: 9/9/24         Time: 1230    I hereby authorize Dr MELIA Bah, my physician and his/her assistants (if applicable), which may include medical students, residents, and/or fellows, to perform the following surgical operation/ procedure and administer such anesthesia as may be determined necessary by my physician: Permanent Dialysis Catheter Insertion on Asmita Cunningham  2.   I recognize that during the surgical operation/procedure, unforeseen conditions may necessitate additional or different procedures than those listed above.  I, therefore, further authorize and request that the above-named surgeon, assistants, or designees perform such procedures as are, in their judgment, necessary and desirable.    3.   My surgeon/physician has discussed prior to my surgery the potential benefits, risks and side effects of this procedure; the likelihood of achieving goals; and potential problems that might occur during recuperation.  They also discussed reasonable alternatives to the procedure, including risks, benefits, and side effects related to the alternatives and risks related to not receiving this procedure.  I have had all my questions answered and I acknowledge that no guarantee has been made as to the result that may be obtained.    4.   Should the need arise during my operation/procedure, which includes change of level of care prior to discharge, I also consent to the administration of blood and/or blood products.  Further, I understand that despite careful testing and screening of blood or blood products by collecting agencies, I may still be subject to ill effects as a result of receiving a blood transfusion and/or blood products.  The following are some, but not all, of the potential risks that can occur: fever and allergic reactions, hemolytic reactions, transmission of diseases such as Hepatitis, AIDS and  Cytomegalovirus (CMV) and fluid overload.  In the event that I wish to have an autologous transfusion of my own blood, or a directed donor transfusion, I will discuss this with my physician.   Check only if Refusing Blood or Blood Products  I understand refusal of blood or blood products as deemed necessary by my physician may have serious consequences to my condition to include possible death. I hereby assume responsibility for my refusal and release the hospital, its personnel, and my physicians from any responsibility for the consequences of my refusal.         o  Refuse         5.   I authorize the use of any specimen, organs, tissues, body parts or foreign objects that may be removed from my body during the operation/procedure for diagnosis, research or teaching purposes and their subsequent disposal by hospital authorities.  I also authorize the release of specimen test results and/or written reports to my treating physician on the hospital medical staff or other referring or consulting physicians involved in my care, at the discretion of the Pathologist or my treating physician.    6.   I consent to the photographing or videotaping of the operations or procedures to be performed, including appropriate portions of my body for medical, scientific, or educational purposes, provided my identity is not revealed by the pictures or by descriptive texts accompanying them.  If the procedure has been photographed/videotaped, the surgeon will obtain the original picture, image, videotape or CD.  The hospital will not be responsible for storage, release or maintenance of the picture, image, tape or CD.    7.   I consent to the presence of a  or observers in the operating room as deemed necessary by my physician or their designees.    8.   I recognize that in the event my procedure results in extended X-Ray/fluoroscopy time, I may develop a skin reaction.    9. If I have a Do Not Attempt Resuscitation  (DNAR) order in place, that status will be suspended while in the operating room, procedural suite, and during the recovery period unless otherwise explicitly stated by me (or a person authorized to consent on my behalf). The surgeon or my attending physician will determine when the applicable recovery period ends for purposes of reinstating the DNAR order.  10. Patients having a sterilization procedure: I understand that if the procedure is successful the results will be permanent and it will therefore be impossible for me to inseminate, conceive, or bear children.  I also understand that the procedure is intended to result in sterility, although the result has not been guaranteed.   11. I acknowledge that my physician has explained sedation/analgesia administration to me including the risk and benefits I consent to the administration of sedation/analgesia as may be necessary or desirable in the judgment of my physician.    I CERTIFY THAT I HAVE READ AND FULLY UNDERSTAND THE ABOVE CONSENT TO OPERATION and/or OTHER PROCEDURE.        ____________________________________       _________________________________      ______________________________  Signature of Patient         Signature of Responsible Person        Printed Name of Responsible Person        ____________________________________      _________________________________      ______________________________       Signature of Witness          Relationship to Patient                       Date                                       Time  Patient Name: Asmita Cunningham  : 1935    Reviewed: 2024   Printed: 2024  Medical Record #: [id number] Page 1 of 1